# Patient Record
Sex: MALE | Race: WHITE | Employment: UNEMPLOYED | URBAN - METROPOLITAN AREA
[De-identification: names, ages, dates, MRNs, and addresses within clinical notes are randomized per-mention and may not be internally consistent; named-entity substitution may affect disease eponyms.]

---

## 2019-03-22 ENCOUNTER — APPOINTMENT (OUTPATIENT)
Dept: GENERAL RADIOLOGY | Age: 54
End: 2019-03-22
Attending: EMERGENCY MEDICINE
Payer: MEDICAID

## 2019-03-22 ENCOUNTER — HOSPITAL ENCOUNTER (EMERGENCY)
Age: 54
Discharge: HOME | End: 2019-03-22
Attending: EMERGENCY MEDICINE
Payer: MEDICAID

## 2019-03-22 VITALS
DIASTOLIC BLOOD PRESSURE: 88 MMHG | TEMPERATURE: 97.5 F | WEIGHT: 248 LBS | SYSTOLIC BLOOD PRESSURE: 133 MMHG | HEART RATE: 86 BPM | RESPIRATION RATE: 17 BRPM | HEIGHT: 72 IN | BODY MASS INDEX: 33.59 KG/M2 | OXYGEN SATURATION: 96 %

## 2019-03-22 DIAGNOSIS — S30.1XXA CONTUSION OF ABDOMINAL WALL, INITIAL ENCOUNTER: Primary | ICD-10-CM

## 2019-03-22 LAB
ALBUMIN SERPL-MCNC: 3.9 G/DL (ref 3.4–5)
ALBUMIN/GLOB SERPL: 1.1 {RATIO} (ref 0.8–1.7)
ALP SERPL-CCNC: 75 U/L (ref 45–117)
ALT SERPL-CCNC: 46 U/L (ref 16–61)
ANION GAP SERPL CALC-SCNC: 7 MMOL/L (ref 3–18)
APPEARANCE UR: CLEAR
AST SERPL-CCNC: 22 U/L (ref 15–37)
ATRIAL RATE: 77 BPM
BASOPHILS # BLD: 0 K/UL (ref 0–0.1)
BASOPHILS NFR BLD: 0 % (ref 0–2)
BILIRUB SERPL-MCNC: 0.4 MG/DL (ref 0.2–1)
BILIRUB UR QL: NEGATIVE
BUN SERPL-MCNC: 17 MG/DL (ref 7–18)
BUN/CREAT SERPL: 15 (ref 12–20)
CALCIUM SERPL-MCNC: 8.5 MG/DL (ref 8.5–10.1)
CALCULATED P AXIS, ECG09: 38 DEGREES
CALCULATED R AXIS, ECG10: -30 DEGREES
CALCULATED T AXIS, ECG11: 22 DEGREES
CHLORIDE SERPL-SCNC: 104 MMOL/L (ref 100–108)
CO2 SERPL-SCNC: 26 MMOL/L (ref 21–32)
COLOR UR: YELLOW
CREAT SERPL-MCNC: 1.14 MG/DL (ref 0.6–1.3)
DIAGNOSIS, 93000: NORMAL
DIFFERENTIAL METHOD BLD: NORMAL
EOSINOPHIL # BLD: 0.2 K/UL (ref 0–0.4)
EOSINOPHIL NFR BLD: 2 % (ref 0–5)
ERYTHROCYTE [DISTWIDTH] IN BLOOD BY AUTOMATED COUNT: 12.3 % (ref 11.6–14.5)
GLOBULIN SER CALC-MCNC: 3.6 G/DL (ref 2–4)
GLUCOSE SERPL-MCNC: 317 MG/DL (ref 74–99)
GLUCOSE UR STRIP.AUTO-MCNC: >1000 MG/DL
HCT VFR BLD AUTO: 42.4 % (ref 36–48)
HGB BLD-MCNC: 14.9 G/DL (ref 13–16)
HGB UR QL STRIP: NEGATIVE
KETONES UR QL STRIP.AUTO: NEGATIVE MG/DL
LEUKOCYTE ESTERASE UR QL STRIP.AUTO: NEGATIVE
LIPASE SERPL-CCNC: 282 U/L (ref 73–393)
LYMPHOCYTES # BLD: 3.5 K/UL (ref 0.9–3.6)
LYMPHOCYTES NFR BLD: 35 % (ref 21–52)
MCH RBC QN AUTO: 31.2 PG (ref 24–34)
MCHC RBC AUTO-ENTMCNC: 35.1 G/DL (ref 31–37)
MCV RBC AUTO: 88.7 FL (ref 74–97)
MONOCYTES # BLD: 0.6 K/UL (ref 0.05–1.2)
MONOCYTES NFR BLD: 6 % (ref 3–10)
NEUTS SEG # BLD: 5.5 K/UL (ref 1.8–8)
NEUTS SEG NFR BLD: 57 % (ref 40–73)
NITRITE UR QL STRIP.AUTO: NEGATIVE
P-R INTERVAL, ECG05: 186 MS
PH UR STRIP: 5 [PH] (ref 5–8)
PLATELET # BLD AUTO: 148 K/UL (ref 135–420)
PMV BLD AUTO: 10.6 FL (ref 9.2–11.8)
POTASSIUM SERPL-SCNC: 4 MMOL/L (ref 3.5–5.5)
PROT SERPL-MCNC: 7.5 G/DL (ref 6.4–8.2)
PROT UR STRIP-MCNC: NEGATIVE MG/DL
Q-T INTERVAL, ECG07: 366 MS
QRS DURATION, ECG06: 110 MS
QTC CALCULATION (BEZET), ECG08: 414 MS
RBC # BLD AUTO: 4.78 M/UL (ref 4.7–5.5)
SODIUM SERPL-SCNC: 137 MMOL/L (ref 136–145)
SP GR UR REFRACTOMETRY: >1.03 (ref 1–1.03)
UROBILINOGEN UR QL STRIP.AUTO: 0.2 EU/DL (ref 0.2–1)
VENTRICULAR RATE, ECG03: 77 BPM
WBC # BLD AUTO: 9.8 K/UL (ref 4.6–13.2)

## 2019-03-22 PROCEDURE — 71046 X-RAY EXAM CHEST 2 VIEWS: CPT

## 2019-03-22 PROCEDURE — 96374 THER/PROPH/DIAG INJ IV PUSH: CPT

## 2019-03-22 PROCEDURE — 74011000250 HC RX REV CODE- 250: Performed by: EMERGENCY MEDICINE

## 2019-03-22 PROCEDURE — 83690 ASSAY OF LIPASE: CPT

## 2019-03-22 PROCEDURE — 80053 COMPREHEN METABOLIC PANEL: CPT

## 2019-03-22 PROCEDURE — 93005 ELECTROCARDIOGRAM TRACING: CPT

## 2019-03-22 PROCEDURE — 99284 EMERGENCY DEPT VISIT MOD MDM: CPT

## 2019-03-22 PROCEDURE — 74011250636 HC RX REV CODE- 250/636: Performed by: EMERGENCY MEDICINE

## 2019-03-22 PROCEDURE — 85025 COMPLETE CBC W/AUTO DIFF WBC: CPT

## 2019-03-22 PROCEDURE — 81003 URINALYSIS AUTO W/O SCOPE: CPT

## 2019-03-22 RX ORDER — LIDOCAINE 50 MG/G
PATCH TOPICAL
Qty: 5 EACH | Refills: 0 | Status: SHIPPED | OUTPATIENT
Start: 2019-03-22

## 2019-03-22 RX ORDER — LIDOCAINE 4 G/100G
1 PATCH TOPICAL EVERY 24 HOURS
Status: DISCONTINUED | OUTPATIENT
Start: 2019-03-22 | End: 2019-03-22 | Stop reason: HOSPADM

## 2019-03-22 RX ORDER — KETOROLAC TROMETHAMINE 30 MG/ML
30 INJECTION, SOLUTION INTRAMUSCULAR; INTRAVENOUS
Status: COMPLETED | OUTPATIENT
Start: 2019-03-22 | End: 2019-03-22

## 2019-03-22 RX ADMIN — KETOROLAC TROMETHAMINE 30 MG: 30 INJECTION, SOLUTION INTRAMUSCULAR; INTRAVENOUS at 15:14

## 2019-03-22 RX ADMIN — SODIUM CHLORIDE 500 ML: 900 INJECTION, SOLUTION INTRAVENOUS at 15:14

## 2019-03-22 NOTE — ED TRIAGE NOTES
3 days of upper right abdominal pain under rib cage. Diarrhea. Denies any injury. C/o heartburn. 7 days ago stopped smoking

## 2019-03-22 NOTE — ED NOTES
I have reviewed discharge instruction and prescriptions with patient. Patient verbalized understanding and has no further questions at this time. Education taught and patient verbalized understanding of education. Teach back method used. Left ac IV removed, catheter tip intact on removal.  Armband removed and shredded per patients request.   
Patients pain 3/10. Belongings given to patient. Patient discharged with self to home.

## 2019-03-22 NOTE — ED PROVIDER NOTES
EMERGENCY DEPARTMENT HISTORY AND PHYSICAL EXAM 
 
2:36 PM 
 
 
Date: 3/22/2019 Patient Name: Driss Narayan History of Presenting Illness Chief Complaint Patient presents with  Abdominal Pain  Rib Pain History Provided By: Patient Additional History (Context): Driss Narayan is a 52 y.o. male who presents with right upper quadrant pain times 1 day worse with movement worse with taking a deep breath denies nausea vomiting denies any increased discomfort with eating does have increased discomfort with movement denies cough fevers chills or shortness of breath. Denies any trauma PCP: None Chief Complaint: Right upper quadrant pain Current Facility-Administered Medications Medication Dose Route Frequency Provider Last Rate Last Dose  lidocaine 4 % patch 1 Patch  1 Patch TransDERmal Q24H Sandhya Pardo MD      
 
Current Outpatient Medications Medication Sig Dispense Refill  lidocaine (LIDODERM) 5 % Apply patch to the affected area for 12 hours a day and remove for 12 hours a day. 5 Each 0 Past History Past Medical History: 
History reviewed. No pertinent past medical history. Past Surgical History: 
History reviewed. No pertinent surgical history. Family History: 
History reviewed. No pertinent family history. Social History: 
Social History Tobacco Use  Smoking status: Former Smoker  Smokeless tobacco: Never Used Substance Use Topics  Alcohol use: Not on file  Drug use: Not on file Allergies: 
No Known Allergies Review of Systems Review of Systems Constitutional: Negative for chills, diaphoresis, fatigue and fever. Respiratory: Negative for cough, chest tightness and shortness of breath. Cardiovascular: Negative for chest pain. Gastrointestinal: Negative for abdominal pain, nausea and vomiting. Genitourinary: Negative for dysuria and flank pain. Musculoskeletal: Negative for back pain. Skin: Negative for rash. Neurological: Negative for dizziness, syncope, weakness and headaches. Hematological: Negative. All other systems reviewed and are negative. Physical Exam  
 
Visit Vitals /88 (BP 1 Location: Right arm, BP Patient Position: At rest) Pulse 86 Temp 97.5 °F (36.4 °C) Resp 17 Ht 6' (1.829 m) Wt 112.5 kg (248 lb) SpO2 96% BMI 33.63 kg/m² Physical Exam  
Constitutional: He is oriented to person, place, and time. He appears well-developed and well-nourished. No distress. HENT:  
Head: Normocephalic and atraumatic. Mouth/Throat: Oropharynx is clear and moist.  
Eyes: Pupils are equal, round, and reactive to light. Conjunctivae and EOM are normal. No scleral icterus. Neck: Normal range of motion. Neck supple. Cardiovascular: Normal rate, regular rhythm and normal heart sounds. No murmur heard. Pulmonary/Chest: Effort normal and breath sounds normal. No respiratory distress. Abdominal: Soft. Bowel sounds are normal. He exhibits no distension. There is no tenderness. Mild right upper quadrant tender tenderness negative Fitzgerald sign Musculoskeletal: He exhibits no edema. Lymphadenopathy:  
  He has no cervical adenopathy. Neurological: He is alert and oriented to person, place, and time. Coordination normal.  
Skin: Skin is warm and dry. No rash noted. Psychiatric: He has a normal mood and affect. His behavior is normal.  
Nursing note and vitals reviewed. Diagnostic Study Results Labs - Recent Results (from the past 12 hour(s)) EKG, 12 LEAD, INITIAL Collection Time: 03/22/19  1:23 PM  
Result Value Ref Range Ventricular Rate 77 BPM  
 Atrial Rate 77 BPM  
 P-R Interval 186 ms QRS Duration 110 ms  
 Q-T Interval 366 ms  
 QTC Calculation (Bezet) 414 ms Calculated P Axis 38 degrees Calculated R Axis -30 degrees Calculated T Axis 22 degrees Diagnosis Normal sinus rhythm Left axis deviation Abnormal ECG No previous ECGs available Confirmed by Latasha Augustin ([id number]) on 3/22/2019 3:14:00 PM 
  
CBC WITH AUTOMATED DIFF Collection Time: 03/22/19  2:00 PM  
Result Value Ref Range WBC 9.8 4.6 - 13.2 K/uL  
 RBC 4.78 4.70 - 5.50 M/uL  
 HGB 14.9 13.0 - 16.0 g/dL HCT 42.4 36.0 - 48.0 % MCV 88.7 74.0 - 97.0 FL  
 MCH 31.2 24.0 - 34.0 PG  
 MCHC 35.1 31.0 - 37.0 g/dL  
 RDW 12.3 11.6 - 14.5 % PLATELET 802 458 - 060 K/uL MPV 10.6 9.2 - 11.8 FL  
 NEUTROPHILS 57 40 - 73 % LYMPHOCYTES 35 21 - 52 % MONOCYTES 6 3 - 10 % EOSINOPHILS 2 0 - 5 % BASOPHILS 0 0 - 2 %  
 ABS. NEUTROPHILS 5.5 1.8 - 8.0 K/UL  
 ABS. LYMPHOCYTES 3.5 0.9 - 3.6 K/UL  
 ABS. MONOCYTES 0.6 0.05 - 1.2 K/UL  
 ABS. EOSINOPHILS 0.2 0.0 - 0.4 K/UL  
 ABS. BASOPHILS 0.0 0.0 - 0.1 K/UL  
 DF AUTOMATED METABOLIC PANEL, COMPREHENSIVE Collection Time: 03/22/19  2:00 PM  
Result Value Ref Range Sodium 137 136 - 145 mmol/L Potassium 4.0 3.5 - 5.5 mmol/L Chloride 104 100 - 108 mmol/L  
 CO2 26 21 - 32 mmol/L Anion gap 7 3.0 - 18 mmol/L Glucose 317 (H) 74 - 99 mg/dL BUN 17 7.0 - 18 MG/DL Creatinine 1.14 0.6 - 1.3 MG/DL  
 BUN/Creatinine ratio 15 12 - 20 GFR est AA >60 >60 ml/min/1.73m2 GFR est non-AA >60 >60 ml/min/1.73m2 Calcium 8.5 8.5 - 10.1 MG/DL Bilirubin, total 0.4 0.2 - 1.0 MG/DL  
 ALT (SGPT) 46 16 - 61 U/L  
 AST (SGOT) 22 15 - 37 U/L Alk. phosphatase 75 45 - 117 U/L Protein, total 7.5 6.4 - 8.2 g/dL Albumin 3.9 3.4 - 5.0 g/dL Globulin 3.6 2.0 - 4.0 g/dL A-G Ratio 1.1 0.8 - 1.7 LIPASE Collection Time: 03/22/19  2:00 PM  
Result Value Ref Range Lipase 282 73 - 393 U/L Radiologic Studies -  
XR CHEST PA LAT Final Result IMPRESSION:  
  
No acute pulmonary process identified. Medical Decision Making I am the first provider for this patient.  
 
I reviewed the vital signs, available nursing notes, past medical history, past surgical history, family history and social history. Vital Signs-Reviewed the patient's vital signs. EKG: 
 
Records Reviewed: Nursing Notes and Old Medical Records (Time of Review: 2:36 PM) 
 
ED Course: Progress Notes, Reevaluation, and Consults: 
 
 
Provider Notes (Medical Decision Making): MDM Number of Diagnoses or Management Options Contusion of abdominal wall, initial encounter:  
Diagnosis management comments: R UQ pain worse with movement no n/v/d no radiations mild tenderness to R UQ no owen's sign 3:53 PM 
On reevaluation labs are negative white count and LFTs are normal after Toradol patient is sitting up does not appear to be in distress on reevaluation of his abdomen there is a specific area of tenderness to the abdominal wall at the attachment of the ribs in the anterior axillary line there is no evidence of a hematoma there is no evidence of infection and there was no tenderness surrounding the remainder of the abdomen lidocaine patch for pain Diagnosis Clinical Impression: 1. Contusion of abdominal wall, initial encounter Disposition: home Follow-up Information Follow up With Specialties Details Why Contact formerly [facility] EMERGENCY DEPT Emergency Medicine  As needed, If symptoms worsen [address] [facility] 
[phone] Your Doctor for recheck this week Nancy  Schedule an appointment as soon as possible for a visit for ED follow up appointment  [address] [place] [address]) [phone] Patient's Medications Start Taking LIDOCAINE (LIDODERM) 5 %    Apply patch to the affected area for 12 hours a day and remove for 12 hours a day. Continue Taking No medications on file These Medications have changed No medications on file Stop Taking No medications on file  
 
_______________________________

## 2019-11-03 ENCOUNTER — HOSPITAL ENCOUNTER (EMERGENCY)
Age: 54
Discharge: HOME | End: 2019-11-04
Attending: EMERGENCY MEDICINE | Admitting: EMERGENCY MEDICINE
Payer: MEDICAID

## 2019-11-03 ENCOUNTER — APPOINTMENT (OUTPATIENT)
Dept: CT IMAGING | Age: 54
End: 2019-11-03
Attending: EMERGENCY MEDICINE
Payer: MEDICAID

## 2019-11-03 ENCOUNTER — APPOINTMENT (OUTPATIENT)
Dept: MRI IMAGING | Age: 54
End: 2019-11-03
Attending: EMERGENCY MEDICINE
Payer: MEDICAID

## 2019-11-03 DIAGNOSIS — I26.99 OTHER ACUTE PULMONARY EMBOLISM WITHOUT ACUTE COR PULMONALE (HCC): ICD-10-CM

## 2019-11-03 DIAGNOSIS — R10.13 ABDOMINAL PAIN, ACUTE, EPIGASTRIC: ICD-10-CM

## 2019-11-03 DIAGNOSIS — K86.89 PANCREATIC MASS: Primary | ICD-10-CM

## 2019-11-03 LAB
ALBUMIN SERPL-MCNC: 3.9 G/DL (ref 3.4–5)
ALBUMIN/GLOB SERPL: 1 {RATIO} (ref 0.8–1.7)
ALP SERPL-CCNC: 120 U/L (ref 45–117)
ALT SERPL-CCNC: 21 U/L (ref 16–61)
ANION GAP SERPL CALC-SCNC: 5 MMOL/L (ref 3–18)
AST SERPL-CCNC: 18 U/L (ref 10–38)
BASOPHILS # BLD: 0 K/UL (ref 0–0.1)
BASOPHILS NFR BLD: 0 % (ref 0–2)
BILIRUB SERPL-MCNC: 0.7 MG/DL (ref 0.2–1)
BUN SERPL-MCNC: 14 MG/DL (ref 7–18)
BUN/CREAT SERPL: 16 (ref 12–20)
CALCIUM SERPL-MCNC: 9 MG/DL (ref 8.5–10.1)
CHLORIDE SERPL-SCNC: 102 MMOL/L (ref 100–111)
CK MB CFR SERPL CALC: 1.5 % (ref 0–4)
CK MB SERPL-MCNC: 1.4 NG/ML (ref 5–25)
CK SERPL-CCNC: 94 U/L (ref 39–308)
CO2 SERPL-SCNC: 29 MMOL/L (ref 21–32)
CREAT SERPL-MCNC: 0.86 MG/DL (ref 0.6–1.3)
DIFFERENTIAL METHOD BLD: ABNORMAL
EOSINOPHIL # BLD: 0.2 K/UL (ref 0–0.4)
EOSINOPHIL NFR BLD: 2 % (ref 0–5)
ERYTHROCYTE [DISTWIDTH] IN BLOOD BY AUTOMATED COUNT: 13 % (ref 11.6–14.5)
GLOBULIN SER CALC-MCNC: 4 G/DL (ref 2–4)
GLUCOSE SERPL-MCNC: 199 MG/DL (ref 74–99)
HCT VFR BLD AUTO: 39.2 % (ref 36–48)
HGB BLD-MCNC: 13.3 G/DL (ref 13–16)
LIPASE SERPL-CCNC: 152 U/L (ref 73–393)
LYMPHOCYTES # BLD: 2.8 K/UL (ref 0.9–3.6)
LYMPHOCYTES NFR BLD: 29 % (ref 21–52)
MCH RBC QN AUTO: 29.9 PG (ref 24–34)
MCHC RBC AUTO-ENTMCNC: 33.9 G/DL (ref 31–37)
MCV RBC AUTO: 88.1 FL (ref 74–97)
MONOCYTES # BLD: 0.8 K/UL (ref 0.05–1.2)
MONOCYTES NFR BLD: 8 % (ref 3–10)
NEUTS SEG # BLD: 5.8 K/UL (ref 1.8–8)
NEUTS SEG NFR BLD: 61 % (ref 40–73)
PLATELET # BLD AUTO: 111 K/UL (ref 135–420)
PMV BLD AUTO: 10.3 FL (ref 9.2–11.8)
POTASSIUM SERPL-SCNC: 3.8 MMOL/L (ref 3.5–5.5)
PROT SERPL-MCNC: 7.9 G/DL (ref 6.4–8.2)
RBC # BLD AUTO: 4.45 M/UL (ref 4.7–5.5)
SODIUM SERPL-SCNC: 136 MMOL/L (ref 136–145)
TROPONIN I SERPL-MCNC: 0.11 NG/ML (ref 0–0.04)
TROPONIN I SERPL-MCNC: 0.11 NG/ML (ref 0–0.04)
WBC # BLD AUTO: 9.5 K/UL (ref 4.6–13.2)

## 2019-11-03 PROCEDURE — 82550 ASSAY OF CK (CPK): CPT

## 2019-11-03 PROCEDURE — 96374 THER/PROPH/DIAG INJ IV PUSH: CPT

## 2019-11-03 PROCEDURE — 83690 ASSAY OF LIPASE: CPT

## 2019-11-03 PROCEDURE — 77030021566 MRI ABD W MRCP W WO CONT

## 2019-11-03 PROCEDURE — 93005 ELECTROCARDIOGRAM TRACING: CPT

## 2019-11-03 PROCEDURE — 74011000250 HC RX REV CODE- 250: Performed by: EMERGENCY MEDICINE

## 2019-11-03 PROCEDURE — 74011636320 HC RX REV CODE- 636/320: Performed by: EMERGENCY MEDICINE

## 2019-11-03 PROCEDURE — 80053 COMPREHEN METABOLIC PANEL: CPT

## 2019-11-03 PROCEDURE — 85025 COMPLETE CBC W/AUTO DIFF WBC: CPT

## 2019-11-03 PROCEDURE — 74011250637 HC RX REV CODE- 250/637: Performed by: EMERGENCY MEDICINE

## 2019-11-03 PROCEDURE — A9575 INJ GADOTERATE MEGLUMI 0.1ML: HCPCS | Performed by: EMERGENCY MEDICINE

## 2019-11-03 PROCEDURE — 96375 TX/PRO/DX INJ NEW DRUG ADDON: CPT

## 2019-11-03 PROCEDURE — 99284 EMERGENCY DEPT VISIT MOD MDM: CPT

## 2019-11-03 PROCEDURE — 74177 CT ABD & PELVIS W/CONTRAST: CPT

## 2019-11-03 PROCEDURE — 74011250636 HC RX REV CODE- 250/636: Performed by: EMERGENCY MEDICINE

## 2019-11-03 RX ORDER — PHENOL/SODIUM PHENOLATE
20 AEROSOL, SPRAY (ML) MUCOUS MEMBRANE DAILY
Qty: 30 TAB | Refills: 3 | Status: SHIPPED | OUTPATIENT
Start: 2019-11-03 | End: 2019-11-12

## 2019-11-03 RX ORDER — HYDROCODONE BITARTRATE AND ACETAMINOPHEN 5; 325 MG/1; MG/1
1 TABLET ORAL
Qty: 20 TAB | Refills: 0 | Status: SHIPPED | OUTPATIENT
Start: 2019-11-03 | End: 2019-11-10

## 2019-11-03 RX ORDER — MORPHINE SULFATE 2 MG/ML
2 INJECTION, SOLUTION INTRAMUSCULAR; INTRAVENOUS
Status: COMPLETED | OUTPATIENT
Start: 2019-11-03 | End: 2019-11-03

## 2019-11-03 RX ORDER — ONDANSETRON 4 MG/1
4 TABLET, ORALLY DISINTEGRATING ORAL
Qty: 20 TAB | Refills: 0 | Status: SHIPPED | OUTPATIENT
Start: 2019-11-03 | End: 2020-02-12

## 2019-11-03 RX ORDER — FAMOTIDINE 10 MG/ML
20 INJECTION INTRAVENOUS
Status: COMPLETED | OUTPATIENT
Start: 2019-11-03 | End: 2019-11-03

## 2019-11-03 RX ORDER — ONDANSETRON 2 MG/ML
4 INJECTION INTRAMUSCULAR; INTRAVENOUS
Status: COMPLETED | OUTPATIENT
Start: 2019-11-03 | End: 2019-11-03

## 2019-11-03 RX ORDER — GADOTERATE MEGLUMINE 376.9 MG/ML
20 INJECTION INTRAVENOUS
Status: COMPLETED | OUTPATIENT
Start: 2019-11-03 | End: 2019-11-03

## 2019-11-03 RX ORDER — NAPROXEN 500 MG/1
500 TABLET ORAL 2 TIMES DAILY WITH MEALS
Qty: 20 TAB | Refills: 0 | Status: SHIPPED | OUTPATIENT
Start: 2019-11-03 | End: 2019-11-15

## 2019-11-03 RX ORDER — HYDROCODONE BITARTRATE AND ACETAMINOPHEN 7.5; 325 MG/1; MG/1
1 TABLET ORAL
Status: COMPLETED | OUTPATIENT
Start: 2019-11-03 | End: 2019-11-03

## 2019-11-03 RX ADMIN — MORPHINE SULFATE 2 MG: 2 INJECTION, SOLUTION INTRAMUSCULAR; INTRAVENOUS at 18:44

## 2019-11-03 RX ADMIN — GADOTERATE MEGLUMINE 20 ML: 376.9 INJECTION INTRAVENOUS at 22:46

## 2019-11-03 RX ADMIN — LIDOCAINE HYDROCHLORIDE 40 ML: 20 SOLUTION ORAL; TOPICAL at 18:42

## 2019-11-03 RX ADMIN — FAMOTIDINE 20 MG: 10 INJECTION, SOLUTION INTRAVENOUS at 18:39

## 2019-11-03 RX ADMIN — HYDROCODONE BITARTRATE AND ACETAMINOPHEN 1 TABLET: 7.5; 325 TABLET ORAL at 23:48

## 2019-11-03 RX ADMIN — IOPAMIDOL 100 ML: 612 INJECTION, SOLUTION INTRAVENOUS at 19:19

## 2019-11-03 RX ADMIN — ONDANSETRON 4 MG: 2 INJECTION INTRAMUSCULAR; INTRAVENOUS at 18:39

## 2019-11-03 RX ADMIN — SODIUM CHLORIDE 1000 ML: 900 INJECTION, SOLUTION INTRAVENOUS at 18:38

## 2019-11-03 NOTE — ED PROVIDER NOTES
Sulma Valera is a 62 y.o. Male with history of insulin diabetes with complaints of upper mid sharp abdominal pain has been constant for the last 5 days. Pain is worse after eating within 5 minutes. Patient tried taking laxatives without sniffing relief but did have diarrhea after taking those. He denies any blood in stool or melena. He has had no fever. He is never had pain like this before in the past.  He is never had any significant abdominal issues to biliary, peptic ulcer disease, pancreatic. He denies any alcohol use. The history is provided by the patient and medical records. History reviewed. No pertinent past medical history. History reviewed. No pertinent surgical history. History reviewed. No pertinent family history.     Social History     Socioeconomic History    Marital status:      Spouse name: Not on file    Number of children: Not on file    Years of education: Not on file    Highest education level: Not on file   Occupational History    Not on file   Social Needs    Financial resource strain: Not on file    Food insecurity:     Worry: Not on file     Inability: Not on file    Transportation needs:     Medical: Not on file     Non-medical: Not on file   Tobacco Use    Smoking status: Former Smoker    Smokeless tobacco: Never Used   Substance and Sexual Activity    Alcohol use: Not on file    Drug use: Not on file    Sexual activity: Not on file   Lifestyle    Physical activity:     Days per week: Not on file     Minutes per session: Not on file    Stress: Not on file   Relationships    Social connections:     Talks on phone: Not on file     Gets together: Not on file     Attends [employer] service: Not on file     Active member of club or organization: Not on file     Attends meetings of clubs or organizations: Not on file     Relationship status: Not on file    Intimate partner violence:     Fear of current or ex partner: Not on file     Emotionally abused: Not on file     Physically abused: Not on file     Forced sexual activity: Not on file   Other Topics Concern    Not on file   Social History Narrative    Not on file         ALLERGIES: Patient has no known allergies. Review of Systems   Constitutional: Negative for fever. HENT: Negative for sore throat and trouble swallowing. Eyes: Negative for visual disturbance. Respiratory: Negative for shortness of breath. Cardiovascular: Negative for chest pain. Gastrointestinal: Positive for abdominal pain. Negative for blood in stool and constipation. Genitourinary: Negative for difficulty urinating, flank pain and hematuria. Musculoskeletal: Negative for gait problem. Skin: Negative for rash. Allergic/Immunologic: Negative for immunocompromised state. Neurological: Negative for syncope. Psychiatric/Behavioral: Positive for sleep disturbance. Vitals:    11/03/19 1806 11/03/19 1810 11/03/19 1820 11/03/19 1830   BP:  122/85 115/80 123/70   Pulse: 81 81 76 78   Resp: 15 13 11 15   Temp:       SpO2: 99% 99% 98% 98%            Physical Exam   Constitutional: He is oriented to person, place, and time. He appears well-developed and well-nourished. Non-toxic appearance. He does not appear ill. No distress. HENT:   Head: Normocephalic and atraumatic. Right Ear: External ear normal.   Left Ear: External ear normal.   Nose: Nose normal.   Mouth/Throat: Oropharynx is clear and moist. No oropharyngeal exudate. Eyes: Conjunctivae are normal.   Neck: Normal range of motion. Cardiovascular: Normal rate, regular rhythm, normal heart sounds and intact distal pulses. Pulmonary/Chest: Effort normal and breath sounds normal. No respiratory distress. Abdominal: Soft. Normal appearance. He exhibits no pulsatile midline mass. There is no hepatosplenomegaly. There is tenderness in the epigastric area.  There is no rigidity, no rebound, no guarding, no CVA tenderness, no tenderness at McBurney's point and negative Fitzgerald's sign. Musculoskeletal: Normal range of motion. He exhibits no edema. Neurological: He is alert and oriented to person, place, and time. Skin: Skin is warm and dry. Capillary refill takes less than 2 seconds. He is not diaphoretic. Psychiatric: His behavior is normal.   Nursing note and vitals reviewed.        MDM       Procedures  Vitals:  Patient Vitals for the past 12 hrs:   Temp Pulse Resp BP SpO2   11/03/19 1830  78 15 123/70 98 %   11/03/19 1820  76 11 115/80 98 %   11/03/19 1810  81 13 122/85 99 %   11/03/19 1806  81 15  99 %   11/03/19 1805  81 15 127/87    11/03/19 1745 98.6 °F (37 °C) 75 18 (!) 138/98 100 %         Medications ordered:   Medications   sodium chloride 0.9 % bolus infusion 1,000 mL (1,000 mL IntraVENous New Bag 11/3/19 1838)   mylanta/viscous lidocaine (GI COCKTAIL) (40 mL Oral Given 11/3/19 1842)   famotidine (PF) (PEPCID) injection 20 mg (20 mg IntraVENous Given 11/3/19 1839)   morphine injection 2 mg (2 mg IntraVENous Given 11/3/19 1844)   ondansetron (ZOFRAN) injection 4 mg (4 mg IntraVENous Given 11/3/19 1839)   iopamidol (ISOVUE 300) 61 % contrast injection 100 mL (100 mL IntraVENous Given 11/3/19 1919)         Lab findings:  Recent Results (from the past 12 hour(s))   EKG, 12 LEAD, INITIAL    Collection Time: 11/03/19  6:08 PM   Result Value Ref Range    Ventricular Rate 78 BPM    Atrial Rate 78 BPM    P-R Interval 188 ms    QRS Duration 100 ms    Q-T Interval 372 ms    QTC Calculation (Bezet) 424 ms    Calculated P Axis 33 degrees    Calculated R Axis -27 degrees    Calculated T Axis 19 degrees    Diagnosis       Normal sinus rhythm  Normal ECG  When compared with ECG of 22-MAR-2019 13:23,  No significant change was found     CBC WITH AUTOMATED DIFF    Collection Time: 11/03/19  6:23 PM   Result Value Ref Range    WBC 9.5 4.6 - 13.2 K/uL    RBC 4.45 (L) 4.70 - 5.50 M/uL    HGB 13.3 13.0 - 16.0 g/dL    HCT 39.2 36.0 - 48.0 %    MCV 88.1 74.0 - 97.0 FL    MCH 29.9 24.0 - 34.0 PG    MCHC 33.9 31.0 - 37.0 g/dL    RDW 13.0 11.6 - 14.5 %    PLATELET 528 (L) 289 - 420 K/uL    MPV 10.3 9.2 - 11.8 FL    NEUTROPHILS 61 40 - 73 %    LYMPHOCYTES 29 21 - 52 %    MONOCYTES 8 3 - 10 %    EOSINOPHILS 2 0 - 5 %    BASOPHILS 0 0 - 2 %    ABS. NEUTROPHILS 5.8 1.8 - 8.0 K/UL    ABS. LYMPHOCYTES 2.8 0.9 - 3.6 K/UL    ABS. MONOCYTES 0.8 0.05 - 1.2 K/UL    ABS. EOSINOPHILS 0.2 0.0 - 0.4 K/UL    ABS. BASOPHILS 0.0 0.0 - 0.1 K/UL    DF AUTOMATED     METABOLIC PANEL, COMPREHENSIVE    Collection Time: 11/03/19  6:23 PM   Result Value Ref Range    Sodium 136 136 - 145 mmol/L    Potassium 3.8 3.5 - 5.5 mmol/L    Chloride 102 100 - 111 mmol/L    CO2 29 21 - 32 mmol/L    Anion gap 5 3.0 - 18 mmol/L    Glucose 199 (H) 74 - 99 mg/dL    BUN 14 7.0 - 18 MG/DL    Creatinine 0.86 0.6 - 1.3 MG/DL    BUN/Creatinine ratio 16 12 - 20      GFR est AA >60 >60 ml/min/1.73m2    GFR est non-AA >60 >60 ml/min/1.73m2    Calcium 9.0 8.5 - 10.1 MG/DL    Bilirubin, total 0.7 0.2 - 1.0 MG/DL    ALT (SGPT) 21 16 - 61 U/L    AST (SGOT) 18 10 - 38 U/L    Alk.  phosphatase 120 (H) 45 - 117 U/L    Protein, total 7.9 6.4 - 8.2 g/dL    Albumin 3.9 3.4 - 5.0 g/dL    Globulin 4.0 2.0 - 4.0 g/dL    A-G Ratio 1.0 0.8 - 1.7     LIPASE    Collection Time: 11/03/19  6:23 PM   Result Value Ref Range    Lipase 152 73 - 393 U/L   CARDIAC PANEL,(CK, CKMB & TROPONIN)    Collection Time: 11/03/19  6:23 PM   Result Value Ref Range    CK 94 39 - 308 U/L    CK - MB 1.4 <3.6 ng/ml    CK-MB Index 1.5 0.0 - 4.0 %    Troponin-I, QT 0.11 (H) 0.0 - 0.045 NG/ML       EKG interpretation by ED Physician:  Normal sinus rhythm with no acute ST or T wave changes  Normal EKG  Rate 78    No significant change from previous EKG    X-Ray, CT or other radiology findings or impressions:  CT ABD PELV W CONT    (Results Pending)   MRI ABD W MRCP W WO CONT    (Results Pending)   CT abdomen pelvis: Hypoenhancing mass in the pancreatic body measuring 4.7 x 2.8 cm which encases and narrows the splenic artery with surrounding local alex-pancreatic fat infiltration. No other vascular involvement. This is highly concerning for primary pancreatic malignancy. Multiple hepatic hypodensities of varying sizes concerning for metastatic disease. Elongated hypodensity in the posterior spleen measuring up to 3.3 cm possibly focus of metastatic disease or infarct. Enlarged portacaval node measuring up to 1.5 cm concerning for metastatic disease    MRI abdomen: Large peripherally enhancing hyperintense mass in the body and tail the pancreas measuring up to 5 cm with upstream atrophy and pancreatic duct dilatation concerning for neoplasm. Numerous peripheral enhancing hyperintense masses seen throughout the liver concerning for metastatic disease. No significant biliary dilatation or obstruction. Tiny cyst within both kidneys. Spinal megaly with a small nonenhancing lesion demonstrates increased heterogeneity within the spleen    Progress notes, Consult notes or additional Procedure notes:   Discussed with patient, grandmother and sister regarding the results and this is highly likely pancreatic cancer. Will get MRI tonight to further evaluate for staging. Discussed with patient need for close follow-up with oncology this week. Do not feel patient requires admission at this time    I have discussed with patient and/or family/sig other the results, interpretation of any imaging if performed, suspected diagnosis and treatment plan to include instructions regarding the diagnoses listed to which understanding was expressed with all questions answered    Reevaluation of patient:   stable    Disposition:  Diagnosis:   1. Pancreatic mass    2.  Abdominal pain, acute, epigastric        Disposition: home      Follow-up Information    None           Patient's Medications   Start Taking    No medications on file   Continue Taking    LIDOCAINE (LIDODERM) 5 % Apply patch to the affected area for 12 hours a day and remove for 12 hours a day. These Medications have changed    No medications on file   Stop Taking    No medications on file       3:11 AM\  , Radiologist noted there is an over read patient is small right lower subsegmental PE.     Attempted to call patient but no answer and no answer when called emergency contact

## 2019-11-03 NOTE — ED TRIAGE NOTES
Severe upper abdominal pain started yesterday. Took 4 \"poop\" pills   Had some diarrhea but no relief from pain.

## 2019-11-04 VITALS
OXYGEN SATURATION: 95 % | SYSTOLIC BLOOD PRESSURE: 113 MMHG | DIASTOLIC BLOOD PRESSURE: 76 MMHG | TEMPERATURE: 98.2 F | RESPIRATION RATE: 15 BRPM | HEART RATE: 70 BPM

## 2019-11-04 LAB
ATRIAL RATE: 78 BPM
CALCULATED P AXIS, ECG09: 33 DEGREES
CALCULATED R AXIS, ECG10: -27 DEGREES
CALCULATED T AXIS, ECG11: 19 DEGREES
DIAGNOSIS, 93000: NORMAL
P-R INTERVAL, ECG05: 188 MS
Q-T INTERVAL, ECG07: 372 MS
QRS DURATION, ECG06: 100 MS
QTC CALCULATION (BEZET), ECG08: 424 MS
VENTRICULAR RATE, ECG03: 78 BPM

## 2019-11-04 NOTE — DISCHARGE INSTRUCTIONS

## 2019-11-04 NOTE — ED NOTES
Left message with pt on voicemail regarding PE and need for xarelto.    rx left for pt at registration  Kirk Sweeney MD

## 2019-11-04 NOTE — ED NOTES
12:25 AM  11/04/19     Discharge instructions given to patient (name) with verbalization of understanding. Patient accompanied by family. Patient discharged with the following prescriptions several. Patient discharged to home (destination).       Denton Weiss RN

## 2019-11-08 PROBLEM — C78.7 LIVER METASTASIS (HCC): Status: ACTIVE | Noted: 2019-11-08

## 2019-11-08 PROBLEM — K86.89 PANCREATIC MASS: Status: ACTIVE | Noted: 2019-11-08

## 2019-11-08 PROBLEM — R10.9 ABDOMINAL PAIN: Status: ACTIVE | Noted: 2019-11-08

## 2019-11-08 RX ORDER — INSULIN GLARGINE 100 [IU]/ML
INJECTION, SOLUTION SUBCUTANEOUS
Refills: 6 | COMMUNITY
Start: 2019-10-21 | End: 2019-11-27

## 2019-11-08 RX ORDER — BLOOD-GLUCOSE METER
EACH MISCELLANEOUS
Refills: 0 | COMMUNITY
Start: 2019-11-04

## 2019-11-08 RX ORDER — NAPROXEN 500 MG/1
TABLET ORAL
Refills: 0 | COMMUNITY
Start: 2019-11-04 | End: 2019-11-12

## 2019-11-08 RX ORDER — ONDANSETRON 4 MG/1
TABLET, ORALLY DISINTEGRATING ORAL
Refills: 0 | COMMUNITY
Start: 2019-11-04 | End: 2019-11-12

## 2019-11-08 RX ORDER — HYDROCODONE BITARTRATE AND ACETAMINOPHEN 5; 325 MG/1; MG/1
TABLET ORAL
Refills: 0 | COMMUNITY
Start: 2019-11-04 | End: 2019-11-19 | Stop reason: ALTCHOICE

## 2019-11-08 RX ORDER — RIVAROXABAN 15 MG-20MG
KIT ORAL DAILY
COMMUNITY
Start: 2019-11-05 | End: 2019-11-22

## 2019-11-08 RX ORDER — PEN NEEDLE, DIABETIC 29 G X1/2"
NEEDLE, DISPOSABLE MISCELLANEOUS
Refills: 3 | COMMUNITY
Start: 2019-10-21 | End: 2019-11-12

## 2019-11-08 RX ORDER — OMEPRAZOLE 20 MG/1
CAPSULE, DELAYED RELEASE ORAL
Refills: 3 | COMMUNITY
Start: 2019-11-04

## 2019-11-08 RX ORDER — ENOXAPARIN SODIUM 100 MG/ML
INJECTION SUBCUTANEOUS
COMMUNITY
Start: 2019-11-06 | End: 2019-11-22

## 2019-11-08 RX ORDER — METFORMIN HYDROCHLORIDE 500 MG/1
TABLET, EXTENDED RELEASE ORAL
Refills: 0 | COMMUNITY
Start: 2019-10-08 | End: 2019-11-22

## 2019-11-08 RX ORDER — GLIPIZIDE 10 MG/1
TABLET ORAL
Refills: 1 | COMMUNITY
Start: 2019-10-11 | End: 2019-11-22

## 2019-11-08 RX ORDER — QUETIAPINE FUMARATE 50 MG/1
50 TABLET, FILM COATED ORAL
COMMUNITY
Start: 2019-08-03

## 2019-11-09 ENCOUNTER — HOSPITAL ENCOUNTER (EMERGENCY)
Age: 54
Discharge: HOME | End: 2019-11-09
Attending: EMERGENCY MEDICINE
Payer: MEDICAID

## 2019-11-09 ENCOUNTER — APPOINTMENT (OUTPATIENT)
Dept: GENERAL RADIOLOGY | Age: 54
End: 2019-11-09
Attending: PHYSICIAN ASSISTANT
Payer: MEDICAID

## 2019-11-09 VITALS
TEMPERATURE: 98.2 F | WEIGHT: 205 LBS | HEART RATE: 98 BPM | DIASTOLIC BLOOD PRESSURE: 77 MMHG | SYSTOLIC BLOOD PRESSURE: 108 MMHG | OXYGEN SATURATION: 97 % | RESPIRATION RATE: 16 BRPM | BODY MASS INDEX: 27.77 KG/M2 | HEIGHT: 72 IN

## 2019-11-09 DIAGNOSIS — M25.00 HEMARTHROSIS: Primary | ICD-10-CM

## 2019-11-09 LAB
ALBUMIN SERPL-MCNC: 4 G/DL (ref 3.4–5)
ALBUMIN/GLOB SERPL: 1.1 {RATIO} (ref 0.8–1.7)
ALP SERPL-CCNC: 155 U/L (ref 45–117)
ALT SERPL-CCNC: 23 U/L (ref 16–61)
ANION GAP SERPL CALC-SCNC: 6 MMOL/L (ref 3–18)
APPEARANCE FLD: ABNORMAL
APTT PPP: 31.1 SEC (ref 23–36.4)
AST SERPL-CCNC: 16 U/L (ref 10–38)
BASOPHILS # BLD: 0 K/UL (ref 0–0.1)
BASOPHILS NFR BLD: 0 % (ref 0–2)
BILIRUB SERPL-MCNC: 0.4 MG/DL (ref 0.2–1)
BUN SERPL-MCNC: 22 MG/DL (ref 7–18)
BUN/CREAT SERPL: 19 (ref 12–20)
CALCIUM SERPL-MCNC: 9.2 MG/DL (ref 8.5–10.1)
CHLORIDE SERPL-SCNC: 103 MMOL/L (ref 100–111)
CO2 SERPL-SCNC: 27 MMOL/L (ref 21–32)
COLOR FLD: ABNORMAL
CREAT SERPL-MCNC: 1.16 MG/DL (ref 0.6–1.3)
DIFFERENTIAL METHOD BLD: ABNORMAL
EOSINOPHIL # BLD: 0.2 K/UL (ref 0–0.4)
EOSINOPHIL NFR BLD: 2 % (ref 0–5)
EOSINOPHIL NFR FLD MANUAL: 0 %
ERYTHROCYTE [DISTWIDTH] IN BLOOD BY AUTOMATED COUNT: 13.2 % (ref 11.6–14.5)
GLOBULIN SER CALC-MCNC: 3.7 G/DL (ref 2–4)
GLUCOSE SERPL-MCNC: 412 MG/DL (ref 74–99)
HCT VFR BLD AUTO: 39 % (ref 36–48)
HGB BLD-MCNC: 13 G/DL (ref 13–16)
INR PPP: 1.5 (ref 0.8–1.2)
LYMPHOCYTES # BLD: 2.5 K/UL (ref 0.9–3.6)
LYMPHOCYTES NFR BLD: 26 % (ref 21–52)
LYMPHOCYTES NFR FLD: 6 %
MCH RBC QN AUTO: 29.8 PG (ref 24–34)
MCHC RBC AUTO-ENTMCNC: 33.3 G/DL (ref 31–37)
MCV RBC AUTO: 89.4 FL (ref 74–97)
MONOCYTES # BLD: 0.7 K/UL (ref 0.05–1.2)
MONOCYTES NFR BLD: 7 % (ref 3–10)
MONOCYTES NFR FLD: 0 %
NEUTROPHILS NFR FLD: 76 %
NEUTS BAND # FLD: 18 %
NEUTS SEG # BLD: 6.2 K/UL (ref 1.8–8)
NEUTS SEG NFR BLD: 65 % (ref 40–73)
NUC CELL # FLD: 5111 /CU MM
PLATELET # BLD AUTO: 127 K/UL (ref 135–420)
PMV BLD AUTO: 10.8 FL (ref 9.2–11.8)
POTASSIUM SERPL-SCNC: 4.3 MMOL/L (ref 3.5–5.5)
PROT SERPL-MCNC: 7.7 G/DL (ref 6.4–8.2)
PROTHROMBIN TIME: 18.2 SEC (ref 11.5–15.2)
RBC # BLD AUTO: 4.36 M/UL (ref 4.7–5.5)
RBC # FLD: ABNORMAL /CU MM
SODIUM SERPL-SCNC: 136 MMOL/L (ref 136–145)
SPECIMEN SOURCE FLD: ABNORMAL
WBC # BLD AUTO: 9.5 K/UL (ref 4.6–13.2)

## 2019-11-09 PROCEDURE — 87070 CULTURE OTHR SPECIMN AEROBIC: CPT

## 2019-11-09 PROCEDURE — 80053 COMPREHEN METABOLIC PANEL: CPT

## 2019-11-09 PROCEDURE — 85025 COMPLETE CBC W/AUTO DIFF WBC: CPT

## 2019-11-09 PROCEDURE — 73564 X-RAY EXAM KNEE 4 OR MORE: CPT

## 2019-11-09 PROCEDURE — 89051 BODY FLUID CELL COUNT: CPT

## 2019-11-09 PROCEDURE — 85730 THROMBOPLASTIN TIME PARTIAL: CPT

## 2019-11-09 PROCEDURE — 74011000250 HC RX REV CODE- 250: Performed by: PHYSICIAN ASSISTANT

## 2019-11-09 PROCEDURE — 74011250637 HC RX REV CODE- 250/637: Performed by: PHYSICIAN ASSISTANT

## 2019-11-09 PROCEDURE — 99284 EMERGENCY DEPT VISIT MOD MDM: CPT

## 2019-11-09 PROCEDURE — 75810000054 HC ARTHOCENTISIS JOINT

## 2019-11-09 PROCEDURE — 85610 PROTHROMBIN TIME: CPT

## 2019-11-09 RX ORDER — LIDOCAINE HYDROCHLORIDE AND EPINEPHRINE 10; 10 MG/ML; UG/ML
10 INJECTION, SOLUTION INFILTRATION; PERINEURAL ONCE
Status: COMPLETED | OUTPATIENT
Start: 2019-11-09 | End: 2019-11-09

## 2019-11-09 RX ORDER — OXYCODONE AND ACETAMINOPHEN 5; 325 MG/1; MG/1
1 TABLET ORAL
Status: COMPLETED | OUTPATIENT
Start: 2019-11-09 | End: 2019-11-09

## 2019-11-09 RX ADMIN — OXYCODONE HYDROCHLORIDE AND ACETAMINOPHEN 1 TABLET: 5; 325 TABLET ORAL at 18:42

## 2019-11-09 RX ADMIN — LIDOCAINE HYDROCHLORIDE,EPINEPHRINE BITARTRATE 100 MG: 10; .01 INJECTION, SOLUTION INFILTRATION; PERINEURAL at 17:01

## 2019-11-09 NOTE — ED NOTES
Pt complains of waking up with left knee severely swollen and painful. Left knee is swollen and tender upon touching. Denies nay fall or trauma.

## 2019-11-09 NOTE — ED PROVIDER NOTES
[facility] EMERGENCY DEPT      4:55 PM    Date: 11/9/2019  Patient Name: Zahraa Hidalgo    History of Presenting Illness     Chief Complaint   Patient presents with    Knee Pain       62 y.o. male with noted past medical history with likely Pancreatic Cancer with mets to the liver and PE on Xarelto presents to the ED complaining of left knee pain since 30 minutes prior to arrival.  Patient states he took a quick nap, and when he woke up 30 minutes ago his knee was painful and swollen. He states that he had similar symptoms to his right knee a while back. He did not have any injury. Has not taken anything for his pain. Denies any numbness, weakness, redness, warmth, wounds, other symptoms at this time. Patient denies any other associated signs or symptoms. Patient denies any other complaints. Nursing notes regarding the HPI and triage nursing notes were reviewed. Prior medical records were reviewed.      Current Outpatient Medications   Medication Sig Dispense Refill    TRUE METRIX GLUCOSE METER misc USE AS DIRECTED  0    enoxaparin (LOVENOX) 80 mg/0.8 mL injection       glipiZIDE (GLUCOTROL) 10 mg tablet TAKE 1 TABLET BY MOUTH ONCE DAILY 30 MINUTES BEFORE BREAKFAST  1    HYDROcodone-acetaminophen (NORCO) 5-325 mg per tablet TAKE 1 TABLET BY MOUTH EVERY 8 HOURS AS NEEDED FOR PAIN FOR UP TO 7 DAYS (MAX DAILY AMOUNT 3 TABS)  0    LANTUS U-100 INSULIN 100 unit/mL injection INJECT 20 UNITS SUBCUTANEOUSLY ONCE DAILY AT BEDTIME  6    metFORMIN ER (GLUCOPHAGE XR) 500 mg tablet TAKE 1 TABLET BY MOUTH ONCE DAILY WITH EVENING MEAL FOR 30 DAYS  0    naproxen (NAPROSYN) 500 mg tablet TAKE 1 TABLET BY MOUTH TWICE DAILY WITH MEALS  0    omeprazole (PRILOSEC) 20 mg capsule TAKE 1 CAPSULE BY MOUTH ONCE DAILY  3    ondansetron (ZOFRAN ODT) 4 mg disintegrating tablet DISSOLVE 1 TABLET IN MOUTH EVERY 8 HOURS AS NEEDED FOR NAUSEA  0    QUEtiapine (SEROQUEL) 50 mg tablet       XARELTO 15 mg (42)- 20 mg (9) DsPk       BD INSULIN SYRINGE ULTRA-FINE 0.3 mL 31 gauge x 5/16\" syrg USE SYRINGES TO INJECT INSULIN DAILY AS DIRECTED FOR 30 DAYS  3    rivaroxaban (XARELTO STARTER THANIA) 15 mg (42)- 20 mg (9) DsPk Take one 15 mg tablet twice a day with food for the first 21 days. Then, take one 20 mg tablet once a day with food for 9 days. 1 Dose Pack 0    rivaroxaban (XARELTO) 20 mg tab tablet Take 1 Tab by mouth daily (with breakfast). 30 Tab 3    HYDROcodone-acetaminophen (NORCO) 5-325 mg per tablet Take 1 Tab by mouth every eight (8) hours as needed for Pain for up to 7 days. Max Daily Amount: 3 Tabs. 20 Tab 0    ondansetron (ZOFRAN ODT) 4 mg disintegrating tablet Take 1 Tab by mouth every eight (8) hours as needed for Nausea. 20 Tab 0    Omeprazole delayed release (PRILOSEC D/R) 20 mg tablet Take 1 Tab by mouth daily. 30 Tab 3    naproxen (NAPROSYN) 500 mg tablet Take 1 Tab by mouth two (2) times daily (with meals). 20 Tab 0    lidocaine (LIDODERM) 5 % Apply patch to the affected area for 12 hours a day and remove for 12 hours a day. 5 Each 0       Past History     Past Medical History:  Past Medical History:   Diagnosis Date    Cancer ([facility] [address]     pancreatic    Diabetes ([facility] [address]        Past Surgical History:  History reviewed. No pertinent surgical history. Family History:  History reviewed. No pertinent family history. Social History:  Social History     Tobacco Use    Smoking status: Former Smoker    Smokeless tobacco: Never Used   Substance Use Topics    Alcohol use: Not Currently    Drug use: Not Currently     Types: Heroin, Cocaine, Marijuana       Allergies:  No Known Allergies    Patient's primary care provider (as noted in EPIC):  None    Review of Systems   Constitutional:  Denies malaise, fever, chills. Extremity/MS:  + left knee pain/swelling. Neuro:  Denies neurologic symptoms/deficits/paresthesias. Skin: Denies injury, rash, itching or skin changes. All other systems negative as reviewed. Visit Vitals  /77   Pulse 98   Temp 98.2 °F (36.8 °C)   Resp 16   Ht 6' (1.829 m)   Wt 93 kg (205 lb)   SpO2 97%   BMI 27.80 kg/m²       PHYSICAL EXAM:    CONSTITUTIONAL:  Alert, in no apparent distress;  well developed;  well nourished. HEAD:  Normocephalic, atraumatic. EYES:  EOMI. Non-icteric sclera. Normal conjunctiva. ENTM: Mouth: mucous membranes moist.  NECK:  Supple  RESPIRATORY:  Chest clear, equal breath sounds, good air movement. Without wheezes, rhonchi or rales. CARDIOVASCULAR:  Regular rate and rhythm. No murmurs, rubs, or gallops. UPPER EXT:  Normal inspection. LOWER EXT: Significant edema and tenderness palpation, obvious effusion present without erythema, warmth or ecchymosis; neurovascularly intact distally with 5 out of 5 strength, 2+ pedal pulses. NEURO:  Moves all four extremities, and grossly normal motor exam.  SKIN:  No rashes;  Normal for age. PSYCH:  Alert and normal affect. DIFFERENTIAL DIAGNOSES/ MEDICAL DECISION MAKING:  Infection/ septic joint, hemarthrosis, gout, pseudogout, inflammatory effusion. Procedure Note:  Knee Arthrocentesis    Location:  Left knee  Point of needle insertion:  Lateral aspect of knee. Under sterile technique, an 18 gauge needle was introduced into the joint space and approximately 30 cc of grossly bloody synovial fluid was aspirated. The patient tolerated the procedure well. The fluid was sent off for cell count and cultures.      Recent Results (from the past 12 hour(s))   CBC WITH AUTOMATED DIFF    Collection Time: 11/09/19  4:56 PM   Result Value Ref Range    WBC 9.5 4.6 - 13.2 K/uL    RBC 4.36 (L) 4.70 - 5.50 M/uL    HGB 13.0 13.0 - 16.0 g/dL    HCT 39.0 36.0 - 48.0 %    MCV 89.4 74.0 - 97.0 FL    MCH 29.8 24.0 - 34.0 PG    MCHC 33.3 31.0 - 37.0 g/dL    RDW 13.2 11.6 - 14.5 %    PLATELET 379 (L) 842 - 420 K/uL    MPV 10.8 9.2 - 11.8 FL    NEUTROPHILS 65 40 - 73 %    LYMPHOCYTES 26 21 - 52 %    MONOCYTES 7 3 - 10 % EOSINOPHILS 2 0 - 5 %    BASOPHILS 0 0 - 2 %    ABS. NEUTROPHILS 6.2 1.8 - 8.0 K/UL    ABS. LYMPHOCYTES 2.5 0.9 - 3.6 K/UL    ABS. MONOCYTES 0.7 0.05 - 1.2 K/UL    ABS. EOSINOPHILS 0.2 0.0 - 0.4 K/UL    ABS. BASOPHILS 0.0 0.0 - 0.1 K/UL    DF AUTOMATED     PROTHROMBIN TIME + INR    Collection Time: 11/09/19  4:56 PM   Result Value Ref Range    Prothrombin time 18.2 (H) 11.5 - 15.2 sec    INR 1.5 (H) 0.8 - 1.2     PTT    Collection Time: 11/09/19  4:56 PM   Result Value Ref Range    aPTT 31.1 23.0 - 03.5 SEC   METABOLIC PANEL, COMPREHENSIVE    Collection Time: 11/09/19  4:56 PM   Result Value Ref Range    Sodium 136 136 - 145 mmol/L    Potassium 4.3 3.5 - 5.5 mmol/L    Chloride 103 100 - 111 mmol/L    CO2 27 21 - 32 mmol/L    Anion gap 6 3.0 - 18 mmol/L    Glucose 412 (HH) 74 - 99 mg/dL    BUN 22 (H) 7.0 - 18 MG/DL    Creatinine 1.16 0.6 - 1.3 MG/DL    BUN/Creatinine ratio 19 12 - 20      GFR est AA >60 >60 ml/min/1.73m2    GFR est non-AA >60 >60 ml/min/1.73m2    Calcium 9.2 8.5 - 10.1 MG/DL    Bilirubin, total 0.4 0.2 - 1.0 MG/DL    ALT (SGPT) 23 16 - 61 U/L    AST (SGOT) 16 10 - 38 U/L    Alk. phosphatase 155 (H) 45 - 117 U/L    Protein, total 7.7 6.4 - 8.2 g/dL    Albumin 4.0 3.4 - 5.0 g/dL    Globulin 3.7 2.0 - 4.0 g/dL    A-G Ratio 1.1 0.8 - 1.7     CELL COUNT AND DIFF, BODY FLUID    Collection Time: 11/09/19  5:30 PM   Result Value Ref Range    BODY FLUID TYPE KNEE FLUID      FLUID COLOR BLOODY      FLUID APPEARANCE BLOODY      FLUID RBC CT. 6,000,000 (A) NRRE /cu mm    FLUID NUCLEATED CELLS 5,111 (A) NRRE /cu mm    FLD NEUTROPHILS 76 %    FLD BANDS 18 %    FLD LYMPHS 6 %    FLD MONOCYTES 0 %    FLD EOSINS 0 %        IMPRESSION AND MEDICAL DECISION MAKING:  Based upon the patients presentation with noted HPI and PE, along with the work up done in the emergency department, I believe that the patient is having hemarthrosis. Patient is currently on Xarelto for a PE.   He was advised to hold on his Xarelto tomorrow as he has already taken it today. He may resume on Monday. He was given strict instructions to follow-up with his regular doctor. He will return immediately for any worsening including numbness or weakness of his lower leg, significant/worsening swelling/bruising, or other concerning symptoms. Diagnosis:   1.  Hemarthrosis      Disposition: Discharge    Follow-up Information     Follow up With Specialties Details Why [id number] [facility]  In 3 days  [address]    [address] [facility]  In 2 days  [address] [facility][note 3] [address]  [address] ([facility]) [facility] EMERGENCY DEPT Emergency Medicine  If symptoms worsen [address]  [phone]          Discharge Medication List as of 11/9/2019  8:13 PM      CONTINUE these medications which have NOT CHANGED    Details   TRUE METRIX GLUCOSE METER misc USE AS DIRECTED, Historical Med, R-0, CLAUDINE      enoxaparin (LOVENOX) 80 mg/0.8 mL injection Historical Med      glipiZIDE (GLUCOTROL) 10 mg tablet TAKE 1 TABLET BY MOUTH ONCE DAILY 30 MINUTES BEFORE BREAKFAST, Historical Med, R-1      !! HYDROcodone-acetaminophen (NORCO) 5-325 mg per tablet TAKE 1 TABLET BY MOUTH EVERY 8 HOURS AS NEEDED FOR PAIN FOR UP TO 7 DAYS (MAX DAILY AMOUNT 3 TABS), Historical Med, R-0      LANTUS U-100 INSULIN 100 unit/mL injection INJECT 20 UNITS SUBCUTANEOUSLY ONCE DAILY AT BEDTIME, Historical Med, R-6, CLAUDINE      metFORMIN ER (GLUCOPHAGE XR) 500 mg tablet TAKE 1 TABLET BY MOUTH ONCE DAILY WITH EVENING MEAL FOR 30 DAYS, Historical Med, R-0      !! naproxen (NAPROSYN) 500 mg tablet TAKE 1 TABLET BY MOUTH TWICE DAILY WITH MEALS, Historical Med, R-0      omeprazole (PRILOSEC) 20 mg capsule TAKE 1 CAPSULE BY MOUTH ONCE DAILY, Historical Med, R-3      !! ondansetron (ZOFRAN ODT) 4 mg disintegrating tablet DISSOLVE 1 TABLET IN MOUTH EVERY 8 HOURS AS NEEDED FOR NAUSEA, Historical Med, R-0      QUEtiapine (SEROQUEL) 50 mg tablet Historical Med      !! XARELTO 15 mg (42)- 20 mg (9) DsPk Historical Med, CLAUDINE      BD INSULIN SYRINGE ULTRA-FINE 0.3 mL 31 gauge x 5/16\" syrg USE SYRINGES TO INJECT INSULIN DAILY AS DIRECTED FOR 30 DAYS, Historical Med, R-3, CLAUDINE      !! rivaroxaban (XARELTO STARTER THANIA) 15 mg (42)- 20 mg (9) DsPk Take one 15 mg tablet twice a day with food for the first 21 days. Then, take one 20 mg tablet once a day with food for 9 days. , Print, Disp-1 Dose Pack, R-0      rivaroxaban (XARELTO) 20 mg tab tablet Take 1 Tab by mouth daily (with breakfast). , PrintStart after finishing starter packDisp-30 Tab, R-3      !! HYDROcodone-acetaminophen (NORCO) 5-325 mg per tablet Take 1 Tab by mouth every eight (8) hours as needed for Pain for up to 7 days. Max Daily Amount: 3 Tabs., Print, Disp-20 Tab, R-0      !! ondansetron (ZOFRAN ODT) 4 mg disintegrating tablet Take 1 Tab by mouth every eight (8) hours as needed for Nausea. , Print, Disp-20 Tab, R-0      Omeprazole delayed release (PRILOSEC D/R) 20 mg tablet Take 1 Tab by mouth daily. , Print, Disp-30 Tab, R-3      !! naproxen (NAPROSYN) 500 mg tablet Take 1 Tab by mouth two (2) times daily (with meals). , Print, Disp-20 Tab, R-0      lidocaine (LIDODERM) 5 % Apply patch to the affected area for 12 hours a day and remove for 12 hours a day., Print, Disp-5 Each, R-0       !! - Potential duplicate medications found. Please discuss with provider.         COLLEEN Brown

## 2019-11-09 NOTE — ED NOTES
Ac Gutiérrez is a 62 y.o. male with a pertinent history of recent dx of likely pancreatic cancer with metastases, new RLL pulmonary emboli, and DM who presents to the emergency department for evaluation of left knee swelling and pain. Pt is currently being treated with xarelto. He denies SOB, but admits to intermittent CP. No known cardiac history. I performed a brief evaluation, including pertinent history and physical, of the patient here in triage and I have determined that patient will need further treatment and evaluation from the main side ER physician. I have placed initial orders to help in expediting patients care.     November 09, 2019 at 4:21 PM - Aliya Fisher PA-C        Visit Vitals  /81 (BP 1 Location: Right arm, BP Patient Position: Sitting)   Pulse 98   Temp 98.2 °F (36.8 °C)   Resp 20   Ht 6' (1.829 m)   Wt 93 kg (205 lb)   SpO2 100%   BMI 27.80 kg/m²

## 2019-11-09 NOTE — ED TRIAGE NOTES
Pt arrives from home with c/o left knee swelling started today, denies any trauma or injury to knee. Pt with recent dx of Pancreatic CA and Pulmonary Embolism and is on anticoagulant. AAO x4, pain 10/10. Took dose of Xarelto today and to start on Lovenox tomorrow in prep for upcoming procedure.

## 2019-11-12 ENCOUNTER — ANESTHESIA EVENT (OUTPATIENT)
Dept: ENDOSCOPY | Age: 54
End: 2019-11-12
Payer: MEDICAID

## 2019-11-12 NOTE — PERIOP NOTES
PAT - SURGICAL PRE-ADMISSION INSTRUCTIONS    NAME:  Tiny Palmer                                                          TODAY'S DATE:  11/12/2019    SURGERY DATE:  11/13/2019                                  SURGERY ARRIVAL TIME:   1130    1. Do NOT eat or drink anything, including candy or gum, after MIDNIGHT on 11/12/2019 , unless you have specific instructions from your Surgeon or Anesthesia Provider to do so. 2. No smoking on the day of surgery. 3. No alcohol 24 hours prior to the day of surgery. 4. No recreational drugs for one week prior to the day of surgery. 5. Leave all valuables, including money/purse, at home. 6. Remove all jewelry, nail polish, makeup (including mascara); no lotions, powders, deodorant, or perfume/cologne/after shave. 7. Glasses/Contact lenses and Dentures may be worn to the hospital.  They will be removed prior to surgery. 8. Call your doctor if symptoms of a cold or illness develop within 24 ours prior to surgery. 9. AN ADULT MUST DRIVE YOU HOME AFTER OUTPATIENT SURGERY. 10. If you are having an OUTPATIENT procedure, please make arrangements for a responsible adult to be with you for 24 hours after your surgery. 11. If you are admitted to the hospital, you will be assigned to a bed after surgery is complete. Normally a family member will not be able to see you until you are in your assigned bed. 15. Family is encouraged to accompany you to the hospital.  We ask visitors in the treatment area to be limited to ONE person at a time to ensure patient privacy. EXCEPTIONS WILL BE MADE AS NEEDED. 15. Children under 12 are discouraged from entering the treatment area and need to be supervised by an adult when in the waiting room. Special Instructions:    HOLD oral diabetic medication on the MORNING OF surgery. , HOLD metformin/glucophage dose starting the EVENING BEFORE the day of surgery. , Follow physician instructions about insulin.   If NO instructions were given, take your usual dose of BASAL insulin the night BEFORE surgery. These surgical instructions were reviewed with Claudia Mustafa during the PAT phone call    Directions: On the morning of surgery, please go to the [facility]. Enter the building from the [facility] entrance, 1st floor (next to the Emergency Room entrance). Take the elevator to the 2nd floor. Sign in at the Registration Desk.     If you have any questions and/or concerns, please do not hesitate to call:  (Prior to the day of surgery)  [facility] unit:  [phone]  (Day of surgery)  [facility] unit:  [phone]

## 2019-11-13 ENCOUNTER — ANESTHESIA (OUTPATIENT)
Dept: ENDOSCOPY | Age: 54
End: 2019-11-13
Payer: MEDICAID

## 2019-11-13 ENCOUNTER — HOSPITAL ENCOUNTER (OUTPATIENT)
Age: 54
Setting detail: OUTPATIENT SURGERY
Discharge: HOME | End: 2019-11-13
Attending: INTERNAL MEDICINE | Admitting: INTERNAL MEDICINE
Payer: MEDICAID

## 2019-11-13 VITALS
DIASTOLIC BLOOD PRESSURE: 64 MMHG | WEIGHT: 209 LBS | OXYGEN SATURATION: 96 % | HEART RATE: 73 BPM | SYSTOLIC BLOOD PRESSURE: 99 MMHG | RESPIRATION RATE: 18 BRPM | BODY MASS INDEX: 28.35 KG/M2 | TEMPERATURE: 97.7 F

## 2019-11-13 LAB
AMPHET UR QL SCN: NEGATIVE
BARBITURATES UR QL SCN: NEGATIVE
BENZODIAZ UR QL: NEGATIVE
CANNABINOIDS UR QL SCN: NEGATIVE
COCAINE UR QL SCN: NEGATIVE
GLUCOSE BLD STRIP.AUTO-MCNC: 189 MG/DL (ref 70–110)
GLUCOSE BLD STRIP.AUTO-MCNC: 247 MG/DL (ref 70–110)
HDSCOM,HDSCOM: ABNORMAL
METHADONE UR QL: NEGATIVE
OPIATES UR QL: POSITIVE
PCP UR QL: NEGATIVE

## 2019-11-13 PROCEDURE — 76060000033 HC ANESTHESIA 1 TO 1.5 HR: Performed by: INTERNAL MEDICINE

## 2019-11-13 PROCEDURE — 88173 CYTOPATH EVAL FNA REPORT: CPT

## 2019-11-13 PROCEDURE — 82962 GLUCOSE BLOOD TEST: CPT

## 2019-11-13 PROCEDURE — 77030022853 HC NDL ASPIR ULTRSND BSC -C: Performed by: INTERNAL MEDICINE

## 2019-11-13 PROCEDURE — 80307 DRUG TEST PRSMV CHEM ANLYZR: CPT

## 2019-11-13 PROCEDURE — 74011636637 HC RX REV CODE- 636/637: Performed by: ANESTHESIOLOGY

## 2019-11-13 PROCEDURE — 74011250636 HC RX REV CODE- 250/636: Performed by: NURSE ANESTHETIST, CERTIFIED REGISTERED

## 2019-11-13 PROCEDURE — 76040000008: Performed by: INTERNAL MEDICINE

## 2019-11-13 PROCEDURE — 88305 TISSUE EXAM BY PATHOLOGIST: CPT

## 2019-11-13 PROCEDURE — 74011636637 HC RX REV CODE- 636/637: Performed by: NURSE ANESTHETIST, CERTIFIED REGISTERED

## 2019-11-13 PROCEDURE — 77030019957 HC CUF BLN GASTSCP OCOA -B: Performed by: INTERNAL MEDICINE

## 2019-11-13 RX ORDER — INSULIN LISPRO 100 [IU]/ML
3 INJECTION, SOLUTION INTRAVENOUS; SUBCUTANEOUS
Status: COMPLETED | OUTPATIENT
Start: 2019-11-13 | End: 2019-11-13

## 2019-11-13 RX ORDER — DEXTROSE MONOHYDRATE 100 MG/ML
125-250 INJECTION, SOLUTION INTRAVENOUS AS NEEDED
Status: DISCONTINUED | OUTPATIENT
Start: 2019-11-13 | End: 2019-11-13 | Stop reason: HOSPADM

## 2019-11-13 RX ORDER — SODIUM CHLORIDE 0.9 % (FLUSH) 0.9 %
5-40 SYRINGE (ML) INJECTION EVERY 8 HOURS
Status: DISCONTINUED | OUTPATIENT
Start: 2019-11-13 | End: 2019-11-13 | Stop reason: HOSPADM

## 2019-11-13 RX ORDER — INSULIN LISPRO 100 [IU]/ML
INJECTION, SOLUTION INTRAVENOUS; SUBCUTANEOUS ONCE
Status: COMPLETED | OUTPATIENT
Start: 2019-11-13 | End: 2019-11-13

## 2019-11-13 RX ORDER — SODIUM CHLORIDE 0.9 % (FLUSH) 0.9 %
5-40 SYRINGE (ML) INJECTION AS NEEDED
Status: DISCONTINUED | OUTPATIENT
Start: 2019-11-13 | End: 2019-11-13 | Stop reason: HOSPADM

## 2019-11-13 RX ORDER — SODIUM CHLORIDE, SODIUM LACTATE, POTASSIUM CHLORIDE, CALCIUM CHLORIDE 600; 310; 30; 20 MG/100ML; MG/100ML; MG/100ML; MG/100ML
50 INJECTION, SOLUTION INTRAVENOUS CONTINUOUS
Status: DISCONTINUED | OUTPATIENT
Start: 2019-11-13 | End: 2019-11-13 | Stop reason: HOSPADM

## 2019-11-13 RX ORDER — FAMOTIDINE 20 MG/1
20 TABLET, FILM COATED ORAL ONCE
Status: DISCONTINUED | OUTPATIENT
Start: 2019-11-13 | End: 2019-11-13 | Stop reason: HOSPADM

## 2019-11-13 RX ORDER — DEXTROMETHORPHAN/PSEUDOEPHED 2.5-7.5/.8
1.2 DROPS ORAL
Status: DISCONTINUED | OUTPATIENT
Start: 2019-11-13 | End: 2019-11-13 | Stop reason: HOSPADM

## 2019-11-13 RX ORDER — MAGNESIUM SULFATE 100 %
4 CRYSTALS MISCELLANEOUS AS NEEDED
Status: DISCONTINUED | OUTPATIENT
Start: 2019-11-13 | End: 2019-11-13 | Stop reason: HOSPADM

## 2019-11-13 RX ORDER — PROPOFOL 10 MG/ML
INJECTION, EMULSION INTRAVENOUS AS NEEDED
Status: DISCONTINUED | OUTPATIENT
Start: 2019-11-13 | End: 2019-11-13 | Stop reason: HOSPADM

## 2019-11-13 RX ADMIN — PROPOFOL 20 MG: 10 INJECTION, EMULSION INTRAVENOUS at 13:41

## 2019-11-13 RX ADMIN — PROPOFOL 20 MG: 10 INJECTION, EMULSION INTRAVENOUS at 13:37

## 2019-11-13 RX ADMIN — PROPOFOL 20 MG: 10 INJECTION, EMULSION INTRAVENOUS at 13:16

## 2019-11-13 RX ADMIN — PROPOFOL 20 MG: 10 INJECTION, EMULSION INTRAVENOUS at 13:29

## 2019-11-13 RX ADMIN — PROPOFOL 20 MG: 10 INJECTION, EMULSION INTRAVENOUS at 13:35

## 2019-11-13 RX ADMIN — PROPOFOL 20 MG: 10 INJECTION, EMULSION INTRAVENOUS at 13:26

## 2019-11-13 RX ADMIN — PROPOFOL 20 MG: 10 INJECTION, EMULSION INTRAVENOUS at 13:14

## 2019-11-13 RX ADMIN — PROPOFOL 20 MG: 10 INJECTION, EMULSION INTRAVENOUS at 13:45

## 2019-11-13 RX ADMIN — PROPOFOL 20 MG: 10 INJECTION, EMULSION INTRAVENOUS at 13:52

## 2019-11-13 RX ADMIN — INSULIN LISPRO 6 UNITS: 100 INJECTION, SOLUTION INTRAVENOUS; SUBCUTANEOUS at 12:07

## 2019-11-13 RX ADMIN — PROPOFOL 20 MG: 10 INJECTION, EMULSION INTRAVENOUS at 13:12

## 2019-11-13 RX ADMIN — PROPOFOL 20 MG: 10 INJECTION, EMULSION INTRAVENOUS at 13:39

## 2019-11-13 RX ADMIN — PROPOFOL 20 MG: 10 INJECTION, EMULSION INTRAVENOUS at 13:43

## 2019-11-13 RX ADMIN — PROPOFOL 20 MG: 10 INJECTION, EMULSION INTRAVENOUS at 13:18

## 2019-11-13 RX ADMIN — INSULIN LISPRO 3 UNITS: 100 INJECTION, SOLUTION INTRAVENOUS; SUBCUTANEOUS at 14:26

## 2019-11-13 RX ADMIN — SODIUM CHLORIDE, SODIUM LACTATE, POTASSIUM CHLORIDE, AND CALCIUM CHLORIDE 50 ML/HR: 600; 310; 30; 20 INJECTION, SOLUTION INTRAVENOUS at 12:03

## 2019-11-13 RX ADMIN — PROPOFOL 20 MG: 10 INJECTION, EMULSION INTRAVENOUS at 13:10

## 2019-11-13 RX ADMIN — PROPOFOL 120 MG: 10 INJECTION, EMULSION INTRAVENOUS at 13:08

## 2019-11-13 RX ADMIN — PROPOFOL 20 MG: 10 INJECTION, EMULSION INTRAVENOUS at 13:21

## 2019-11-13 RX ADMIN — PROPOFOL 20 MG: 10 INJECTION, EMULSION INTRAVENOUS at 13:49

## 2019-11-13 RX ADMIN — PROPOFOL 20 MG: 10 INJECTION, EMULSION INTRAVENOUS at 13:33

## 2019-11-13 RX ADMIN — PROPOFOL 20 MG: 10 INJECTION, EMULSION INTRAVENOUS at 13:31

## 2019-11-13 RX ADMIN — PROPOFOL 20 MG: 10 INJECTION, EMULSION INTRAVENOUS at 13:24

## 2019-11-13 NOTE — PERIOP NOTES
Pre-Op Summary    Pt arrived via car with family/friend and is oriented to time, place, person and situation. Patient with steady gait with none assistive devices. Visit Vitals  /75   Pulse 68   Temp 97.7 °F (36.5 °C)   Resp 16   Wt 94.8 kg (209 lb)   SpO2 95%   BMI 28.35 kg/m²       Peripheral IV located on Right forearm. Patients belongings are located with grandmother. Patient's point of contact is Lazarus Lemon and their contact number is: [phone]([facility]). They will be in the waiting room. They are able to receive medication information. They will be their ride home.

## 2019-11-13 NOTE — PROCEDURES
Endoscopic Ultrasound Procedure Note    Patient: Panda Vaughan MRN: [id number]  SSN: [id number]    YOB: 1965  Age: 62 y.o. Sex: male      Date/Time:  11/13/2019 2:01 PM    Endoscopic Ultrasound  Procedure Note    Procedure: Endoscopic ultrasound with Fine Needle Aspiration    IMPRESSION:   1. Large 4cm pancreas body mass. Appears to be invading the splenic vein. No invasion into other organs or other vasculature. FNA performed. 2. 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 Unable to FNA lymphnodes due to equipment malfunction. 3. Otherwise normal EUS. RECOMMENDATIONS:  1. Resume regular diet. 2. Will contact with FNA results in 1 week. 3. Follow up with Dr. Taina Baer and with Oncology. Indication: Pancreas mass on CT scan  :  Sharad Couch MD  Referring Provider:   None  History: The history and physical exam were reviewed and updated. Endoscope: Olympus UE-160 Radial Echoendoscope , GF- Linear Echoendoscope  Extent of Exam: third portion of the duodenum  ASA: ASA 3 - Patient with moderate systemic disease with functional limitations  Assistants:  Endoscopy Technician-1: Phyllis Balderrama  Endoscopy RN-1: Mariajose Rollins RN  Anethesia/Sedation:  MAC anesthesia    Description of the procedure: The procedure was discussed with the patient including risks, benefits, alternatives including risks of iv sedation, bleeding, perforation and aspiration. A safety timeout was performed. The patient was placed in the left lateral decubitus position. A bite block was placed. The patient was given incremental doses of intravenous sedation until moderate sedation was achieved. The patients vital signs were monitored at all times including heart rate/rhythm, blood pressure and oxygen saturation. The endoscope was then passed under direct visualization to the third portion of the duodenum.   The pancreas head and surrounding areas were imaged with ultrasound. The endoscope was then withdrawn into the stomach. The pancreas body and tail and surrounding areas were again visualized with ultrasound. The endoscope was withdrawn into the esophagus. The mediastinum and esophagus were imaged with ultrasound. The endoscope was then withdrawn. The Linear array echoendoscope was then passed under direct visualization. The area of concern was again imaged with ultrasound. The area was evaluated with color flow doppler with no flow noted in the lesion or path of the needle. FNA was then performed with suction. Cytology tech was in the room for procedure. No immediate complications noted. A total of 5 passes were made with a 22g Expect needle. The endoscope was then removed and patient transferred to recovery in stable condition. Findings:    Esophagus: The esophageal mucosa was normal with no ulceration, mass or stricture. There was no evidence of Dee's esophagus or reflux esophagitis. Stomach: The gastric mucosa was normal with no ulceration, mass, stricture. Duodenum: The duodenum mucosa was normal with no ulceration, mass, stricture and no evidence of villous atrophy. EUS: There was a large 4cm mass in the pancreas body. The mass was ovoid in shape with ill-defined border. The mass had mixed echogenic features including cystic appearing areas. The mass appeared to be invading splenic vein. The mass did not appear to invade other local organs. FNA performed with 22g expect needle with 5 passes made. The remainder of the pancreas appeared normal.  The pancreas head was normal with no mass or cyst.  Normal pancreatic duct with taper to the ampulla. The common bile duct was normal with no dilation, stone or stricture. Normal taper to the ampulla. There were two round lymphnodes adjacent to the pancreas head measuring approximately 1.5cm. There was a larger lymphnode measuring 2cm adjancent to the duodenum.   There was a 1.2cm lymphnode in the portahepatis. FNA attempted but visualization was not able to be performed with linear scope due to equipment malfunction. The SMA, SMV and portal vein appeared uninvolved on limited views. No masses noted on limited views of the liver. No celiac lymphadenopathy noted. Specimens:   ID Type Source Tests Collected by Time Destination   1 : FNA pancreatic mass x8 slides with 1 bottle cytolyt Other Pancreas  Donald Pfeiffer MD 11/13/2019 1344 Cytology               Complications:   None; patient tolerated the procedure well. EBL:Minimal    Discharge disposition:  Home in the company of  when sedation criteria are met.      Herman Nixon MD  November 13, 2019  2:01 PM

## 2019-11-13 NOTE — DISCHARGE INSTRUCTIONS
Patient Discharge Instructions    Kiesha Bae / [id number] : 1965    Admitted 2019 Discharged: 2019         Procedure Impression:  1. Large 4cm pancreas body mass. Appears to be invading the splenic vein. No invasion into other organs or other vasculature. FNA performed. 2. 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    3. Otherwise normal EUS. Recommendation:  1. Resume regular diet. 2. Will contact with biopsy results in 1 week  3. Follow up with Dr. Cally Bloom and with Oncology. 4. Resume lovenox tonight and follow lovenox window plan per Dr. Cally Bloom. Recommended Diet: Regular Diet    Recommended Activity:    1. Do not drink alcohol, drive or operate machinery for 12 hours   2. Call if any fever, abdominal pain or bleeding noted. Signed By: Alicia Thorne MD     2019         DISCHARGE SUMMARY from Nurse    PATIENT INSTRUCTIONS:    After general anesthesia or intravenous sedation, for 24 hours or while taking prescription Narcotics:  · Limit your activities  · Do not drive and operate hazardous machinery  · Do not make important personal or business decisions  · Do  not drink alcoholic beverages  · If you have not urinated within 8 hours after discharge, please contact your surgeon on call. Report the following to your surgeon:  · Excessive pain, swelling, redness or odor of or around the surgical area  · Temperature over 100.5  · Nausea and vomiting lasting longer than 4 hours or if unable to take medications  · Any signs of decreased circulation or nerve impairment to extremity: change in color, persistent  numbness, tingling, coldness or increase pain  · Any questions    What to do at Home:  These are general instructions for a healthy lifestyle:    No smoking/ No tobacco products/ Avoid exposure to second hand smoke  Surgeon General's Warning:  Quitting smoking now greatly reduces serious risk to your health.     Obesity, smoking, and sedentary lifestyle greatly increases your risk for illness    A healthy diet, regular physical exercise & weight monitoring are important for maintaining a healthy lifestyle    You may be retaining fluid if you have a history of heart failure or if you experience any of the following symptoms:  Weight gain of 3 pounds or more overnight or 5 pounds in a week, increased swelling in our hands or feet or shortness of breath while lying flat in bed. Please call your doctor as soon as you notice any of these symptoms; do not wait until your next office visit. The discharge information has been reviewed with the patient. The patient verbalized understanding. Discharge medications reviewed with the patient and appropriate educational materials and side effects teaching were provided. ___________________________________________________________________________________________________________________________________  Patient armband removed and given to patient to take home.   Patient was informed of the privacy risks if armband lost or stolen

## 2019-11-13 NOTE — ANESTHESIA POSTPROCEDURE EVALUATION
Procedure(s):  ENDOSCOPIC ULTRASOUND with  FNA.     MAC    Anesthesia Post Evaluation        Patient location during evaluation: PACU  Patient participation: complete - patient participated  Level of consciousness: awake and alert  Pain management: satisfactory to patient  Airway patency: patent  Anesthetic complications: no  Cardiovascular status: stable  Respiratory status: room air  Hydration status: stable  Post anesthesia nausea and vomiting:  none      Vitals Value Taken Time   BP 99/64 11/13/2019  2:09 PM   Temp     Pulse 73 11/13/2019  2:05 PM   Resp 18 11/13/2019  2:05 PM   SpO2 96 % 11/13/2019  2:05 PM

## 2019-11-13 NOTE — H&P
Date of Surgery Update:  Fracisco Galdamez was seen and examined. History and physical has been reviewed. The patient has been examined.  There have been no significant clinical changes since the completion of the originally dated History and Physical.    Signed By: Cory Mitchell MD     November 13, 2019 12:54 PM

## 2019-11-14 LAB
BACTERIA SPEC CULT: NORMAL
GRAM STN SPEC: NORMAL
GRAM STN SPEC: NORMAL
SERVICE CMNT-IMP: NORMAL

## 2019-11-15 ENCOUNTER — APPOINTMENT (OUTPATIENT)
Dept: GENERAL RADIOLOGY | Age: 54
End: 2019-11-15
Attending: PHYSICIAN ASSISTANT
Payer: MEDICAID

## 2019-11-15 ENCOUNTER — APPOINTMENT (OUTPATIENT)
Dept: VASCULAR SURGERY | Age: 54
End: 2019-11-15
Attending: PHYSICIAN ASSISTANT
Payer: MEDICAID

## 2019-11-15 ENCOUNTER — HOSPITAL ENCOUNTER (EMERGENCY)
Age: 54
Discharge: HOME | End: 2019-11-15
Attending: EMERGENCY MEDICINE
Payer: MEDICAID

## 2019-11-15 VITALS
RESPIRATION RATE: 18 BRPM | OXYGEN SATURATION: 100 % | SYSTOLIC BLOOD PRESSURE: 114 MMHG | HEART RATE: 71 BPM | DIASTOLIC BLOOD PRESSURE: 85 MMHG | TEMPERATURE: 98 F

## 2019-11-15 DIAGNOSIS — I82.4Y2 ACUTE DEEP VEIN THROMBOSIS (DVT) OF PROXIMAL VEIN OF LEFT LOWER EXTREMITY (HCC): Primary | ICD-10-CM

## 2019-11-15 DIAGNOSIS — M25.462 KNEE EFFUSION, LEFT: ICD-10-CM

## 2019-11-15 LAB
ALBUMIN SERPL-MCNC: 3.8 G/DL (ref 3.4–5)
ALBUMIN/GLOB SERPL: 1.1 {RATIO} (ref 0.8–1.7)
ALP SERPL-CCNC: 113 U/L (ref 45–117)
ALT SERPL-CCNC: 28 U/L (ref 16–61)
ANION GAP SERPL CALC-SCNC: 4 MMOL/L (ref 3–18)
AST SERPL-CCNC: 23 U/L (ref 10–38)
BASOPHILS # BLD: 0 K/UL (ref 0–0.1)
BASOPHILS NFR BLD: 0 % (ref 0–2)
BILIRUB SERPL-MCNC: 0.5 MG/DL (ref 0.2–1)
BUN SERPL-MCNC: 18 MG/DL (ref 7–18)
BUN/CREAT SERPL: 20 (ref 12–20)
CALCIUM SERPL-MCNC: 8.9 MG/DL (ref 8.5–10.1)
CHLORIDE SERPL-SCNC: 103 MMOL/L (ref 100–111)
CO2 SERPL-SCNC: 29 MMOL/L (ref 21–32)
CREAT SERPL-MCNC: 0.88 MG/DL (ref 0.6–1.3)
DIFFERENTIAL METHOD BLD: ABNORMAL
EOSINOPHIL # BLD: 0.2 K/UL (ref 0–0.4)
EOSINOPHIL NFR BLD: 2 % (ref 0–5)
ERYTHROCYTE [DISTWIDTH] IN BLOOD BY AUTOMATED COUNT: 13.1 % (ref 11.6–14.5)
GLOBULIN SER CALC-MCNC: 3.6 G/DL (ref 2–4)
GLUCOSE SERPL-MCNC: 266 MG/DL (ref 74–99)
HCT VFR BLD AUTO: 35.7 % (ref 36–48)
HGB BLD-MCNC: 11.8 G/DL (ref 13–16)
INR PPP: 1 (ref 0.8–1.2)
LYMPHOCYTES # BLD: 2.3 K/UL (ref 0.9–3.6)
LYMPHOCYTES NFR BLD: 30 % (ref 21–52)
MCH RBC QN AUTO: 29.1 PG (ref 24–34)
MCHC RBC AUTO-ENTMCNC: 33.1 G/DL (ref 31–37)
MCV RBC AUTO: 87.9 FL (ref 74–97)
MONOCYTES # BLD: 0.6 K/UL (ref 0.05–1.2)
MONOCYTES NFR BLD: 8 % (ref 3–10)
NEUTS SEG # BLD: 4.7 K/UL (ref 1.8–8)
NEUTS SEG NFR BLD: 60 % (ref 40–73)
PLATELET # BLD AUTO: 121 K/UL (ref 135–420)
PMV BLD AUTO: 10 FL (ref 9.2–11.8)
POTASSIUM SERPL-SCNC: 4 MMOL/L (ref 3.5–5.5)
PROT SERPL-MCNC: 7.4 G/DL (ref 6.4–8.2)
PROTHROMBIN TIME: 13.1 SEC (ref 11.5–15.2)
RBC # BLD AUTO: 4.06 M/UL (ref 4.7–5.5)
SODIUM SERPL-SCNC: 136 MMOL/L (ref 136–145)
WBC # BLD AUTO: 7.8 K/UL (ref 4.6–13.2)

## 2019-11-15 PROCEDURE — 80053 COMPREHEN METABOLIC PANEL: CPT

## 2019-11-15 PROCEDURE — 74011250637 HC RX REV CODE- 250/637: Performed by: PHYSICIAN ASSISTANT

## 2019-11-15 PROCEDURE — 85025 COMPLETE CBC W/AUTO DIFF WBC: CPT

## 2019-11-15 PROCEDURE — 99283 EMERGENCY DEPT VISIT LOW MDM: CPT

## 2019-11-15 PROCEDURE — 73564 X-RAY EXAM KNEE 4 OR MORE: CPT

## 2019-11-15 PROCEDURE — 85610 PROTHROMBIN TIME: CPT

## 2019-11-15 PROCEDURE — 93971 EXTREMITY STUDY: CPT

## 2019-11-15 RX ORDER — HYDROCODONE BITARTRATE AND ACETAMINOPHEN 5; 325 MG/1; MG/1
1 TABLET ORAL
Qty: 15 TAB | Refills: 0 | Status: SHIPPED | OUTPATIENT
Start: 2019-11-15 | End: 2019-11-19 | Stop reason: ALTCHOICE

## 2019-11-15 RX ORDER — OXYCODONE AND ACETAMINOPHEN 5; 325 MG/1; MG/1
1 TABLET ORAL
Status: COMPLETED | OUTPATIENT
Start: 2019-11-15 | End: 2019-11-15

## 2019-11-15 RX ADMIN — OXYCODONE HYDROCHLORIDE AND ACETAMINOPHEN 1 TABLET: 5; 325 TABLET ORAL at 14:54

## 2019-11-15 NOTE — ED TRIAGE NOTES
Left lower leg swelling  Noticed it this morning. States last time this happened they drained blood from his knee.

## 2019-11-15 NOTE — ED PROVIDER NOTES
EMERGENCY DEPARTMENT HISTORY AND PHYSICAL EXAM    1:37 PM      Date: 11/15/2019  Patient Name: Grupo Monreal    History of Presenting Illness     Chief Complaint   Patient presents with    Leg Pain       History Provided By: Patient    Chief Complaint: left calf swelling  Duration:  Days  Timing:  Acute  Location:   Quality: Aching  Severity: 5 out of 10  Modifying Factors: none  Associated Symptoms: denies any other associated signs or symptoms      Additional History (Context):Bairon Ochoa is a 62 y.o. male with a pertinent history of recent dx of pancreatic cancer with metastatic disease, PE currently on anticoagulation with Lovenox, IDDM, and GERD who presents to the emergency department for evaluation of left calf swelling with pain x 2 days. Pt reports he is compliant with prescribed Lovenox. He is also taking Naprosyn for pain. He denies CP, SOB, or hemoptysis. States he feels fine otherwise. Pt did have endoscopic US with FNA performed 2 days ago by Dr. Jessica Yarbrough. Pt reports he had a knee aspiration on his last visit to the ED. No recent fevers, chills, n/v/d. No other concerns at this time. PCP:  Janel, MD Jewel      Current Outpatient Medications   Medication Sig Dispense Refill    enoxaparin sodium (LOVENOX SC) by SubCUTAneous route.  HYDROcodone-acetaminophen (NORCO) 5-325 mg per tablet Take 1 Tab by mouth every eight (8) hours as needed for Pain for up to 5 days. Max Daily Amount: 3 Tabs.  15 Tab 0    TRUE METRIX GLUCOSE METER misc USE AS DIRECTED  0    enoxaparin (LOVENOX) 80 mg/0.8 mL injection       glipiZIDE (GLUCOTROL) 10 mg tablet TAKE 1 TABLET BY MOUTH ONCE DAILY 30 MINUTES BEFORE BREAKFAST  1    HYDROcodone-acetaminophen (NORCO) 5-325 mg per tablet TAKE 1 TABLET BY MOUTH EVERY 8 HOURS AS NEEDED FOR PAIN FOR UP TO 7 DAYS (MAX DAILY AMOUNT 3 TABS)  0    LANTUS U-100 INSULIN 100 unit/mL injection INJECT 20 UNITS SUBCUTANEOUSLY ONCE DAILY AT BEDTIME  6    metFORMIN ER (GLUCOPHAGE XR) 500 mg tablet TAKE 1 TABLET BY MOUTH ONCE DAILY WITH EVENING MEAL FOR 30 DAYS  0    omeprazole (PRILOSEC) 20 mg capsule TAKE 1 CAPSULE BY MOUTH ONCE DAILY  3    QUEtiapine (SEROQUEL) 50 mg tablet       XARELTO 15 mg (42)- 20 mg (9) DsPk       rivaroxaban (XARELTO STARTER THANIA) 15 mg (42)- 20 mg (9) DsPk Take one 15 mg tablet twice a day with food for the first 21 days. Then, take one 20 mg tablet once a day with food for 9 days. 1 Dose Pack 0    ondansetron (ZOFRAN ODT) 4 mg disintegrating tablet Take 1 Tab by mouth every eight (8) hours as needed for Nausea. 20 Tab 0    lidocaine (LIDODERM) 5 % Apply patch to the affected area for 12 hours a day and remove for 12 hours a day. 5 Each 0       Past History     Past Medical History:  Past Medical History:   Diagnosis Date    Cancer ([facility]     pancreatic    Diabetes ([facility]     GERD (gastroesophageal reflux disease)     Thromboembolus (H[facility])     Blood clot in his lungs       Past Surgical History:  Past Surgical History:   Procedure Laterality Date    HX TONSILLECTOMY         Family History:  History reviewed. No pertinent family history. Social History:  Social History     Tobacco Use    Smoking status: Former Smoker     Last attempt to quit: 2019     Years since quittin.5    Smokeless tobacco: Never Used   Substance Use Topics    Alcohol use: Not Currently    Drug use: Not Currently     Types: Heroin, Cocaine, Marijuana     Comment:  1year ago       Allergies:  No Known Allergies      Review of Systems       Review of Systems   Constitutional: Negative for chills and fever. HENT: Negative for congestion, rhinorrhea and sore throat. Respiratory: Negative for cough and shortness of breath. Cardiovascular: Positive for leg swelling. Negative for chest pain. Gastrointestinal: Negative for abdominal pain, blood in stool, constipation, diarrhea, nausea and vomiting.    Genitourinary: Negative for dysuria, frequency and hematuria. Musculoskeletal: Negative for back pain and myalgias. Skin: Negative for rash and wound. Neurological: Negative for dizziness and headaches. All other systems reviewed and are negative. Physical Exam     Visit Vitals  /85   Pulse 71   Temp 98 °F (36.7 °C)   Resp 18   SpO2 100%       Physical Exam   Constitutional: He is oriented to person, place, and time. He appears well-developed and well-nourished. No distress. HENT:   Head: Normocephalic and atraumatic. Eyes: Conjunctivae are normal.   Neck: Normal range of motion. Neck supple. Cardiovascular: Normal rate, regular rhythm and normal heart sounds. Pulmonary/Chest: Effort normal and breath sounds normal. No respiratory distress. He exhibits no tenderness. Musculoskeletal: He exhibits edema and tenderness. He exhibits no deformity. Edema and TTP left knee   Neurological: He is alert and oriented to person, place, and time. Skin: Skin is warm and dry. He is not diaphoretic. Psychiatric: He has a normal mood and affect. Nursing note and vitals reviewed. Diagnostic Study Results     Labs -  Recent Results (from the past 12 hour(s))   CBC WITH AUTOMATED DIFF    Collection Time: 11/15/19  1:55 PM   Result Value Ref Range    WBC 7.8 4.6 - 13.2 K/uL    RBC 4.06 (L) 4.70 - 5.50 M/uL    HGB 11.8 (L) 13.0 - 16.0 g/dL    HCT 35.7 (L) 36.0 - 48.0 %    MCV 87.9 74.0 - 97.0 FL    MCH 29.1 24.0 - 34.0 PG    MCHC 33.1 31.0 - 37.0 g/dL    RDW 13.1 11.6 - 14.5 %    PLATELET 489 (L) 776 - 420 K/uL    MPV 10.0 9.2 - 11.8 FL    NEUTROPHILS 60 40 - 73 %    LYMPHOCYTES 30 21 - 52 %    MONOCYTES 8 3 - 10 %    EOSINOPHILS 2 0 - 5 %    BASOPHILS 0 0 - 2 %    ABS. NEUTROPHILS 4.7 1.8 - 8.0 K/UL    ABS. LYMPHOCYTES 2.3 0.9 - 3.6 K/UL    ABS. MONOCYTES 0.6 0.05 - 1.2 K/UL    ABS. EOSINOPHILS 0.2 0.0 - 0.4 K/UL    ABS.  BASOPHILS 0.0 0.0 - 0.1 K/UL    DF AUTOMATED     METABOLIC PANEL, COMPREHENSIVE    Collection Time: 11/15/19  1:55 PM Result Value Ref Range    Sodium 136 136 - 145 mmol/L    Potassium 4.0 3.5 - 5.5 mmol/L    Chloride 103 100 - 111 mmol/L    CO2 29 21 - 32 mmol/L    Anion gap 4 3.0 - 18 mmol/L    Glucose 266 (H) 74 - 99 mg/dL    BUN 18 7.0 - 18 MG/DL    Creatinine 0.88 0.6 - 1.3 MG/DL    BUN/Creatinine ratio 20 12 - 20      GFR est AA >60 >60 ml/min/1.73m2    GFR est non-AA >60 >60 ml/min/1.73m2    Calcium 8.9 8.5 - 10.1 MG/DL    Bilirubin, total 0.5 0.2 - 1.0 MG/DL    ALT (SGPT) 28 16 - 61 U/L    AST (SGOT) 23 10 - 38 U/L    Alk. phosphatase 113 45 - 117 U/L    Protein, total 7.4 6.4 - 8.2 g/dL    Albumin 3.8 3.4 - 5.0 g/dL    Globulin 3.6 2.0 - 4.0 g/dL    A-G Ratio 1.1 0.8 - 1.7     PROTHROMBIN TIME + INR    Collection Time: 11/15/19  1:55 PM   Result Value Ref Range    Prothrombin time 13.1 11.5 - 15.2 sec    INR 1.0 0.8 - 1.2         Radiologic Studies -   Xr Knee Lt Min 4 V    Result Date: 11/15/2019  EXAM: LEFT KNEE RADIOGRAPHS CLINICAL INDICATION/HISTORY: Left knee pain, swelling -Additional: None COMPARISON: Radiographs dated 11/9/2019 TECHNIQUE: 4 views of the left knee _______________ FINDINGS: BONES: Osseous alignment is as expected on the provided projections. Tricompartmental left knee joint osteoarthritis is present, March severity across the patellofemoral compartment. SOFT TISSUES: There is a small knee joint effusion with small ossified intra-articular bodies present within the suprapatellar recess. Mild prepatellar soft tissue swelling. _______________     IMPRESSION: 1. Tricompartmental left knee joint osteoarthritis, advanced severity across the patellofemoral compartment. 2. Joint effusion with ossified intra-articular bodies and mild prepatellar soft tissue swelling, as previously. Medical Decision Making   I am the first provider for this patient. I reviewed the vital signs, available nursing notes, past medical history, past surgical history, family history and social history.     Vital Signs-Reviewed the patient's vital signs. Pulse Oximetry Analysis -  100% on room air (Interpretation)    Records Reviewed: Nursing Notes and Old Medical Records (Time of Review: 1:37 PM)    ED Course: Progress Notes, Reevaluation, and Consults:    Provider Notes (Medical Decision Making):   differential diagnosis:  DVT, cellulitis, hemarthrosis, pathologic fracture    Plan: Patient presents in no significant distress with normal vitals. Exam concerning for DVT. Mild thrombocytopenia with normal INR. Otherwise unremarkable/stable blood work. Patient found to be positive for DVT, acute, nonocclusive in the posterior tibials, peroneal, popliteal, and distal femoral.  Patient is already taking Lovenox but is compliant with this. X-ray shows effusion and osteoarthrosis/degenerative changes to the left knee. No indication for emergent aspiration. Patient advised to discontinue use of Naprosyn as this may be worsening his bleeding. He is advised to follow-up with vascular and his oncologist.  No indication for further work-up at this time. At this time, patient is stable and appropriate for discharge home. Patient demonstrates understanding of current diagnoses and is in agreement with the treatment plan. They are advised that while the likelihood of serious underlying condition is low at this point given the evaluation performed today, we cannot fully rule it out. They are advised to immediately return with any new symptoms or worsening of current condition. All questions have been answered. Patient is given educational material regarding their diagnoses, including danger symptoms and when to return to the ED. Diagnosis     Clinical Impression:   1. Acute deep vein thrombosis (DVT) of proximal vein of left lower extremity ([address]    2.  Knee effusion, left        Disposition: DC Home    Follow-up Information     Follow up With Specialties Details Why Contact Info    Sharda Liriano MD Vascular Surgery Call For follow-up [address] [facility] [address] [place] [address] [address] EMERGENCY DEPT Emergency Medicine Go to As needed, If symptoms worsen [address] [facility].  [phone]           Patient's Medications   Start Taking    HYDROCODONE-ACETAMINOPHEN (NORCO) 5-325 MG PER TABLET    Take 1 Tab by mouth every eight (8) hours as needed for Pain for up to 5 days. Max Daily Amount: 3 Tabs. Continue Taking    ENOXAPARIN (LOVENOX) 80 MG/0.8 ML INJECTION        ENOXAPARIN SODIUM (LOVENOX SC)    by SubCUTAneous route. GLIPIZIDE (GLUCOTROL) 10 MG TABLET    TAKE 1 TABLET BY MOUTH ONCE DAILY 30 MINUTES BEFORE BREAKFAST    HYDROCODONE-ACETAMINOPHEN (NORCO) 5-325 MG PER TABLET    TAKE 1 TABLET BY MOUTH EVERY 8 HOURS AS NEEDED FOR PAIN FOR UP TO 7 DAYS (MAX DAILY AMOUNT 3 TABS)    LANTUS U-100 INSULIN 100 UNIT/ML INJECTION    INJECT 20 UNITS SUBCUTANEOUSLY ONCE DAILY AT BEDTIME    LIDOCAINE (LIDODERM) 5 %    Apply patch to the affected area for 12 hours a day and remove for 12 hours a day. METFORMIN ER (GLUCOPHAGE XR) 500 MG TABLET    TAKE 1 TABLET BY MOUTH ONCE DAILY WITH EVENING MEAL FOR 30 DAYS    OMEPRAZOLE (PRILOSEC) 20 MG CAPSULE    TAKE 1 CAPSULE BY MOUTH ONCE DAILY    ONDANSETRON (ZOFRAN ODT) 4 MG DISINTEGRATING TABLET    Take 1 Tab by mouth every eight (8) hours as needed for Nausea. QUETIAPINE (SEROQUEL) 50 MG TABLET        RIVAROXABAN (XARELTO STARTER THANIA) 15 MG (42)- 20 MG (9) DSPK    Take one 15 mg tablet twice a day with food for the first 21 days. Then, take one 20 mg tablet once a day with food for 9 days. TRUE METRIX GLUCOSE METER MISC    USE AS DIRECTED    XARELTO 15 MG (42)- 20 MG (9) DSPK       These Medications have changed    No medications on file   Stop Taking    NAPROXEN (NAPROSYN) 500 MG TABLET    Take 1 Tab by mouth two (2) times daily (with meals).      _______________________________    This note was dictated utilizing voice recognition software which may lead to typographical errors. I apologize in advance if the situation occurs. If questions arise please do not hesitate to contact me or call our department.   Tierney Roper PA-C

## 2019-11-19 ENCOUNTER — TELEPHONE (OUTPATIENT)
Dept: ONCOLOGY | Age: 54
End: 2019-11-19

## 2019-11-19 ENCOUNTER — HOSPITAL ENCOUNTER (OUTPATIENT)
Dept: INFUSION THERAPY | Age: 54
Discharge: HOME | End: 2019-11-19
Payer: MEDICAID

## 2019-11-19 ENCOUNTER — OFFICE VISIT (OUTPATIENT)
Dept: ONCOLOGY | Age: 54
End: 2019-11-19

## 2019-11-19 VITALS
OXYGEN SATURATION: 96 % | HEART RATE: 79 BPM | SYSTOLIC BLOOD PRESSURE: 120 MMHG | TEMPERATURE: 97.7 F | DIASTOLIC BLOOD PRESSURE: 76 MMHG

## 2019-11-19 VITALS
WEIGHT: 215.4 LBS | TEMPERATURE: 97.7 F | HEART RATE: 79 BPM | SYSTOLIC BLOOD PRESSURE: 120 MMHG | DIASTOLIC BLOOD PRESSURE: 76 MMHG | OXYGEN SATURATION: 96 % | RESPIRATION RATE: 18 BRPM | BODY MASS INDEX: 29.21 KG/M2

## 2019-11-19 DIAGNOSIS — D69.6 THROMBOCYTOPENIA (HCC): ICD-10-CM

## 2019-11-19 DIAGNOSIS — C78.7 LIVER METASTASES (HCC): ICD-10-CM

## 2019-11-19 DIAGNOSIS — C25.9 PANCREATIC ADENOCARCINOMA (HCC): Primary | ICD-10-CM

## 2019-11-19 DIAGNOSIS — C25.9 PANCREATIC ADENOCARCINOMA (HCC): ICD-10-CM

## 2019-11-19 DIAGNOSIS — R10.13 EPIGASTRIC PAIN: ICD-10-CM

## 2019-11-19 DIAGNOSIS — I26.99 PULMONARY EMBOLISM WITHOUT ACUTE COR PULMONALE, UNSPECIFIED CHRONICITY, UNSPECIFIED PULMONARY EMBOLISM TYPE (HCC): ICD-10-CM

## 2019-11-19 LAB
ALBUMIN SERPL-MCNC: 3.9 G/DL (ref 3.4–5)
ALBUMIN/GLOB SERPL: 0.9 {RATIO} (ref 0.8–1.7)
ALP SERPL-CCNC: 99 U/L (ref 45–117)
ALT SERPL-CCNC: 31 U/L (ref 16–61)
ANION GAP SERPL CALC-SCNC: 4 MMOL/L (ref 3–18)
AST SERPL-CCNC: 18 U/L (ref 10–38)
BASO+EOS+MONOS # BLD AUTO: 0.8 K/UL (ref 0–2.3)
BASO+EOS+MONOS NFR BLD AUTO: 8 % (ref 0.1–17)
BILIRUB SERPL-MCNC: 0.5 MG/DL (ref 0.2–1)
BUN SERPL-MCNC: 14 MG/DL (ref 7–18)
BUN/CREAT SERPL: 15 (ref 12–20)
CALCIUM SERPL-MCNC: 9.5 MG/DL (ref 8.5–10.1)
CHLORIDE SERPL-SCNC: 101 MMOL/L (ref 100–111)
CO2 SERPL-SCNC: 29 MMOL/L (ref 21–32)
CREAT SERPL-MCNC: 0.92 MG/DL (ref 0.6–1.3)
DIFFERENTIAL METHOD BLD: NORMAL
ERYTHROCYTE [DISTWIDTH] IN BLOOD BY AUTOMATED COUNT: 13.2 % (ref 11.5–14.5)
GLOBULIN SER CALC-MCNC: 4.3 G/DL (ref 2–4)
GLUCOSE SERPL-MCNC: 219 MG/DL (ref 74–99)
HCT VFR BLD AUTO: 38 % (ref 36–48)
HGB BLD-MCNC: 12.6 G/DL (ref 12–16)
LYMPHOCYTES # BLD: 2.6 K/UL (ref 1.1–5.9)
LYMPHOCYTES NFR BLD: 25 % (ref 14–44)
MCH RBC QN AUTO: 29.4 PG (ref 25–35)
MCHC RBC AUTO-ENTMCNC: 33.2 G/DL (ref 31–37)
MCV RBC AUTO: 88.6 FL (ref 78–102)
NEUTS SEG # BLD: 6.7 K/UL (ref 1.8–9.5)
NEUTS SEG NFR BLD: 67 % (ref 40–70)
PLATELET # BLD AUTO: 154 K/UL (ref 140–440)
POTASSIUM SERPL-SCNC: 3.9 MMOL/L (ref 3.5–5.5)
PROT SERPL-MCNC: 8.2 G/DL (ref 6.4–8.2)
RBC # BLD AUTO: 4.29 M/UL (ref 4.1–5.1)
SODIUM SERPL-SCNC: 134 MMOL/L (ref 136–145)
WBC # BLD AUTO: 10.1 K/UL (ref 4.5–13)

## 2019-11-19 PROCEDURE — 80053 COMPREHEN METABOLIC PANEL: CPT

## 2019-11-19 PROCEDURE — 81350 UGT1A1 GENE COMMON VARIANTS: CPT

## 2019-11-19 PROCEDURE — 85025 COMPLETE CBC W/AUTO DIFF WBC: CPT

## 2019-11-19 PROCEDURE — 86301 IMMUNOASSAY TUMOR CA 19-9: CPT

## 2019-11-19 PROCEDURE — 36415 COLL VENOUS BLD VENIPUNCTURE: CPT

## 2019-11-19 RX ORDER — NALOXONE HYDROCHLORIDE 4 MG/.1ML
SPRAY NASAL
Qty: 1 EACH | Refills: 1 | Status: SHIPPED | OUTPATIENT
Start: 2019-11-19

## 2019-11-19 RX ORDER — OXYCODONE HYDROCHLORIDE 15 MG/1
15 TABLET, FILM COATED, EXTENDED RELEASE ORAL EVERY 12 HOURS
Qty: 60 TAB | Refills: 0 | Status: SHIPPED | OUTPATIENT
Start: 2019-11-19 | End: 2019-12-10 | Stop reason: DRUGHIGH

## 2019-11-19 RX ORDER — LIDOCAINE AND PRILOCAINE 25; 25 MG/G; MG/G
CREAM TOPICAL AS NEEDED
Qty: 30 G | Refills: 0 | Status: SHIPPED | OUTPATIENT
Start: 2019-11-19 | End: 2020-05-25

## 2019-11-19 RX ORDER — LOPERAMIDE HCL 2 MG
2 TABLET ORAL
Qty: 40 TAB | Refills: 1 | Status: SHIPPED | OUTPATIENT
Start: 2019-11-19 | End: 2019-11-29

## 2019-11-19 RX ORDER — DEXAMETHASONE 4 MG/1
TABLET ORAL
Qty: 30 TAB | Refills: 0 | Status: SHIPPED | OUTPATIENT
Start: 2019-11-19

## 2019-11-19 RX ORDER — HYDROCODONE BITARTRATE AND ACETAMINOPHEN 7.5; 325 MG/1; MG/1
1 TABLET ORAL
Qty: 90 TAB | Refills: 0 | Status: SHIPPED | OUTPATIENT
Start: 2019-11-19 | End: 2019-11-27

## 2019-11-19 RX ORDER — ONDANSETRON HYDROCHLORIDE 8 MG/1
8 TABLET, FILM COATED ORAL
Qty: 90 TAB | Refills: 0 | Status: SHIPPED | OUTPATIENT
Start: 2019-11-19 | End: 2019-11-27

## 2019-11-19 NOTE — TELEPHONE ENCOUNTER
Pharmacy is requesting Prior Auth for the following prescriptions:         oxyCODONE ER (OXYCONTIN) 15 mg ER tablet 15 mg, EVERY 12 HOURS 0 ordered        Summary: Take 1 Tab by mouth every twelve (12) hours for 30 days. Max Daily Amount: 30 mg., Print, Disp-60 Tab, R-0   Dose, Route, Frequency: 15 mg, Oral, EVERY 12 HOURS  Start: 11/19/2019  End: 12/19/2019  Ord/Sold: 11/19/2019 (O)  Report  Adh:   Taking:   Long-term:   Med Dose History  Change       Patient Sig: Take 1 Tab by mouth every twelve (12) hours for 30 days. Max Daily Amount: 30 mg. Ordered on: 11/19/2019       Authorized by: Lane Nielsen       Dispense: 60 Tab        HYDROcodone-acetaminophen ([address]) 7.5-325 mg per tablet 1 Tab, EVERY 6 HOURS AS NEEDED 0 ordered         Summary: Take 1 Tab by mouth every six (6) hours as needed for Pain for up to 30 days. Max Daily Amount: 4 Tabs., Normal, Disp-90 Tab, R-0   Dose, Route, Frequency: 1 Tab, Oral, EVERY 6 HOURS AS NEEDED  Start: 11/19/2019  End: 12/19/2019  Ord/Sold: 11/19/2019 (O)  Report  Adh:   Taking:   Long-term:   [facility] Dose [facility]  Change       Patient Sig: Take 1 Tab by mouth every six (6) hours as needed for Pain for up to 30 days. Max Daily Amount: 4 Tabs.        Ordered on: 11/19/2019       Authorized by: Magda Brown: 90 Tab

## 2019-11-19 NOTE — PROGRESS NOTES
[facility]  [address] [facility], [address]  [place], ose [facility]  Office Phone: [phone]  Fax: [phone]      Reason for visit: Pancreatic adenocarcinoma    HPI:   Lillian Milligan is a 62 y.o.  male with past medical history including pulmonary embolus, on Lovenox, type 2 diabetes, GERD, who I was asked to see in consultation at the request of Dr. Yaima Cummings for evaluation for newly diagnosed pancreatic adenocarcinoma. Patient had fine-needle aspiration of pancreatic mass on 11/13/2019 and pathology showed pancreatic adenocarcinoma. Mr. Marie Cervantes presented to ER on 11/03/2019 with complaint of severe mid-abdominal pain for 5 days. CT abdomen and pelvis showed hypoenhancing mass in the pancreatic body measuring 4.7 x 2.8 cm which encases and narrows the splenic artery with surrounding local peripancreatic fat infiltration. There were also multiple hepatic hypodensities of varying size concerning for metastatic disease. There was also elongated hypodensity in the posterior spleen measuring up to 3.3 cm possibly focus of metastatic disease. 1.5 cm portacaval lymph node was also concerning for metastatic disease. Abdominal MRI confirmed above findings see below for more details and oncology history. Patient presented again in the ER on 11/15/2019 with concern of lower extremity pain and swelling and was found to have left acute, nonocclusive DVT in the posterior tibial, peroneal, popliteal and distal femoral veins. Labs in the ER on 11/15/2019 showed sodium 136, potassium 4.0, chloride 103, CO2 29, BUN 18, creatinine 0.88. Albumin 3.8. Normal AST and ALT. normal total bilirubin. WBC 7.8, H&H 11.8/35.7, MCV 87.9, platelet 931. Patient was seen and examined today. He is awake alert oriented x3. On review of system he reports epigastric abdominal pain, 67/10, left lower extremity swelling and pain as well as fatigue.   Has lost 15-20 Lbs in last 3 months. Denies any fevers, chills, or nausea and vomiting. Denies any melena or bright red blood per rectum. All also points of review of system have been reviewed and were negative. ECOG performance status 1. Independent with ADLs and IADLs. DX: Pancreatic adenocarcinoma    STAGE: Stage IV    Treatment intent: Palliative    ONCOLOGY HISTORY:   11/03/2019: Paige Hemming to ER due to 5 days complaints of epigastric pain. CT abdomen and pelvis showed:  1. 5 cm mass within the pancreatic body highly suggestive of primary malignancy. Superimposed acute pancreatitis cannot entirely be excluded. 2. Multiple liver lesions highly suggestive of metastatic disease 3. Metastatic retroperitoneal and mesenteric lymphadenopathy. 4. Indeterminate hypodensity within the spleen. Diagnostic consideration include splenic infarct or metastatic lesion 5. Right lower lobe pulmonary emboli.      *MRI of the abdomen showed  1. Stable since same day CT abdomen pelvis. Elliptical 4 cm hypoenhancing mass,  body of pancreas, with upstream glandular atrophy and pancreatic ductal  dilatation, most likely adenocarcinoma. Associated local/regional likely metastatic lymphadenopathy including upper retroperitoneum, lesser omentum, portacaval/periportal space. Associated encasement/narrowing of the adjacent  splenic vein. 2. Mild peripancreatic edema and soft tissue reticulation; may reflect secondary low-grade or chronic pancreatitis or adjacent peripancreatic tumor infiltration.   3. Numerous hypoenhancing and target-like small nodules and masses throughout  the liver, typical of metastatic disease. 4. Mild pericholecystic fluid, nonspecific, but may represent low-grade cholecystitis. No evident cholecystolithiasis/choledocholithiasis or biliary ductal dilatation. 5. Mild splenomegaly with focal small splenic infarction, age indeterminate  perhaps recent. Small left lower pole, nonacute renal infarction.  6. Other minor and/or ancillary findings including lumbar disc herniations    11/15/2019: Duplex left  lower extremity showed Acute nonocclusive thrombus in the distal femoral vein, popliteal, peroneal and both posterior tibial veins. The thrombus in the distal femoral vein appears to be floating tail-like thrombus. 2019: First medical oncology visit with me         Past Medical History:   Diagnosis Date    Cancer ([facility] [address]     pancreatic    Diabetes ([facility] [address]     GERD (gastroesophageal reflux disease)     Thromboembolus (HCC)     Blood clot in his lungs     Past Surgical History:   Procedure Laterality Date    HX TONSILLECTOMY       Social History     Socioeconomic History    Marital status:      Spouse name: Not on file    Number of children: Not on file    Years of education: Not on file    Highest education level: Not on file   Tobacco Use    Smoking status: Former Smoker     Last attempt to quit: 2019     Years since quittin.5    Smokeless tobacco: Never Used   Substance and Sexual Activity    Alcohol use: Not Currently    Drug use: Not Currently     Types: Heroin, Cocaine, Marijuana     Comment:  1year ago     No family history on file. Current Outpatient Medications   Medication Sig Dispense Refill    enoxaparin sodium (LOVENOX SC) by SubCUTAneous route.  HYDROcodone-acetaminophen (NORCO) 5-325 mg per tablet Take 1 Tab by mouth every eight (8) hours as needed for Pain for up to 5 days. Max Daily Amount: 3 Tabs.  15 Tab 0    TRUE METRIX GLUCOSE METER misc USE AS DIRECTED  0    enoxaparin (LOVENOX) 80 mg/0.8 mL injection       glipiZIDE (GLUCOTROL) 10 mg tablet TAKE 1 TABLET BY MOUTH ONCE DAILY 30 MINUTES BEFORE BREAKFAST  1    HYDROcodone-acetaminophen (NORCO) 5-325 mg per tablet TAKE 1 TABLET BY MOUTH EVERY 8 HOURS AS NEEDED FOR PAIN FOR UP TO 7 DAYS (MAX DAILY AMOUNT 3 TABS)  0    LANTUS U-100 INSULIN 100 unit/mL injection INJECT 20 UNITS SUBCUTANEOUSLY ONCE DAILY AT BEDTIME  6    metFORMIN ER (GLUCOPHAGE XR) 500 mg tablet TAKE 1 TABLET BY MOUTH ONCE DAILY WITH EVENING MEAL FOR 30 DAYS  0    omeprazole (PRILOSEC) 20 mg capsule TAKE 1 CAPSULE BY MOUTH ONCE DAILY  3    QUEtiapine (SEROQUEL) 50 mg tablet       XARELTO 15 mg (42)- 20 mg (9) DsPk       rivaroxaban (XARELTO STARTER THANIA) 15 mg (42)- 20 mg (9) DsPk Take one 15 mg tablet twice a day with food for the first 21 days. Then, take one 20 mg tablet once a day with food for 9 days. 1 Dose Pack 0    ondansetron (ZOFRAN ODT) 4 mg disintegrating tablet Take 1 Tab by mouth every eight (8) hours as needed for Nausea. 20 Tab 0    lidocaine (LIDODERM) 5 % Apply patch to the affected area for 12 hours a day and remove for 12 hours a day. 5 Each 0       No Known Allergies    Review of Systems  As per HPI      Objective:  Physical Exam:  Visit Vitals  /76   Pulse 79   Temp 97.7 °F (36.5 °C) (Oral)   Resp 18   Wt 97.7 kg (215 lb 6.4 oz)   SpO2 96%   BMI 29.21 kg/m²         General:  Alert, cooperative, no distress, appears stated age. Head:  Normocephalic, without obvious abnormality, atraumatic. Eyes:  Conjunctivae/corneas clear. PERRL, EOMs intact. Throat: Lips, mucosa, and tongue normal.    Neck: Supple, symmetrical, trachea midline, no adenopathy, thyroid: no enlargement/tenderness/nodules   Back:   Symmetric, no curvature. ROM normal. No CVA tenderness. Lungs:   Clear to auscultation bilaterally. Chest wall:  No tenderness or deformity. Heart:  Regular rate and rhythm, S1, S2 normal, no murmur, click, rub or gallop. Abdomen:   Soft, non-tender. Bowel sounds normal. No masses,  No organomegaly. Extremities: Extremities normal, atraumatic, no cyanosis or edema. Skin: Skin color, texture, turgor normal. No rashes or lesions. Lymph nodes: Cervical, supraclavicular, and axillary nodes normal.   Neurologic: CNII-XII intact.        Diagnostic Imaging     Results for orders placed during the hospital encounter of 11/03/19   CT ABD PELV W CONT    Narrative EXAM: CT of the abdomen and pelvis    INDICATION: Upper abdominal pain radiating to back    COMPARISON: None. TECHNIQUE: Axial CT imaging of the abdomen and pelvis was performed with  intravenous contrast. Multiplanar reformats were generated. One or more dose  reduction techniques were used on this CT: automated exposure control,  adjustment of the mAs and/or kVp according to patient size, and iterative  reconstruction techniques. The specific techniques used on this CT exam have  been documented in the patient's electronic medical record. Digital Imaging and Communications in Medicine (DICOM) format image data are  available to nonaffiliated external healthcare facilities or entities on a  secure, media free, reciprocally searchable basis with patient authorization for  at least a 12 month period after this study. _______________    FINDINGS:    LOWER CHEST: There are filling defects within the segmental and subsegmental  branches of the right lower lobe pulmonary artery. Heart size is the upper limit  of normal. No pericardial effusion. A small hiatal hernia is present. LIVER, BILIARY: Liver contains numerous hypoattenuating masses of varying sizes  spanning the right and left hepatic lobes. No biliary dilation. Mild gallbladder  wall thickening is present. PANCREAS: 4.7 x 2.8 cm hypoattenuating heterogeneous infiltrative mass within  the pancreatic body and tail. There is peripancreatic inflammatory stranding. SPLEEN: Focal linear hypodensity is seen within the posterior splenic body. ADRENALS: Normal.    KIDNEYS: Normal.    LYMPH NODES: Enlarged mesenteric and retroperitoneal lymph nodes. For example,  periaortic node measures 1.9 x 1.5 cm on series 3 image 82. Enlarged nodes are  also seen within the marquis hepatis and epigastric region. GASTROINTESTINAL TRACT: No bowel dilation or wall thickening. Normal appendix. PELVIC ORGANS: Prostate hypertrophy.     VASCULATURE: Unremarkable. BONES: Mild thoracolumbar dextroscoliosis and multilevel spondylosis. No acute  or aggressive osseous abnormalities identified. OTHER: None.    _______________      Impression IMPRESSION:    1. 5 cm mass within the pancreatic body highly suggestive of primary malignancy. Superimposed acute pancreatitis cannot entirely be excluded. 2. Multiple liver lesions highly suggestive of metastatic disease    3. Metastatic retroperitoneal and mesenteric lymphadenopathy    4. Indeterminate hypodensity within the spleen. Diagnostic considerations  include splenic infarct or metastatic lesion    5. Right lower lobe pulmonary emboli. Critical result was discussed with ED  attending at 3:05 AM on November 4, 2019. This result was acknowledged and  understood. Preliminary interpretation provided by on-call radiology resident. Lab Results  Lab Results   Component Value Date/Time    WBC 7.8 11/15/2019 01:55 PM    HGB 11.8 (L) 11/15/2019 01:55 PM    HCT 35.7 (L) 11/15/2019 01:55 PM    PLATELET 729 (L) 66/04/9888 01:55 PM    MCV 87.9 11/15/2019 01:55 PM       Lab Results   Component Value Date/Time    Sodium 136 11/15/2019 01:55 PM    Potassium 4.0 11/15/2019 01:55 PM    Chloride 103 11/15/2019 01:55 PM    CO2 29 11/15/2019 01:55 PM    Anion gap 4 11/15/2019 01:55 PM    Glucose 266 (H) 11/15/2019 01:55 PM    BUN 18 11/15/2019 01:55 PM    Creatinine 0.88 11/15/2019 01:55 PM    BUN/Creatinine ratio 20 11/15/2019 01:55 PM    GFR est AA >60 11/15/2019 01:55 PM    GFR est non-AA >60 11/15/2019 01:55 PM    Calcium 8.9 11/15/2019 01:55 PM    AST (SGOT) 23 11/15/2019 01:55 PM    Alk. phosphatase 113 11/15/2019 01:55 PM    Protein, total 7.4 11/15/2019 01:55 PM    Albumin 3.8 11/15/2019 01:55 PM    Globulin 3.6 11/15/2019 01:55 PM    A-G Ratio 1.1 11/15/2019 01:55 PM    ALT (SGPT) 28 11/15/2019 01:55 PM       Assessment/Plan:  62 y.o. male  1.  Pancreatic adenocarcinoma ([facility] [address]  I had a long discussion with Mr. Amarilis regarding his newly diagnosed metastatic pancreatic cancer. I told him that in the first-line setting, palliative chemotherapy have been shown to improve 1 year survival by 73% compared to supportive care alone. I told him that since he has a good performance status, that my plan would be to treat him with palliative FOLFIRINOX  as follow:    Day 1: Oxaliplatin 85mg/m2 IV + irinotecan 180mg/m2 IV + leucovorin 400mg/m2 IV, followed by a 5-FU bolus of 400mg/m2 and a 46-hour continuous 5-FU infusion of 2,400mg/m2. Repeat cycle every 2 weeks until disease progression. The benefits, risk, and potential adverse effects of this combination chemotherapy including but not limited to cytopenias, neutropenic fever, possible death, nausea vomiting, fatigue, peripheral neuropathy, alopecia, diarrhea, kidney failure etc. were discussed with patient. All of his questions were answered. He opted to continue with treatment plan. *UG T1 A1 testing  *CBC with differential, CMP, and CA-19-9 today  *PET/CT tomorrow hopefully  *Brain MRI was ordered and pending  *Port-A-Cath to be placed  *Signed chemotherapy consent  *Ordered Glucerna. Start treatment as soon as possible    2. Liver metastases ([address]  These are from pancreatic cancer. We will follow-up with imaging after few cycles of treatment. 3. Epigastric pain  Continue Norco 5 mg orally every 4-6 hours as needed pain. Add long-acting with OxyContin 15 mg p.o. twice daily  Signed pain contract    4. Pulmonary embolism without acute cor pulmonale, unspecified chronicity, unspecified pulmonary embolism type (HCC)  Continue Xarelto    5. Thrombocytopenia ([address]  This is likely secondary to the mild splenomegaly seen on imaging. Continue follow-up. 6. Insulin dependent diabetes:Likely from the pancreatic cancer. PCP to stop Metformin since patient is likely not making insulin anymore.  Needs tight glucose control with insulin since he will be getting steroids for pre-chemo meds.       Return in 2 weeks

## 2019-11-19 NOTE — PROGRESS NOTES
Rg Mckenzie is a 62 y.o. male presenting today for a new patient appointment. Patient is ambulatory with no assistive devices and reports pain of 10/10 in abdomen. Chief Complaint   Patient presents with    Pancreatic Cancer       Visit Vitals  /76   Pulse 79   Temp 97.7 °F (36.5 °C) (Oral)   Resp 18   Wt 215 lb 6.4 oz (97.7 kg)   SpO2 96%   BMI 29.21 kg/m²       Current Outpatient Medications   Medication Sig    oxyCODONE ER (OXYCONTIN) 15 mg ER tablet Take 1 Tab by mouth every twelve (12) hours for 30 days. Max Daily Amount: 30 mg.    naloxone (NARCAN) 4 mg/actuation nasal spray Use 1 spray intranasally, then discard. Repeat with new spray every 2 min as needed for opioid overdose symptoms, alternating nostrils.  HYDROcodone-acetaminophen (NORCO) 7.5-325 mg per tablet Take 1 Tab by mouth every six (6) hours as needed for Pain for up to 30 days. Max Daily Amount: 4 Tabs.  dexAMETHasone (DECADRON) 4 mg tablet Take 4mg by mouth twice a day the day before chemotherapy and the day after chemotherapy    lidocaine-prilocaine (EMLA) topical cream Apply  to affected area as needed for Pain (Apply to skin 30-60 minutes before mediport access).  loperamide (IMMODIUM) 2 mg tablet Take 1 Tab by mouth four (4) times daily as needed for Diarrhea for up to 10 days. Indications: Diarrhea caused by Chemotherapy    ondansetron hcl (ZOFRAN) 8 mg tablet Take 1 Tab by mouth every eight (8) hours as needed for Nausea.  nut.tx.gluc.intol,lac-free,soy (GLUCERNA ADVANCE) liqd Take 237 mL by mouth three (3) times daily.     TRUE METRIX GLUCOSE METER misc USE AS DIRECTED    glipiZIDE (GLUCOTROL) 10 mg tablet TAKE 1 TABLET BY MOUTH ONCE DAILY 30 MINUTES BEFORE BREAKFAST    LANTUS U-100 INSULIN 100 unit/mL injection INJECT 20 UNITS SUBCUTANEOUSLY ONCE DAILY AT BEDTIME    metFORMIN ER (GLUCOPHAGE XR) 500 mg tablet TAKE 1 TABLET BY MOUTH ONCE DAILY WITH EVENING MEAL FOR 30 DAYS    omeprazole (PRILOSEC) 20 mg capsule TAKE 1 CAPSULE BY MOUTH ONCE DAILY    XARELTO 15 mg (42)- 20 mg (9) DsPk     rivaroxaban (XARELTO STARTER THANIA) 15 mg (42)- 20 mg (9) DsPk Take one 15 mg tablet twice a day with food for the first 21 days. Then, take one 20 mg tablet once a day with food for 9 days.  ondansetron (ZOFRAN ODT) 4 mg disintegrating tablet Take 1 Tab by mouth every eight (8) hours as needed for Nausea.  enoxaparin sodium (LOVENOX SC) by SubCUTAneous route.  enoxaparin (LOVENOX) 80 mg/0.8 mL injection     QUEtiapine (SEROQUEL) 50 mg tablet     lidocaine (LIDODERM) 5 % Apply patch to the affected area for 12 hours a day and remove for 12 hours a day. No current facility-administered medications for this visit. Medications no longer taking/discontinued: Lovenox, Seroquel    Fall Risk Assessment, last 12 mths 11/19/2019   Able to walk? Yes   Fall in past 12 months? No       3 most recent PHQ Screens 11/19/2019   Little interest or pleasure in doing things Not at all   Feeling down, depressed, irritable, or hopeless Not at all   Total Score PHQ 2 0       Abuse Screening Questionnaire 11/19/2019   Do you ever feel afraid of your partner? N   Are you in a relationship with someone who physically or mentally threatens you? N   Is it safe for you to go home?  Y       Learning Assessment 11/19/2019   PRIMARY LEARNER Patient   PRIMARY LANGUAGE ENGLISH   LEARNER PREFERENCE PRIMARY LISTENING   ANSWERED BY patient   RELATIONSHIP SELF

## 2019-11-19 NOTE — LETTER
11/19/19 Patient: Lillian Milligan YOB: 1965 Date of Visit: 11/19/2019 Jewel Islas MD 
Patient Can Only Remember The Practice Name And Not The Physician 
VIA Ricci Gill NP 
[address] [place] [address] [phone] VIA Facsimile: [phone] Dear MD Ricci Squires NP, Thank you for referring Mr. Lillian Milligan to [facility] for evaluation. My notes for this consultation are attached. If you have questions, please do not hesitate to call me. I look forward to following your patient along with you.  
 
 
Sincerely, 
 
Eloisa Lucero MD

## 2019-11-19 NOTE — Clinical Note
Newly diagnosed stage IV pancreatic ca to be treated next week with folfirinox.  Port to be placed hopefully this week

## 2019-11-19 NOTE — PROGRESS NOTES
[facility] Progress Note    Date: 2019    Name: Vanita Rodney    MRN: [id number]         : 1965    Peripheral Lab Draw      Mr. Ivan Malik to [facility], ambulatory at [id number] [phone] accompanied by family. Pt was assessed and education was provided. Mr. Barby Tavarez vitals were reviewed and patient was observed for 5 minutes prior to treatment. Visit Vitals  /76   Pulse 79   Temp 97.7 °F (36.5 °C) (Oral)   SpO2 96%     Recent Results (from the past 12 hour(s))   CBC WITH 3 PART DIFF    Collection Time: 19  2:24 PM   Result Value Ref Range    WBC 10.1 4.5 - 13.0 K/uL    RBC 4.29 4. 10 - 5.10 M/uL    HGB 12.6 12.0 - 16.0 g/dL    HCT 38.0 36 - 48 %    MCV 88.6 78 - 102 FL    MCH 29.4 25.0 - 35.0 PG    MCHC 33.2 31 - 37 g/dL    RDW 13.2 11.5 - 14.5 %    PLATELET 942 300 - 237 K/uL    NEUTROPHILS 67 40 - 70 %    MIXED CELLS 8 0.1 - 17 %    LYMPHOCYTES 25 14 - 44 %    ABS. NEUTROPHILS 6.7 1.8 - 9.5 K/UL    ABS. MIXED CELLS 0.8 0.0 - 2.3 K/uL    ABS. LYMPHOCYTES 2.6 1.1 - 5.9 K/UL    DF AUTOMATED         Blood obtained peripherally from left arm x 1 attempt with butterfly needle and sent to lab for Cbc w/diff, Cmp, Ca 19-9 and [id number] Irinotecan toxicity  per written orders. No bleeding or hematoma noted at site. Gauze and coban applied. Mr. Ivan Malik tolerated the phlebotomy, and had no complaints. Patient armband removed and shredded. Mr. Ivan Malik was discharged from [place] [address] in stable condition at 1425.      Kat Brown Phlebotomist PCT  2019  2:31 PM

## 2019-11-20 ENCOUNTER — TELEPHONE (OUTPATIENT)
Dept: ONCOLOGY | Age: 54
End: 2019-11-20

## 2019-11-20 ENCOUNTER — OFFICE VISIT (OUTPATIENT)
Dept: SURGERY | Age: 54
End: 2019-11-20

## 2019-11-20 VITALS
DIASTOLIC BLOOD PRESSURE: 71 MMHG | OXYGEN SATURATION: 96 % | HEART RATE: 81 BPM | SYSTOLIC BLOOD PRESSURE: 104 MMHG | BODY MASS INDEX: 28.79 KG/M2 | TEMPERATURE: 97.3 F | WEIGHT: 212.6 LBS | HEIGHT: 72 IN

## 2019-11-20 DIAGNOSIS — C25.9 PANCREATIC ADENOCARCINOMA (HCC): Primary | ICD-10-CM

## 2019-11-20 LAB — CANCER AG19-9 SERPL-ACNC: 112 U/ML (ref 0–35)

## 2019-11-20 NOTE — TELEPHONE ENCOUNTER
[employer], called to inform that a peer to peer is required for the patient.   Please contact MILAGROS @[phone]

## 2019-11-20 NOTE — PROGRESS NOTES
Consult    Patient: Tiny Palmer MRN: [id number]  SSN: [id number]    YOB: 1965  Age: 62 y.o. Sex: male      Subjective:      Tiny Palmer is a 62 y.o. white male who presents in referral from Dr. Francisco Teixeira in regard to facilitation of venous access for planned chemotherapy of a recently diagnosed pancreatic adenocarcinoma with hepatic metastases. The patient has a significant history of a DVT/PE for which she is on Xarelto. Past Medical History:   Diagnosis Date    Cancer ([facility] [address]     pancreatic    Diabetes ([facility] [address]     GERD (gastroesophageal reflux disease)     Thromboembolus (HCC)     Blood clot in his lungs     Past Surgical History:   Procedure Laterality Date    HX TONSILLECTOMY        Family History   Problem Relation Age of Onset    Diabetes Mother     Hypertension Mother     Heart Disease Father     Diabetes Brother     Cancer Maternal Grandmother     Cancer Maternal Grandfather      Social History     Tobacco Use    Smoking status: Former Smoker     Packs/day: 0.50     Last attempt to quit: 2018     Years since quittin.0    Smokeless tobacco: Never Used   Substance Use Topics    Alcohol use: Not Currently      Current Outpatient Medications   Medication Sig Dispense Refill    oxyCODONE ER (OXYCONTIN) 15 mg ER tablet Take 1 Tab by mouth every twelve (12) hours for 30 days. Max Daily Amount: 30 mg. 60 Tab 0    naloxone (NARCAN) 4 mg/actuation nasal spray Use 1 spray intranasally, then discard. Repeat with new spray every 2 min as needed for opioid overdose symptoms, alternating nostrils. 1 Each 1    HYDROcodone-acetaminophen (NORCO) 7.5-325 mg per tablet Take 1 Tab by mouth every six (6) hours as needed for Pain for up to 30 days. Max Daily Amount: 4 Tabs.  90 Tab 0    dexAMETHasone (DECADRON) 4 mg tablet Take 4mg by mouth twice a day the day before chemotherapy and the day after chemotherapy 30 Tab 0    lidocaine-prilocaine (EMLA) topical cream Apply  to affected area as needed for Pain (Apply to skin 30-60 minutes before mediport access). 30 g 0    loperamide (IMMODIUM) 2 mg tablet Take 1 Tab by mouth four (4) times daily as needed for Diarrhea for up to 10 days. Indications: Diarrhea caused by Chemotherapy 40 Tab 1    ondansetron hcl (ZOFRAN) 8 mg tablet Take 1 Tab by mouth every eight (8) hours as needed for Nausea. 90 Tab 0    nut.tx.gluc.intol,lac-free,soy (GLUCERNA ADVANCE) liqd Take 237 mL by mouth three (3) times daily. 90 Bottle 5    enoxaparin sodium (LOVENOX SC) by SubCUTAneous route.  TRUE METRIX GLUCOSE METER misc USE AS DIRECTED  0    enoxaparin (LOVENOX) 80 mg/0.8 mL injection       glipiZIDE (GLUCOTROL) 10 mg tablet TAKE 1 TABLET BY MOUTH ONCE DAILY 30 MINUTES BEFORE BREAKFAST  1    LANTUS U-100 INSULIN 100 unit/mL injection INJECT 20 UNITS SUBCUTANEOUSLY ONCE DAILY AT BEDTIME  6    metFORMIN ER (GLUCOPHAGE XR) 500 mg tablet TAKE 1 TABLET BY MOUTH ONCE DAILY WITH EVENING MEAL FOR 30 DAYS  0    omeprazole (PRILOSEC) 20 mg capsule TAKE 1 CAPSULE BY MOUTH ONCE DAILY  3    QUEtiapine (SEROQUEL) 50 mg tablet       XARELTO 15 mg (42)- 20 mg (9) DsPk       rivaroxaban (XARELTO STARTER THANIA) 15 mg (42)- 20 mg (9) DsPk Take one 15 mg tablet twice a day with food for the first 21 days. Then, take one 20 mg tablet once a day with food for 9 days. 1 Dose Pack 0    ondansetron (ZOFRAN ODT) 4 mg disintegrating tablet Take 1 Tab by mouth every eight (8) hours as needed for Nausea. 20 Tab 0    lidocaine (LIDODERM) 5 % Apply patch to the affected area for 12 hours a day and remove for 12 hours a day. 5 Each 0        No Known Allergies    Review of Systems:      General: Denies fevers, chills, night sweats, fatigue, weight loss, or weight gain.     HEENT: Denies changes in auditory or visual acuity, recurrent pharyngitis, epistaxis, chronic rhinorrhea    Respiratory: Denies increasing shortness of breath, productive cough, hemoptysis    Cardiac: Denies known history of cardiac disease, heart murmur, palpitations    GI: Denies dysphagia, recurrent emesis, hematemesis, changes in bowel habits, hematochezia, melena    : Denies hematuria frequency urgency dysuria    Musculoskeletal: Denies fractures, dislocations    Neurologic: Denies history of CVA, paralysis paresthesias, recurrent cephalgia, seizures    Endocrine: Denies polyuria, polydipsia, polyphagia    Lymph/heme: Denies a history of malignancy, anemia    Integumentary: Negative for dermatitis           Objective:     Vitals:    11/20/19 1040   BP: 104/71   Pulse: 81   Temp: 97.3 °F (36.3 °C)   TempSrc: Oral   SpO2: 96%   Weight: 96.4 kg (212 lb 9.6 oz)   Height: 6' (1.829 m)        Physical Exam:    General: Well developed, well nourished,  in no acute distress, nontoxic in appearance. Head: Normocephalic, atraumatic  Neck: Supple, no masses, no adenopathy or carotid bruits, trachea midline  Resp: Clear to auscultation bilaterally, no wheezing, rhonchi, or rales, excursions normal and symmetrical.  Cardio: Regular rate and rhythm, no murmurs, clicks, gallops, or rubs. Abdomen: Soft, nontender, nondistended, normoactive bowel sounds, no hernias. l. Psych: Alert and oriented to person, place, and time. Assessment:     Pancreatic adenocarcinoma    Plan:     Discussed the options for venous access to facilitate his chemotherapy. The patient after discussion is elected to pursue right subclavian vein PowerPort placement in this regard.   I have asked patient to discontinue his Xarelto 3 days prior to procedure we will pursue scheduling at a date convenient for the patient as an outpatient at [facility] utilizing MAC/local anesthesia  Signed By: Heath Dumont DO     November 20, 2019

## 2019-11-20 NOTE — PATIENT INSTRUCTIONS
.. PATIENT PRE AND POST OPERATIVE INSTRUCTIONS [address] [place] [address] [address] [place], [place] [facility] 
[phone] Before Surgery Instructions:  
1) You must have someone available to drive you to and from your procedure and stay with you for the first 24 hours. 2) It is very important that you have nothing to eat or drink after midnight the night before your surgery. This includes chewing gum or sucking on hard candy. Take only heart, blood pressure and cholesterol medications the morning of surgery with only a sip of water. 3) Please stop taking Plavix 10 days prior to your surgery. Stop taking Coumadin 5 days prior to your surgery. Stop taking all Aspirin or Aspirin containing products 7 days prior to your surgery. Stop taking Advil, Motrin, Aleve, and etc. 3 days prior to your surgery. 4) If you take any diabetic medications please consult with your primary care physician on how to take them on the day of your surgery 5) Please stop all Herbal products 2 weeks prior to your surgery. 6) Please arrive at the hospital 2 hours prior to your surgery, unless you have been otherwise instructed. Any required labs/urine drug screen/x-rays will be performed on day of surgery. 7) If you are of child bearing age you will have pregnancy test done the morning of your surgery as soon as you arrive. 8) Patients having an operation on their colon will be given a separate instruction sheet on their Bowel Prep. 9) For any pre-operative work up check in at the main entrance to [address] [place] [address], and then go to Patient Registration. These studies are done on a walk in basis they are open from 7:00am to 5:00pm Monday through Friday. 10) Please wash your surgical site the morning of your surgery with antibacterial (i.e. Dial) soap and water.  
11) You will be contacted by a hospital preadmission nurse approximately one week prior to scheduled surgery to discuss additional instructions and to review your medications. 12) You may be contacted to change your surgery time. At times this is necessary due to equipment, staffing needs or in the event of a cancellation. Surgery Date and Time: November 25th 2019, at 7:30am 
 
Please check in at [facility], enter through the Emergency Room entrance and go up to the second floor. Please check in by 5:30am the day of your surgery. After Surgery Instructions: You will need to be seen in the office for a follow-up visit 7-14 days after your surgery. Please call after you have had the procedure to make this appointment. Unless otherwise instructed, you may remove your outer bandage and shower 48 hours after your surgery. If you develop a fever greater than 101, have any significant drainage, bleeding, swelling and/or pus of the wound. Please call our office immediately. You may contact [facility] with any questions at [phone], [phone]

## 2019-11-20 NOTE — TELEPHONE ENCOUNTER
Patients sister called to advised patients medication amount was to high for him to pay, she was advised that a prior authorization was sent to his insurance co., to approve the medication

## 2019-11-20 NOTE — TELEPHONE ENCOUNTER
Prior Authorization completed and faxed to [facility] - pending insurance decision.

## 2019-11-20 NOTE — H&P (VIEW-ONLY)
Consult Patient: Kasey Foreman MRN: [id number]  SSN: [id number] YOB: 1965  Age: 62 y.o. Sex: male Subjective:  
  
Kasey Foreman is a 62 y.o. white male who presents in referral from Dr. Aida Reyna in regard to facilitation of venous access for planned chemotherapy of a recently diagnosed pancreatic adenocarcinoma with hepatic metastases. The patient has a significant history of a DVT/PE for which she is on Xarelto. Past Medical History:  
Diagnosis Date  Cancer [facility])   
 pancreatic  Diabetes ([facility] [address]  GERD (gastroesophageal reflux disease)  Thromboembolus ([facility] [address] Blood clot in his lungs Past Surgical History:  
Procedure Laterality Date  HX TONSILLECTOMY Family History Problem Relation Age of Onset  Diabetes Mother  Hypertension Mother  Heart Disease Father  Diabetes Brother  Cancer Maternal Grandmother  Cancer Maternal Grandfather Social History Tobacco Use  Smoking status: Former Smoker Packs/day: 0.50 Last attempt to quit: 2018 Years since quittin.0  Smokeless tobacco: Never Used Substance Use Topics  Alcohol use: Not Currently Current Outpatient Medications Medication Sig Dispense Refill  oxyCODONE ER (OXYCONTIN) 15 mg ER tablet Take 1 Tab by mouth every twelve (12) hours for 30 days. Max Daily Amount: 30 mg. 60 Tab 0  
 naloxone (NARCAN) 4 mg/actuation nasal spray Use 1 spray intranasally, then discard. Repeat with new spray every 2 min as needed for opioid overdose symptoms, alternating nostrils. 1 Each 1  
 HYDROcodone-acetaminophen (NORCO) 7.5-325 mg per tablet Take 1 Tab by mouth every six (6) hours as needed for Pain for up to 30 days. Max Daily Amount: 4 Tabs.  90 Tab 0  
 dexAMETHasone (DECADRON) 4 mg tablet Take 4mg by mouth twice a day the day before chemotherapy and the day after chemotherapy 30 Tab 0  
  lidocaine-prilocaine (EMLA) topical cream Apply  to affected area as needed for Pain (Apply to skin 30-60 minutes before mediport access). 30 g 0  
 loperamide (IMMODIUM) 2 mg tablet Take 1 Tab by mouth four (4) times daily as needed for Diarrhea for up to 10 days. Indications: Diarrhea caused by Chemotherapy 40 Tab 1  
 ondansetron hcl (ZOFRAN) 8 mg tablet Take 1 Tab by mouth every eight (8) hours as needed for Nausea. 90 Tab 0  
 nut.tx.gluc.intol,lac-free,soy (GLUCERNA ADVANCE) liqd Take 237 mL by mouth three (3) times daily. 90 Bottle 5  
 enoxaparin sodium (LOVENOX SC) by SubCUTAneous route.  TRUE METRIX GLUCOSE METER misc USE AS DIRECTED  0  
 enoxaparin (LOVENOX) 80 mg/0.8 mL injection  glipiZIDE (GLUCOTROL) 10 mg tablet TAKE 1 TABLET BY MOUTH ONCE DAILY 30 MINUTES BEFORE BREAKFAST  1  
 LANTUS U-100 INSULIN 100 unit/mL injection INJECT 20 UNITS SUBCUTANEOUSLY ONCE DAILY AT BEDTIME  6  
 metFORMIN ER (GLUCOPHAGE XR) 500 mg tablet TAKE 1 TABLET BY MOUTH ONCE DAILY WITH EVENING MEAL FOR 30 DAYS  0  
 omeprazole (PRILOSEC) 20 mg capsule TAKE 1 CAPSULE BY MOUTH ONCE DAILY  3  
 QUEtiapine (SEROQUEL) 50 mg tablet  XARELTO 15 mg (42)- 20 mg (9) DsPk  rivaroxaban (XARELTO STARTER THANIA) 15 mg (42)- 20 mg (9) DsPk Take one 15 mg tablet twice a day with food for the first 21 days. Then, take one 20 mg tablet once a day with food for 9 days. 1 Dose Pack 0  
 ondansetron (ZOFRAN ODT) 4 mg disintegrating tablet Take 1 Tab by mouth every eight (8) hours as needed for Nausea. 20 Tab 0  
 lidocaine (LIDODERM) 5 % Apply patch to the affected area for 12 hours a day and remove for 12 hours a day. 5 Each 0 No Known Allergies Review of Systems: 
 
 
General: Denies fevers, chills, night sweats, fatigue, weight loss, or weight gain. HEENT: Denies changes in auditory or visual acuity, recurrent pharyngitis, epistaxis, chronic rhinorrhea Respiratory: Denies increasing shortness of breath, productive cough, hemoptysis Cardiac: Denies known history of cardiac disease, heart murmur, palpitations GI: Denies dysphagia, recurrent emesis, hematemesis, changes in bowel habits, hematochezia, melena : Denies hematuria frequency urgency dysuria Musculoskeletal: Denies fractures, dislocations Neurologic: Denies history of CVA, paralysis paresthesias, recurrent cephalgia, seizures Endocrine: Denies polyuria, polydipsia, polyphagia Lymph/heme: Denies a history of malignancy, anemia Integumentary: Negative for dermatitis Objective:  
 
Vitals:  
 11/20/19 1040 BP: 104/71 Pulse: 81 Temp: 97.3 °F (36.3 °C) TempSrc: Oral  
SpO2: 96% Weight: 96.4 kg (212 lb 9.6 oz) Height: 6' (1.829 m) Physical Exam: 
 
General: Well developed, well nourished,  in no acute distress, nontoxic in appearance. Head: Normocephalic, atraumatic Neck: Supple, no masses, no adenopathy or carotid bruits, trachea midline Resp: Clear to auscultation bilaterally, no wheezing, rhonchi, or rales, excursions normal and symmetrical. 
Cardio: Regular rate and rhythm, no murmurs, clicks, gallops, or rubs. Abdomen: Soft, nontender, nondistended, normoactive bowel sounds, no hernias. l. Psych: Alert and oriented to person, place, and time. Assessment:  
 
Pancreatic adenocarcinoma Plan:  
 
Discussed the options for venous access to facilitate his chemotherapy. The patient after discussion is elected to pursue right subclavian vein PowerPort placement in this regard. I have asked patient to discontinue his Xarelto 3 days prior to procedure we will pursue scheduling at a date convenient for the patient as an outpatient at [facility] utilizing MAC/local anesthesia Signed By: Ailyn Ibarra DO November 20, 2019

## 2019-11-21 ENCOUNTER — DOCUMENTATION ONLY (OUTPATIENT)
Dept: ONCOLOGY | Age: 54
End: 2019-11-21

## 2019-11-21 ENCOUNTER — TELEPHONE (OUTPATIENT)
Dept: ONCOLOGY | Age: 54
End: 2019-11-21

## 2019-11-21 NOTE — TELEPHONE ENCOUNTER
Allen Barney of [employer] advised patient needs a Peer to Peer for Pet scan schedule on 11/22/19

## 2019-11-21 NOTE — PROGRESS NOTES
Peer to peer completed. PET/CT scheduled for 11/22/2019 was approved. Authorization number [id number]. Auth date from 11/20/2019 - 01/18/2020.         AGUSTINA Michelle   11/21/2019

## 2019-11-22 ENCOUNTER — ANESTHESIA EVENT (OUTPATIENT)
Dept: SURGERY | Age: 54
End: 2019-11-22
Payer: MEDICAID

## 2019-11-22 DIAGNOSIS — C78.7 LIVER METASTASIS (HCC): ICD-10-CM

## 2019-11-22 DIAGNOSIS — C25.9 PANCREATIC ADENOCARCINOMA (HCC): Primary | ICD-10-CM

## 2019-11-22 NOTE — TELEPHONE ENCOUNTER
Spoke with Geeta Breaux, she states they are \"missing some information on the prior authorizations\" but does inform me that they received the short and long acting opioid PA forms via fax. Completed remaining questions, Hydrocodone rx approved (auth# [id number] valid from 11/20/2019-05/20/2020) and Oxycontin rx approved (auth# [id number] valid from 11/20/2019-05/20/2020).

## 2019-11-25 ENCOUNTER — APPOINTMENT (OUTPATIENT)
Dept: GENERAL RADIOLOGY | Age: 54
End: 2019-11-25
Attending: SURGERY
Payer: MEDICAID

## 2019-11-25 ENCOUNTER — HOSPITAL ENCOUNTER (OUTPATIENT)
Age: 54
Setting detail: OBSERVATION
Discharge: HOME | End: 2019-11-27
Attending: EMERGENCY MEDICINE | Admitting: INTERNAL MEDICINE
Payer: MEDICAID

## 2019-11-25 ENCOUNTER — ANESTHESIA (OUTPATIENT)
Dept: SURGERY | Age: 54
End: 2019-11-25
Payer: MEDICAID

## 2019-11-25 ENCOUNTER — HOSPITAL ENCOUNTER (OUTPATIENT)
Age: 54
Discharge: HOME | End: 2019-11-25
Attending: SURGERY | Admitting: SURGERY
Payer: MEDICAID

## 2019-11-25 VITALS
WEIGHT: 211 LBS | DIASTOLIC BLOOD PRESSURE: 64 MMHG | OXYGEN SATURATION: 95 % | RESPIRATION RATE: 16 BRPM | TEMPERATURE: 97.9 F | HEIGHT: 72 IN | HEART RATE: 60 BPM | SYSTOLIC BLOOD PRESSURE: 199 MMHG | BODY MASS INDEX: 28.58 KG/M2

## 2019-11-25 DIAGNOSIS — R11.2 CINV (CHEMOTHERAPY-INDUCED NAUSEA AND VOMITING): ICD-10-CM

## 2019-11-25 DIAGNOSIS — C25.9 PANCREATIC ADENOCARCINOMA (HCC): Primary | ICD-10-CM

## 2019-11-25 DIAGNOSIS — C78.7 LIVER METASTASES (HCC): ICD-10-CM

## 2019-11-25 DIAGNOSIS — C25.9 MALIGNANT NEOPLASM OF PANCREAS, UNSPECIFIED LOCATION OF MALIGNANCY (HCC): Primary | ICD-10-CM

## 2019-11-25 DIAGNOSIS — T45.1X5A CINV (CHEMOTHERAPY-INDUCED NAUSEA AND VOMITING): ICD-10-CM

## 2019-11-25 PROBLEM — E11.65 HYPERGLYCEMIA DUE TO TYPE 2 DIABETES MELLITUS (HCC): Status: ACTIVE | Noted: 2019-11-25

## 2019-11-25 PROBLEM — B19.20 HEPATITIS C: Status: ACTIVE | Noted: 2019-11-25

## 2019-11-25 PROBLEM — E11.9 DIABETES MELLITUS, NEW ONSET (HCC): Status: ACTIVE | Noted: 2019-11-25

## 2019-11-25 PROBLEM — D64.9 ANEMIA: Status: ACTIVE | Noted: 2019-11-25

## 2019-11-25 LAB
ALBUMIN SERPL-MCNC: 3.9 G/DL (ref 3.4–5)
ALBUMIN/GLOB SERPL: 1 {RATIO} (ref 0.8–1.7)
ALP SERPL-CCNC: 114 U/L (ref 45–117)
ALT SERPL-CCNC: 27 U/L (ref 16–61)
ANION GAP SERPL CALC-SCNC: 5 MMOL/L (ref 3–18)
AST SERPL-CCNC: 25 U/L (ref 10–38)
BASOPHILS # BLD: 0 K/UL (ref 0–0.1)
BASOPHILS NFR BLD: 0 % (ref 0–2)
BILIRUB SERPL-MCNC: 0.4 MG/DL (ref 0.2–1)
BUN SERPL-MCNC: 22 MG/DL (ref 7–18)
BUN/CREAT SERPL: 20 (ref 12–20)
CALCIUM SERPL-MCNC: 9 MG/DL (ref 8.5–10.1)
CHLORIDE SERPL-SCNC: 105 MMOL/L (ref 100–111)
CO2 SERPL-SCNC: 25 MMOL/L (ref 21–32)
CREAT SERPL-MCNC: 1.09 MG/DL (ref 0.6–1.3)
DIFFERENTIAL METHOD BLD: ABNORMAL
EOSINOPHIL # BLD: 0.2 K/UL (ref 0–0.4)
EOSINOPHIL NFR BLD: 2 % (ref 0–5)
ERYTHROCYTE [DISTWIDTH] IN BLOOD BY AUTOMATED COUNT: 13.1 % (ref 11.6–14.5)
EST. AVERAGE GLUCOSE BLD GHB EST-MCNC: 258 MG/DL
GLOBULIN SER CALC-MCNC: 3.9 G/DL (ref 2–4)
GLUCOSE BLD STRIP.AUTO-MCNC: 125 MG/DL (ref 70–110)
GLUCOSE BLD STRIP.AUTO-MCNC: 130 MG/DL (ref 70–110)
GLUCOSE BLD STRIP.AUTO-MCNC: 172 MG/DL (ref 70–110)
GLUCOSE BLD STRIP.AUTO-MCNC: 242 MG/DL (ref 70–110)
GLUCOSE SERPL-MCNC: 217 MG/DL (ref 74–99)
HBA1C MFR BLD: 10.6 % (ref 4.2–5.6)
HBA1C MFR BLD: 10.6 % (ref 4.2–5.6)
HCT VFR BLD AUTO: 38.6 % (ref 36–48)
HGB BLD-MCNC: 12.7 G/DL (ref 13–16)
LIPASE SERPL-CCNC: 49 U/L (ref 73–393)
LYMPHOCYTES # BLD: 2.5 K/UL (ref 0.9–3.6)
LYMPHOCYTES NFR BLD: 25 % (ref 21–52)
Lab: NORMAL
Lab: NORMAL
MCH RBC QN AUTO: 29.1 PG (ref 24–34)
MCHC RBC AUTO-ENTMCNC: 32.9 G/DL (ref 31–37)
MCV RBC AUTO: 88.5 FL (ref 74–97)
MONOCYTES # BLD: 0.7 K/UL (ref 0.05–1.2)
MONOCYTES NFR BLD: 7 % (ref 3–10)
NEUTS SEG # BLD: 6.3 K/UL (ref 1.8–8)
NEUTS SEG NFR BLD: 66 % (ref 40–73)
PLATELET # BLD AUTO: 143 K/UL (ref 135–420)
PMV BLD AUTO: 10.8 FL (ref 9.2–11.8)
POTASSIUM SERPL-SCNC: 3.9 MMOL/L (ref 3.5–5.5)
PROT SERPL-MCNC: 7.8 G/DL (ref 6.4–8.2)
RBC # BLD AUTO: 4.36 M/UL (ref 4.7–5.5)
SODIUM SERPL-SCNC: 135 MMOL/L (ref 136–145)
UGT1A1 GENE MUT ANL BLD/T: NORMAL
WBC # BLD AUTO: 9.7 K/UL (ref 4.6–13.2)

## 2019-11-25 PROCEDURE — 83036 HEMOGLOBIN GLYCOSYLATED A1C: CPT

## 2019-11-25 PROCEDURE — 82962 GLUCOSE BLOOD TEST: CPT

## 2019-11-25 PROCEDURE — 74011250636 HC RX REV CODE- 250/636: Performed by: SURGERY

## 2019-11-25 PROCEDURE — 74011250636 HC RX REV CODE- 250/636: Performed by: NURSE ANESTHETIST, CERTIFIED REGISTERED

## 2019-11-25 PROCEDURE — 85025 COMPLETE CBC W/AUTO DIFF WBC: CPT

## 2019-11-25 PROCEDURE — 80053 COMPREHEN METABOLIC PANEL: CPT

## 2019-11-25 PROCEDURE — C1788 PORT, INDWELLING, IMP: HCPCS | Performed by: SURGERY

## 2019-11-25 PROCEDURE — 77001 FLUOROGUIDE FOR VEIN DEVICE: CPT

## 2019-11-25 PROCEDURE — 74011250637 HC RX REV CODE- 250/637: Performed by: INTERNAL MEDICINE

## 2019-11-25 PROCEDURE — 76010000138 HC OR TIME 0.5 TO 1 HR: Performed by: SURGERY

## 2019-11-25 PROCEDURE — 74011250636 HC RX REV CODE- 250/636: Performed by: INTERNAL MEDICINE

## 2019-11-25 PROCEDURE — 77030002986 HC SUT PROL J&J -A: Performed by: SURGERY

## 2019-11-25 PROCEDURE — 74011636637 HC RX REV CODE- 636/637: Performed by: INTERNAL MEDICINE

## 2019-11-25 PROCEDURE — 77030031139 HC SUT VCRL2 J&J -A: Performed by: SURGERY

## 2019-11-25 PROCEDURE — 74011000250 HC RX REV CODE- 250: Performed by: SURGERY

## 2019-11-25 PROCEDURE — 83690 ASSAY OF LIPASE: CPT

## 2019-11-25 PROCEDURE — 74011250637 HC RX REV CODE- 250/637: Performed by: NURSE ANESTHETIST, CERTIFIED REGISTERED

## 2019-11-25 PROCEDURE — 76060000032 HC ANESTHESIA 0.5 TO 1 HR: Performed by: SURGERY

## 2019-11-25 PROCEDURE — 77030020782 HC GWN BAIR PAWS FLX 3M -B: Performed by: SURGERY

## 2019-11-25 PROCEDURE — 99218 HC RM OBSERVATION: CPT

## 2019-11-25 PROCEDURE — 77030011265 HC ELECTRD BLD HEX COVD -A: Performed by: SURGERY

## 2019-11-25 PROCEDURE — 74011000250 HC RX REV CODE- 250: Performed by: NURSE ANESTHETIST, CERTIFIED REGISTERED

## 2019-11-25 PROCEDURE — 71045 X-RAY EXAM CHEST 1 VIEW: CPT

## 2019-11-25 PROCEDURE — 74011000250 HC RX REV CODE- 250: Performed by: INTERNAL MEDICINE

## 2019-11-25 PROCEDURE — 99284 EMERGENCY DEPT VISIT MOD MDM: CPT

## 2019-11-25 PROCEDURE — 77030002933 HC SUT MCRYL J&J -A: Performed by: SURGERY

## 2019-11-25 PROCEDURE — 76210000021 HC REC RM PH II 0.5 TO 1 HR: Performed by: SURGERY

## 2019-11-25 PROCEDURE — 74011250637 HC RX REV CODE- 250/637: Performed by: EMERGENCY MEDICINE

## 2019-11-25 DEVICE — PORT INFUS 8FR PLAS ATTCH OPN END POLYUR CATH SIL FILL SUT: Type: IMPLANTABLE DEVICE | Site: SUBCLAVIAN | Status: FUNCTIONAL

## 2019-11-25 RX ORDER — INSULIN LISPRO 100 [IU]/ML
INJECTION, SOLUTION INTRAVENOUS; SUBCUTANEOUS
Status: DISCONTINUED | OUTPATIENT
Start: 2019-11-25 | End: 2019-11-26

## 2019-11-25 RX ORDER — DEXTROSE MONOHYDRATE 50 MG/ML
25 INJECTION, SOLUTION INTRAVENOUS CONTINUOUS
Status: CANCELLED
Start: 2019-12-16

## 2019-11-25 RX ORDER — ACETAMINOPHEN 325 MG/1
650 TABLET ORAL AS NEEDED
Status: CANCELLED
Start: 2019-12-16

## 2019-11-25 RX ORDER — ONDANSETRON 2 MG/ML
8 INJECTION INTRAMUSCULAR; INTRAVENOUS AS NEEDED
Status: CANCELLED | OUTPATIENT
Start: 2019-12-16

## 2019-11-25 RX ORDER — SODIUM CHLORIDE 0.9 % (FLUSH) 0.9 %
5-40 SYRINGE (ML) INJECTION EVERY 8 HOURS
Status: DISCONTINUED | OUTPATIENT
Start: 2019-11-25 | End: 2019-11-25 | Stop reason: HOSPADM

## 2019-11-25 RX ORDER — HEPARIN SODIUM 1000 [USP'U]/ML
INJECTION, SOLUTION INTRAVENOUS; SUBCUTANEOUS AS NEEDED
Status: DISCONTINUED | OUTPATIENT
Start: 2019-11-25 | End: 2019-11-25 | Stop reason: HOSPADM

## 2019-11-25 RX ORDER — OLANZAPINE 5 MG/1
TABLET, ORALLY DISINTEGRATING ORAL
Qty: 30 TAB | Refills: 1 | Status: SHIPPED | OUTPATIENT
Start: 2019-11-25 | End: 2020-01-22

## 2019-11-25 RX ORDER — LIDOCAINE 4 G/100G
1 PATCH TOPICAL EVERY 24 HOURS
Status: DISCONTINUED | OUTPATIENT
Start: 2019-11-25 | End: 2019-11-27 | Stop reason: HOSPADM

## 2019-11-25 RX ORDER — OXYCODONE HYDROCHLORIDE 5 MG/1
10 TABLET ORAL ONCE
Status: COMPLETED | OUTPATIENT
Start: 2019-11-25 | End: 2019-11-25

## 2019-11-25 RX ORDER — HEPARIN 100 UNIT/ML
300-500 SYRINGE INTRAVENOUS AS NEEDED
Status: CANCELLED
Start: 2019-12-18

## 2019-11-25 RX ORDER — DEXAMETHASONE 4 MG/1
4 TABLET ORAL EVERY 12 HOURS
Status: DISCONTINUED | OUTPATIENT
Start: 2019-11-25 | End: 2019-11-26

## 2019-11-25 RX ORDER — INSULIN LISPRO 100 [IU]/ML
INJECTION, SOLUTION INTRAVENOUS; SUBCUTANEOUS ONCE
Status: DISCONTINUED | OUTPATIENT
Start: 2019-11-25 | End: 2019-11-25 | Stop reason: HOSPADM

## 2019-11-25 RX ORDER — QUETIAPINE FUMARATE 25 MG/1
50 TABLET, FILM COATED ORAL
Status: DISCONTINUED | OUTPATIENT
Start: 2019-11-25 | End: 2019-11-27 | Stop reason: HOSPADM

## 2019-11-25 RX ORDER — MAGNESIUM SULFATE 100 %
4 CRYSTALS MISCELLANEOUS AS NEEDED
Status: DISCONTINUED | OUTPATIENT
Start: 2019-11-25 | End: 2019-11-27 | Stop reason: HOSPADM

## 2019-11-25 RX ORDER — FAMOTIDINE 20 MG/1
20 TABLET, FILM COATED ORAL ONCE
Status: COMPLETED | OUTPATIENT
Start: 2019-11-25 | End: 2019-11-25

## 2019-11-25 RX ORDER — DIPHENHYDRAMINE HYDROCHLORIDE 50 MG/ML
50 INJECTION, SOLUTION INTRAMUSCULAR; INTRAVENOUS AS NEEDED
Status: CANCELLED
Start: 2019-12-16

## 2019-11-25 RX ORDER — LIDOCAINE AND PRILOCAINE 25; 25 MG/G; MG/G
CREAM TOPICAL AS NEEDED
Status: DISCONTINUED | OUTPATIENT
Start: 2019-11-25 | End: 2019-11-27 | Stop reason: HOSPADM

## 2019-11-25 RX ORDER — NALOXONE HYDROCHLORIDE 0.4 MG/ML
0.4 INJECTION, SOLUTION INTRAMUSCULAR; INTRAVENOUS; SUBCUTANEOUS AS NEEDED
Status: DISCONTINUED | OUTPATIENT
Start: 2019-11-25 | End: 2019-11-27 | Stop reason: HOSPADM

## 2019-11-25 RX ORDER — ONDANSETRON 4 MG/1
8 TABLET, ORALLY DISINTEGRATING ORAL
Status: DISCONTINUED | OUTPATIENT
Start: 2019-11-25 | End: 2019-11-27 | Stop reason: HOSPADM

## 2019-11-25 RX ORDER — PALONOSETRON 0.05 MG/ML
0.25 INJECTION, SOLUTION INTRAVENOUS ONCE
Status: CANCELLED | OUTPATIENT
Start: 2019-12-16

## 2019-11-25 RX ORDER — MIDAZOLAM HYDROCHLORIDE 1 MG/ML
INJECTION, SOLUTION INTRAMUSCULAR; INTRAVENOUS AS NEEDED
Status: DISCONTINUED | OUTPATIENT
Start: 2019-11-25 | End: 2019-11-25 | Stop reason: HOSPADM

## 2019-11-25 RX ORDER — SODIUM CHLORIDE, SODIUM LACTATE, POTASSIUM CHLORIDE, CALCIUM CHLORIDE 600; 310; 30; 20 MG/100ML; MG/100ML; MG/100ML; MG/100ML
25 INJECTION, SOLUTION INTRAVENOUS CONTINUOUS
Status: DISCONTINUED | OUTPATIENT
Start: 2019-11-25 | End: 2019-11-25 | Stop reason: HOSPADM

## 2019-11-25 RX ORDER — INSULIN GLARGINE 100 [IU]/ML
20 INJECTION, SOLUTION SUBCUTANEOUS
Status: DISCONTINUED | OUTPATIENT
Start: 2019-11-25 | End: 2019-11-26

## 2019-11-25 RX ORDER — LIDOCAINE HYDROCHLORIDE 20 MG/ML
INJECTION, SOLUTION EPIDURAL; INFILTRATION; INTRACAUDAL; PERINEURAL AS NEEDED
Status: DISCONTINUED | OUTPATIENT
Start: 2019-11-25 | End: 2019-11-25 | Stop reason: HOSPADM

## 2019-11-25 RX ORDER — ATROPINE SULFATE 0.4 MG/ML
0.4 INJECTION, SOLUTION ENDOTRACHEAL; INTRAMEDULLARY; INTRAMUSCULAR; INTRAVENOUS; SUBCUTANEOUS
Status: CANCELLED | OUTPATIENT
Start: 2019-12-16

## 2019-11-25 RX ORDER — PROPOFOL 10 MG/ML
INJECTION, EMULSION INTRAVENOUS AS NEEDED
Status: DISCONTINUED | OUTPATIENT
Start: 2019-11-25 | End: 2019-11-25 | Stop reason: HOSPADM

## 2019-11-25 RX ORDER — ATROPINE SULFATE 0.4 MG/ML
0.4 INJECTION, SOLUTION ENDOTRACHEAL; INTRAMEDULLARY; INTRAMUSCULAR; INTRAVENOUS; SUBCUTANEOUS ONCE
Status: CANCELLED | OUTPATIENT
Start: 2019-12-16

## 2019-11-25 RX ORDER — PANTOPRAZOLE SODIUM 40 MG/1
40 TABLET, DELAYED RELEASE ORAL
Status: DISCONTINUED | OUTPATIENT
Start: 2019-11-26 | End: 2019-11-27 | Stop reason: HOSPADM

## 2019-11-25 RX ORDER — FENTANYL CITRATE 50 UG/ML
25 INJECTION, SOLUTION INTRAMUSCULAR; INTRAVENOUS AS NEEDED
Status: DISCONTINUED | OUTPATIENT
Start: 2019-11-25 | End: 2019-11-25 | Stop reason: HOSPADM

## 2019-11-25 RX ORDER — ALBUTEROL SULFATE 0.83 MG/ML
2.5 SOLUTION RESPIRATORY (INHALATION) AS NEEDED
Status: CANCELLED
Start: 2019-12-16

## 2019-11-25 RX ORDER — ONDANSETRON 4 MG/1
4 TABLET, ORALLY DISINTEGRATING ORAL
Status: DISCONTINUED | OUTPATIENT
Start: 2019-11-25 | End: 2019-11-25 | Stop reason: SDUPTHER

## 2019-11-25 RX ORDER — HEPARIN 100 UNIT/ML
300-500 SYRINGE INTRAVENOUS AS NEEDED
Status: CANCELLED
Start: 2019-12-16

## 2019-11-25 RX ORDER — SODIUM CHLORIDE 0.9 % (FLUSH) 0.9 %
10 SYRINGE (ML) INJECTION AS NEEDED
Status: CANCELLED
Start: 2019-12-18

## 2019-11-25 RX ORDER — HYDROCODONE BITARTRATE AND ACETAMINOPHEN 7.5; 325 MG/1; MG/1
1 TABLET ORAL
Status: DISCONTINUED | OUTPATIENT
Start: 2019-11-25 | End: 2019-11-26

## 2019-11-25 RX ORDER — MAGNESIUM SULFATE 100 %
4 CRYSTALS MISCELLANEOUS AS NEEDED
Status: DISCONTINUED | OUTPATIENT
Start: 2019-11-25 | End: 2019-11-25 | Stop reason: HOSPADM

## 2019-11-25 RX ORDER — SODIUM CHLORIDE 9 MG/ML
10 INJECTION INTRAMUSCULAR; INTRAVENOUS; SUBCUTANEOUS AS NEEDED
Status: CANCELLED | OUTPATIENT
Start: 2019-12-18

## 2019-11-25 RX ORDER — ONDANSETRON 2 MG/ML
4 INJECTION INTRAMUSCULAR; INTRAVENOUS ONCE
Status: DISCONTINUED | OUTPATIENT
Start: 2019-11-25 | End: 2019-11-25 | Stop reason: HOSPADM

## 2019-11-25 RX ORDER — SODIUM CHLORIDE 9 MG/ML
10 INJECTION INTRAMUSCULAR; INTRAVENOUS; SUBCUTANEOUS AS NEEDED
Status: CANCELLED | OUTPATIENT
Start: 2019-12-16

## 2019-11-25 RX ORDER — PROPOFOL 10 MG/ML
INJECTION, EMULSION INTRAVENOUS
Status: DISCONTINUED | OUTPATIENT
Start: 2019-11-25 | End: 2019-11-25 | Stop reason: HOSPADM

## 2019-11-25 RX ORDER — SODIUM CHLORIDE 0.9 % (FLUSH) 0.9 %
10 SYRINGE (ML) INJECTION AS NEEDED
Status: CANCELLED
Start: 2019-12-16

## 2019-11-25 RX ORDER — FLUOROURACIL 50 MG/ML
900 INJECTION, SOLUTION INTRAVENOUS ONCE
Status: CANCELLED
Start: 2019-12-16 | End: 2019-12-16

## 2019-11-25 RX ORDER — HYDROCORTISONE SODIUM SUCCINATE 100 MG/2ML
100 INJECTION, POWDER, FOR SOLUTION INTRAMUSCULAR; INTRAVENOUS AS NEEDED
Status: CANCELLED | OUTPATIENT
Start: 2019-12-16

## 2019-11-25 RX ORDER — OXYCODONE HYDROCHLORIDE 5 MG/1
5 TABLET ORAL
Status: DISCONTINUED | OUTPATIENT
Start: 2019-11-25 | End: 2019-11-25 | Stop reason: HOSPADM

## 2019-11-25 RX ORDER — EPINEPHRINE 1 MG/ML
0.3 INJECTION, SOLUTION, CONCENTRATE INTRAVENOUS AS NEEDED
Status: CANCELLED | OUTPATIENT
Start: 2019-12-16

## 2019-11-25 RX ORDER — CEFAZOLIN SODIUM 2 G/50ML
2 SOLUTION INTRAVENOUS ONCE
Status: COMPLETED | OUTPATIENT
Start: 2019-11-25 | End: 2019-11-25

## 2019-11-25 RX ORDER — LIDOCAINE HYDROCHLORIDE 10 MG/ML
0.1 INJECTION, SOLUTION EPIDURAL; INFILTRATION; INTRACAUDAL; PERINEURAL AS NEEDED
Status: DISCONTINUED | OUTPATIENT
Start: 2019-11-25 | End: 2019-11-25 | Stop reason: HOSPADM

## 2019-11-25 RX ORDER — FENTANYL CITRATE 50 UG/ML
INJECTION, SOLUTION INTRAMUSCULAR; INTRAVENOUS AS NEEDED
Status: DISCONTINUED | OUTPATIENT
Start: 2019-11-25 | End: 2019-11-25 | Stop reason: HOSPADM

## 2019-11-25 RX ORDER — SODIUM CHLORIDE 0.9 % (FLUSH) 0.9 %
5-40 SYRINGE (ML) INJECTION AS NEEDED
Status: DISCONTINUED | OUTPATIENT
Start: 2019-11-25 | End: 2019-11-25 | Stop reason: HOSPADM

## 2019-11-25 RX ADMIN — MIDAZOLAM 1 MG: 1 INJECTION INTRAMUSCULAR; INTRAVENOUS at 07:50

## 2019-11-25 RX ADMIN — INSULIN LISPRO 2 UNITS: 100 INJECTION, SOLUTION INTRAVENOUS; SUBCUTANEOUS at 23:20

## 2019-11-25 RX ADMIN — FENTANYL CITRATE 25 MCG: 50 INJECTION, SOLUTION INTRAMUSCULAR; INTRAVENOUS at 07:54

## 2019-11-25 RX ADMIN — PROPOFOL 20 MG: 10 INJECTION, EMULSION INTRAVENOUS at 08:19

## 2019-11-25 RX ADMIN — SODIUM CHLORIDE, SODIUM LACTATE, POTASSIUM CHLORIDE, AND CALCIUM CHLORIDE 25 ML/HR: 600; 310; 30; 20 INJECTION, SOLUTION INTRAVENOUS at 06:44

## 2019-11-25 RX ADMIN — PROPOFOL 140 MCG/KG/MIN: 10 INJECTION, EMULSION INTRAVENOUS at 07:54

## 2019-11-25 RX ADMIN — INSULIN GLARGINE 20 UNITS: 100 INJECTION, SOLUTION SUBCUTANEOUS at 23:21

## 2019-11-25 RX ADMIN — CEFAZOLIN SODIUM 2 G: 2 SOLUTION INTRAVENOUS at 07:54

## 2019-11-25 RX ADMIN — PROPOFOL 50 MG: 10 INJECTION, EMULSION INTRAVENOUS at 07:55

## 2019-11-25 RX ADMIN — LIDOCAINE HYDROCHLORIDE 100 MG: 20 INJECTION, SOLUTION INTRAVENOUS at 07:54

## 2019-11-25 RX ADMIN — OXYCODONE HYDROCHLORIDE 10 MG: 5 TABLET ORAL at 19:57

## 2019-11-25 RX ADMIN — FAMOTIDINE 20 MG: 20 TABLET ORAL at 06:43

## 2019-11-25 RX ADMIN — QUETIAPINE FUMARATE 50 MG: 25 TABLET ORAL at 23:20

## 2019-11-25 RX ADMIN — RIVAROXABAN 15 MG: 15 TABLET, FILM COATED ORAL at 23:20

## 2019-11-25 RX ADMIN — MIDAZOLAM 1 MG: 1 INJECTION INTRAMUSCULAR; INTRAVENOUS at 07:47

## 2019-11-25 RX ADMIN — OXYCODONE 13.5 MG: 13.5 CAPSULE, EXTENDED RELEASE ORAL at 23:20

## 2019-11-25 RX ADMIN — DEXAMETHASONE 4 MG: 4 TABLET ORAL at 23:21

## 2019-11-25 RX ADMIN — PROPOFOL 50 MG: 10 INJECTION, EMULSION INTRAVENOUS at 07:54

## 2019-11-25 NOTE — DISCHARGE INSTRUCTIONS
Patient Education        Implanted Port: What to Expect at [address] [place] [address] have had a procedure to implant a port. A port is a device placed, in most cases, under the skin of your chest below your collarbone. It is made of plastic, stainless steel, or titanium. It's about the size of a quarter, but thicker. It looks like a small bump under your skin. A thin, flexible tube called a catheter runs under the skin from the port into a large vein. With the port, you will be able to get medicines (such as chemotherapy) with more comfort. You also can get blood, nutrients, or other fluids. Blood can be taken through the port for tests. You will probably have some discomfort and bruising at the port site. This will go away in a few days. The port can be used right away. You may have the port for weeks, months, or longer. Your port will need to be flushed out regularly to keep it open. A nurse or other health professional will do this for you. This care sheet gives you a general idea about how long it will take for you to recover. But each person recovers at a different pace. Follow the steps below to feel better as quickly as possible. How can you care for yourself at home? Activity    · Avoid arm and upper body movements that may pull on the catheter for the first few days. These movements include heavy weight lifting and vigorous use of your arms.     · You will probably need to take 1 day off from work and will be able to return to normal activities shortly after. This depends on the type of work you do, why you have the catheter, and how you feel.     · You probably will be able to take baths and swim. But you may need to avoid some activities. Talk to your doctor about any limits on your activity.     · Ask your doctor when you can drive again. Be careful when you pull your seat belt across your chest so it doesn't pull out the catheter. It's okay if the seat belt lays over the catheter.    Medicines    · Your doctor will tell you if and when you can restart your medicines. He or she will also give you instructions about taking any new medicines.     · If you take blood thinners, such as warfarin (Coumadin), clopidogrel (Plavix), or aspirin, be sure to talk to your doctor. He or she will tell you if and when to start taking those medicines again. Make sure that you understand exactly what your doctor wants you to do.     · Be safe with medicines. Read and follow all instructions on the label. ? If the doctor gave you a prescription medicine for pain, take it as prescribed. ? If you are not taking a prescription pain medicine, ask your doctor if you can take an over-the-counter medicine. Incision care    · If you have a bandage, your doctor will tell you when you can remove it. After you remove the bandage, you may shower. Wash the area with soap and water and pat it dry. Don't use hydrogen peroxide or alcohol, which can slow healing. You may cover the area with a gauze bandage if it weeps or rubs against clothing. Change the bandage every day.     · If you have strips of tape on the cut (incision) the doctor made, leave the tape on for a week or until it falls off. Other instructions    · Always carry the medical alert card that your doctor gives you. It contains information about your port. It will tell health care workers that you have a port in case you need emergency care.     · When you get dressed, be careful not to rub the port. Do not wear a bra or suspenders that irritate your skin near the port. Follow-up care is a key part of your treatment and safety. Be sure to make and go to all appointments, and call your doctor if you are having problems. It's also a good idea to know your test results and keep a list of the medicines you take. When should you call for help?   Call your doctor now or seek immediate medical care if:    · You have signs of infection, such as:  ? Increased pain, swelling, warmth, or redness. ? Red streaks leading from the area. ? Pus draining from the area. ? A fever.     · You have pain or swelling in your neck or arm.     · You have trouble breathing.    Watch closely for changes in your health, and be sure to contact your doctor if:    · You have any problems with your line or port. Where can you learn more? Go to http://kelley-carissa.info/. Enter M256 in the search box to learn more about \"Implanted Port: What to Expect at Home. \"  Current as of: June 26, 2019  Content Version: 12.2  © 1542-3014 [employer]. Care instructions adapted under license by [employer] (which disclaims liability or warranty for this information). If you have questions about a medical condition or this instruction, always ask your healthcare professional. [address] any warranty or liability for your use of this information. DISCHARGE SUMMARY from Nurse    PATIENT INSTRUCTIONS:    After general anesthesia or intravenous sedation, for 24 hours or while taking prescription Narcotics:  · Limit your activities  · Do not drive and operate hazardous machinery  · Do not make important personal or business decisions  · Do  not drink alcoholic beverages  · If you have not urinated within 8 hours after discharge, please contact your surgeon on call.     Report the following to your surgeon:  · Excessive pain, swelling, redness or odor of or around the surgical area  · Temperature over 100.5  · Nausea and vomiting lasting longer than 4 hours or if unable to take medications  · Any signs of decreased circulation or nerve impairment to extremity: change in color, persistent  numbness, tingling, coldness or increase pain  · Any questions    These are general instructions for a healthy lifestyle:    No smoking/ No tobacco products/ Avoid exposure to second hand smoke  Surgeon General's Warning:  Quitting smoking now greatly reduces serious risk to your health. Obesity, smoking, and sedentary lifestyle greatly increases your risk for illness    A healthy diet, regular physical exercise & weight monitoring are important for maintaining a healthy lifestyle    You may be retaining fluid if you have a history of heart failure or if you experience any of the following symptoms:  Weight gain of 3 pounds or more overnight or 5 pounds in a week, increased swelling in our hands or feet or shortness of breath while lying flat in bed. Please call your doctor as soon as you notice any of these symptoms; do not wait until your next office visit. The discharge information has been reviewed with the patient. The patient verbalized understanding. Discharge medications reviewed with the patient and appropriate educational materials and side effects teaching were provided. ______________Patient armband removed and given to patient to take home.   Patient was informed of the privacy risks if armband lost or stolen  _____________________________________________________________________________________________________________________

## 2019-11-25 NOTE — ANESTHESIA PREPROCEDURE EVALUATION
Relevant Problems   No relevant active problems       Anesthetic History   No history of anesthetic complications            Review of Systems / Medical History  Patient summary reviewed and pertinent labs reviewed    Pulmonary  Within defined limits                 Neuro/Psych   Within defined limits           Cardiovascular                  Exercise tolerance: >4 METS     GI/Hepatic/Renal     GERD: well controlled  Hepatitis: type C    Liver disease     Endo/Other    Diabetes: type 2    Cancer (Pancreatic cancer)     Other Findings              Physical Exam    Airway  Mallampati: II  TM Distance: 4 - 6 cm  Neck ROM: normal range of motion   Mouth opening: Normal     Cardiovascular  Regular rate and rhythm,  S1 and S2 normal,  no murmur, click, rub, or gallop             Dental    Dentition: Edentulous     Pulmonary  Breath sounds clear to auscultation               Abdominal  GI exam deferred       Other Findings            Anesthetic Plan    ASA: 3  Anesthesia type: MAC          Induction: Intravenous  Anesthetic plan and risks discussed with: Patient

## 2019-11-25 NOTE — BRIEF OP NOTE
BRIEF OPERATIVE NOTE    Date of Procedure: 11/25/2019   Preoperative Diagnosis: pancreatic adenocarcinoma hcc c25.9  Postoperative Diagnosis: pancreatic adenocarcinoma hcc c25.9    Procedure(s):  right subclavian vein powerport placement  Surgeon(s) and Role:     * Artis Raza, DO - Primary         Surgical Assistant: None    Surgical Staff:  Circ-1: Simon Giron RN  Radiology Technician: JON Winn, RT, NM, ARRT  Scrub Tech-1: Mu Soto  Surg Asst-1:  Yanira Neville  Event Time In Time Out   Incision Start 0803    Incision Close 0834      Anesthesia: General   Estimated Blood Loss: 5cc  Specimens: * No specimens in log *   Findings: Normal anatomy     Complications: None  Implants: * No implants in log *

## 2019-11-25 NOTE — PROGRESS NOTES
conducted a pre-surgery visit with Loni Copeland, who is a 62 y.o.,male. The  provided the following Interventions:  Initiated a relationship of care and support. Offered prayer and assurance of continued prayers on patient's behalf. Plan:  Chaplains will continue to follow and will provide pastoral care on an as needed/requested basis.  recommends bedside caregivers page  on duty if patient shows signs of acute spiritual or emotional distress.     Edilberto Flores   Spiritual Care   [phone]

## 2019-11-25 NOTE — ED TRIAGE NOTES
Patient has pancreatic CA. Had port put in today. Endocrinologist wants insulin increased to decrease blood glucose and PCP desires admission to hospital for patient. Wants a PET scan for patient.

## 2019-11-25 NOTE — PERIOP NOTES
Pre-Op Summary    Pt arrived via car with family/friend and is oriented to time, place, person and situation. Patient with steady gait with none assistive devices. Visit Vitals  BP (!) 129/97 (BP 1 Location: Left arm, BP Patient Position: At rest)   Pulse 70   Temp 97.9 °F (36.6 °C)   Resp 18   Ht 6' (1.829 m)   Wt 95.7 kg (211 lb)   SpO2 97%   BMI 28.62 kg/m²       Peripheral IV located on Left antecubital .    Patients belongings are located with family. [facility] and [facility] contact number is: [phone]. They will be in the waiting room. They are able to receive medication information. They will be their ride home.

## 2019-11-25 NOTE — PROGRESS NOTES
Dr. Liya Feliciano discontinuing patient prescription for dexamethasone and replacing with Zydis (ordered verbal with read back).

## 2019-11-25 NOTE — PERIOP NOTES
Phase 2 Recovery Summary    Report received from OR RN. Fm at bedside. Waiting for x-ray. Confirmed with Dr. Leandra Su to have x-ray today prior to pt DC. Dc instructions provided. Vitals:    11/22/19 1702 11/25/19 0623 11/25/19 0842 11/25/19 0845   BP:  (!) 129/97 100/64 199/64   Pulse:  70 62 60   Resp:  18 20 16   Temp:  97.9 °F (36.6 °C)     SpO2:  97% 95% 95%   Weight: 96.2 kg (212 lb) 95.7 kg (211 lb)     Height: 6' (1.829 m) 6' (1.829 m)         oriented to time, place, person and situation          Lines and Drains  Peripheral Intravenous Line:   Peripheral IV 11/25/19 Left Antecubital (Active)   Site Assessment Clean, dry, & intact 11/25/2019  6:44 AM   Phlebitis Assessment 0 11/25/2019  6:44 AM   Infiltration Assessment 0 11/25/2019  6:44 AM   Dressing Status Clean, dry, & intact 11/25/2019  6:44 AM   Dressing Type Tape;Transparent 11/25/2019  6:44 AM   Hub Color/Line Status Pink; Infusing 11/25/2019  6:44 AM   Alcohol Cap Used Yes 11/25/2019  6:44 AM       Wound  Wound Chest Right RIGHT SUBCLAVIAN (Active)   Dressing Status Clean, dry, and intact 11/25/2019  9:00 AM   Dressing Type 4 x 4;Adhesive wound closure strips (Steri-Strips);Gauze 11/25/2019  9:00 AM   Incision Site Well Approximated Yes 11/25/2019  8:45 AM   Number of days: 0        Patient assisted to chair with minimal assist. Pateint tolerating liquids well. Patient's family at bedside. Discharge discussed with patient and family. No questions or concerns at this time. Patient had time to ask any questions. Patient discharged to home, with aunt.       Erica Rosa RN

## 2019-11-25 NOTE — INTERVAL H&P NOTE
H&P Update:  Rg Mckenzie was seen and examined. History and physical has been reviewed. The patient has been examined.  There have been no significant clinical changes since the completion of the originally dated History and Physical.

## 2019-11-26 ENCOUNTER — TELEPHONE (OUTPATIENT)
Dept: ONCOLOGY | Age: 54
End: 2019-11-26

## 2019-11-26 LAB
ANION GAP SERPL CALC-SCNC: 5 MMOL/L (ref 3–18)
BUN SERPL-MCNC: 20 MG/DL (ref 7–18)
BUN/CREAT SERPL: 11 (ref 12–20)
CALCIUM SERPL-MCNC: 9 MG/DL (ref 8.5–10.1)
CHLORIDE SERPL-SCNC: 105 MMOL/L (ref 100–111)
CO2 SERPL-SCNC: 25 MMOL/L (ref 21–32)
CREAT SERPL-MCNC: 1.84 MG/DL (ref 0.6–1.3)
GLUCOSE BLD STRIP.AUTO-MCNC: 199 MG/DL (ref 70–110)
GLUCOSE BLD STRIP.AUTO-MCNC: 219 MG/DL (ref 70–110)
GLUCOSE BLD STRIP.AUTO-MCNC: 283 MG/DL (ref 70–110)
GLUCOSE BLD STRIP.AUTO-MCNC: 315 MG/DL (ref 70–110)
GLUCOSE SERPL-MCNC: 206 MG/DL (ref 74–99)
POTASSIUM SERPL-SCNC: 4.5 MMOL/L (ref 3.5–5.5)
SODIUM SERPL-SCNC: 135 MMOL/L (ref 136–145)

## 2019-11-26 PROCEDURE — 36415 COLL VENOUS BLD VENIPUNCTURE: CPT

## 2019-11-26 PROCEDURE — 99218 HC RM OBSERVATION: CPT

## 2019-11-26 PROCEDURE — 74011250637 HC RX REV CODE- 250/637: Performed by: HOSPITALIST

## 2019-11-26 PROCEDURE — 82962 GLUCOSE BLOOD TEST: CPT

## 2019-11-26 PROCEDURE — 74011636637 HC RX REV CODE- 636/637: Performed by: INTERNAL MEDICINE

## 2019-11-26 PROCEDURE — 80048 BASIC METABOLIC PNL TOTAL CA: CPT

## 2019-11-26 PROCEDURE — 74011636637 HC RX REV CODE- 636/637: Performed by: HOSPITALIST

## 2019-11-26 PROCEDURE — 74011000250 HC RX REV CODE- 250: Performed by: INTERNAL MEDICINE

## 2019-11-26 PROCEDURE — 74011250637 HC RX REV CODE- 250/637: Performed by: INTERNAL MEDICINE

## 2019-11-26 PROCEDURE — 74011250636 HC RX REV CODE- 250/636: Performed by: INTERNAL MEDICINE

## 2019-11-26 RX ORDER — HYDROCODONE BITARTRATE AND ACETAMINOPHEN 7.5; 325 MG/1; MG/1
1-2 TABLET ORAL
Status: DISCONTINUED | OUTPATIENT
Start: 2019-11-26 | End: 2019-11-27 | Stop reason: HOSPADM

## 2019-11-26 RX ORDER — INSULIN GLARGINE 100 [IU]/ML
24 INJECTION, SOLUTION SUBCUTANEOUS
Status: DISCONTINUED | OUTPATIENT
Start: 2019-11-26 | End: 2019-11-27

## 2019-11-26 RX ORDER — HYDROCODONE BITARTRATE AND ACETAMINOPHEN 7.5; 325 MG/1; MG/1
1-2 TABLET ORAL
Status: DISCONTINUED | OUTPATIENT
Start: 2019-11-26 | End: 2019-11-26

## 2019-11-26 RX ORDER — INSULIN LISPRO 100 [IU]/ML
INJECTION, SOLUTION INTRAVENOUS; SUBCUTANEOUS 4 TIMES DAILY
Status: DISCONTINUED | OUTPATIENT
Start: 2019-11-26 | End: 2019-11-27 | Stop reason: HOSPADM

## 2019-11-26 RX ADMIN — HYDROCODONE BITARTRATE AND ACETAMINOPHEN 1 TABLET: 7.5; 325 TABLET ORAL at 06:42

## 2019-11-26 RX ADMIN — INSULIN LISPRO 6 UNITS: 100 INJECTION, SOLUTION INTRAVENOUS; SUBCUTANEOUS at 12:02

## 2019-11-26 RX ADMIN — PANTOPRAZOLE SODIUM 40 MG: 40 TABLET, DELAYED RELEASE ORAL at 08:35

## 2019-11-26 RX ADMIN — RIVAROXABAN 15 MG: 15 TABLET, FILM COATED ORAL at 16:03

## 2019-11-26 RX ADMIN — HYDROCODONE BITARTRATE AND ACETAMINOPHEN 1 TABLET: 7.5; 325 TABLET ORAL at 11:10

## 2019-11-26 RX ADMIN — QUETIAPINE FUMARATE 50 MG: 25 TABLET ORAL at 21:04

## 2019-11-26 RX ADMIN — OXYCODONE 13.5 MG: 13.5 CAPSULE, EXTENDED RELEASE ORAL at 21:04

## 2019-11-26 RX ADMIN — INSULIN LISPRO 6 UNITS: 100 INJECTION, SOLUTION INTRAVENOUS; SUBCUTANEOUS at 21:44

## 2019-11-26 RX ADMIN — RIVAROXABAN 15 MG: 15 TABLET, FILM COATED ORAL at 08:35

## 2019-11-26 RX ADMIN — HYDROCODONE BITARTRATE AND ACETAMINOPHEN 2 TABLET: 7.5; 325 TABLET ORAL at 16:03

## 2019-11-26 RX ADMIN — INSULIN LISPRO 12 UNITS: 100 INJECTION, SOLUTION INTRAVENOUS; SUBCUTANEOUS at 17:09

## 2019-11-26 RX ADMIN — DEXAMETHASONE 4 MG: 4 TABLET ORAL at 08:35

## 2019-11-26 RX ADMIN — OXYCODONE 13.5 MG: 13.5 CAPSULE, EXTENDED RELEASE ORAL at 08:35

## 2019-11-26 RX ADMIN — INSULIN GLARGINE 24 UNITS: 100 INJECTION, SOLUTION SUBCUTANEOUS at 21:45

## 2019-11-26 RX ADMIN — INSULIN LISPRO 2 UNITS: 100 INJECTION, SOLUTION INTRAVENOUS; SUBCUTANEOUS at 08:35

## 2019-11-26 NOTE — PROGRESS NOTES
2150: pt arrived via wheelchair accompanied by ED nurse    2206: pt alert and oriented X4, denies pain, hooked on tele monitor box # 32 on SR, rhythm verified by monitor tech, admission and required doc completed, Dual skin assessment done with Verner Caprio RN, dressing R subclavian area clean, dry and intact, bed locked and low position, call bell within reach    2320: medication given, tolerated well    0205: sleeping, no sign of distress, no changed from previous assessment, continue to monitor    0300: assisted pt to bathroom and back to bed comfortably    0600: pt sleeping, reassessment done, no changes, call bell within reach    0642: pt c/o pain, scale 7/10, Norco 7.5 mg PO given for pain    Bedside and Verbal shift change report given to [address] [place] [address] (oncoming nurse) by Segun Wall RN (offgoing nurse). Report included the following information SBAR, Kardex, ED Summary, Intake/Output, MAR, Recent Results and Cardiac Rhythm SR on tele # 32.

## 2019-11-26 NOTE — PROGRESS NOTES
0730 - Bedside shift change report given to Mouna (oncoming nurse) by Nydia Santacruz (offgoing nurse). Report included the following information SBAR, Kardex, Procedure Summary, Intake/Output, MAR and Recent Results. 1603 - Norco administered for pain. 1633 - Pt pain reassessed; pt's pain level decreased. 1709 - Insulin Lispro administered. 1935 - Bedside shift change report given to Erwin Wyman (oncoming nurse) by Sonja Diane (offgoing nurse). Report included the following information SBAR, Kardex, Procedure Summary, Intake/Output, MAR and Recent Results.

## 2019-11-26 NOTE — DIABETES MGMT
NUTRITIONAL ASSESSMENT GLYCEMIC CONTROL/ PLAN OF CARE     Jordan Suarez           62 y.o.           11/25/2019              Patient Active Problem List   Diagnosis Code    Liver metastasis ([facility] [address] C78.7    Abdominal pain R10.9    Pancreatic adenocarcinoma ([facility] [address] C25.9    Epigastric pain R10.13    Pulmonary embolism without acute cor pulmonale (HCC) I26.99    Thrombocytopenia ([address] D69.6    Diabetes mellitus, new onset ([facility] [address] E11.9    Hepatitis C B19.20    Anemia D64.9      INTERVENTIONS/PLAN:   Encouraged increased intake at meals to meet calorie and protein requirements. Recommend increasing Lantus to 24 units q 24 hrs. ASSESSMENT:   Nutritional Status:  Pt is overweight related to excess caloric intake as evidenced by 119% ideal weight and BMI=28.6kg/m2. Altered nutrition-related lab values  RT DX. Diabetes Mellitus  as evidenced by A1c of 10.6 % equivalent  to ave Blood Glucose of 248 mg/dl for 2-3 months prior to admission  Diabetes Management:   Recent blood glucose:       Lab Results   Component Value Date/Time     (H) 11/26/2019 05:40 AM     (H) 11/25/2019 08:08 PM    GLUCPOC 199 (H) 11/26/2019 08:18 AM    GLUCPOC 172 (H) 11/25/2019 10:28 PM    GLUCPOC 242 (H) 11/25/2019 08:01 PM     Within target range (non-ICU: <140; ICU<180): [] Yes   [x]  No  Current Insulin regimen: 20 units Lantus plus corrective lispro  Home medication/insulin regimen: 20 units Lantus per pt  HbA1c:10.6 %  Adequate glycemic control PTA:  [] Yes  [x] No     SUBJECTIVE/OBJECTIVE:   Information obtained from: Pt reports he has been eating ~50% of meals and that his appetite is reduced. Diet: Diabetic diet  No data found.   Medications: [x]                Reviewed     Most Recent POC Glucose:   Recent Labs     11/26/19  0540 11/25/19 2008   * 217*         Labs:   Lab Results   Component Value Date/Time    Hemoglobin A1c 10.6 (H) 11/25/2019 08:08 PM     Lab Results   Component Value Date/Time    Sodium 135 (L) 11/26/2019 05:40 AM    Potassium 4.5 11/26/2019 05:40 AM    Chloride 105 11/26/2019 05:40 AM    CO2 25 11/26/2019 05:40 AM    Anion gap 5 11/26/2019 05:40 AM    Glucose 206 (H) 11/26/2019 05:40 AM    BUN 20 (H) 11/26/2019 05:40 AM    Creatinine 1.84 (H) 11/26/2019 05:40 AM    Calcium 9.0 11/26/2019 05:40 AM    Albumin 3.9 11/25/2019 08:08 PM       Anthropometrics: IBW : 80.7 kg (178 lb), % IBW (Calculated): 118.54 %, BMI (calculated): 28.6  Wt Readings from Last 1 Encounters:   11/25/19 95.7 kg (211 lb)      Ht Readings from Last 1 Encounters:   11/25/19 6' (1.829 m)     Estimated Nutrition Needs:  7507 Kcals/day, Protein (g): (95) Fluid (ml): 2400 ml  Based on:   [x]          Actual BW    []          ABW   []            Adjusted BW    Nutrition Diagnoses: As stated above. Nutrition Interventions: insulin increase and implement food preferences  Goal:   Blood glucose will be within target range of  mg/dL by 11/29. Intake will meet >75% of energy and protein requirements by 12/2.      Nutrition Monitoring and Evaluation      []     Monitor po intake on meal rounds  [x]     Continue inpatient monitoring and intervention  []     Other:      Nutrition Risk:  []   High     []  Moderate    [x]  Minimal/Uncompromised    Talha Baker, RD  pgr [phone]

## 2019-11-26 NOTE — PROGRESS NOTES
Problem: Diabetes Self-Management  Goal: *Disease process and treatment process  Description  Define diabetes and identify own type of diabetes; list 3 options for treating diabetes. Outcome: Progressing Towards Goal  Goal: *Incorporating nutritional management into lifestyle  Description  Describe effect of type, amount and timing of food on blood glucose; list 3 methods for planning meals. Outcome: Progressing Towards Goal  Goal: *Incorporating physical activity into lifestyle  Description  State effect of exercise on blood glucose levels. Outcome: Progressing Towards Goal  Goal: *Developing strategies to promote health/change behavior  Description  Define the ABC's of diabetes; identify appropriate screenings, schedule and personal plan for screenings. Outcome: Progressing Towards Goal  Goal: *Using medications safely  Description  State effect of diabetes medications on diabetes; name diabetes medication taking, action and side effects. Outcome: Progressing Towards Goal  Goal: *Monitoring blood glucose, interpreting and using results  Description  Identify recommended blood glucose targets  and personal targets. Outcome: Progressing Towards Goal  Goal: *Prevention, detection, treatment of acute complications  Description  List symptoms of hyper- and hypoglycemia; describe how to treat low blood sugar and actions for lowering  high blood glucose level. Outcome: Progressing Towards Goal  Goal: *Prevention, detection and treatment of chronic complications  Description  Define the natural course of diabetes and describe the relationship of blood glucose levels to long term complications of diabetes.   Outcome: Progressing Towards Goal  Goal: *Developing strategies to address psychosocial issues  Description  Describe feelings about living with diabetes; identify support needed and support network  Outcome: Progressing Towards Goal  Goal: *Insulin pump training  Outcome: Progressing Towards Goal  Goal: *Sick day guidelines  Outcome: Progressing Towards Goal  Goal: *Patient Specific Goal (EDIT GOAL, INSERT TEXT)  Outcome: Progressing Towards Goal     Problem: Falls - Risk of  Goal: *Absence of Falls  Description  Document Delfin Escobar Fall Risk and appropriate interventions in the flowsheet.   Outcome: Progressing Towards Goal  Note: Fall Risk Interventions:  Mobility Interventions: Patient to call before getting OOB    Mentation Interventions: Door open when patient unattended    Medication Interventions: Patient to call before getting OOB    Elimination Interventions: Call light in reach    History of Falls Interventions: Door open when patient unattended         Problem: Nutrition Deficit  Goal: *Optimize nutritional status  Outcome: Progressing Towards Goal

## 2019-11-26 NOTE — TELEPHONE ENCOUNTER
Spoke with patient's sister, advised her that d/t issues with glycemic control, Dr. Augustine Wilkins is changing pre-chemotherapy outpatient medication from dexamethasone to olanzapine. Advised Ms. Carlitos Yap this prescription is ready for pickup at our office. She verbalized understanding and had no other questions/concerns.

## 2019-11-26 NOTE — OP NOTES
[facility]  OPERATIVE REPORT    Name:  Jada Magaña  MR#:   [id number]  :  1965  ACCOUNT #:  [de-identified]  DATE OF SERVICE:  2019    PREOPERATIVE DIAGNOSIS:  Metastatic pancreatic adenocarcinoma. POSTOPERATIVE DIAGNOSIS:  Metastatic pancreatic adenocarcinoma. PROCEDURE PERFORMED:  Right subclavian vein 8.0 Icelandic single lumen PowerPort placement. SURGEON:  Daisy Pulliam. Leandra Su DO    ASSISTANT: None    ANESTHESIA:  MAC supplemented with local infiltration at the operative site with equal mixture of 1% lidocaine plain mixed in equal portions of 0.5% Marcaine diluted with 1:200,000 epinephrine utilizing a total volume of 19 mL. COMPLICATIONS: None    SPECIMENS REMOVED:  None. IMPLANTS: 8.0 Icelandic single-lumen PowerPort catheter    ESTIMATED BLOOD LOSS:  Less than 5 mL. FINDINGS:  Normal anatomy. INDICATIONS FOR SURGICAL PROCEDURE:  This is a 49-year-old white male with metastatic pancreatic adenocarcinoma who needs venous access to facilitate his upcoming chemotherapy. The risks, benefits of operating versus nonoperative potential alternatives and potential complications to include, but not limited to undesired cosmetic result; wound hematoma, seroma, or infection; catheter or reservoir sepsis; hemo-, pneumo-or chylothorax; catheter malposition or malfunction; injury to any cervical or axillary structure, DVT, PE, pneumonia, myocardial infarction, stroke, potentially death were all discussed in detail with the patient prior to surgical procedure who voiced understanding and wished to proceed. PROCEDURE:  The patient, after signing his operative permit in the preoperative staging area and voiding and receiving Ancef for antibiotic prophylaxis as well as Lovenox for DVT prophylaxis, was then transported to the operating room where he was placed in the supine position on the operating room table, and SCDs were placed and inflated prior to institution of MAC anesthesia. The patient was then prepped in the usual fashion with his arms tuck and a roll between the shoulder blades and bilateral infraclavicular and cervical regions were prepped and draped in the usual sterile fashion. A time-out procedure was then performed according to protocol and agreed upon by all personnel in the operating suite. The operative site was then anesthetized with an equal mixture of 1% lidocaine plain mixed in equal portions of 0.5% Marcaine diluted with 1:200,000 epinephrine, utilizing a total volume of 19 mL. A seeker needle was then introduced in the junction of the middle and distal clavicular thirds 1.5 cm below the clavicle oriented towards the suprasternal notch entering the right subclavian vein on the first pass. A guidewire was then placed and confirmed to be in the superior vena cava utilizing fluoroscopy. A 2-mm transverse cutaneous incision was made at the exit site of the wire and a transverse 3 cm incision was made in the middle aspect of the left chest.  This was carried down to the subcutaneous tissue and inferior flap was developed utilizing electrocautery, large enough to accommodate the PowerPort reservoir. The PowerPort reservoir catheter was then flushed with heparinized saline 3 units/mL of fluid and the catheter was then tunneled from the reservoir pocket out through the cutaneous exit site of the wire. The reservoir was then sutured in position utilizing simple interrupted 2-0 Prolene sutures. A dilator and a Peel-Away sheath were introduced under fluoroscopic directions and the catheter was tailored to the appropriate length. The guidewire and dilator were removed. The catheter was introduced into Peel-Away sheath, which was subsequently removed and its position was confirmed with the tip at the junction of the superior vena cava right atrial junction. There was a smooth contour of the catheter back into the reservoir without kinking. No evidence of pneumothorax. The reservoir was then accessed with a Russ needle and aspirated of blood and then flushed with 5 mL of a 300/mL heparinized saline solution and the reservoir was then flushed with 1 mL of 1000 units/mL of heparin. The wound was irrigated with normal saline solution. Deep subcutaneous closure was accomplished with a series of simple interrupted buried 3-0 Vicryl sutures. Deep dermal closure was accomplished with a series of simple interrupted buried deep dermal 3-0 Vicryl sutures and the skin was then closed with a running 4-0 Monocryl subcuticular stitch. Steri-Strips were applied as was the sterile dressing. The sponge and needle counts were correct both during and completion of the procedure. The patient tolerated the procedure without operative complications, subsequently was extubated and transported to the recovery room in awake, alert, and stable condition. The estimated blood loss was less than 5 mL. The patient received 250 mL of crystalloid during the procedure. The operative start time was 0803, completion time was 0834.       DO CONCHITA [email or web address]  D:  11/25/2019 8:55  T:  11/25/2019 19:00  JOB #:  [id number]

## 2019-11-26 NOTE — ASSESSMENT & PLAN NOTE
Patient has received curative therapy for this infection; however, initial treatment was not curative and Hepatitis C infection has reportedly reoccurred.

## 2019-11-26 NOTE — ASSESSMENT & PLAN NOTE
Patient on Rivaroxaban. However, Patient reports that he has only taken this medication for, perhaps, 5 days total since being prescribed it, as he has had to hold it for Pancreas Biopsy and MediPort Placement.

## 2019-11-26 NOTE — ED NOTES
Report given to [facility]    TRANSFER - ED to INPATIENT REPORT:    SBAR report made available to receiving floor on this patient being transferred to [address] [place] [address] ([facility])  for routine progression of care       Admitting diagnosis Diabetes mellitus, new onset ([facility] [address] [E11.9]  Pancreatic cancer ([facility] [address] [C25.9]    Information from the following report(s) SBAR, Kardex, ED Summary and MAR was made available to receiving floor. Lines:   Peripheral IV 11/25/19 Right Antecubital (Active)   Site Assessment Clean, dry, & intact 11/25/2019  8:10 PM   Phlebitis Assessment 0 11/25/2019  8:10 PM   Infiltration Assessment 0 11/25/2019  8:10 PM   Dressing Status Clean, dry, & intact 11/25/2019  8:10 PM   Dressing Type Transparent 11/25/2019  8:10 PM   Hub Color/Line Status Green 11/25/2019  8:10 PM   Action Taken Blood drawn 11/25/2019  8:10 PM        Medication list confirmed with patient    Opportunity for questions and clarification was provided. Patient is oriented to time, place, person and situation   Patient is  continent and ambulatory with assist     Valuables transported with patient     Patient transported with:   Registered Nurse      =Monitored (most recent)  Vitals w/ MEWS Score (last day)     Date/Time MEWS Score Pulse Resp Temp BP Level of Consciousness SpO2    11/25/19 21:14:23  2  65  18  97.6 °F (36.4 °C)  95/61  Alert  96 %    11/25/19 2011            Alert      11/25/19 1802  1  72  16  98.8 °F (37.1 °C)  107/70  Alert  96 %              Septic Patients:     Lactic Acid  No results found for: LACPOC (Most recent on top)  Repeat drawn: NO Time: na     ALL LACTIC ACIDS GREATER THAN 2 MUST BE REPEATED POC WITHIN 4 HOURS OR PRIOR TO ADMISSION               Total Fluid Bolus initiated and documented on MAR: NO    All ordered antibiotics initiated within first 3 hours of TIME ZERO?   NO

## 2019-11-26 NOTE — PROGRESS NOTES
Internal Medicine Progress Note    Patient's Name: Fracisco Galdamez  Admit Date: 11/25/2019  Length of Stay: 0      Assessment/Plan     Principal Problem:    Diabetes mellitus, new onset ([facility] (11/25/2019)    Active Problems:    Abdominal pain (11/8/2019)      Pancreatic adenocarcinoma ([facility] (11/19/2019)      Pulmonary embolism without acute cor pulmonale ([facility] (11/19/2019)      Hepatitis C (11/25/2019)      Anemia (11/25/2019)      DM  ACHS  D/c dexamethasone     Pancreatic adenocarcinoma  Severe abd pain, pain control and nausea control  PET scan ordered     PE  Continue xeralto, on RA, no resp distress    - Cont acceptable home medications for chronic conditions   - DVT protocol    I have personally reviewed all pertinent labs and films that have officially resulted over the last 24 hours. I have personally checked for all pending labs that are awaiting final results. Interval History   \"Bairon Montiel is a 62 y.o. male who was recently diagnosed with New Onset Diabetes, Pancreatic Cancer, and DVTs/PE who presents to [facility] ER after being referred by his Primary Endocrinologist for glycemic control while starting a new therapeutic agent (Dexamethasone) and obtaining a PET scan. Patient and Wife provide Subjective. Patient states that Diabetes was diagnosed in 10/2019 due to polyuria and serum glucose measurements obtained using Patient's Brother's Glucometer. Patient reports that Abdominal Pain led to finding a Pancreatic Mass approximately 2-3 weeks ago, which was confirmed via Pancreas Biopsy. Patient reports that approximately 2 weeks ago his Left Leg and Left Knee were swollen and painful, which eventually led to the diagnosis of a DVT and PE. Patient was started on Rivaroxaban at that time (approximately 11/09/2019). Patient reports that he has had to hold Rivaroxaban for Biopsies and Mediport placement.   Patient reports that his blood sugars have been quite volatile and that he was unable to have a PET Scan due to hyperglycemia.     Notably, Patient's Primary Endocrinologist sent a hand-written letter requesting that Patient be admitted for stricter Glycemic Control while starting Dexamethasone, which could not be accomplished Out-Patient. Additionally, Patient could have a PET scan performed while serum glucose was controlled at this time.     Patient is placed on Observation for management of hyperglycemia while initiating a new therapeutic agent (Dexamethasone) and for performance of PET Scan. \"    Glucose improving. Subjective     Pt s/e @ bedside. No major events overnight. Pt offers no complaints this AM.Denies CP or SOB. Denies abd pain    Objective     Visit Vitals  /73   Pulse 79   Temp 97.8 °F (36.6 °C)   Resp 20   Ht 6' (1.829 m)   Wt 95.7 kg (211 lb)   SpO2 94%   BMI 28.62 kg/m²       Physical Exam:  General Appearance: NAD, conversant  HENT: normocephalic/atraumatic, moist mucus membranes  Neck: No JVD, supple  Lungs: CTA with normal respiratory effort  CV: RRR, no m/r/g  Abdomen: soft, epigastric tenderness, guarding, normal bowel sounds  Extremities: no cyanosis, no peripheral edema  Neuro: No focal deficits, motor/sensory intact    Intake and Output:  Current Shift:  No intake/output data recorded. Last three shifts:  11/24 1901 - 11/26 0700  In: 240 [P.O.:240]  Out: -     Lab/Data Reviewed: All lab results for the last 24 hours reviewed. Imaging Reviewed:  No results found.     Medications Reviewed:  Current Facility-Administered Medications   Medication Dose Route Frequency    insulin glargine (LANTUS) injection 24 Units  24 Units SubCUTAneous QHS    HYDROcodone-acetaminophen (NORCO) 7.5-325 mg per tablet 1-2 Tab  1-2 Tab Oral Q4H PRN    dexAMETHasone (DECADRON) tablet 4 mg  4 mg Oral Q12H    lidocaine 4 % patch 1 Patch  1 Patch TransDERmal Q24H    lidocaine-prilocaine (EMLA) 2.5-2.5 % cream   Topical PRN    pantoprazole (PROTONIX) tablet 40 mg  40 mg Oral ACB    ondansetron (ZOFRAN ODT) tablet 8 mg  8 mg Oral Q8H PRN    oxyCODONE ER (XTAMPZA ER) capsule 13.5 mg  13.5 mg Oral Q12H    QUEtiapine (SEROquel) tablet 50 mg  50 mg Oral QHS    insulin lispro (HUMALOG) injection   SubCUTAneous AC&HS    glucose chewable tablet 16 g  4 Tab Oral PRN    glucagon (GLUCAGEN) injection 1 mg  1 mg IntraMUSCular PRN    dextrose 10 % infusion 125-250 mL  125-250 mL IntraVENous PRN    naloxone (NARCAN) injection 0.4 mg  0.4 mg IntraVENous PRN    rivaroxaban (XARELTO) tablet 15 mg  15 mg Oral BID WITH MEALS       Alexei Dorsey PA-C  [address] [facility]  Hospitalist Division  Pager: [phone]  Office: [phone]

## 2019-11-26 NOTE — PROGRESS NOTES
Problem: Diabetes Self-Management  Goal: *Disease process and treatment process  Description  Define diabetes and identify own type of diabetes; list 3 options for treating diabetes. Outcome: Progressing Towards Goal  Goal: *Incorporating nutritional management into lifestyle  Description  Describe effect of type, amount and timing of food on blood glucose; list 3 methods for planning meals. Outcome: Progressing Towards Goal  Goal: *Incorporating physical activity into lifestyle  Description  State effect of exercise on blood glucose levels. Outcome: Progressing Towards Goal  Goal: *Developing strategies to promote health/change behavior  Description  Define the ABC's of diabetes; identify appropriate screenings, schedule and personal plan for screenings. Outcome: Progressing Towards Goal  Goal: *Using medications safely  Description  State effect of diabetes medications on diabetes; name diabetes medication taking, action and side effects. Outcome: Progressing Towards Goal  Goal: *Monitoring blood glucose, interpreting and using results  Description  Identify recommended blood glucose targets  and personal targets. Outcome: Progressing Towards Goal  Goal: *Prevention, detection, treatment of acute complications  Description  List symptoms of hyper- and hypoglycemia; describe how to treat low blood sugar and actions for lowering  high blood glucose level. Outcome: Progressing Towards Goal  Goal: *Prevention, detection and treatment of chronic complications  Description  Define the natural course of diabetes and describe the relationship of blood glucose levels to long term complications of diabetes.   Outcome: Progressing Towards Goal  Goal: *Developing strategies to address psychosocial issues  Description  Describe feelings about living with diabetes; identify support needed and support network  Outcome: Progressing Towards Goal  Goal: *Insulin pump training  Outcome: Progressing Towards Goal  Goal: *Sick day guidelines  Outcome: Progressing Towards Goal  Goal: *Patient Specific Goal (EDIT GOAL, INSERT TEXT)  Outcome: Progressing Towards Goal     Problem: Patient Education: Go to Patient Education Activity  Goal: Patient/Family Education  Outcome: Progressing Towards Goal     Problem: Falls - Risk of  Goal: *Absence of Falls  Description  Document [facility] Fall Risk and appropriate interventions in the flowsheet.   Outcome: Progressing Towards Goal  Note: Fall Risk Interventions:  Mobility Interventions: Patient to call before getting OOB    Mentation Interventions: Door open when patient unattended    Medication Interventions: Patient to call before getting OOB, Teach patient to arise slowly    Elimination Interventions: Call light in reach, Urinal in reach, Patient to call for help with toileting needs              Problem: Patient Education: Go to Patient Education Activity  Goal: Patient/Family Education  Outcome: Progressing Towards Goal     Problem: Pain  Goal: *Control of Pain  Outcome: Progressing Towards Goal     Problem: Patient Education: Go to Patient Education Activity  Goal: Patient/Family Education  Outcome: Progressing Towards Goal

## 2019-11-26 NOTE — H&P
History and Physical    Patient: Kartik Khan MRN: [id number]  SSN: [id number]    YOB: 1965  Age: 62 y.o. Sex: male      Subjective:      Kartik Khan is a 62 y.o. male who was recently diagnosed with New Onset Diabetes, Pancreatic Cancer, and DVTs/PE who presents to [facility] ER after being referred by his Primary Endocrinologist for glycemic control while starting a new therapeutic agent (Dexamethasone) and obtaining a PET scan. Patient and Wife provide Subjective. Patient states that Diabetes was diagnosed in 10/2019 due to polyuria and serum glucose measurements obtained using Patient's Brother's Glucometer. Patient reports that Abdominal Pain led to finding a Pancreatic Mass approximately 2-3 weeks ago, which was confirmed via Pancreas Biopsy. Patient reports that approximately 2 weeks ago his Left Leg and Left Knee were swollen and painful, which eventually led to the diagnosis of a DVT and PE. Patient was started on Rivaroxaban at that time (approximately 11/09/2019). Patient reports that he has had to hold Rivaroxaban for Biopsies and Mediport placement. Patient reports that his blood sugars have been quite volatile and that he was unable to have a PET Scan due to hyperglycemia. Notably, Patient's Primary Endocrinologist sent a hand-written letter requesting that Patient be admitted for stricter Glycemic Control while starting Dexamethasone, which could not be accomplished Out-Patient. Additionally, Patient could have a PET scan performed while serum glucose was controlled at this time. Patient is placed on Observation for management of hyperglycemia while initiating a new therapeutic agent (Dexamethasone) and for performance of PET Scan. Past Medical History:   Diagnosis Date    Cancer [facility])     Pancreatic Adenocarcinoma. Diagnosed 11/2019.  Diabetes ([facility] [address] 10/2019    Diagnosed 10/2019.     Hepatitis C     Received Curative Treatment, but Hepatitis C was not cured and returned.  Left ulnar fracture     Thromboembolus ([address]     DVT of LLE 2019. Also, Pulmonary Embolism 2019. Treated with Rivaroxaban. Past Surgical History:   Procedure Laterality Date    HX TONSILLECTOMY      IR BX PANCREAS NEEDLE PERC  2019    Endoscopic Pancreatic Biopsy (?)    IR INSERT TUNL CVC W PORT OVER 5 YEARS  2019      Family History   Problem Relation Age of Onset    Diabetes Mother     Kidney Disease Mother     Diabetes Father     Diabetes Brother     Cancer Maternal Grandmother     Cancer Maternal Grandfather     Cancer Maternal Aunt     Cancer Maternal Uncle      Social History     Tobacco Use    Smoking status: Former Smoker     Packs/day: 0.50     Years: 40.00     Pack years: 20.00     Last attempt to quit: 2018     Years since quittin.0    Smokeless tobacco: Never Used    Tobacco comment: Quit 2018   Substance Use Topics    Alcohol use: Not Currently      Patient lives with Family and [address] [place] [address]. Patient is up ad leidy at home. Patient admits to Smoking 0.5 PPD x~40 years; Quit 2018. Patient reports rare ETOH Use. Patient denies Koleen Pock. Patient reports previous Heroine, Cocaine, and Marijuana use; Quit 2018. Prior to Admission medications    Medication Sig Start Date End Date Taking? Authorizing Provider   OLANZapine (ZYPREXA ZYDIS) 5 mg disintegrating tablet Take 5mg PO the night before chemotherapy, then 5mg PO the morning of chemotherapy, then 5mg PO three times a day as needed for 1 week following chemotherapy. Indications: prevent nausea and vomiting from cancer chemotherapy 19   Chiqui Campbell MD   rivaroxaban (XARELTO) 15 mg (42)- 20 mg (9) DsPk Take 20 mg by mouth daily. Take one 15 mg tablet twice a day with food for the first 21 days. Then, take one 20 mg tablet once a day with food for 9 days.     Provider, Historical   oxyCODONE ER (OXYCONTIN) 15 mg ER tablet Take 1 Tab by mouth every twelve (12) hours for 30 days. Max Daily Amount: 30 mg. 11/19/19 12/19/19  Ramón Barriga MD   naloxone [facility]) 4 mg/actuation nasal spray Use 1 spray intranasally, then discard. Repeat with new spray every 2 min as needed for opioid overdose symptoms, alternating nostrils. 11/19/19   Ramón Barriga MD   HYDROcodone-acetaminophen (NORCO) 7.5-325 mg per tablet Take 1 Tab by mouth every six (6) hours as needed for Pain for up to 30 days. Max Daily Amount: 4 Tabs. 11/19/19 12/19/19  Ramón Barriga MD   dexAMETHasone (DECADRON) 4 mg tablet Take 4mg by mouth twice a day the day before chemotherapy and the day after chemotherapy 11/19/19   Chiqui Campbell MD   lidocaine-prilocaine (EMLA) topical cream Apply  to affected area as needed for Pain (Apply to skin 30-60 minutes before mediport access). 11/19/19   Ramón Barriga MD   loperamide (IMMODIUM) 2 mg tablet Take 1 Tab by mouth four (4) times daily as needed for Diarrhea for up to 10 days. Indications: Diarrhea caused by Chemotherapy 11/19/19 11/29/19  Chiqui Campbell MD   ondansetron hcl (ZOFRAN) 8 mg tablet Take 1 Tab by mouth every eight (8) hours as needed for Nausea. 11/19/19   Chiqui Campbell MD   nut.tx.gluc.intol,lac-free,soy (GLUCERNA ADVANCE) liqd Take 237 mL by mouth three (3) times daily. 11/19/19   Ramón Barriga MD   TRUE METRIX GLUCOSE METER misc USE AS DIRECTED 11/4/19   Provider, Historical   LANTUS U-100 INSULIN 100 unit/mL injection INJECT 20 UNITS SUBCUTANEOUSLY ONCE DAILY AT BEDTIME 10/21/19   Provider, Historical   omeprazole (PRILOSEC) 20 mg capsule TAKE 1 CAPSULE BY MOUTH ONCE DAILY 11/4/19   Provider, Historical   QUEtiapine (SEROQUEL) 50 mg tablet Take 50 mg by mouth nightly. 8/3/19   Provider, Historical   ondansetron (ZOFRAN ODT) 4 mg disintegrating tablet Take 1 Tab by mouth every eight (8) hours as needed for Nausea.  11/3/19   Maddy Tyler MD   lidocaine (LIDODERM) 5 % Apply patch to the affected area for 12 hours a day and remove for 12 hours a day. 3/22/19   Natasha Bolanos MD        No Known Allergies    Review of Systems:  (-) Fevers  (-) Chills  (-) Fatigue  (-) Generalized Weakness  (-) Vertigo  (-) Cough  (-) Increased Sputum Production  (-) Pain with Inspiration  (-) Wheezing  (-) Shortness of Breath  (-) Dyspnea on Exertion  (-) Chest Pain  (-) Abdominal Pain  (-) Nausea  (-) Vomiting  (-) Diarrhea  (-) Constipation  (-) Polyuria  (-) Dysuria  (-) Faintness  All other systems have been reviewed and are negative      Objective:     Vitals:    11/25/19 1802 11/25/19 2114 11/25/19 2155   BP: 107/70 95/61 102/66   Pulse: 72 65 64   Resp: 16 18 18   Temp: 98.8 °F (37.1 °C) 97.6 °F (36.4 °C) 97.7 °F (36.5 °C)   SpO2: 96% 96% 95%   Weight: 95.7 kg (211 lb)     Height: 6' (1.829 m)          Physical Exam:  General:  Adult male lying in bed in no acute distress  HEENT:  Atraumatic, normocephalic; (+) Miotic Pupils equally round and reactive to light with accommodation; Extraocular muscles intact; (+) Somewhat dry Oropharynx without erythema, edema, or exudates; (+) Edentulous  Neck:  No Bruits; No Lymphadenopathy  Chest:  No pectus carinatum; No pectus excavatum  Cardiovascular:  Regular rate and rhythm without rubs, gallops, or murmurs  Respiratory:  (+) Slight Crackles of Bilateral lung bases, Right worse than Left; otherwise clear to Auscultation Bilaterally without wheezes or rhonchi; normal effort of breathing  Abdominal:  Soft, non-tense, (+) Mild-to-Moderate point-tenderness of LLQ over previous injection yaw; BS present without guarding, rebound, or masses  :  Deferred  Extremities:  Pulses 2+ x4 without edema, clubbing, or cyanosis  Musculoskeletal:  Strength 5/5 and symmetrical in BUE and BLE  Integument:  No rash on face, forearms, or legs  Neurological:  A&O x4/4; No gross deficits of Visual Acuity, Eye Movement, Jaw Opening, Facial Expression, Hearing, Phonation, or Head Movement;  No gross deficits of Tongue Movement or Slurring of Speech  Psychiatric:  Affect, Language, and Behavior are appropriate      Assessment:     Hospital Problems  Date Reviewed: 11/25/2019          Codes Class Noted POA    * (Principal) Diabetes mellitus, new onset ([facility] ICD-10-CM: E11.9  ICD-9-CM: 250.00  11/25/2019 Yes        Pancreatic adenocarcinoma ([facility] ICD-10-CM: C25.9  ICD-9-CM: 157.9  11/19/2019 Yes        Pulmonary embolism without acute cor pulmonale ([facility] ICD-10-CM: I26.99  ICD-9-CM: 415.19  11/19/2019 Yes        Abdominal pain ICD-10-CM: R10.9  ICD-9-CM: 789.00  11/8/2019 Yes        Hepatitis C ICD-10-CM: B19.20  ICD-9-CM: 070.70  11/25/2019 Yes        Anemia ICD-10-CM: D64.9  ICD-9-CM: 285.9  11/25/2019 Yes              Plan:     Start Dexamethasone 4 mg PO BID. Continue home Long-Acting Insulin 20 units qHS with Serum Glucose checking qACHS with Corrective Insulin. Additional adjustments may be determined after Corrective Insulin requirements are tabulated after 24 hours. Continue home medications for Malignant Pain and Depression, also prophylaxis for GERD and Nausea. MediPort was implanted today on 11/25/2019 and recommendations were for Rivaroxaban to be HELD for 3 days. DVT prophylaxis will be achieved with SCDs. Plan to resume Rivaroxaban on 11/28/2019.       Signed By: Adela Montelongo DO     November 25, 2019

## 2019-11-26 NOTE — DIABETES MGMT
Diabetes Patient/Family Education Record  Factors That  May Influence Patients Ability  to Learn or  Comply with Recommendations   []   Language barrier    []   Cultural needs   []   Motivation    []   Cognitive limitation    [x]   Physical- reported pain   []   Education    []   Physiological factors   []   Hearing/vision/speaking impairment   []   Quaker beliefs    []   Financial factors   []  Other:   []  No factors identified at this time.      Person Instructed:   [x]   Patient   []   Family   []  Other     Preference for Learning:   [x]   Verbal   []   Written   []  Demonstration     Level of Comprehension & Competence:    [x]  Good                                      [] Fair                                     []  Poor                             []  Needs Reinforcement   [x]  Teachback completed    Education Component:   [x]  Medication management,  and potential medication interactions    [x]  Nutritional management -obtain usual meal pattern   []  Exercise   []  Signs, symptoms, and treatment of hyperglycemia and hypoglycemia   [] Prevention, recognition and treatment of hyperglycemia and hypoglycemia   [x]  Importance of blood glucose monitoring and how to obtain a blood glucose meter - currently monitoring   []  Instruction on use of the blood glucose meter   [x]  Discuss the importance of HbA1C monitoring    []  Sick day guidelines   []  Proper use and disposal of lancets, needles, syringes or insulin pens (if appropriate)   []  Potential long-term complications (retinopathy, kidney disease, neuropathy, foot care)   [] Information about whom to contact in case of emergency or for more information    [x]  Goal:  Patient/family will demonstrate understanding of Diabetes Self Management Skills by: (date) _12/5______  Plan for post-discharge education or self-management support:    [] Outpatient class schedule provided            [] Patient Declined    [] Scheduled for outpatient classes (date) _______  Verify:  Does patient understand how diabetes medications work? __yes__________________________  Does patient know what their most recent A1c is? ___10.6________________________________  Does patient monitor glucose at home? _____yes______________________________________  Does patient have difficulty obtaining diabetes medications or testing supplies?  ___insurance covers per pt______________

## 2019-11-26 NOTE — PROGRESS NOTES
Problem: Discharge Planning  Goal: *Discharge to safe environment  Outcome: Resolved/Met    Home    Care Management Interventions  PCP Verified by CM: Yes  Palliative Care Criteria Met (RRAT>21 & CHF Dx)?: No  Transition of Care Consult (CM Consult): Discharge Planning  Current Support Network: (lives with family per patient)  Confirm Follow Up Transport: Family  Plan discussed with Pt/Family/Caregiver: Yes  Discharge Location  Discharge Placement: Home     Reason for Admission:   Diabetes mellitus, new onset ([facility] [address] (11/25/2019)                     RRAT Score:     18             Do you (patient/family) have any concerns for transition/discharge? Not at this time              Plan for utilizing home health:   Not home bound    Current Advanced Directive/Advance Care Plan:  Not at this time            Transition of Care Plan:           Spoke with patient in room, he is independent with his  ADL's And uses no DME. He verified his address, PCP ( [address]), insurance ( [employer] [place] )  and phone # as correct on the facesheet. Patient has designated __Sister Dearchidi Lara [phone]_____________________ to participate in his/her discharge plan and to receive any needed information. Plan is to return home per patient and has family to transport. Patient and/or next of kin has been given the Outpatient Observation Information and Notification letter and all questions answered.

## 2019-11-27 VITALS
SYSTOLIC BLOOD PRESSURE: 111 MMHG | DIASTOLIC BLOOD PRESSURE: 69 MMHG | HEIGHT: 72 IN | OXYGEN SATURATION: 97 % | WEIGHT: 211 LBS | RESPIRATION RATE: 18 BRPM | TEMPERATURE: 98.5 F | HEART RATE: 60 BPM | BODY MASS INDEX: 28.58 KG/M2

## 2019-11-27 LAB
ANION GAP SERPL CALC-SCNC: 5 MMOL/L (ref 3–18)
BUN SERPL-MCNC: 21 MG/DL (ref 7–18)
BUN/CREAT SERPL: 23 (ref 12–20)
CALCIUM SERPL-MCNC: 9.1 MG/DL (ref 8.5–10.1)
CHLORIDE SERPL-SCNC: 103 MMOL/L (ref 100–111)
CO2 SERPL-SCNC: 27 MMOL/L (ref 21–32)
CREAT SERPL-MCNC: 0.93 MG/DL (ref 0.6–1.3)
GLUCOSE BLD STRIP.AUTO-MCNC: 126 MG/DL (ref 70–110)
GLUCOSE BLD STRIP.AUTO-MCNC: 275 MG/DL (ref 70–110)
GLUCOSE SERPL-MCNC: 302 MG/DL (ref 74–99)
POTASSIUM SERPL-SCNC: 3.6 MMOL/L (ref 3.5–5.5)
SODIUM SERPL-SCNC: 135 MMOL/L (ref 136–145)

## 2019-11-27 PROCEDURE — 99218 HC RM OBSERVATION: CPT

## 2019-11-27 PROCEDURE — 74011250637 HC RX REV CODE- 250/637: Performed by: INTERNAL MEDICINE

## 2019-11-27 PROCEDURE — 80048 BASIC METABOLIC PNL TOTAL CA: CPT

## 2019-11-27 PROCEDURE — 36415 COLL VENOUS BLD VENIPUNCTURE: CPT

## 2019-11-27 PROCEDURE — 82962 GLUCOSE BLOOD TEST: CPT

## 2019-11-27 PROCEDURE — 74011636637 HC RX REV CODE- 636/637: Performed by: HOSPITALIST

## 2019-11-27 PROCEDURE — 74011250637 HC RX REV CODE- 250/637: Performed by: HOSPITALIST

## 2019-11-27 RX ORDER — INSULIN GLARGINE 100 [IU]/ML
4 INJECTION, SOLUTION SUBCUTANEOUS
Status: COMPLETED | OUTPATIENT
Start: 2019-11-27 | End: 2019-11-27

## 2019-11-27 RX ORDER — INSULIN LISPRO 100 [IU]/ML
3-15 INJECTION, SOLUTION INTRAVENOUS; SUBCUTANEOUS
Qty: 1 VIAL | Refills: 0 | Status: SHIPPED | OUTPATIENT
Start: 2019-11-27

## 2019-11-27 RX ORDER — INSULIN GLARGINE 100 [IU]/ML
28 INJECTION, SOLUTION SUBCUTANEOUS
Qty: 1 VIAL | Refills: 0 | Status: SHIPPED | OUTPATIENT
Start: 2019-11-27

## 2019-11-27 RX ORDER — INSULIN LISPRO 100 [IU]/ML
3 INJECTION, SOLUTION INTRAVENOUS; SUBCUTANEOUS 4 TIMES DAILY
Qty: 1 VIAL | Refills: 0 | Status: SHIPPED
Start: 2019-11-27 | End: 2019-11-27

## 2019-11-27 RX ORDER — ONDANSETRON 4 MG/1
4 TABLET, FILM COATED ORAL
Qty: 15 TAB | Refills: 0 | Status: SHIPPED | OUTPATIENT
Start: 2019-11-27 | End: 2020-01-15 | Stop reason: SDUPTHER

## 2019-11-27 RX ORDER — OXYCODONE HYDROCHLORIDE 10 MG/1
10 TABLET ORAL
Qty: 9 TAB | Refills: 0 | Status: SHIPPED | OUTPATIENT
Start: 2019-11-27 | End: 2019-11-30

## 2019-11-27 RX ORDER — INSULIN GLARGINE 100 [IU]/ML
28 INJECTION, SOLUTION SUBCUTANEOUS
Status: DISCONTINUED | OUTPATIENT
Start: 2019-11-27 | End: 2019-11-27 | Stop reason: HOSPADM

## 2019-11-27 RX ADMIN — INSULIN LISPRO 9 UNITS: 100 INJECTION, SOLUTION INTRAVENOUS; SUBCUTANEOUS at 09:44

## 2019-11-27 RX ADMIN — HYDROCODONE BITARTRATE AND ACETAMINOPHEN 1 TABLET: 7.5; 325 TABLET ORAL at 12:04

## 2019-11-27 RX ADMIN — OXYCODONE 13.5 MG: 13.5 CAPSULE, EXTENDED RELEASE ORAL at 09:44

## 2019-11-27 RX ADMIN — INSULIN GLARGINE 4 UNITS: 100 INJECTION, SOLUTION SUBCUTANEOUS at 12:03

## 2019-11-27 RX ADMIN — HYDROCODONE BITARTRATE AND ACETAMINOPHEN 2 TABLET: 7.5; 325 TABLET ORAL at 00:32

## 2019-11-27 RX ADMIN — PANTOPRAZOLE SODIUM 40 MG: 40 TABLET, DELAYED RELEASE ORAL at 09:44

## 2019-11-27 RX ADMIN — HYDROCODONE BITARTRATE AND ACETAMINOPHEN 2 TABLET: 7.5; 325 TABLET ORAL at 05:04

## 2019-11-27 NOTE — PROGRESS NOTES
Discharge instructions provided to pt and pt wife on behalf of primary nurse Priyanka Benz. Written prescriptions were given to the pt. Recommended follow up appointments reviewed. Pt escorted via wheelchair to car to be driven by wife. Pt left in stable condition.

## 2019-11-27 NOTE — DISCHARGE SUMMARY
2 [facility]  Hospitalist Division    Discharge Summary    Patient: Sulma Valera MRN: [id number]  CSN: [id number]    YOB: 1965  Age: 62 y.o. Sex: male    DOA: 11/25/2019 LOS:  LOS: 0 days   Discharge Date: 11/27/2019     Admission Diagnoses: Diabetes mellitus, new onset ([facility] [E11.9]  Pancreatic cancer ([facility] [C25.9]    Discharge Diagnoses:    Problem List as of 11/27/2019 Date Reviewed: 11/25/2019          Codes Class Noted - Resolved    Pancreatic cancer ([facility] ICD-10-CM: C25.9  ICD-9-CM: 157.9  11/25/2019 - Present        * (Principal) Diabetes mellitus, new onset ([facility] ICD-10-CM: E11.9  ICD-9-CM: 250.00  11/25/2019 - Present        Hepatitis C ICD-10-CM: B19.20  ICD-9-CM: 070.70  11/25/2019 - Present        Anemia ICD-10-CM: D64.9  ICD-9-CM: 285.9  11/25/2019 - Present        Pancreatic adenocarcinoma ([facility] ICD-10-CM: C25.9  ICD-9-CM: 157.9  11/19/2019 - Present        Liver metastases ([facility] ICD-10-CM: C78.7  ICD-9-CM: 197.7  11/19/2019 - Present        Epigastric pain ICD-10-CM: R10.13  ICD-9-CM: 789.06  11/19/2019 - Present        Pulmonary embolism without acute cor pulmonale (HCC) ICD-10-CM: I26.99  ICD-9-CM: 415.19  11/19/2019 - Present        Thrombocytopenia ([facility] ICD-10-CM: D69.6  ICD-9-CM: 287.5  11/19/2019 - Present        Pancreatic mass ICD-10-CM: K86.89  ICD-9-CM: 577.8  11/8/2019 - Present        Liver metastasis ([facility] ICD-10-CM: C78.7  ICD-9-CM: 197.7  11/8/2019 - Present        Abdominal pain ICD-10-CM: R10.9  ICD-9-CM: 789.00  11/8/2019 - Present              Discharge Condition: Stable    Discharge To: Home    Consults: None    Procedures: None    Hospital Course:     \"Bairon Olmos is a 62 y. o. male who was recently diagnosed with New Onset Diabetes, Pancreatic Cancer, and DVTs/PE who presents to [facility] ER after being referred by his Primary Endocrinologist for glycemic control while starting a new therapeutic agent (Dexamethasone) and obtaining a PET scan.  Patient and Wife provide Subjective.  Patient states that Diabetes was diagnosed in 10/2019 due to polyuria and serum glucose measurements obtained using Patient's Brother's Glucometer.  Patient reports that Abdominal Pain led to finding a Pancreatic Mass approximately 2-3 weeks ago, which was confirmed via Pancreas Biopsy.  Patient reports that approximately 2 weeks ago his Left Leg and Left Knee were swollen and painful, which eventually led to the diagnosis of a DVT and PE.  Patient was started on Rivaroxaban at that time (approximately 11/09/2019).  Patient reports that he has had to hold Rivaroxaban for Biopsies and Mediport placement.  Patient reports that his blood sugars have been quite volatile and that he was unable to have a PET Scan due to hyperglycemia.     Notably, Patient's Primary Endocrinologist sent a hand-written letter requesting that Patient be admitted for stricter Glycemic Control while starting Dexamethasone, which could not be accomplished Out-Patient.  Additionally, Patient could have a PET scan performed while serum glucose was controlled at this time.     Patient is placed on Observation for management of hyperglycemia while initiating a new therapeutic agent (Dexamethasone) and for performance of PET Scan. \"     Glucose improving. Pain controlled with oral medications. Diabetic education addressed. He will check home glucose daily and given note pad to document glucose. Diabetes management consulted. Patient has PET scan planned for this Friday. He is provided prescription for Lantus 28 units, and high resistant sliding scale Lispro on day of discharge. VSS. Ready for discharge.    Pain control     Physical Exam:  General appearance: alert, cooperative, no distress, appears stated age  Head: Normocephalic, without obvious abnormality, atraumatic  Lungs: clear to auscultation bilaterally  Heart: regular rate and rhythm, S1, S2 normal, no murmur, click, rub or gallop  Abdomen: soft, non-tender. Bowel sounds normal. No masses,  no organomegaly  Extremities: extremities normal, atraumatic, no cyanosis or edema  Skin: Skin color, texture, turgor normal. No rashes or lesions  Neurologic: Grossly normal  PSY: Mood and affect normal, appropriately behaved    Significant Diagnostic Studies: All lab results for the last 24 hours reviewed. No results found. Discharge Medications:   Cannot display discharge medications since this patient is not currently admitted.       Activity: Activity as tolerated    Diet: Diabetic Diet    Wound Care: None needed    Follow-up: PCP, Endocrinologist     Discharge time: >35 Minutes     [facility]  Pager: [phone]  Office: [phone]    11/27/2019, 2:40 PM

## 2019-11-27 NOTE — DISCHARGE INSTRUCTIONS
Your A1C  was   Lab Results   Component Value Date/Time    Hemoglobin A1c 10.6 (H) 11/25/2019 08:08 PM   .      This lab test reflects that your blood sugar was elevated over the past 3 months and your average blood sugar has been 258 mg/dl. Please follow up with your endocrinologist when you are discharged. Please monitor your blood sugar 3 times per day and record for your doctor. Please take your insulin as prescribed -  28 units lantus (glargine) at night and lispro (humalog) with meals utilizing the following scale; Less than 150 =   0 units  150 -199 =   3 units  200 -249 =   6 units  250 -299 =   9 units  300 -349 =   12 units  350 and above =   15 units        DISCHARGE SUMMARY from Nurse    PATIENT INSTRUCTIONS:    After general anesthesia or intravenous sedation, for 24 hours or while taking prescription Narcotics:  · Limit your activities  · Do not drive and operate hazardous machinery  · Do not make important personal or business decisions  · Do  not drink alcoholic beverages  · If you have not urinated within 8 hours after discharge, please contact your surgeon on call. Report the following to your surgeon:  · Excessive pain, swelling, redness or odor of or around the surgical area  · Temperature over 100.5  · Nausea and vomiting lasting longer than 4 hours or if unable to take medications  · Any signs of decreased circulation or nerve impairment to extremity: change in color, persistent  numbness, tingling, coldness or increase pain  · Any questions    What to do at Home:  Recommended activity: Activity as tolerated    If you experience any of the following symptoms of elevated blood glucose not responding to medication, please follow up with primary care physician/endocrinologist.    *  Please give a list of your current medications to your Primary Care Provider.     *  Please update this list whenever your medications are discontinued, doses are      changed, or new medications (including over-the-counter products) are added. *  Please carry medication information at all times in case of emergency situations. These are general instructions for a healthy lifestyle:    No smoking/ No tobacco products/ Avoid exposure to second hand smoke  Surgeon General's Warning:  Quitting smoking now greatly reduces serious risk to your health. Obesity, smoking, and sedentary lifestyle greatly increases your risk for illness    A healthy diet, regular physical exercise & weight monitoring are important for maintaining a healthy lifestyle    You may be retaining fluid if you have a history of heart failure or if you experience any of the following symptoms:  Weight gain of 3 pounds or more overnight or 5 pounds in a week, increased swelling in our hands or feet or shortness of breath while lying flat in bed. Please call your doctor as soon as you notice any of these symptoms; do not wait until your next office visit. The discharge information has been reviewed with the patient. The patient verbalized understanding. Discharge medications reviewed with the patient and appropriate educational materials and side effects teaching were provided.     Patient armband removed and shredded

## 2019-11-27 NOTE — DIABETES MGMT
Glycemic Control Plan of Care    Patient to discharge home today and follow up with endocrinology. Will discharge on corrective lispro and lantus per orders - 28 units lantus daily and sliding scale lispro with meals. Reviewed this with patient and printed his insulin orders in his discharge summary. He it to monitor BG 3 times daily before meals and record for his PCP. Provided him with chart to record his BG and insulin doses.   He has meter and supplies at home -         Hernesto Lawler MPH RN  Pager [phone]  Office [phone]

## 2019-11-27 NOTE — PROGRESS NOTES
Problem: Diabetes Self-Management  Goal: *Monitoring blood glucose, interpreting and using results  Description  Identify recommended blood glucose targets  and personal targets. Outcome: Progressing Towards Goal     Problem: Diabetes Self-Management  Goal: *Prevention, detection, treatment of acute complications  Description  List symptoms of hyper- and hypoglycemia; describe how to treat low blood sugar and actions for lowering  high blood glucose level. Outcome: Progressing Towards Goal     Problem: Diabetes Self-Management  Goal: *Using medications safely  Description  State effect of diabetes medications on diabetes; name diabetes medication taking, action and side effects.   Outcome: Progressing Towards Goal

## 2019-11-27 NOTE — PROGRESS NOTES
Problem: Discharge Planning  Goal: *Discharge to safe environment  Outcome: Resolved/Met     Home    Care Management Interventions  PCP Verified by CM: Yes  Palliative Care Criteria Met (RRAT>21 & CHF Dx)?: No  Transition of Care Consult (CM Consult):  Other(home)  Current Support Network: (lives with family per patient)  Confirm Follow Up Transport: Family  Plan discussed with Pt/Family/Caregiver: Yes  Discharge Location  Discharge Placement: Home

## 2019-11-27 NOTE — PROGRESS NOTES
Assume care of patient lying in bed watching TV. Alert and oriented X 4. Denies pain or discomfort at present. Bed locked in lowest position. Call light within reach and understand to use for assistance and needs. 11/27/2019    0030 Norco 2 tabs administered orally for complaint of abdominal pain. 0130  Resting quietly with eyes closed. 0507 Norco 2 tabs administered orally for complaint of abdominal pain with rocking motion in place. 0604 Resting quietly with eyes closed. No complaint voiced of pain or discomfort at present. 0745 Bedside and Verbal shift change report given to Nika Webb RN (oncoming nurse) by Andre Galdamez RN (offgoing nurse). Report given with SBAR, Kardex, Intake/Output, MAR and Recent Results.

## 2019-11-29 ENCOUNTER — HOSPITAL ENCOUNTER (OUTPATIENT)
Dept: PET IMAGING | Age: 54
Discharge: HOME | End: 2019-11-29
Attending: INTERNAL MEDICINE
Payer: MEDICAID

## 2019-11-29 ENCOUNTER — HOSPITAL ENCOUNTER (OUTPATIENT)
Dept: PET IMAGING | Age: 54
Discharge: HOME | End: 2019-11-22
Attending: INTERNAL MEDICINE

## 2019-11-29 ENCOUNTER — APPOINTMENT (OUTPATIENT)
Dept: INFUSION THERAPY | Age: 54
End: 2019-11-29

## 2019-11-29 DIAGNOSIS — C78.7 LIVER METASTASES (HCC): ICD-10-CM

## 2019-11-29 DIAGNOSIS — C25.9 PANCREATIC ADENOCARCINOMA (HCC): ICD-10-CM

## 2019-11-29 PROCEDURE — A9552 F18 FDG: HCPCS

## 2019-12-02 ENCOUNTER — TELEPHONE (OUTPATIENT)
Dept: ONCOLOGY | Age: 54
End: 2019-12-02

## 2019-12-02 ENCOUNTER — HOSPITAL ENCOUNTER (OUTPATIENT)
Dept: INFUSION THERAPY | Age: 54
Discharge: HOME | End: 2019-12-02
Payer: MEDICAID

## 2019-12-02 NOTE — TELEPHONE ENCOUNTER
Patient's caregiver would like to speak with nurse regarding dexAMETHasone (DECADRON) 4 mg tablet, has some questions and needs clarification.  Please f/u

## 2019-12-03 ENCOUNTER — OFFICE VISIT (OUTPATIENT)
Dept: CARDIOLOGY CLINIC | Age: 54
End: 2019-12-03

## 2019-12-03 ENCOUNTER — TELEPHONE (OUTPATIENT)
Dept: ONCOLOGY | Age: 54
End: 2019-12-03

## 2019-12-03 ENCOUNTER — DOCUMENTATION ONLY (OUTPATIENT)
Dept: ONCOLOGY | Age: 54
End: 2019-12-03

## 2019-12-03 VITALS
OXYGEN SATURATION: 98 % | WEIGHT: 213.2 LBS | DIASTOLIC BLOOD PRESSURE: 84 MMHG | HEART RATE: 79 BPM | HEIGHT: 72 IN | BODY MASS INDEX: 28.88 KG/M2 | RESPIRATION RATE: 22 BRPM | SYSTOLIC BLOOD PRESSURE: 122 MMHG

## 2019-12-03 DIAGNOSIS — I82.402 ACUTE THROMBOEMBOLISM OF DEEP VEINS OF LEFT LOWER EXTREMITY (HCC): Primary | ICD-10-CM

## 2019-12-03 NOTE — PROGRESS NOTES
Patient presents in office today as new patient.       3 most recent PHQ Screens 12/3/2019   Little interest or pleasure in doing things Not at all   Feeling down, depressed, irritable, or hopeless Not at all   Total Score PHQ 2 0

## 2019-12-03 NOTE — PROGRESS NOTES
Spoke with patient regarding pain medication. Patient reports pain 10/10 abdominal pain. He was advised to change frequency of hydrocodone 7.5 mg from every 6 hours to every 4 hours. And to continue taking oxycontin 15 mg q12h. Will further discuss during office visit on tomorrow.       Kem Carter NP-C  12/3/2019

## 2019-12-03 NOTE — TELEPHONE ENCOUNTER
Patient called back and advised he is taking his medication oxycontin 15 mg twice a day, and taking the Hydrocodone 7.5mg every 6 hrs a day       Patient advised both his medication is not working and he is in a lot of pain

## 2019-12-03 NOTE — PROGRESS NOTES
Rg Mckenzie    Chief Complaint   Patient presents with   Fayrene Breech New Patient       History and Physical    Mr. Joao Mai is a 52-year-old gentleman referred to our office from the emergency room for evaluation of a deep venous thrombosis. Currently he only complains of abdominal pain and no chest pain or shortness of breath. He denies any leg swelling. Apparently he had some sweats. Shortness of breath and abdominal pain for which she presents to the emergency room where identified he had a DVT PE as well as a pancreatic mass that was later diagnosed as adenocarcinoma. His oncologist is Dr. Humaira Peoples. Past Medical History:   Diagnosis Date    Cancer [facility])     Pancreatic Adenocarcinoma. Diagnosed 11/2019.  Diabetes ([address] 10/2019    Diagnosed 10/2019.  Hepatitis C     Received Curative Treatment, but Hepatitis C was not cured and returned.  Left ulnar fracture     Thromboembolus ([address]     DVT of LLE 11/2019. Also, Pulmonary Embolism 11/2019. Treated with Rivaroxaban. Patient Active Problem List   Diagnosis Code    Pancreatic mass K86.89    Liver metastasis (HCC) C78.7    Abdominal pain R10.9    Pancreatic adenocarcinoma ([address] C25.9    Liver metastases ([address] C78.7    Epigastric pain R10.13    Pulmonary embolism without acute cor pulmonale (HCC) I26.99    Thrombocytopenia ([address] D69.6    Pancreatic cancer ([address] C25.9    Diabetes mellitus, new onset ([address] E11.9    Hepatitis C B19.20    Anemia D64.9     Past Surgical History:   Procedure Laterality Date    HX TONSILLECTOMY      IR BX PANCREAS NEEDLE PERC  11/2019    Endoscopic Pancreatic Biopsy (?)    IR INSERT TUNL CVC W PORT OVER 5 YEARS  11/25/2019     Current Outpatient Medications   Medication Sig Dispense Refill    insulin glargine (LANTUS U-100 INSULIN) 100 unit/mL injection 28 Units by SubCUTAneous route nightly. 1 Vial 0    ondansetron hcl (ZOFRAN) 4 mg tablet Take 1 Tab by mouth every eight (8) hours as needed for Nausea.  15 Tab 0    insulin lispro (HUMALOG) 100 unit/mL injection 3-15 Units by SubCUTAneous route Before breakfast, lunch, and dinner. 1 Vial 0    OLANZapine (ZYPREXA ZYDIS) 5 mg disintegrating tablet Take 5mg PO the night before chemotherapy, then 5mg PO the morning of chemotherapy, then 5mg PO three times a day as needed for 1 week following chemotherapy. Indications: prevent nausea and vomiting from cancer chemotherapy 30 Tab 1    rivaroxaban (XARELTO) 15 mg (42)- 20 mg (9) DsPk Take 20 mg by mouth daily. Take one 15 mg tablet twice a day with food for the first 21 days. Then, take one 20 mg tablet once a day with food for 9 days.  oxyCODONE ER (OXYCONTIN) 15 mg ER tablet Take 1 Tab by mouth every twelve (12) hours for 30 days. Max Daily Amount: 30 mg. 60 Tab 0    naloxone (NARCAN) 4 mg/actuation nasal spray Use 1 spray intranasally, then discard. Repeat with new spray every 2 min as needed for opioid overdose symptoms, alternating nostrils. 1 Each 1    dexAMETHasone (DECADRON) 4 mg tablet Take 4mg by mouth twice a day the day before chemotherapy and the day after chemotherapy 30 Tab 0    lidocaine-prilocaine (EMLA) topical cream Apply  to affected area as needed for Pain (Apply to skin 30-60 minutes before mediport access). 30 g 0    nut.tx.gluc.intol,lac-free,soy (GLUCERNA ADVANCE) liqd Take 237 mL by mouth three (3) times daily. 90 Bottle 5    TRUE METRIX GLUCOSE METER misc USE AS DIRECTED  0    omeprazole (PRILOSEC) 20 mg capsule TAKE 1 CAPSULE BY MOUTH ONCE DAILY  3    ondansetron (ZOFRAN ODT) 4 mg disintegrating tablet Take 1 Tab by mouth every eight (8) hours as needed for Nausea. 20 Tab 0    QUEtiapine (SEROQUEL) 50 mg tablet Take 50 mg by mouth nightly.  lidocaine (LIDODERM) 5 % Apply patch to the affected area for 12 hours a day and remove for 12 hours a day.  5 Each 0     No Known Allergies  Social History     Socioeconomic History    Marital status:      Spouse name: Not on file  Number of children: Not on file    Years of education: Not on file    Highest education level: Not on file   Occupational History    Not on file   Social Needs    Financial resource strain: Not on file    Food insecurity:     Worry: Not on file     Inability: Not on file    Transportation needs:     Medical: Not on file     Non-medical: Not on file   Tobacco Use    Smoking status: Former Smoker     Packs/day: 0.50     Years: 40.00     Pack years: 20.00     Last attempt to quit: 2018     Years since quittin.0    Smokeless tobacco: Never Used    Tobacco comment: Quit 2018   Substance and Sexual Activity    Alcohol use: Not Currently    Drug use: Not Currently     Types: Marijuana, Cocaine, Heroin     Comment: Quit Cocaine, Heroin, and Marijuana since 2018    Sexual activity: Not Currently   Lifestyle    Physical activity:     Days per week: Not on file     Minutes per session: Not on file    Stress: Not on file   Relationships    Social connections:     Talks on phone: Not on file     Gets together: Not on file     Attends Scientologist service: Not on file     Active member of club or organization: Not on file     Attends meetings of clubs or organizations: Not on file     Relationship status: Not on file    Intimate partner violence:     Fear of current or ex partner: Not on file     Emotionally abused: Not on file     Physically abused: Not on file     Forced sexual activity: Not on file   Other Topics Concern     Service Not Asked    Blood Transfusions Not Asked    Caffeine Concern Not Asked    Occupational Exposure Not Asked   Faisal Kathleen Hazards Not Asked    Sleep Concern Not Asked    Stress Concern Not Asked    Weight Concern Not Asked    Special Diet Not Asked    Back Care Not Asked    Exercise Not Asked    Bike Helmet Not Asked    Seat Belt Not Asked    Self-Exams Not Asked   Social History Narrative    Not on file      Family History   Problem Relation Age of Onset    Diabetes Mother     Kidney Disease Mother     Diabetes Father     Diabetes Brother     Cancer Maternal Grandmother     Cancer Maternal Grandfather     Cancer Maternal Aunt     Cancer Maternal Uncle        Review of Systems    Review of Systems   Constitutional: Negative for chills, diaphoresis, fever, malaise/fatigue and weight loss. HENT: Negative for hearing loss and sore throat. Eyes: Negative for blurred vision, photophobia and redness. Respiratory: Negative for cough, hemoptysis, shortness of breath and wheezing. Cardiovascular: Negative for chest pain, palpitations and orthopnea. Gastrointestinal: Positive for abdominal pain. Negative for blood in stool, constipation, diarrhea, heartburn, nausea and vomiting. Genitourinary: Negative for dysuria, frequency, hematuria and urgency. Musculoskeletal: Negative for back pain and myalgias. Skin: Negative for itching and rash. Neurological: Negative for dizziness, speech change, focal weakness, weakness and headaches. Endo/Heme/Allergies: Does not bruise/bleed easily. Psychiatric/Behavioral: Negative for depression and suicidal ideas.             Physical Exam:    Visit Vitals  /84 (BP 1 Location: Left arm, BP Patient Position: Sitting)   Pulse 79   Resp 22   Ht 6' (1.829 m)   Wt 213 lb 3.2 oz (96.7 kg)   SpO2 98%   BMI 28.92 kg/m²      Physical Examination: General appearance - alert, well appearing, and in no distress  Mental status - alert, oriented to person, place, and time  Eyes - sclera anicteric, left eye normal, right eye normal  Ears - right ear normal, left ear normal  Nose - normal and patent, no erythema, discharge or polyps  Mouth - mucous membranes moist, pharynx normal without lesions  Neck - supple, no significant adenopathy  Lymphatics - no palpable lymphadenopathy  Chest - clear to auscultation, no wheezes, rales or rhonchi, symmetric air entry  Heart - normal rate and regular rhythm  Abdomen -soft, slight tenderness palpation alex-umbilical area no rebound or guarding. Extremities - peripheral pulses normal, no pedal edema, no clubbing or cyanosis      Impression and Plan:    ICD-10-CM ICD-9-CM    1. Acute thromboembolism of deep veins of left lower extremity (HCC) I82.402 453.40      No orders of the defined types were placed in this encounter. Follow-up and Dispositions    · Return if symptoms worsen or fail to improve. I told Mr. Donta Ochoa that I do not have anything to add to his treatment plan. He is current on anticoagulation and I would defer to the oncologist as to the best regimen for treating deep venous thrombosis and the patient with pancreatic cancer. I agree that there is no indication for an IVC filter as he is tolerating his anticoagulation. I recommend that he follow-up with his oncologist in regards to his pain medication as it has been prescribing him narcotics and to reach out to our office if he develops any new vascular issues. The treatment plan was reviewed with the patient in detail. The patient voiced understanding of this plan and all questions and concerns were addressed. The patient agrees with this plan. We discussed the signs and symptoms that would require earlier attention or intervention. The patient was given educational material related to his/her visit and the patient has voiced understanding of the material.     I appreciate the opportunity to participate in the care of your patient. I will be sure to keep you informed of any subsequent changes in the treatment plan. If you have any questions or concerns, please feel free to contact me. Hubert Rodriguez MD    PLEASE NOTE:  This document has been produced using voice recognition software. Unrecognized errors in transcription may be present.

## 2019-12-03 NOTE — TELEPHONE ENCOUNTER
Pt called stating he really needs some pain meds. States the pain is very unbearable. Also stated he may not be able to make it to the appt tomorrow without something to help with the pain.   Please advise

## 2019-12-04 ENCOUNTER — NURSE NAVIGATOR (OUTPATIENT)
Dept: OTHER | Age: 54
End: 2019-12-04

## 2019-12-04 ENCOUNTER — APPOINTMENT (OUTPATIENT)
Dept: INFUSION THERAPY | Age: 54
End: 2019-12-04
Payer: MEDICAID

## 2019-12-04 ENCOUNTER — OFFICE VISIT (OUTPATIENT)
Dept: ONCOLOGY | Age: 54
End: 2019-12-04

## 2019-12-04 VITALS
HEART RATE: 64 BPM | HEIGHT: 72 IN | TEMPERATURE: 97.7 F | WEIGHT: 217.8 LBS | BODY MASS INDEX: 29.5 KG/M2 | DIASTOLIC BLOOD PRESSURE: 69 MMHG | OXYGEN SATURATION: 96 % | SYSTOLIC BLOOD PRESSURE: 104 MMHG | RESPIRATION RATE: 18 BRPM

## 2019-12-04 DIAGNOSIS — C25.9 PANCREATIC ADENOCARCINOMA (HCC): ICD-10-CM

## 2019-12-04 DIAGNOSIS — C78.7 LIVER METASTASES (HCC): ICD-10-CM

## 2019-12-04 DIAGNOSIS — D69.6 THROMBOCYTOPENIA (HCC): ICD-10-CM

## 2019-12-04 DIAGNOSIS — I26.99 PULMONARY EMBOLISM WITHOUT ACUTE COR PULMONALE, UNSPECIFIED CHRONICITY, UNSPECIFIED PULMONARY EMBOLISM TYPE (HCC): ICD-10-CM

## 2019-12-04 DIAGNOSIS — C25.9 MALIGNANT NEOPLASM OF PANCREAS, UNSPECIFIED LOCATION OF MALIGNANCY (HCC): Primary | ICD-10-CM

## 2019-12-04 DIAGNOSIS — E11.9 DIABETES MELLITUS, NEW ONSET (HCC): ICD-10-CM

## 2019-12-04 NOTE — PROGRESS NOTES
Tamir Howell is a 62 y.o. male ambulatory accompanied by brother and sister in law. Sister in law states they have been in [place] and have returned to take care of Mr. Paige Easton. Mr. Paige Easton lives with them. Patient complains of pain with eating. States pain medication doesn't help with breakthrough pain, however denies any pain today. Patient states he has bowel movements q3 days and has to strain. 1965    Chief Complaint   Patient presents with    Pancreatic Cancer     follow up       Visit Vitals  /69 (BP 1 Location: Left arm, BP Patient Position: Sitting)   Pulse 64   Temp 97.7 °F (36.5 °C) (Oral)   Resp 18   Ht 6' (1.829 m)   Wt 217 lb 12.8 oz (98.8 kg)   SpO2 96%   BMI 29.54 kg/m²       Current Outpatient Medications on File Prior to Visit   Medication Sig Dispense Refill    insulin glargine (LANTUS U-100 INSULIN) 100 unit/mL injection 28 Units by SubCUTAneous route nightly. 1 Vial 0    ondansetron hcl (ZOFRAN) 4 mg tablet Take 1 Tab by mouth every eight (8) hours as needed for Nausea. 15 Tab 0    insulin lispro (HUMALOG) 100 unit/mL injection 3-15 Units by SubCUTAneous route Before breakfast, lunch, and dinner. 1 Vial 0    OLANZapine (ZYPREXA ZYDIS) 5 mg disintegrating tablet Take 5mg PO the night before chemotherapy, then 5mg PO the morning of chemotherapy, then 5mg PO three times a day as needed for 1 week following chemotherapy. Indications: prevent nausea and vomiting from cancer chemotherapy 30 Tab 1    rivaroxaban (XARELTO) 15 mg (42)- 20 mg (9) DsPk Take 20 mg by mouth daily. Take one 15 mg tablet twice a day with food for the first 21 days. Then, take one 20 mg tablet once a day with food for 9 days.  oxyCODONE ER (OXYCONTIN) 15 mg ER tablet Take 1 Tab by mouth every twelve (12) hours for 30 days. Max Daily Amount: 30 mg. 60 Tab 0    naloxone (NARCAN) 4 mg/actuation nasal spray Use 1 spray intranasally, then discard.  Repeat with new spray every 2 min as needed for opioid overdose symptoms, alternating nostrils. 1 Each 1    dexAMETHasone (DECADRON) 4 mg tablet Take 4mg by mouth twice a day the day before chemotherapy and the day after chemotherapy 30 Tab 0    lidocaine-prilocaine (EMLA) topical cream Apply  to affected area as needed for Pain (Apply to skin 30-60 minutes before mediport access). 30 g 0    nut.tx.gluc.intol,lac-free,soy (GLUCERNA ADVANCE) liqd Take 237 mL by mouth three (3) times daily. 90 Bottle 5    TRUE METRIX GLUCOSE METER misc USE AS DIRECTED  0    omeprazole (PRILOSEC) 20 mg capsule TAKE 1 CAPSULE BY MOUTH ONCE DAILY  3    QUEtiapine (SEROQUEL) 50 mg tablet Take 50 mg by mouth nightly.  ondansetron (ZOFRAN ODT) 4 mg disintegrating tablet Take 1 Tab by mouth every eight (8) hours as needed for Nausea. 20 Tab 0    lidocaine (LIDODERM) 5 % Apply patch to the affected area for 12 hours a day and remove for 12 hours a day. 5 Each 0     No current facility-administered medications on file prior to visit. Learning Assessment 12/3/2019   PRIMARY LEARNER Patient   PRIMARY LANGUAGE ENGLISH   LEARNER PREFERENCE PRIMARY VIDEOS   ANSWERED BY patient   RELATIONSHIP SELF        3 most recent PHQ Screens 12/3/2019   Little interest or pleasure in doing things Not at all   Feeling down, depressed, irritable, or hopeless Not at all   Total Score PHQ 2 0       Fall Risk Assessment, last 12 mths 11/19/2019   Able to walk? Yes   Fall in past 12 months? No       Abuse Screening Questionnaire 11/19/2019   Do you ever feel afraid of your partner? N   Are you in a relationship with someone who physically or mentally threatens you? N   Is it safe for you to go home? Y       1. Have you been to the ER, urgent care clinic since your last visit? Hospitalized since your last visit? Yes, [facility] ER, 11/25/19 hyperglycemia    2. Have you seen or consulted any other health care providers outside of the [address] [place] [address] since your last visit?   Include any pap smears or colon screening.  No

## 2019-12-04 NOTE — PROGRESS NOTES
Grupo Monreal is a 62 y.o. 1965 male with with past medical history including pulmonary embolus, on Lovenox, type 2 diabetes, GERD, who I was asked to see in consultation at the request of Dr. Eugenia Canales for evaluation for newly diagnosed pancreatic adenocarcinoma. Patient had fine-needle aspiration of pancreatic mass on 11/13/2019 and pathology showed pancreatic adenocarcinoma.     Mr. Donta Ochoa presented to ER on 11/03/2019 with complaint of severe mid-abdominal pain for 5 days. CT abdomen and pelvis showed hypoenhancing mass in the pancreatic body measuring 4.7 x 2.8 cm which encases and narrows the splenic artery with surrounding local peripancreatic fat infiltration. There were also multiple hepatic hypodensities of varying size concerning for metastatic disease. There was also elongated hypodensity in the posterior spleen measuring up to 3.3 cm possibly focus of metastatic disease. 1.5 cm portacaval lymph node was also concerning for metastatic disease. Abdominal MRI confirmed above findings see below for more details and oncology history. Patient presented again in the ER on 11/15/2019 with concern of lower extremity pain and swelling and was found to have left acute, nonocclusive DVT in the posterior tibial, peroneal, popliteal and distal femoral veins.     Labs in the ER on 11/15/2019 showed sodium 136, potassium 4.0, chloride 103, CO2 29, BUN 18, creatinine 0.88. Albumin 3.8. Normal AST and ALT. normal total bilirubin. WBC 7.8, H&H 11.8/35.7, MCV 87.9, platelet 431.     Patient was seen and examined today. He is awake alert oriented x3. On review of system he reports epigastric abdominal pain, 67/10, after he eats. Has lost 15-20 Lbs in last 3 months. Denies any fevers, chills, or nausea and vomiting. Denies any melena or bright red blood per rectum. All also points of review of system have been reviewed and were negative. ECOG performance status 1.   Independent with ADLs and IADLs.     DX: Pancreatic adenocarcinoma     STAGE: Stage IV     Treatment intent: Palliative     ONCOLOGY HISTORY:   11/03/2019: [place] to ER due to 5 days complaints of epigastric pain. CT abdomen and pelvis showed:  1. 5 cm mass within the pancreatic body highly suggestive of primary malignancy. Superimposed acute pancreatitis cannot entirely be excluded. 2. Multiple liver lesions highly suggestive of metastatic disease 3. Metastatic retroperitoneal and mesenteric lymphadenopathy. 4. Indeterminate hypodensity within the spleen. Diagnostic consideration include splenic infarct or metastatic lesion 5. Right lower lobe pulmonary emboli.       *MRI of the abdomen showed  1. Stable since same day CT abdomen pelvis. Elliptical 4 cm hypoenhancing mass,  body of pancreas, with upstream glandular atrophy and pancreatic ductal  dilatation, most likely adenocarcinoma. Associated local/regional likely metastatic lymphadenopathy including upper retroperitoneum, lesser omentum, portacaval/periportal space. Associated encasement/narrowing of the adjacent  splenic vein. 2. Mild peripancreatic edema and soft tissue reticulation; may reflect secondary low-grade or chronic pancreatitis or adjacent peripancreatic tumor infiltration.   3. Numerous hypoenhancing and target-like small nodules and masses throughout  the liver, typical of metastatic disease. 4. Mild pericholecystic fluid, nonspecific, but may represent low-grade cholecystitis. No evident cholecystolithiasis/choledocholithiasis or biliary ductal dilatation. 5. Mild splenomegaly with focal small splenic infarction, age indeterminate  perhaps recent. Small left lower pole, nonacute renal infarction. 6. Other minor and/or ancillary findings including lumbar disc herniations     11/15/2019: Duplex left  lower extremity showed Acute nonocclusive thrombus in the distal femoral vein, popliteal, peroneal and both posterior tibial veins.  The thrombus in the distal femoral vein appears to be floating tail-like thrombus.     2019: First medical oncology visit with me  2019: PET/CT showed  Malignant metabolic activity corresponding with the known malignancy in the pancreatic body. Innumerable hypermetabolic liver metastases. Metastatic portal caval and aortocaval lymph nodes. Enlargement and malignant metabolic activity in the anterior aspect of the left  psoas muscle suspicious for metastatic disease. Distant metastatic disease to include the left supraclavicular region, left  superior mediastinum and possibly the left inferior hilum.       Past Medical History:   Diagnosis Date    Cancer [facility])     Pancreatic Adenocarcinoma. Diagnosed 2019.  Diabetes ([facility] [address] 10/2019    Diagnosed 10/2019.  Hepatitis C     Received Curative Treatment, but Hepatitis C was not cured and returned.  Left ulnar fracture     Thromboembolus ([facility] [address]     DVT of LLE 2019. Also, Pulmonary Embolism 2019. Treated with Rivaroxaban.      Past Surgical History:   Procedure Laterality Date    HX TONSILLECTOMY      IR BX PANCREAS NEEDLE PERC  2019    Endoscopic Pancreatic Biopsy (?)    IR INSERT TUNL CVC W PORT OVER 5 YEARS  2019     Social History     Socioeconomic History    Marital status:      Spouse name: Not on file    Number of children: Not on file    Years of education: Not on file    Highest education level: Not on file   Tobacco Use    Smoking status: Former Smoker     Packs/day: 0.50     Years: 40.00     Pack years: 20.00     Last attempt to quit: 2018     Years since quittin.0    Smokeless tobacco: Never Used    Tobacco comment: Quit 2018   Substance and Sexual Activity    Alcohol use: Not Currently    Drug use: Not Currently     Types: Marijuana, Cocaine, Heroin     Comment: Quit Cocaine, Heroin, and Marijuana since 2018    Sexual activity: Not Currently   Other Topics Concern     Family History   Problem Relation Age of Onset    Diabetes Mother     Kidney Disease Mother     Diabetes Father     Diabetes Brother     Cancer Maternal Grandmother     Cancer Maternal Grandfather     Cancer Maternal Aunt     Cancer Maternal Uncle        Current Outpatient Medications   Medication Sig Dispense Refill    insulin glargine (LANTUS U-100 INSULIN) 100 unit/mL injection 28 Units by SubCUTAneous route nightly. 1 Vial 0    ondansetron hcl (ZOFRAN) 4 mg tablet Take 1 Tab by mouth every eight (8) hours as needed for Nausea. 15 Tab 0    insulin lispro (HUMALOG) 100 unit/mL injection 3-15 Units by SubCUTAneous route Before breakfast, lunch, and dinner. 1 Vial 0    OLANZapine (ZYPREXA ZYDIS) 5 mg disintegrating tablet Take 5mg PO the night before chemotherapy, then 5mg PO the morning of chemotherapy, then 5mg PO three times a day as needed for 1 week following chemotherapy. Indications: prevent nausea and vomiting from cancer chemotherapy 30 Tab 1    rivaroxaban (XARELTO) 15 mg (42)- 20 mg (9) DsPk Take 20 mg by mouth daily. Take one 15 mg tablet twice a day with food for the first 21 days. Then, take one 20 mg tablet once a day with food for 9 days.  oxyCODONE ER (OXYCONTIN) 15 mg ER tablet Take 1 Tab by mouth every twelve (12) hours for 30 days. Max Daily Amount: 30 mg. 60 Tab 0    naloxone (NARCAN) 4 mg/actuation nasal spray Use 1 spray intranasally, then discard. Repeat with new spray every 2 min as needed for opioid overdose symptoms, alternating nostrils. 1 Each 1    dexAMETHasone (DECADRON) 4 mg tablet Take 4mg by mouth twice a day the day before chemotherapy and the day after chemotherapy 30 Tab 0    lidocaine-prilocaine (EMLA) topical cream Apply  to affected area as needed for Pain (Apply to skin 30-60 minutes before mediport access). 30 g 0    nut.tx.gluc.intol,lac-free,soy (GLUCERNA ADVANCE) liqd Take 237 mL by mouth three (3) times daily.  90 Bottle 5    TRUE METRIX GLUCOSE METER misc USE AS DIRECTED  0    omeprazole (PRILOSEC) 20 mg capsule TAKE 1 CAPSULE BY MOUTH ONCE DAILY  3    QUEtiapine (SEROQUEL) 50 mg tablet Take 50 mg by mouth nightly.  ondansetron (ZOFRAN ODT) 4 mg disintegrating tablet Take 1 Tab by mouth every eight (8) hours as needed for Nausea. 20 Tab 0    lidocaine (LIDODERM) 5 % Apply patch to the affected area for 12 hours a day and remove for 12 hours a day. 5 Each 0       No Known Allergies    Review of Systems  As per hpi    Objective:  Visit Vitals  /69 (BP 1 Location: Left arm, BP Patient Position: Sitting)   Pulse 64   Temp 97.7 °F (36.5 °C) (Oral)   Resp 18   Ht 6' (1.829 m)   Wt 98.8 kg (217 lb 12.8 oz)   SpO2 96%   BMI 29.54 kg/m²         Physical Exam:   General appearance - alert, well appearing, and in no distress  Mental status - alert, oriented to person, place, and time  EYE-ONOFRE, EOMI  ENT-ENT exam normal, no neck nodes or sinus tenderness  Mouth - mucous membranes moist, pharynx normal without lesions  Neck - supple, no significant adenopathy   Chest - clear to auscultation, no wheezes, rales or rhonchi, symmetric air entry   Heart - normal rate and regular rhythm   Abdomen - soft, nontender, nondistended, no masses or organomegaly  Lymph- no adenopathy palpable  Ext-no pedal edema noted  Skin-Warm and dry. Neuro -alert, oriented, normal speech, no focal findings or movement disorder noted      Diagnostic Imaging     No results found for this or any previous visit. Results for orders placed during the hospital encounter of 11/25/19   XR CHEST PORT    Narrative EXAM: CHEST RADIOGRAPH, SINGLE VIEW    CLINICAL INDICATION/HISTORY: MediPort placement    COMPARISON: Two-view chest 3/22/2019    TECHNIQUE: Portable frontal view of the chest was obtained.     _______________    FINDINGS:    SUPPORT DEVICES: MediPort catheter is present via right subclavian vein. Tip is  in the superior vena cava. HEART AND MEDIASTINUM: Cardiomediastinal silhouette appears within normal  limits.   Normal caliber thoracic aorta. No central vascular congestion. LUNGS AND PLEURAL SPACES: Lungs are well aerated with no confluent airspace  opacity. No pleural effusion or pneumothorax. BONY THORAX AND SOFT TISSUES: No acute osseous abnormality. _______________      Impression IMPRESSION:      1. MediPort catheter good position. No pneumothorax. No acute cardiopulmonary  disease. Results for orders placed during the hospital encounter of 11/03/19   CT ABD PELV W CONT    Narrative EXAM: CT of the abdomen and pelvis    INDICATION: Upper abdominal pain radiating to back    COMPARISON: None. TECHNIQUE: Axial CT imaging of the abdomen and pelvis was performed with  intravenous contrast. Multiplanar reformats were generated. One or more dose  reduction techniques were used on this CT: automated exposure control,  adjustment of the mAs and/or kVp according to patient size, and iterative  reconstruction techniques. The specific techniques used on this CT exam have  been documented in the patient's electronic medical record. Digital Imaging and Communications in Medicine (DICOM) format image data are  available to nonaffiliated external healthcare facilities or entities on a  secure, media free, reciprocally searchable basis with patient authorization for  at least a 12 month period after this study. _______________    FINDINGS:    LOWER CHEST: There are filling defects within the segmental and subsegmental  branches of the right lower lobe pulmonary artery. Heart size is the upper limit  of normal. No pericardial effusion. A small hiatal hernia is present. LIVER, BILIARY: Liver contains numerous hypoattenuating masses of varying sizes  spanning the right and left hepatic lobes. No biliary dilation. Mild gallbladder  wall thickening is present. PANCREAS: 4.7 x 2.8 cm hypoattenuating heterogeneous infiltrative mass within  the pancreatic body and tail.  There is peripancreatic inflammatory stranding. SPLEEN: Focal linear hypodensity is seen within the posterior splenic body. ADRENALS: Normal.    KIDNEYS: Normal.    LYMPH NODES: Enlarged mesenteric and retroperitoneal lymph nodes. For example,  periaortic node measures 1.9 x 1.5 cm on series 3 image 82. Enlarged nodes are  also seen within the marquis hepatis and epigastric region. GASTROINTESTINAL TRACT: No bowel dilation or wall thickening. Normal appendix. PELVIC ORGANS: Prostate hypertrophy. VASCULATURE: Unremarkable. BONES: Mild thoracolumbar dextroscoliosis and multilevel spondylosis. No acute  or aggressive osseous abnormalities identified. OTHER: None.    _______________      Impression IMPRESSION:    1. 5 cm mass within the pancreatic body highly suggestive of primary malignancy. Superimposed acute pancreatitis cannot entirely be excluded. 2. Multiple liver lesions highly suggestive of metastatic disease    3. Metastatic retroperitoneal and mesenteric lymphadenopathy    4. Indeterminate hypodensity within the spleen. Diagnostic considerations  include splenic infarct or metastatic lesion    5. Right lower lobe pulmonary emboli. Critical result was discussed with ED  attending at 3:05 AM on November 4, 2019. This result was acknowledged and  understood. Preliminary interpretation provided by on-call radiology resident.         Lab Results  Lab Results   Component Value Date/Time    WBC 9.7 11/25/2019 08:08 PM    HGB 12.7 (L) 11/25/2019 08:08 PM    HCT 38.6 11/25/2019 08:08 PM    PLATELET 475 23/96/5212 08:08 PM    MCV 88.5 11/25/2019 08:08 PM       Lab Results   Component Value Date/Time    Sodium 135 (L) 11/27/2019 10:36 AM    Potassium 3.6 11/27/2019 10:36 AM    Chloride 103 11/27/2019 10:36 AM    CO2 27 11/27/2019 10:36 AM    Anion gap 5 11/27/2019 10:36 AM    Glucose 302 (H) 11/27/2019 10:36 AM    BUN 21 (H) 11/27/2019 10:36 AM    Creatinine 0.93 11/27/2019 10:36 AM    BUN/Creatinine ratio 23 (H) 11/27/2019 10:36 AM GFR est AA >60 11/27/2019 10:36 AM    GFR est non-AA >60 11/27/2019 10:36 AM    Calcium 9.1 11/27/2019 10:36 AM    AST (SGOT) 25 11/25/2019 08:08 PM    Alk. phosphatase 114 11/25/2019 08:08 PM    Protein, total 7.8 11/25/2019 08:08 PM    Albumin 3.9 11/25/2019 08:08 PM    Globulin 3.9 11/25/2019 08:08 PM    A-G Ratio 1.0 11/25/2019 08:08 PM    ALT (SGPT) 27 11/25/2019 08:08 PM       Assessment/Plan:  62 y.o. male  1. Pancreatic adenocarcinoma [facility])  I had a long discussion with Mr. Fransisca Oconnor regarding his newly diagnosed metastatic pancreatic cancer. I told him that in the first-line setting, palliative chemotherapy have been shown to improve 1 year survival by 73% compared to supportive care alone. I told him that since he has a good performance status, that my plan would be to treat him with palliative FOLFIRINOX  as follow:     Day 1: Oxaliplatin 85mg/m2 IV + irinotecan 180mg/m2 IV + leucovorin 400mg/m2 IV, followed by a 5-FU bolus of 400mg/m2 and a 46-hour continuous 5-FU infusion of 2,400mg/m2. Repeat cycle every 2 weeks until disease progression.     The benefits, risk, and potential adverse effects of this combination chemotherapy including but not limited to cytopenias, neutropenic fever, possible death, nausea vomiting, fatigue, peripheral neuropathy, alopecia, diarrhea, kidney failure etc. were discussed with patient. All of his questions were answered. He opted to continue with treatment plan. Due to high blood sugar I will not give him steroids. I will give Olanzapine instead for nausea. Reviewed PETCT which showed widespread mets. *UG T1 A1 pending  *CBC with differential, CMP reviewed  *Brain MRI was ordered and pending  *Port-A-Cath  placed  *Signed chemotherapy consent  *Ordered Glucerna. Awaiting insurance to approve chemo the hopefully start by 12/16/19     2. Liver metastases ([address]  These are from pancreatic cancer. We will follow-up with imaging after few cycles of treatment.     3. Epigastric pain  Increased oxycontin to 30 mg BID  Continue Hydrocodone/tylenol 7.5 mg Q 4 hours prn  Signed pain contract     4. Pulmonary embolism without acute cor pulmonale, unspecified chronicity, unspecified pulmonary embolism type (HCC)  Continue Xarelto     5. Thrombocytopenia ([address]  This is likely secondary to the mild splenomegaly seen on imaging. Continue follow-up.     6. Insulin dependent diabetes:Likely from the pancreatic cancer. PCP to stop Metformin since patient is likely not making insulin anymore. Needs tight glucose control with insulin.  No steroids for prechemo    RTC 2 weeks            Shaina Weller MD

## 2019-12-09 ENCOUNTER — TELEPHONE (OUTPATIENT)
Dept: ONCOLOGY | Age: 54
End: 2019-12-09

## 2019-12-09 NOTE — TELEPHONE ENCOUNTER
Rec'd call from patients caregiver, Jorge Vinson, on Permission to Release info. Stated pt has been in severe pain. Wants to know what they should do as far as pain medicine.

## 2019-12-10 ENCOUNTER — DOCUMENTATION ONLY (OUTPATIENT)
Dept: ONCOLOGY | Age: 54
End: 2019-12-10

## 2019-12-10 DIAGNOSIS — G89.3 CANCER-RELATED PAIN: Primary | ICD-10-CM

## 2019-12-10 DIAGNOSIS — R10.13 EPIGASTRIC PAIN: ICD-10-CM

## 2019-12-10 DIAGNOSIS — C25.9 PANCREATIC ADENOCARCINOMA (HCC): ICD-10-CM

## 2019-12-10 RX ORDER — NALOXONE HYDROCHLORIDE 4 MG/.1ML
SPRAY NASAL
Qty: 1 EACH | Refills: 1 | Status: SHIPPED | OUTPATIENT
Start: 2019-12-10

## 2019-12-10 RX ORDER — OXYCODONE HYDROCHLORIDE 15 MG/1
15 TABLET, FILM COATED, EXTENDED RELEASE ORAL EVERY 12 HOURS
Qty: 60 TAB | Refills: 0 | Status: CANCELLED | OUTPATIENT
Start: 2019-12-10 | End: 2020-01-09

## 2019-12-10 RX ORDER — OXYCODONE HYDROCHLORIDE 30 MG/1
30 TABLET, FILM COATED, EXTENDED RELEASE ORAL EVERY 12 HOURS
Qty: 60 TAB | Refills: 0 | Status: SHIPPED | OUTPATIENT
Start: 2019-12-10 | End: 2019-12-18 | Stop reason: ALTCHOICE

## 2019-12-10 NOTE — PROGRESS NOTES
Oxycodone ER 15 MG BID was increased during previous office visit on 12/04/02019 to 30 mg BID for cancer related pain. A new prescription was sent to pharmacy for 30 day supply #60 tabs. Both the  and chart were reviewed. Opioid agreement on file.      JESSICA Jauregui-BC  12/10/2019

## 2019-12-10 NOTE — TELEPHONE ENCOUNTER
Pt requesting refill. States they were instructed to increase dosage and he will run out before initial date. oxyCODONE ER (OXYCONTIN) 15 mg ER tablet [[id number]]     Order Details   Dose: 15 mg Route: Oral Frequency: EVERY 12 HOURS   Dispense Quantity: 60 Tab Refills: 0 Fills remaining: --           Sig: Take 1 Tab by mouth every twelve (12) hours for 30 days.  Max Daily Amount: 30 mg.          Written Date: 11/19/19 Expiration Date: --     Start Date: 11/19/19 End Date: 12/19/19 after 60 doses

## 2019-12-11 ENCOUNTER — TELEPHONE (OUTPATIENT)
Dept: ONCOLOGY | Age: 54
End: 2019-12-11

## 2019-12-11 ENCOUNTER — HOSPITAL ENCOUNTER (OUTPATIENT)
Dept: MRI IMAGING | Age: 54
Discharge: HOME | End: 2019-12-11
Attending: INTERNAL MEDICINE

## 2019-12-11 VITALS — BODY MASS INDEX: 29.29 KG/M2 | WEIGHT: 216 LBS

## 2019-12-11 DIAGNOSIS — C25.9 PANCREATIC ADENOCARCINOMA (HCC): ICD-10-CM

## 2019-12-11 DIAGNOSIS — C78.7 LIVER METASTASIS (HCC): ICD-10-CM

## 2019-12-11 NOTE — NURSE NAVIGATOR
Initial assessment for Jose Rodriguez, newly diagnosed with pancreatic cancer. Meeting with pt included pt and brother. Patient was assessed for the following barriers:    Communication:       Yes    No  -Ability to read/write,understand [place]       [x]      []    -Ability to talk with family/children,friends about diagnosis     [x]      []    -Other:  Comments/Referrals/Services provided: Patient was able to discuss diagnosis with family  Employment/Financial/Legal:     Yes    No  -Loss of employment/income         []      [x]    -Insurance coverage          [x]      []     -Needs help applying for Social Security/Disability/FMLA     [x]      []     -Help with Co-Pays for office visits         []      [x]    -Medication assistance/medical supplies or equipment     []      [x]    -Difficulty paying bills: utilities/housing       []      [x]    -Means to buy food                     [x]      []    -Legal issues           []      [x]    -Other:  Comments/Referrals/Services provided: Pt has coverage with Medicaid, no financial concerns today. Referred to [facility], referral faxed to [phone].   Psychosocial        Yes    No  Housing:  -Homeless           []      [x]    -Extended care needs: long term care/home care/Hospice     []      [x]    -Other:  Comments/Referrals/Services provided: pt lives with brother and sister-in-law who owns a shulter  Transportation:       Yes    No  -Lack of vehicle or public transportation options      []      [x]    -Funds need for public transportation: bus/taxi      []      [x]    -Other:  Comments/Referrals/Services provided: Brother brings pt to appointments  Support system:       Yes No  -Family members at home: spouse/significant other/children    [x]      []    -Has a support system         [x]      []    Other:  Comments/Referrals/Services provided: Lives with brother  Spirituality:        Yes No  -Spiritual issues          []      [x]    -Cultural concerns          []      [x] -Other:  -Comments/Referrals/Services provided: No concerns today  Sexuality:        Yes No  -Body image concerns                     []      [x]    -Relationships/significant other issues        []      [x]    -Other:  Comments/Referrals/Services provided: No concerns today   Coping:        Yes    No  -Able to manage emotions         [x]      []    -Feeling fearful or anxious         [x]      []    -Interest in attending support groups        []      [x]    -Cancer related pain/control         []      [x]    -Other:  Comments/Referrals/Services provided: Pt calm today, attends bible study which decreases anxiety. Tobacco dependency:      Yes No  -Currently smokes: cigarettes/cigars        [x]      []    -Uses smokeless tobacco         [x]      []    -Other:  Comments/Referrals/Services provided: Quite smoking 1 yr ago    Education/review of Disease process and Management   Yes No  -Explanation of navigator role/contact information      [x]      []    New Patient Guide for Pancreatic Cancer provided                      [x]     []         -Pathology/Staging          [x]      []    -Diagnostic tests          [x]      []    -Genetic testing needed         [x]      []    -Treatment options/plan: surgery/chemotherapy/radiation     [x]      []    -Mediport placement as needed                              [x]      []    -Tobacco cessation as needed        []      [x]    -Need for a second opinion         []      [x]    -Importance of bringing family/friends to medical appts     [x]      []   -Other:  Patient/family verbalized understanding of treatment plan: Yes. PET Scan, port and brain MRI ordered, labs scheduled 12/3/19. Placed on pain regiment, referred to palliative care. Hold steroids while on treatment, RTC in 2 weeks. All questions answered. NCCN Distress Tool    Avery Augustin was assessed for management of distress using the NCCN Distress Thermometer for Patients tool.       Mild to moderate distress  Scorin-4        Yes    No    -Practical/physical issues       []      [x]      -Provide community resources      [x]      []      -Provide list of support groups      [x]      []      -Provide list of national organizations and websites               [x]      []      -Provide ongoing supportive care by oncology medical team        [x]      []                  Comments/Referrals/Services provided:  Yes. Score 0, will reassess in the future. Moderate to severe distress  Scorin or greater                   Yes    No    -Navigator consulted with MD      []      []     -Navigator follow up         []      []     -Spiritual concerns        []      []     -Emotional/family concerns       []      []     -Refer to /mental health professional/PCP   []      []      Comments/Referral/Services provided:  Yes.

## 2019-12-11 NOTE — TELEPHONE ENCOUNTER
Rec'd call from [facility], stating that MRI was unable to be performed this morning due to patient having severe claustrophobia

## 2019-12-13 DIAGNOSIS — C25.9 MALIGNANT NEOPLASM OF PANCREAS, UNSPECIFIED LOCATION OF MALIGNANCY (HCC): ICD-10-CM

## 2019-12-13 NOTE — PROGRESS NOTES
Pharmacy Note - Treatment Plan has been transcribed by Pharmacy    Name: Tiffany Heredia  : 1965  Estimated body surface area is 2.23 meters squared as calculated from the following:    Height as of 19: 182.9 cm (72\"). Weight as of 19: 98 kg (216 lb). Diagnosis: Pancreatic Cancer   Treatment Plan: FOLFIRINOX  Cycle/Day: C1D1  Cycle Start Date: 2019    Lab Results   Component Value Date/Time    WBC 9.7 2019 08:08 PM    PLATELET 934  08:08 PM     Lab Results   Component Value Date/Time    ABS. NEUTROPHILS 6.3 2019 08:08 PM     Lab Results   Component Value Date/Time    Creatinine 0.93 2019 10:36 AM     Most Recent Creatinine Clearance:  CrCl: 107.6 mL/min (based on Adjusted Body Weight)  Creatinine: 0.93 mg/dL (2019)      Pharmacy Intervention:  A Treatment plan has been received and transcribed into [facility] by [address].    FOLFIRINOX every 14 days x 12 cycles  Add Emend to treatment regimen- contacted Dr. Maryanne Perez office   Pharmacy to continue to monitor    Thank you,  Carrol Acosta, PharmD, M.S.  Billie Guevara [address] [place] [address], [facility], and [facility]   [phone]     Pharmacist: TALI Avalos

## 2019-12-15 NOTE — ED PROVIDER NOTES
EMERGENCY DEPARTMENT HISTORY AND PHYSICAL EXAM      Date: 11/25/2019  Patient Name: Jose Rodriguez    History of Presenting Illness     Chief Complaint   Patient presents with    Other       History (Context): Jose Rodriguez is a 62 y.o. gentleman with pancreatic cancer who was sent here by his endocrinologist for admission due to being on steroids and having uncontrolled hyperglycemia. The patient is on an insulin regimen. His blood glucose has been very labile. On review of systems, the patient denies fever, chills, rashes, nausea vomiting, diarrhea    PCP: Jewel Islas MD    Current Outpatient Medications   Medication Sig Dispense Refill    insulin glargine (LANTUS U-100 INSULIN) 100 unit/mL injection 28 Units by SubCUTAneous route nightly. 1 Vial 0    ondansetron hcl (ZOFRAN) 4 mg tablet Take 1 Tab by mouth every eight (8) hours as needed for Nausea. 15 Tab 0    insulin lispro (HUMALOG) 100 unit/mL injection 3-15 Units by SubCUTAneous route Before breakfast, lunch, and dinner. 1 Vial 0    oxyCODONE ER 30 mg TR12 Take 30 mg by mouth every twelve (12) hours for 30 days. Max Daily Amount: 60 mg. 60 Tab 0    naloxone (NARCAN) 4 mg/actuation nasal spray Use 1 spray intranasally, then discard. Repeat with new spray every 2 min as needed for opioid overdose symptoms, alternating nostrils. Indications: opioid overdose 1 Each 1    OLANZapine (ZYPREXA ZYDIS) 5 mg disintegrating tablet Take 5mg PO the night before chemotherapy, then 5mg PO the morning of chemotherapy, then 5mg PO three times a day as needed for 1 week following chemotherapy. Indications: prevent nausea and vomiting from cancer chemotherapy 30 Tab 1    rivaroxaban (XARELTO) 15 mg (42)- 20 mg (9) DsPk Take 20 mg by mouth daily. Take one 15 mg tablet twice a day with food for the first 21 days. Then, take one 20 mg tablet once a day with food for 9 days.  naloxone (NARCAN) 4 mg/actuation nasal spray Use 1 spray intranasally, then discard. Repeat with new spray every 2 min as needed for opioid overdose symptoms, alternating nostrils. 1 Each 1    dexAMETHasone (DECADRON) 4 mg tablet Take 4mg by mouth twice a day the day before chemotherapy and the day after chemotherapy 30 Tab 0    lidocaine-prilocaine (EMLA) topical cream Apply  to affected area as needed for Pain (Apply to skin 30-60 minutes before mediport access). 30 g 0    nut.tx.gluc.intol,lac-free,soy (GLUCERNA ADVANCE) liqd Take 237 mL by mouth three (3) times daily. 90 Bottle 5    TRUE METRIX GLUCOSE METER misc USE AS DIRECTED  0    omeprazole (PRILOSEC) 20 mg capsule TAKE 1 CAPSULE BY MOUTH ONCE DAILY  3    QUEtiapine (SEROQUEL) 50 mg tablet Take 50 mg by mouth nightly.  ondansetron (ZOFRAN ODT) 4 mg disintegrating tablet Take 1 Tab by mouth every eight (8) hours as needed for Nausea. 20 Tab 0    lidocaine (LIDODERM) 5 % Apply patch to the affected area for 12 hours a day and remove for 12 hours a day. 5 Each 0       Past History     Past Medical History:  Past Medical History:   Diagnosis Date    Cancer [facility])     Pancreatic Adenocarcinoma. Diagnosed 11/2019.  Diabetes ([facility] [address] 10/2019    Diagnosed 10/2019.  Hepatitis C     Received Curative Treatment, but Hepatitis C was not cured and returned.  Left ulnar fracture     Thromboembolus ([address]     DVT of LLE 11/2019. Also, Pulmonary Embolism 11/2019. Treated with Rivaroxaban.        Past Surgical History:  Past Surgical History:   Procedure Laterality Date    HX TONSILLECTOMY      IR BX PANCREAS NEEDLE PERC  11/2019    Endoscopic Pancreatic Biopsy (?)    IR INSERT TUNL CVC W PORT OVER 5 YEARS  11/25/2019       Family History:  Family History   Problem Relation Age of Onset    Diabetes Mother     Kidney Disease Mother     Diabetes Father     Diabetes Brother     Cancer Maternal Grandmother     Cancer Maternal Grandfather     Cancer Maternal Aunt     Cancer Maternal Uncle        Social History:  Social History Tobacco Use    Smoking status: Former Smoker     Packs/day: 0.50     Years: 40.00     Pack years: 20.00     Last attempt to quit: 2018     Years since quittin.1    Smokeless tobacco: Never Used    Tobacco comment: Quit 2018   Substance Use Topics    Alcohol use: Not Currently    Drug use: Not Currently     Types: Marijuana, Cocaine, Heroin     Comment: Quit Cocaine, Heroin, and Marijuana since 2018       Allergies:  No Known Allergies    PMH, PSH, family history, social history, allergies reviewed with the patient with significant items noted above. Review of Systems   As per HPI, otherwise reviewed and negative. Physical Exam     Vitals:    19 2325 19 0500 19 0732 19 1200   BP: 120/82 107/70 113/69 111/69   Pulse: 66 66 (!) 54 60   Resp: 18 18 18 18   Temp: 97.8 °F (36.6 °C) 97.5 °F (36.4 °C) 97.4 °F (36.3 °C) 98.5 °F (36.9 °C)   SpO2: 96% 99% 94% 97%   Weight:       Height:           Gen: Well-appearing, in no acute distress   HEENT: Normocephalic, sclera anicteric  Cardiovascular: Normal rate, regular rhythm, no murmurs, rubs, gallops. Pulses intact and equal distally. Pulmonary: No respiratory distress. No stridor. Clear lungs. ABD: Soft, nontender, nondistended. Neuro: Alert. Normal speech. Normal mentation. Psych: Normal thought content and thought processes. : No CVA tenderness  EXT: Moves all extremities well. No cyanosis or clubbing. Skin: Warm and well-perfused. Other        Diagnostic Study Results     Labs -   No results found for this or any previous visit (from the past 12 hour(s)). Radiologic Studies -   PET BONE SCAN NAFL F18 [facility] BDY (INI)    (Results Pending)     CT Results  (Last 48 hours)    None        CXR Results  (Last 48 hours)    None            Medical Decision Making   I am the first provider for this patient.     I reviewed the vital signs, available nursing notes, past medical history, past surgical history, family history and social history. Vital Signs-Reviewed the patient's vital signs. Records Reviewed: Personally, on initial evaluation    MDM:   Patient presents with labile blood glucose in the setting of insulin and steroids in a diabetic patient with pancreatic cancer. Exam significant for baseline stable exam.  The patient's endocrinologist recommends he be admitted which is reasonable. Patient condition on initial evaluation: Stable    Plan:   Baseline labs and admission    Orders as below:  Orders Placed This Encounter    NO VTE PROPHYLAXIS NEEDED    PET BONE SCAN NAFL F18 WH BDY (INI)    CBC WITH AUTOMATED DIFF    COMPREHENSIVE METABOLIC PANEL    LIPASE    METABOLIC PANEL, BASIC    HEMOGLOBIN A1C    GLUCOSE, POC    GLUCOSE, POC    GLUCOSE, POC    GLUCOSE, POC    GLUCOSE, POC    GLUCOSE, POC    GLUCOSE, POC    GLUCOSE, POC    oxyCODONE IR (ROXICODONE) tablet 10 mg    DISCONTD: dexAMETHasone (DECADRON) tablet 4 mg    DISCONTD: HYDROcodone-acetaminophen (NORCO) 7.5-325 mg per tablet 1 Tab    DISCONTD: insulin glargine (LANTUS) injection 20 Units    DISCONTD: lidocaine 4 % patch 1 Patch    DISCONTD: lidocaine-prilocaine (EMLA) 2.5-2.5 % cream    DISCONTD: . PHARMACY TO SUBSTITUTE PER PROTOCOL (Reordered from: naloxone (NARCAN) 4 mg/actuation nasal spray)    DISCONTD: pantoprazole (PROTONIX) tablet 40 mg    DISCONTD: ondansetron (ZOFRAN ODT) tablet 4 mg    DISCONTD: ondansetron (ZOFRAN ODT) tablet 8 mg    DISCONTD: oxyCODONE ER (XTAMPZA ER) capsule 13.5 mg    DISCONTD: QUEtiapine (SEROquel) tablet 50 mg    DISCONTD: insulin lispro (HUMALOG) injection    DISCONTD: glucose chewable tablet 16 g    DISCONTD: glucagon (GLUCAGEN) injection 1 mg    DISCONTD: dextrose 10 % infusion 125-250 mL    DISCONTD: naloxone (NARCAN) injection 0.4 mg    DISCONTD: rivaroxaban (XARELTO) tablet 15 mg    DISCONTD: insulin glargine (LANTUS) injection 24 Units    DISCONTD: HYDROcodone-acetaminophen (NORCO) 7.5-325 mg per tablet 1-2 Tab    DISCONTD: insulin lispro (HUMALOG) injection    DISCONTD: HYDROcodone-acetaminophen (NORCO) 7.5-325 mg per tablet 1-2 Tab    DISCONTD: insulin glargine (LANTUS) injection 28 Units    insulin glargine (LANTUS) injection 4 Units    insulin glargine (LANTUS U-100 INSULIN) 100 unit/mL injection    ondansetron hcl (ZOFRAN) 4 mg tablet    DISCONTD: insulin lispro (HUMALOG) 100 unit/mL injection    oxyCODONE IR (ROXICODONE) 10 mg tab immediate release tablet    insulin lispro (HUMALOG) 100 unit/mL injection    INITIAL PHYSICIAN ORDER: OBSERVATION/OUTPATIENT IN A BED OBSERVATION; Telemetry; Management of New Therapeutic Agent (Dexamethasone) in setting of Pancreatic Cancer and New-Onset Diabetes        ED Course:   ED Course as of Dec 15 1207   Mon Nov 25, 2019   2008 Spoke to the hospitalist who will admit the patient. Patient admitted in stable condition. [DT]      ED Course User Index  [DT] Wade Curry MD               Disposition: Admit    Diagnosis     Clinical Impression:   1. Malignant neoplasm of pancreas, unspecified location of malignancy ([facility] [address]        Signed,  Marilee Stephenson MD  Emergency Physician  [facility]    As a voice dictation software was utilized to dictate this note, minor word transpositions can occur. I apologize for confusing wording and typographic errors. Please feel free to contact me for clarification.

## 2019-12-16 ENCOUNTER — HOSPITAL ENCOUNTER (OUTPATIENT)
Dept: INFUSION THERAPY | Age: 54
Discharge: HOME | End: 2019-12-16
Payer: MEDICAID

## 2019-12-16 VITALS
DIASTOLIC BLOOD PRESSURE: 72 MMHG | HEART RATE: 76 BPM | WEIGHT: 210.8 LBS | HEIGHT: 72 IN | RESPIRATION RATE: 18 BRPM | TEMPERATURE: 98 F | BODY MASS INDEX: 28.55 KG/M2 | SYSTOLIC BLOOD PRESSURE: 111 MMHG | OXYGEN SATURATION: 94 %

## 2019-12-16 DIAGNOSIS — C78.7 LIVER METASTASIS (HCC): ICD-10-CM

## 2019-12-16 DIAGNOSIS — K86.89 PANCREATIC MASS: Primary | ICD-10-CM

## 2019-12-16 LAB
ALBUMIN SERPL-MCNC: 3.4 G/DL (ref 3.4–5)
ALBUMIN/GLOB SERPL: 0.9 {RATIO} (ref 0.8–1.7)
ALP SERPL-CCNC: 161 U/L (ref 45–117)
ALT SERPL-CCNC: 36 U/L (ref 16–61)
ANION GAP SERPL CALC-SCNC: 4 MMOL/L (ref 3–18)
AST SERPL-CCNC: 30 U/L (ref 10–38)
BASO+EOS+MONOS # BLD AUTO: 0.8 K/UL (ref 0–2.3)
BASO+EOS+MONOS NFR BLD AUTO: 8 % (ref 0.1–17)
BILIRUB SERPL-MCNC: 0.4 MG/DL (ref 0.2–1)
BUN SERPL-MCNC: 20 MG/DL (ref 7–18)
BUN/CREAT SERPL: 23 (ref 12–20)
CALCIUM SERPL-MCNC: 8.7 MG/DL (ref 8.5–10.1)
CHLORIDE SERPL-SCNC: 105 MMOL/L (ref 100–111)
CO2 SERPL-SCNC: 28 MMOL/L (ref 21–32)
CREAT SERPL-MCNC: 0.88 MG/DL (ref 0.6–1.3)
DIFFERENTIAL METHOD BLD: ABNORMAL
ERYTHROCYTE [DISTWIDTH] IN BLOOD BY AUTOMATED COUNT: 13.1 % (ref 11.5–14.5)
GLOBULIN SER CALC-MCNC: 3.8 G/DL (ref 2–4)
GLUCOSE SERPL-MCNC: 127 MG/DL (ref 74–99)
HCT VFR BLD AUTO: 37.4 % (ref 36–48)
HGB BLD-MCNC: 12.2 G/DL (ref 12–16)
LYMPHOCYTES # BLD: 2.3 K/UL (ref 1.1–5.9)
LYMPHOCYTES NFR BLD: 23 % (ref 14–44)
MCH RBC QN AUTO: 28.8 PG (ref 25–35)
MCHC RBC AUTO-ENTMCNC: 32.6 G/DL (ref 31–37)
MCV RBC AUTO: 88.2 FL (ref 78–102)
NEUTS SEG # BLD: 6.7 K/UL (ref 1.8–9.5)
NEUTS SEG NFR BLD: 69 % (ref 40–70)
PLATELET # BLD AUTO: 111 K/UL (ref 140–440)
POTASSIUM SERPL-SCNC: 3.7 MMOL/L (ref 3.5–5.5)
PROT SERPL-MCNC: 7.2 G/DL (ref 6.4–8.2)
RBC # BLD AUTO: 4.24 M/UL (ref 4.1–5.1)
SODIUM SERPL-SCNC: 137 MMOL/L (ref 136–145)
WBC # BLD AUTO: 9.8 K/UL (ref 4.5–13)

## 2019-12-16 PROCEDURE — 96416 CHEMO PROLONG INFUSE W/PUMP: CPT

## 2019-12-16 PROCEDURE — 96375 TX/PRO/DX INJ NEW DRUG ADDON: CPT

## 2019-12-16 PROCEDURE — 74011250636 HC RX REV CODE- 250/636: Performed by: INTERNAL MEDICINE

## 2019-12-16 PROCEDURE — 96367 TX/PROPH/DG ADDL SEQ IV INF: CPT

## 2019-12-16 PROCEDURE — 96411 CHEMO IV PUSH ADDL DRUG: CPT

## 2019-12-16 PROCEDURE — 74011000258 HC RX REV CODE- 258: Performed by: INTERNAL MEDICINE

## 2019-12-16 PROCEDURE — 36591 DRAW BLOOD OFF VENOUS DEVICE: CPT

## 2019-12-16 PROCEDURE — 80053 COMPREHEN METABOLIC PANEL: CPT

## 2019-12-16 PROCEDURE — 77030012965 HC NDL HUBR BBMI -A

## 2019-12-16 PROCEDURE — 96417 CHEMO IV INFUS EACH ADDL SEQ: CPT

## 2019-12-16 PROCEDURE — 96413 CHEMO IV INFUSION 1 HR: CPT

## 2019-12-16 PROCEDURE — 96415 CHEMO IV INFUSION ADDL HR: CPT

## 2019-12-16 PROCEDURE — 85025 COMPLETE CBC W/AUTO DIFF WBC: CPT

## 2019-12-16 PROCEDURE — 36415 COLL VENOUS BLD VENIPUNCTURE: CPT

## 2019-12-16 RX ORDER — PALONOSETRON 0.05 MG/ML
0.25 INJECTION, SOLUTION INTRAVENOUS ONCE
Status: COMPLETED | OUTPATIENT
Start: 2019-12-16 | End: 2019-12-16

## 2019-12-16 RX ORDER — FLUOROURACIL 50 MG/ML
900 INJECTION, SOLUTION INTRAVENOUS ONCE
Status: COMPLETED | OUTPATIENT
Start: 2019-12-16 | End: 2019-12-16

## 2019-12-16 RX ORDER — SODIUM CHLORIDE 9 MG/ML
10 INJECTION INTRAMUSCULAR; INTRAVENOUS; SUBCUTANEOUS AS NEEDED
Status: ACTIVE | OUTPATIENT
Start: 2019-12-16 | End: 2019-12-16

## 2019-12-16 RX ORDER — DEXTROSE MONOHYDRATE 50 MG/ML
25 INJECTION, SOLUTION INTRAVENOUS CONTINUOUS
Status: DISPENSED | OUTPATIENT
Start: 2019-12-16 | End: 2019-12-16

## 2019-12-16 RX ORDER — ATROPINE SULFATE 1 MG/ML
0.4 INJECTION, SOLUTION INTRAVENOUS ONCE
Status: COMPLETED | OUTPATIENT
Start: 2019-12-16 | End: 2019-12-16

## 2019-12-16 RX ADMIN — DEXTROSE 25 ML/HR: 5 SOLUTION INTRAVENOUS at 11:27

## 2019-12-16 RX ADMIN — IRINOTECAN HYDROCHLORIDE 400 MG: 20 INJECTION, SOLUTION INTRAVENOUS at 14:33

## 2019-12-16 RX ADMIN — ATROPINE SULFATE 0.4 MG: 1 INJECTION, SOLUTION INTRAMUSCULAR; INTRAVENOUS; SUBCUTANEOUS at 11:25

## 2019-12-16 RX ADMIN — FLUOROURACIL 5300 MG: 50 INJECTION, SOLUTION INTRAVENOUS at 16:13

## 2019-12-16 RX ADMIN — PALONOSETRON 0.25 MG: 0.05 INJECTION, SOLUTION INTRAVENOUS at 11:27

## 2019-12-16 RX ADMIN — FLUOROURACIL 900 MG: 50 INJECTION, SOLUTION INTRAVENOUS at 16:19

## 2019-12-16 RX ADMIN — FOSAPREPITANT 150 MG: 150 INJECTION, POWDER, LYOPHILIZED, FOR SOLUTION INTRAVENOUS at 11:27

## 2019-12-16 RX ADMIN — LEUCOVORIN CALCIUM 900 MG: 100 INJECTION, POWDER, LYOPHILIZED, FOR SOLUTION INTRAMUSCULAR; INTRAVENOUS at 14:34

## 2019-12-16 RX ADMIN — OXALIPLATIN 190 MG: 5 INJECTION, SOLUTION, CONCENTRATE INTRAVENOUS at 12:29

## 2019-12-16 NOTE — PROGRESS NOTES
[facility] Progress Note    Date: 2019    Name: Cassi Moralez    MRN: [id number]         : 1965     Chemotherapy cycle : C1D1 Folfirinox      Mr. Olmos arrived to [place] at 0930. Mr. Daniela Gardner was assessed and education was provided. Mr. Jam Reinoso vitals were reviewed. Visit Vitals  /72 (BP 1 Location: Right arm, BP Patient Position: Sitting)   Pulse 76   Temp 98 °F (36.7 °C)   Resp 18   Ht 6' (1.829 m)   Wt 95.6 kg (210 lb 12.8 oz)   SpO2 94%   BMI 28.59 kg/m²           Recent Results (from the past 12 hour(s))   CBC WITH 3 PART DIFF    Collection Time: 19 10:05 AM   Result Value Ref Range    WBC 9.8 4.5 - 13.0 K/uL    RBC 4.24 4.10 - 5.10 M/uL    HGB 12.2 12.0 - 16.0 g/dL    HCT 37.4 36 - 48 %    MCV 88.2 78 - 102 FL    MCH 28.8 25.0 - 35.0 PG    MCHC 32.6 31 - 37 g/dL    RDW 13.1 11.5 - 14.5 %    PLATELET 081 (L) 569 - 440 K/uL    NEUTROPHILS 69 40 - 70 %    MIXED CELLS 8 0.1 - 17 %    LYMPHOCYTES 23 14 - 44 %    ABS. NEUTROPHILS 6.7 1.8 - 9.5 K/UL    ABS. MIXED CELLS 0.8 0.0 - 2.3 K/uL    ABS. LYMPHOCYTES 2.3 1.1 - 5.9 K/UL    DF AUTOMATED     METABOLIC PANEL, COMPREHENSIVE    Collection Time: 19 10:05 AM   Result Value Ref Range    Sodium 137 136 - 145 mmol/L    Potassium 3.7 3.5 - 5.5 mmol/L    Chloride 105 100 - 111 mmol/L    CO2 28 21 - 32 mmol/L    Anion gap 4 3.0 - 18 mmol/L    Glucose 127 (H) 74 - 99 mg/dL    BUN 20 (H) 7.0 - 18 MG/DL    Creatinine 0.88 0.6 - 1.3 MG/DL    BUN/Creatinine ratio 23 (H) 12 - 20      GFR est AA >60 >60 ml/min/1.73m2    GFR est non-AA >60 >60 ml/min/1.73m2    Calcium 8.7 8.5 - 10.1 MG/DL    Bilirubin, total 0.4 0.2 - 1.0 MG/DL    ALT (SGPT) 36 16 - 61 U/L    AST (SGOT) 30 10 - 38 U/L    Alk. phosphatase 161 (H) 45 - 117 U/L    Protein, total 7.2 6.4 - 8.2 g/dL    Albumin 3.4 3.4 - 5.0 g/dL    Globulin 3.8 2.0 - 4.0 g/dL    A-G Ratio 0.9 0.8 - 1.7         Right chest mediport accessed with 20 g 3/4 inch emmanuel needle.  Port flushed easily and had brisk blood return. Blood drawn off and sent for CBC & CMP per written orders after 10 ml waste. D5W initiated @ [facility]. Lab results within ordered parameters to give chemo today. ANC = 6.7, PLT = 111. Chemo dosages verified with today's BSA and found to be within 10% of ordered dosages. Pre-medications (Aloxi 0.25mg and Atropine 0.4mg IVP, Emend 150 mg IVPB) were administered as ordered and chemotherapy was initiated after blood return from port re-verified. Reviewed expected side effects of premeds with patient. Oxaliplatin 190 mg was infused at  157 ml/hr over 2 hours per order. VS stable at end of infusion and pt denied complaints. Line flushed with D5W and blood return from port re-verified. Irinotecan 400 mg was infused at  368 ml/hr over 90 minutes per order. VS stable at end of infusion and pt denied complaints. Line flushed with D5W and blood return from port re-verified. Leucovorin 900 mg was infused at  213 ml/hr over 90 minutes per order. VS stable at end of infusion and pt denied complaints. Line flushed with D5W and blood return from port re-verified    5FU syringe 900 mg was given IVP over 5 minutes per order. VS stable at end of infusion and pt denied complaints. Line flushed with D5W and blood return from port re-verified    5FU 5300 mg infused via CADD pump and infusing without difficulty over 46 hours. Tubing connections reinforced with tape. Mr. Usha Figueroa tolerated infusion without complaints. Mr. Usha Figueroa was discharged from [place] [address] in stable condition at 1640. He is to return on December 17, 2019 at 1500 for his next appointment.     Belinda Vazquez  December 16, 2019

## 2019-12-18 ENCOUNTER — OFFICE VISIT (OUTPATIENT)
Dept: ONCOLOGY | Age: 54
End: 2019-12-18

## 2019-12-18 ENCOUNTER — TELEPHONE (OUTPATIENT)
Dept: ONCOLOGY | Age: 54
End: 2019-12-18

## 2019-12-18 ENCOUNTER — HOSPITAL ENCOUNTER (OUTPATIENT)
Dept: INFUSION THERAPY | Age: 54
Discharge: HOME | End: 2019-12-18
Payer: MEDICAID

## 2019-12-18 ENCOUNTER — NURSE NAVIGATOR (OUTPATIENT)
Dept: OTHER | Age: 54
End: 2019-12-18

## 2019-12-18 ENCOUNTER — HOSPITAL ENCOUNTER (OUTPATIENT)
Dept: GENERAL RADIOLOGY | Age: 54
Discharge: HOME | End: 2019-12-18
Payer: MEDICAID

## 2019-12-18 ENCOUNTER — HOSPITAL ENCOUNTER (OUTPATIENT)
Dept: LAB | Age: 54
Discharge: HOME | End: 2019-12-18

## 2019-12-18 ENCOUNTER — APPOINTMENT (OUTPATIENT)
Dept: INFUSION THERAPY | Age: 54
End: 2019-12-18
Payer: MEDICAID

## 2019-12-18 VITALS
OXYGEN SATURATION: 96 % | HEART RATE: 83 BPM | TEMPERATURE: 99.9 F | RESPIRATION RATE: 18 BRPM | SYSTOLIC BLOOD PRESSURE: 118 MMHG | DIASTOLIC BLOOD PRESSURE: 71 MMHG

## 2019-12-18 VITALS
SYSTOLIC BLOOD PRESSURE: 105 MMHG | WEIGHT: 214.2 LBS | HEART RATE: 92 BPM | DIASTOLIC BLOOD PRESSURE: 69 MMHG | BODY MASS INDEX: 29.05 KG/M2 | OXYGEN SATURATION: 93 % | RESPIRATION RATE: 18 BRPM | TEMPERATURE: 99 F

## 2019-12-18 DIAGNOSIS — I26.99 PULMONARY EMBOLISM WITHOUT ACUTE COR PULMONALE, UNSPECIFIED CHRONICITY, UNSPECIFIED PULMONARY EMBOLISM TYPE (HCC): ICD-10-CM

## 2019-12-18 DIAGNOSIS — R10.13 EPIGASTRIC PAIN: ICD-10-CM

## 2019-12-18 DIAGNOSIS — C25.9 PANCREATIC ADENOCARCINOMA (HCC): Primary | ICD-10-CM

## 2019-12-18 DIAGNOSIS — K86.89 PANCREATIC MASS: ICD-10-CM

## 2019-12-18 DIAGNOSIS — D69.6 THROMBOCYTOPENIA (HCC): ICD-10-CM

## 2019-12-18 DIAGNOSIS — K59.00 CONSTIPATION, UNSPECIFIED CONSTIPATION TYPE: ICD-10-CM

## 2019-12-18 DIAGNOSIS — M25.562 ACUTE PAIN OF LEFT KNEE: ICD-10-CM

## 2019-12-18 DIAGNOSIS — C25.9 PANCREATIC ADENOCARCINOMA (HCC): ICD-10-CM

## 2019-12-18 DIAGNOSIS — C78.7 LIVER METASTASES (HCC): ICD-10-CM

## 2019-12-18 DIAGNOSIS — C78.7 LIVER METASTASIS (HCC): ICD-10-CM

## 2019-12-18 LAB — XX-LABCORP SPECIMEN COL,LCBCF: NORMAL

## 2019-12-18 PROCEDURE — 96523 IRRIG DRUG DELIVERY DEVICE: CPT

## 2019-12-18 PROCEDURE — 73564 X-RAY EXAM KNEE 4 OR MORE: CPT

## 2019-12-18 PROCEDURE — 99001 SPECIMEN HANDLING PT-LAB: CPT

## 2019-12-18 RX ORDER — OXYCODONE HYDROCHLORIDE 10 MG/1
10 TABLET ORAL
Qty: 120 TAB | Refills: 0 | Status: SHIPPED | OUTPATIENT
Start: 2019-12-18 | End: 2019-12-21

## 2019-12-18 RX ORDER — HEPARIN 100 UNIT/ML
SYRINGE INTRAVENOUS
Status: DISCONTINUED
Start: 2019-12-18 | End: 2019-12-19 | Stop reason: HOSPADM

## 2019-12-18 RX ORDER — FENTANYL 25 UG/1
1 PATCH TRANSDERMAL
Qty: 10 PATCH | Refills: 0 | Status: SHIPPED | OUTPATIENT
Start: 2019-12-18 | End: 2020-01-15 | Stop reason: DRUGHIGH

## 2019-12-18 RX ORDER — LACTULOSE 10 G/15ML
10 SOLUTION ORAL; RECTAL 3 TIMES DAILY
Qty: 480 ML | Refills: 3 | Status: SHIPPED | OUTPATIENT
Start: 2019-12-18

## 2019-12-18 NOTE — TELEPHONE ENCOUNTER
Per patient's brother, pharmacy stated a 3rd party approval was needed for fentaNYL (DURAGESIC) 25 mcg/hr [facility]

## 2019-12-18 NOTE — PROGRESS NOTES
[facility] Progress Note    Date: 2019    Name: Martha Mcclure    MRN: [id number]         : 1965    D/C CADD PUMP    Mr. Sulema Epperson arrived to [facility] at 80. Mr. Sulema Epperson was assessed and education was provided. Mr. Judith Grace vitals were reviewed. Visit Vitals  /71 (BP 1 Location: Left arm, BP Patient Position: Sitting)   Pulse 83   Temp 99.9 °F (37.7 °C)   Resp 18   SpO2 96%       Patient's right upper chest port already accessed from yesterday's visit. CADD pump infusion complete. Disconnected from patient's port. Blood return verified. Normal saline initiated at [facility] to flush line. Mr. Sulema Epperson tolerated infusion without complaints. Mr. Sulema Epperson was discharged from [place] [address] in stable condition at [phone]. He is to return on  at 0800 for his next appointment.     Lima Ocasio RN  2019

## 2019-12-18 NOTE — PROGRESS NOTES
Hudson Lau is a 62 y.o. 1965 male with past medical history including pulmonary embolus, on Lovenox, type 2 diabetes, GERD, who I was asked to see in consultation at the request of Tess Berg for evaluation for newly diagnosed pancreatic adenocarcinoma.  Patient had fine-needle aspiration of pancreatic mass on 11/13/2019 and pathology showed pancreatic adenocarcinoma. I saw him in consultation for the first time on 11/19/2019) was to treat him with palliative FOLFIRINOX. He received C1D1 FOLFIRINOX on 12/16/2019. In the interim he went to the ER on 11/27/2019 due to hypoglycemia which was worse urgent from steroid intake. Patient was sent by endocrinologist to be admitted for blood sugar control before staging PET/CT. Blood sugars were finally controlled and PET/CT was done on 11/29/2019. Patient was seen and examined today. [facility] is awake alert oriented x3.  On review of system he reports epigastric abdominal pain, 6-7/10, after he eats. Also reports right knee pain and swelling which started this AM.  Denies any fevers, chills, or nausea and vomiting.  Denies any melena or bright red blood per rectum.  All also points of review of system have been reviewed and were negative.  ECOG performance status 1.  Independent with ADLs and IADLs.    DX: Pancreatic adenocarcinoma     STAGE: Stage IV     Treatment intent: Palliative     ONCOLOGY HISTORY:   11/03/2019: Went to ER due to 5 days complaints of epigastric pain.  CT abdomen and pelvis showed:  1. 5 cm mass within the pancreatic body highly suggestive of primary malignancy. Superimposed acute pancreatitis cannot entirely be excluded. 2. Multiple liver lesions highly suggestive of metastatic disease 3. Metastatic retroperitoneal and mesenteric lymphadenopathy. 4. Indeterminate hypodensity within the spleen.  Diagnostic consideration include splenic infarct or metastatic lesion 5. Right lower lobe pulmonary emboli.       *MRI of the abdomen showed  1. Stable since same day CT abdomen pelvis. Elliptical 4 cm hypoenhancing mass,  body of pancreas, with upstream glandular atrophy and pancreatic ductal  dilatation, most likely adenocarcinoma. Associated local/regional likely metastatic lymphadenopathy including upper retroperitoneum, lesser omentum, portacaval/periportal space. Associated encasement/narrowing of the adjacent  splenic vein. 2. Mild peripancreatic edema and soft tissue reticulation; may reflect secondary low-grade or chronic pancreatitis or adjacent peripancreatic tumor infiltration.   3. Numerous hypoenhancing and target-like small nodules and masses throughout  the liver, typical of metastatic disease. 4. Mild pericholecystic fluid, nonspecific, but may represent low-grade cholecystitis.   No evident cholecystolithiasis/choledocholithiasis or biliary ductal dilatation. 5. Mild splenomegaly with focal small splenic infarction, age indeterminate  perhaps recent. Small left lower pole, nonacute renal infarction. 6. Other minor and/or ancillary findings including lumbar disc herniations     11/15/2019: Duplex left  lower extremity showed Acute nonocclusive thrombus in the distal femoral vein, popliteal, peroneal and both posterior tibial veins. The thrombus in the distal femoral vein appears to be floating tail-like thrombus.     11/19/2019: First medical oncology visit with me    11/29/2019: PET/CT showed  Malignant metabolic activity corresponding with the known malignancy in the pancreatic body. Innumerable hypermetabolic liver metastases. Metastatic portal caval and aortocaval lymph nodes. Enlargement and malignant metabolic activity in the anterior aspect of the left  psoas muscle suspicious for metastatic disease. Distant metastatic disease to include the left supraclavicular region, left  superior mediastinum and possibly the left inferior hilum.     Past Medical History:   Diagnosis Date    Cancer [facility])     Pancreatic Adenocarcinoma. Diagnosed 2019.  Diabetes ([facility] [address] 10/2019    Diagnosed 10/2019.  Hepatitis C     Received Curative Treatment, but Hepatitis C was not cured and returned.  Left ulnar fracture     Thromboembolus ([facility] [address]     DVT of LLE 2019. Also, Pulmonary Embolism 2019. Treated with Rivaroxaban. Past Surgical History:   Procedure Laterality Date    HX TONSILLECTOMY      IR BX PANCREAS NEEDLE PERC  2019    Endoscopic Pancreatic Biopsy (?)    IR INSERT TUNL CVC W PORT OVER 5 YEARS  2019     Social History     Socioeconomic History    Marital status:      Spouse name: Not on file    Number of children: Not on file    Years of education: Not on file    Highest education level: Not on file   Tobacco Use    Smoking status: Former Smoker     Packs/day: 0.50     Years: 40.00     Pack years: 20.00     Last attempt to quit: 2018     Years since quittin.1    Smokeless tobacco: Never Used    Tobacco comment: Quit 2018   Substance and Sexual Activity    Alcohol use: Not Currently    Drug use: Not Currently     Types: Marijuana, Cocaine, Heroin     Comment: Quit Cocaine, Heroin, and Marijuana since 2018    Sexual activity: Not Currently   Other Topics Concern     Family History   Problem Relation Age of Onset    Diabetes Mother     Kidney Disease Mother     Diabetes Father     Diabetes Brother     Cancer Maternal Grandmother     Cancer Maternal Grandfather     Cancer Maternal Aunt     Cancer Maternal Uncle        Current Outpatient Medications   Medication Sig Dispense Refill    oxyCODONE ER 30 mg TR12 Take 30 mg by mouth every twelve (12) hours for 30 days. Max Daily Amount: 60 mg. 60 Tab 0    naloxone (NARCAN) 4 mg/actuation nasal spray Use 1 spray intranasally, then discard. Repeat with new spray every 2 min as needed for opioid overdose symptoms, alternating nostrils.   Indications: opioid overdose 1 Each 1    insulin glargine (LANTUS U-100 INSULIN) 100 unit/mL injection 28 Units by SubCUTAneous route nightly. 1 Vial 0    ondansetron hcl (ZOFRAN) 4 mg tablet Take 1 Tab by mouth every eight (8) hours as needed for Nausea. 15 Tab 0    insulin lispro (HUMALOG) 100 unit/mL injection 3-15 Units by SubCUTAneous route Before breakfast, lunch, and dinner. 1 Vial 0    OLANZapine (ZYPREXA ZYDIS) 5 mg disintegrating tablet Take 5mg PO the night before chemotherapy, then 5mg PO the morning of chemotherapy, then 5mg PO three times a day as needed for 1 week following chemotherapy. Indications: prevent nausea and vomiting from cancer chemotherapy 30 Tab 1    rivaroxaban (XARELTO) 15 mg (42)- 20 mg (9) DsPk Take 20 mg by mouth daily. Take one 15 mg tablet twice a day with food for the first 21 days. Then, take one 20 mg tablet once a day with food for 9 days.  naloxone (NARCAN) 4 mg/actuation nasal spray Use 1 spray intranasally, then discard. Repeat with new spray every 2 min as needed for opioid overdose symptoms, alternating nostrils. 1 Each 1    dexAMETHasone (DECADRON) 4 mg tablet Take 4mg by mouth twice a day the day before chemotherapy and the day after chemotherapy 30 Tab 0    lidocaine-prilocaine (EMLA) topical cream Apply  to affected area as needed for Pain (Apply to skin 30-60 minutes before mediport access). 30 g 0    nut.tx.gluc.intol,lac-free,soy (GLUCERNA ADVANCE) liqd Take 237 mL by mouth three (3) times daily. 90 Bottle 5    TRUE METRIX GLUCOSE METER misc USE AS DIRECTED  0    omeprazole (PRILOSEC) 20 mg capsule TAKE 1 CAPSULE BY MOUTH ONCE DAILY  3    ondansetron (ZOFRAN ODT) 4 mg disintegrating tablet Take 1 Tab by mouth every eight (8) hours as needed for Nausea. 20 Tab 0    QUEtiapine (SEROQUEL) 50 mg tablet Take 50 mg by mouth nightly.  lidocaine (LIDODERM) 5 % Apply patch to the affected area for 12 hours a day and remove for 12 hours a day.  5 Each 0     Facility-Administered Medications Ordered in Other Visits   Medication Dose Route Frequency Provider Last Rate Last Dose    PHARMACY INFORMATION NOTE  1 Each Other Rx Dosing/Monitoring Hilaria Barriga MD        fluorouracil (ADRUCIL) 5,300 mg in 0.9% sodium chloride 250 mL CADD Cassette  5,300 mg IntraVENous ONCE Hilaria Barriga MD 5.4 mL/hr at 12/16/19 1613 5,300 mg at 12/16/19 1613       No Known Allergies    Review of Systems  As per HPI    Objective:  Visit Vitals  /69   Pulse 92   Temp 99 °F (37.2 °C) (Oral)   Resp 18   Wt 97.2 kg (214 lb 3.2 oz)   SpO2 93%   BMI 29.05 kg/m²       Physical Exam:   General appearance - alert, well appearing, and in no distress  Mental status - alert, oriented to person, place, and time  EYE-ONOFRE, EOMI  ENT-ENT exam normal, no neck nodes or sinus tenderness  Mouth - mucous membranes moist, pharynx normal without lesions  Neck - supple, no significant adenopathy   Chest - clear to auscultation, no wheezes, rales or rhonchi, symmetric air entry   Heart - normal rate and regular rhythm   Abdomen - soft, nontender, nondistended, no masses or organomegaly  Lymph- no adenopathy palpable  Ext-no pedal edema noted  Skin-Warm and dry. Neuro -alert, oriented, normal speech, no focal findings or movement disorder noted      Diagnostic Imaging     No results found for this or any previous visit. Results for orders placed during the hospital encounter of 11/25/19   XR CHEST PORT    Narrative EXAM: CHEST RADIOGRAPH, SINGLE VIEW    CLINICAL INDICATION/HISTORY: MediPort placement    COMPARISON: Two-view chest 3/22/2019    TECHNIQUE: Portable frontal view of the chest was obtained.     _______________    FINDINGS:    SUPPORT DEVICES: MediPort catheter is present via right subclavian vein. Tip is  in the superior vena cava. HEART AND MEDIASTINUM: Cardiomediastinal silhouette appears within normal  limits. Normal caliber thoracic aorta. No central vascular congestion. LUNGS AND PLEURAL SPACES: Lungs are well aerated with no confluent airspace  opacity.   No pleural effusion or pneumothorax. BONY THORAX AND SOFT TISSUES: No acute osseous abnormality. _______________      Impression IMPRESSION:      1. MediPort catheter good position. No pneumothorax. No acute cardiopulmonary  disease. Results for orders placed during the hospital encounter of 11/03/19   CT ABD PELV W CONT    Narrative EXAM: CT of the abdomen and pelvis    INDICATION: Upper abdominal pain radiating to back    COMPARISON: None. TECHNIQUE: Axial CT imaging of the abdomen and pelvis was performed with  intravenous contrast. Multiplanar reformats were generated. One or more dose  reduction techniques were used on this CT: automated exposure control,  adjustment of the mAs and/or kVp according to patient size, and iterative  reconstruction techniques. The specific techniques used on this CT exam have  been documented in the patient's electronic medical record. Digital Imaging and Communications in Medicine (DICOM) format image data are  available to nonaffiliated external healthcare facilities or entities on a  secure, media free, reciprocally searchable basis with patient authorization for  at least a 12 month period after this study. _______________    FINDINGS:    LOWER CHEST: There are filling defects within the segmental and subsegmental  branches of the right lower lobe pulmonary artery. Heart size is the upper limit  of normal. No pericardial effusion. A small hiatal hernia is present. LIVER, BILIARY: Liver contains numerous hypoattenuating masses of varying sizes  spanning the right and left hepatic lobes. No biliary dilation. Mild gallbladder  wall thickening is present. PANCREAS: 4.7 x 2.8 cm hypoattenuating heterogeneous infiltrative mass within  the pancreatic body and tail. There is peripancreatic inflammatory stranding. SPLEEN: Focal linear hypodensity is seen within the posterior splenic body.     ADRENALS: Normal.    KIDNEYS: Normal.    LYMPH NODES: Enlarged mesenteric and retroperitoneal lymph nodes. For example,  periaortic node measures 1.9 x 1.5 cm on series 3 image 82. Enlarged nodes are  also seen within the marquis hepatis and epigastric region. GASTROINTESTINAL TRACT: No bowel dilation or wall thickening. Normal appendix. PELVIC ORGANS: Prostate hypertrophy. VASCULATURE: Unremarkable. BONES: Mild thoracolumbar dextroscoliosis and multilevel spondylosis. No acute  or aggressive osseous abnormalities identified. OTHER: None.    _______________      Impression IMPRESSION:    1. 5 cm mass within the pancreatic body highly suggestive of primary malignancy. Superimposed acute pancreatitis cannot entirely be excluded. 2. Multiple liver lesions highly suggestive of metastatic disease    3. Metastatic retroperitoneal and mesenteric lymphadenopathy    4. Indeterminate hypodensity within the spleen. Diagnostic considerations  include splenic infarct or metastatic lesion    5. Right lower lobe pulmonary emboli. Critical result was discussed with ED  attending at 3:05 AM on November 4, 2019. This result was acknowledged and  understood. Preliminary interpretation provided by on-call radiology resident. Lab Results  Lab Results   Component Value Date/Time    WBC 9.8 12/16/2019 10:05 AM    HGB 12.2 12/16/2019 10:05 AM    HCT 37.4 12/16/2019 10:05 AM    PLATELET 096 (L) 05/84/9478 10:05 AM    MCV 88.2 12/16/2019 10:05 AM       Lab Results   Component Value Date/Time    Sodium 137 12/16/2019 10:05 AM    Potassium 3.7 12/16/2019 10:05 AM    Chloride 105 12/16/2019 10:05 AM    CO2 28 12/16/2019 10:05 AM    Anion gap 4 12/16/2019 10:05 AM    Glucose 127 (H) 12/16/2019 10:05 AM    BUN 20 (H) 12/16/2019 10:05 AM    Creatinine 0.88 12/16/2019 10:05 AM    BUN/Creatinine ratio 23 (H) 12/16/2019 10:05 AM    GFR est AA >60 12/16/2019 10:05 AM    GFR est non-AA >60 12/16/2019 10:05 AM    Calcium 8.7 12/16/2019 10:05 AM    AST (SGOT) 30 12/16/2019 10:05 AM    Alk. phosphatase 161 (H) 12/16/2019 10:05 AM    Protein, total 7.2 12/16/2019 10:05 AM    Albumin 3.4 12/16/2019 10:05 AM    Globulin 3.8 12/16/2019 10:05 AM    A-G Ratio 0.9 12/16/2019 10:05 AM    ALT (SGPT) 36 12/16/2019 10:05 AM       Assessment/Plan:  62 y. o. male  1. Pancreatic adenocarcinoma ([facility] [address]  I had a long discussion with . Matheus Hernadez regarding his newly diagnosed metastatic pancreatic cancer.  I told him that in the first-line setting, palliative chemotherapy have been shown to improve 1 year survival by 73% compared to supportive care alone. Labs on 12/16/2019 showed WBC 9.8, H&H 12.2/37.4, MCV 88.2, platelets 196. BUN 20 and creatinine 0.88. He is status post C1 D1 FOLFIRINOX on 12/16/2019 as follow:    Day 1: Oxaliplatin 85mg/m2 IV + irinotecan 180mg/m2 IV + leucovorin 400mg/m2 IV, followed by a 5-FU bolus of 400mg/m2 and a 46-hour continuous 5-FU infusion of 2,400mg/m2. Repeat cycle every 2 weeks until disease progression.     Patient tolerated well cycle 1 FOLFIRINOX so far with minimal side effects. Recheck labs before next cycle. *UG T1 A1  heterozygous  *Brain MRI patient could not do MRI due to claustrophobia. *Signed chemotherapy consent  *Continue  Glucerna. *Check monthly CA-19-9     2. Liver metastases ([facility] [address]  I will follow-up with imaging after few cycles of treatment.     3. Epigastric pain  Still having severe epigastric especially after he eats. Stop Oxycontin-Give Fentanyl patch 25 mcg Q 72 hours dosing-Give Oxycodone 10 mg PO Q 4hours prn. Stop Norco-Due to tylenol        4. Pulmonary embolism without acute cor pulmonale, unspecified chronicity, unspecified pulmonary embolism type (HCC)  Continue Xarelto     5. Thrombocytopenia ([address]  This is likely secondary to the mild splenomegaly seen on imaging.  Continue follow-up.     6. Insulin dependent diabetes:Likely from the pancreatic cancer. PCP to stop Metformin since patient is likely not making insulin anymore.  Needs tight glucose control with insulin. No steroids for pre-chemotherapy. Give Zyprexa for nausea. 7. Constipation: Likely from opiates and zofran.  Give lactulose     RTC 4 weeks            Elisha Gupta MD

## 2019-12-18 NOTE — NURSE NAVIGATOR
Saw pt in clinic with Dr. Uzma Gaston c/o cancer related pain, constipation and edema in right knee. Pain meds adjusted, X-Ray right knee and labs ordered. RTC in 4 weeks, all questions answered.

## 2019-12-18 NOTE — PROGRESS NOTES
Sulma Valera is a 62 y.o. male presenting today for a follow-up appointment. Patient is ambulatory with no assistive devices and reports 2/10 pain in right knee and abdominal pain this morning \"it hurts when I eat, I had to take an extra pain pill\". Also reports difficulty with memory, \"I forgot how to tie my shoes yesterday\". Chief Complaint   Patient presents with    Pancreatic Cancer       Visit Vitals  /69   Pulse 92   Temp 99 °F (37.2 °C) (Oral)   Resp 18   Wt 214 lb 3.2 oz (97.2 kg)   SpO2 93%   BMI 29.05 kg/m²       Current Outpatient Medications   Medication Sig    oxyCODONE ER 30 mg TR12 Take 30 mg by mouth every twelve (12) hours for 30 days. Max Daily Amount: 60 mg.    naloxone (NARCAN) 4 mg/actuation nasal spray Use 1 spray intranasally, then discard. Repeat with new spray every 2 min as needed for opioid overdose symptoms, alternating nostrils. Indications: opioid overdose    insulin glargine (LANTUS U-100 INSULIN) 100 unit/mL injection 28 Units by SubCUTAneous route nightly.  ondansetron hcl (ZOFRAN) 4 mg tablet Take 1 Tab by mouth every eight (8) hours as needed for Nausea.  insulin lispro (HUMALOG) 100 unit/mL injection 3-15 Units by SubCUTAneous route Before breakfast, lunch, and dinner.  OLANZapine (ZYPREXA ZYDIS) 5 mg disintegrating tablet Take 5mg PO the night before chemotherapy, then 5mg PO the morning of chemotherapy, then 5mg PO three times a day as needed for 1 week following chemotherapy. Indications: prevent nausea and vomiting from cancer chemotherapy    rivaroxaban (XARELTO) 15 mg (42)- 20 mg (9) DsPk Take 20 mg by mouth daily. Take one 15 mg tablet twice a day with food for the first 21 days. Then, take one 20 mg tablet once a day with food for 9 days.  naloxone (NARCAN) 4 mg/actuation nasal spray Use 1 spray intranasally, then discard. Repeat with new spray every 2 min as needed for opioid overdose symptoms, alternating nostrils.     dexAMETHasone (DECADRON) 4 mg tablet Take 4mg by mouth twice a day the day before chemotherapy and the day after chemotherapy    lidocaine-prilocaine (EMLA) topical cream Apply  to affected area as needed for Pain (Apply to skin 30-60 minutes before mediport access).  nut.tx.gluc.intol,lac-free,soy (GLUCERNA ADVANCE) liqd Take 237 mL by mouth three (3) times daily.  TRUE METRIX GLUCOSE METER misc USE AS DIRECTED    omeprazole (PRILOSEC) 20 mg capsule TAKE 1 CAPSULE BY MOUTH ONCE DAILY    ondansetron (ZOFRAN ODT) 4 mg disintegrating tablet Take 1 Tab by mouth every eight (8) hours as needed for Nausea.  QUEtiapine (SEROQUEL) 50 mg tablet Take 50 mg by mouth nightly.  lidocaine (LIDODERM) 5 % Apply patch to the affected area for 12 hours a day and remove for 12 hours a day. No current facility-administered medications for this visit. Facility-Administered Medications Ordered in Other Visits   Medication Dose Route Frequency    PHARMACY INFORMATION NOTE  1 Each Other Rx Dosing/Monitoring    fluorouracil (ADRUCIL) 5,300 mg in 0.9% sodium chloride 250 mL CADD Cassette  5,300 mg IntraVENous ONCE       Medications no longer taking/discontinued: lidocaine, seroquel    Fall Risk Assessment, last 12 mths 11/19/2019   Able to walk? Yes   Fall in past 12 months? No       3 most recent PHQ Screens 12/3/2019   Little interest or pleasure in doing things Not at all   Feeling down, depressed, irritable, or hopeless Not at all   Total Score PHQ 2 0       Abuse Screening Questionnaire 11/19/2019   Do you ever feel afraid of your partner? N   Are you in a relationship with someone who physically or mentally threatens you? N   Is it safe for you to go home? Y       1. Have you been to the ER, urgent care clinic since your last visit? Hospitalized since your last visit? No    2. Have you seen or consulted any other health care providers outside of the [address] [place] [address] since your last visit?   Include any pap smears or colon screening.  No

## 2019-12-19 LAB
CANCER AG19-9 SERPL-ACNC: 85 U/ML (ref 0–35)
SPECIMEN STATUS REPORT, ROLRST: NORMAL
URATE SERPL-MCNC: 6.8 MG/DL (ref 3.7–8.6)

## 2019-12-20 ENCOUNTER — TELEPHONE (OUTPATIENT)
Dept: ONCOLOGY | Age: 54
End: 2019-12-20

## 2019-12-20 ENCOUNTER — HOSPITAL ENCOUNTER (EMERGENCY)
Age: 54
Discharge: HOME | End: 2019-12-20
Attending: EMERGENCY MEDICINE
Payer: MEDICAID

## 2019-12-20 VITALS
HEART RATE: 70 BPM | WEIGHT: 170 LBS | BODY MASS INDEX: 23.03 KG/M2 | HEIGHT: 72 IN | OXYGEN SATURATION: 82 % | SYSTOLIC BLOOD PRESSURE: 115 MMHG | TEMPERATURE: 99.4 F | RESPIRATION RATE: 17 BRPM | DIASTOLIC BLOOD PRESSURE: 74 MMHG

## 2019-12-20 DIAGNOSIS — R10.13 ABDOMINAL PAIN, EPIGASTRIC: ICD-10-CM

## 2019-12-20 DIAGNOSIS — C25.9 MALIGNANT NEOPLASM OF PANCREAS, UNSPECIFIED LOCATION OF MALIGNANCY (HCC): Primary | ICD-10-CM

## 2019-12-20 LAB
ALBUMIN SERPL-MCNC: 3.6 G/DL (ref 3.4–5)
ALBUMIN/GLOB SERPL: 1.1 {RATIO} (ref 0.8–1.7)
ALP SERPL-CCNC: 151 U/L (ref 45–117)
ALT SERPL-CCNC: 31 U/L (ref 16–61)
ANION GAP SERPL CALC-SCNC: 7 MMOL/L (ref 3–18)
AST SERPL-CCNC: 39 U/L (ref 10–38)
BASOPHILS # BLD: 0 K/UL (ref 0–0.1)
BASOPHILS NFR BLD: 0 % (ref 0–2)
BILIRUB SERPL-MCNC: 0.5 MG/DL (ref 0.2–1)
BUN SERPL-MCNC: 19 MG/DL (ref 7–18)
BUN/CREAT SERPL: 22 (ref 12–20)
CALCIUM SERPL-MCNC: 8.6 MG/DL (ref 8.5–10.1)
CHLORIDE SERPL-SCNC: 103 MMOL/L (ref 100–111)
CO2 SERPL-SCNC: 25 MMOL/L (ref 21–32)
CREAT SERPL-MCNC: 0.88 MG/DL (ref 0.6–1.3)
DIFFERENTIAL METHOD BLD: ABNORMAL
EOSINOPHIL # BLD: 0.1 K/UL (ref 0–0.4)
EOSINOPHIL NFR BLD: 2 % (ref 0–5)
ERYTHROCYTE [DISTWIDTH] IN BLOOD BY AUTOMATED COUNT: 12.9 % (ref 11.6–14.5)
GLOBULIN SER CALC-MCNC: 3.4 G/DL (ref 2–4)
GLUCOSE SERPL-MCNC: 112 MG/DL (ref 74–99)
HCT VFR BLD AUTO: 35.6 % (ref 36–48)
HGB BLD-MCNC: 11.5 G/DL (ref 13–16)
LIPASE SERPL-CCNC: 36 U/L (ref 73–393)
LYMPHOCYTES # BLD: 1.4 K/UL (ref 0.9–3.6)
LYMPHOCYTES NFR BLD: 22 % (ref 21–52)
MCH RBC QN AUTO: 28.9 PG (ref 24–34)
MCHC RBC AUTO-ENTMCNC: 32.3 G/DL (ref 31–37)
MCV RBC AUTO: 89.4 FL (ref 74–97)
MONOCYTES # BLD: 0.1 K/UL (ref 0.05–1.2)
MONOCYTES NFR BLD: 1 % (ref 3–10)
NEUTS SEG # BLD: 4.8 K/UL (ref 1.8–8)
NEUTS SEG NFR BLD: 75 % (ref 40–73)
PLATELET # BLD AUTO: 105 K/UL (ref 135–420)
PMV BLD AUTO: 10.7 FL (ref 9.2–11.8)
POTASSIUM SERPL-SCNC: 3.9 MMOL/L (ref 3.5–5.5)
PROT SERPL-MCNC: 7 G/DL (ref 6.4–8.2)
RBC # BLD AUTO: 3.98 M/UL (ref 4.7–5.5)
SODIUM SERPL-SCNC: 135 MMOL/L (ref 136–145)
WBC # BLD AUTO: 6.4 K/UL (ref 4.6–13.2)

## 2019-12-20 PROCEDURE — 83690 ASSAY OF LIPASE: CPT

## 2019-12-20 PROCEDURE — 96374 THER/PROPH/DIAG INJ IV PUSH: CPT

## 2019-12-20 PROCEDURE — 80053 COMPREHEN METABOLIC PANEL: CPT

## 2019-12-20 PROCEDURE — 99283 EMERGENCY DEPT VISIT LOW MDM: CPT

## 2019-12-20 PROCEDURE — 96375 TX/PRO/DX INJ NEW DRUG ADDON: CPT

## 2019-12-20 PROCEDURE — 74011250636 HC RX REV CODE- 250/636: Performed by: EMERGENCY MEDICINE

## 2019-12-20 PROCEDURE — 87081 CULTURE SCREEN ONLY: CPT

## 2019-12-20 PROCEDURE — 85025 COMPLETE CBC W/AUTO DIFF WBC: CPT

## 2019-12-20 RX ORDER — FAMOTIDINE 10 MG/ML
20 INJECTION INTRAVENOUS
Status: COMPLETED | OUTPATIENT
Start: 2019-12-20 | End: 2019-12-20

## 2019-12-20 RX ORDER — FAMOTIDINE 10 MG/ML
INJECTION INTRAVENOUS
Status: DISCONTINUED
Start: 2019-12-20 | End: 2019-12-20 | Stop reason: HOSPADM

## 2019-12-20 RX ORDER — HYDROMORPHONE HYDROCHLORIDE 1 MG/ML
1 INJECTION, SOLUTION INTRAMUSCULAR; INTRAVENOUS; SUBCUTANEOUS ONCE
Status: COMPLETED | OUTPATIENT
Start: 2019-12-20 | End: 2019-12-20

## 2019-12-20 RX ORDER — ONDANSETRON 2 MG/ML
4 INJECTION INTRAMUSCULAR; INTRAVENOUS
Status: COMPLETED | OUTPATIENT
Start: 2019-12-20 | End: 2019-12-20

## 2019-12-20 RX ADMIN — ONDANSETRON 4 MG: 2 INJECTION INTRAMUSCULAR; INTRAVENOUS at 17:05

## 2019-12-20 RX ADMIN — FAMOTIDINE 20 MG: 10 INJECTION, SOLUTION INTRAVENOUS at 17:44

## 2019-12-20 RX ADMIN — SODIUM CHLORIDE 1000 ML: 900 INJECTION, SOLUTION INTRAVENOUS at 17:05

## 2019-12-20 RX ADMIN — HYDROMORPHONE HYDROCHLORIDE 1 MG: 1 INJECTION, SOLUTION INTRAMUSCULAR; INTRAVENOUS; SUBCUTANEOUS at 17:05

## 2019-12-20 NOTE — DISCHARGE INSTRUCTIONS
Thank you so much for visiting us today. Please make sure to call your doctor today to be rechecked in 1-3 days. Bring your results from here with you when you do. Return immediately if any fevers, headaches, chest pain, difficulty breathing, abdominal pain, worsening or changing symptoms, or any other concerns. Patient Education        Abdominal Pain: Care Instructions  Your Care Instructions    Abdominal pain has many possible causes. Some aren't serious and get better on their own in a few days. Others need more testing and treatment. If your pain continues or gets worse, you need to be rechecked and may need more tests to find out what is wrong. You may need surgery to correct the problem. Don't ignore new symptoms, such as fever, nausea and vomiting, urination problems, pain that gets worse, and dizziness. These may be signs of a more serious problem. Your doctor may have recommended a follow-up visit in the next 8 to 12 hours. If you are not getting better, you may need more tests or treatment. The doctor has checked you carefully, but problems can develop later. If you notice any problems or new symptoms, get medical treatment right away. Follow-up care is a key part of your treatment and safety. Be sure to make and go to all appointments, and call your doctor if you are having problems. It's also a good idea to know your test results and keep a list of the medicines you take. How can you care for yourself at home? · Rest until you feel better. · To prevent dehydration, drink plenty of fluids, enough so that your urine is light yellow or clear like water. Choose water and other caffeine-free clear liquids until you feel better. If you have kidney, heart, or liver disease and have to limit fluids, talk with your doctor before you increase the amount of fluids you drink. · If your stomach is upset, eat mild foods, such as rice, dry toast or crackers, bananas, and applesauce.  Try eating several small meals instead of two or three large ones. · Wait until 48 hours after all symptoms have gone away before you have spicy foods, alcohol, and drinks that contain caffeine. · Do not eat foods that are high in fat. · Avoid anti-inflammatory medicines such as aspirin, ibuprofen (Advil, Motrin), and naproxen (Aleve). These can cause stomach upset. Talk to your doctor if you take daily aspirin for another health problem. When should you call for help? Call 911 anytime you think you may need emergency care. For example, call if:    · You passed out (lost consciousness).     · You pass maroon or very bloody stools.     · You vomit blood or what looks like coffee grounds.     · You have new, severe belly pain.    Call your doctor now or seek immediate medical care if:    · Your pain gets worse, especially if it becomes focused in one area of your belly.     · You have a new or higher fever.     · Your stools are black and look like tar, or they have streaks of blood.     · You have unexpected vaginal bleeding.     · You have symptoms of a urinary tract infection. These may include:  ? Pain when you urinate. ? Urinating more often than usual.  ? Blood in your urine.     · You are dizzy or lightheaded, or you feel like you may faint.    Watch closely for changes in your health, and be sure to contact your doctor if:    · You are not getting better after 1 day (24 hours). Where can you learn more? Go to http://kelley-carissa.info/. Enter N463 in the search box to learn more about \"Abdominal Pain: Care Instructions. \"  Current as of: June 26, 2019  Content Version: 12.2  © 3192-2340 [employer] Care instructions adapted under license by [employer] (which disclaims liability or warranty for this information).  If you have questions about a medical condition or this instruction, always ask your healthcare professional. Fabian Mo disclaims any warranty or liability for your use of this information. Patient Education        Pancreatic Cancer: Care Instructions  Your Care Instructions    Pancreatic cancer occurs when abnormal cells grow out of control in the pancreas. Your pancreas is in your belly, behind your stomach. It makes juices that help your body digest food. It also makes insulin, which helps control your blood sugar level. You may have a combination of treatments, depending on how advanced your cancer is. You may have surgery to take out part or all of your pancreas. Also, you may need radiation treatments or may take medicines that destroy cancer cells (chemotherapy). You may need to take medicines to help you digest food and control your blood sugar. If the cancer causes pain, your doctor will give you medicine or other treatment to help you be more comfortable. When you find out that you have cancer, you may feel many emotions and may need some help coping. Seek out family, friends, and counselors for support. You also can do things at home to make yourself feel better while you go through treatment. Call the [employer] (8-709.607.5856) or visit its website at [employer] for more information. Follow-up care is a key part of your treatment and safety. Be sure to make and go to all appointments, and call your doctor if you are having problems. It's also a good idea to know your test results and keep a list of the medicines you take. How can you care for yourself at home? · Take your medicines exactly as prescribed. Call your doctor if you think you are having a problem with your medicine. You may get medicine for nausea and vomiting if you have these side effects. · Eat healthy food. If you do not feel like eating, try to eat food that has protein and extra calories to keep up your strength and prevent weight loss. Drink liquid meal replacements for extra calories and protein. Try to eat your main meal early.   · Get some physical activity every day, but do not get too tired. Keep doing the hobbies you enjoy as your energy allows. · Take steps to control your stress and workload. Learn relaxation techniques. ? Share your feelings. Stress and tension affect our emotions. By expressing your feelings to others, you may be able to understand and cope with them. ? Consider joining a support group. Talking about a problem with your spouse, a good friend, or other people with similar problems is a good way to reduce tension and stress. ? Express yourself through art. Try writing, crafts, dance, or art to relieve stress. Some dance, writing, or art groups may be available just for people who have cancer. ? Be kind to your body and mind. Getting enough sleep, eating a healthy diet, and taking time to do things you enjoy can contribute to an overall feeling of balance in your life and help reduce stress. ? Get help if you need it. Discuss your concerns with your doctor or counselor. · If you are vomiting or have diarrhea:  ? Drink plenty of fluids (enough so that your urine is light yellow or clear like water) to prevent dehydration. Choose water and other caffeine-free clear liquids. If you have kidney, heart, or liver disease and have to limit fluids, talk with your doctor before you increase the amount of fluids you drink. ? When you are able to eat, try clear soups, mild foods, and liquids until all symptoms are gone for 12 to 48 hours. Other good choices include dry toast, crackers, cooked cereal, and gelatin dessert, such as Jell-O.  · If you have not already done so, prepare a list of advance directives. Advance directives are instructions to your doctor and family members about what kind of care you want if you become unable to speak or express yourself. When should you call for help? Call 911 anytime you think you may need emergency care.  For example, call if:    · You passed out (lost consciousness).    Call your doctor now or seek immediate medical care if:    · You have a fever.     · You have abnormal bleeding.     · You have new or worse pain.     · You think you have an infection.     · You have new symptoms, such as a cough, belly pain, vomiting, diarrhea, or a rash.    Watch closely for changes in your health, and be sure to contact your doctor if:    · You are much more tired than usual.     · You have swollen glands in your armpits, groin, or neck.     · You do not get better as expected. Where can you learn more? Go to http://kelley-carissa.info/. Enter A692 in the search box to learn more about \"Pancreatic Cancer: Care Instructions. \"  Current as of: December 19, 2018  Content Version: 12.2  © 4729-6679 [employer]. Care instructions adapted under license by [employer] (which disclaims liability or warranty for this information). If you have questions about a medical condition or this instruction, always ask your healthcare professional. [address] any warranty or liability for your use of this information.

## 2019-12-20 NOTE — TELEPHONE ENCOUNTER
Caller is advising Mr Simi Patrick is in pain (its btwn 8-10) and he is unable to sleep since having his chemo bag removed

## 2019-12-20 NOTE — ED NOTES
Primary nurse Michelle RN states she is having trouble opening door of pyxis to obtain Pepcid. Attempted to retreive Pepcid from pyxis, pyxis door will not open. Notified Deandra Jin in pharmacy of door not opening and to credit patient for attempts of obtaining Pepcid.

## 2019-12-20 NOTE — ED TRIAGE NOTES
Pt with hx of Pancreatic CA stage IV arrives with c/o severe abdominal pain starting early in the am with headache and sore throat. Pt denies N/V and diarrhea or dysuria. Pt has had 1 round of chemo, and due for next in 2 weeks.

## 2019-12-20 NOTE — TELEPHONE ENCOUNTER
Sister in [place] called back about patient's pain, states the Fentanyl is not working and the oxycodone is not helping either, wants to know if he can go back to taking the Oxycontin.  Please advise and f/u

## 2019-12-20 NOTE — ED PROVIDER NOTES
[address] [facility]  EMERGENCY DEPARTMENT HISTORY AND PHYSICAL EXAM       Date: 12/20/2019   Patient Name: Evans Balbuena   YOB: 1965  Medical Record Number: [id number]    HISTORY OF PRESENTING ILLNESS:     Evans Balbuena is a 62 y.o. male presenting with the noted PMH to the ED c/o epigastric abdominal pain. According to patient and patient's wife. They state that he has epigastric bowel pain. It started this morning. States it is constant. Feels better when he rubs it. No decreasing factors. 2 days ago he was seen by his pain specialist.  They switched him off the 30 mg of OxyContin and put him on 10 mg of OxyContin. Wife states he is not doing any better. Now he is doing worse. He states this morning started complaining of a sore throat. Increases when he swallows. No decreasing factors. Denies any fevers or chills. Denies any chest pain or shortness of breath. Denies any urinary changes. Patient states he is got history of problems with constipation however his last bowel movement was yesterday. He takes a stool softener. Had his first dose of chemo 5 days ago. Was diagnosed with pancreatic cancer stage IV about a month ago. Has been having pain since then. Denies any injury or trauma. Rest of 10 systems reviewed and negative. Primary Care Provider: Janel, MD Jewel   Specialist: + onco.     Past Medical History:   Past Medical History:   Diagnosis Date    Cancer [facility])     Pancreatic Adenocarcinoma. Diagnosed 11/2019.  Diabetes ([facility] [address] 10/2019    Diagnosed 10/2019.  Hepatitis C     Received Curative Treatment, but Hepatitis C was not cured and returned.  Left ulnar fracture     Thromboembolus ([address]     DVT of LLE 11/2019. Also, Pulmonary Embolism 11/2019. Treated with Rivaroxaban.         Past Surgical History:   Past Surgical History:   Procedure Laterality Date    HX TONSILLECTOMY      IR BX PANCREAS NEEDLE PERC  11/2019    Endoscopic Pancreatic Biopsy (?)    IR INSERT TUNL CVC W PORT OVER 5 YEARS  2019        Social History:   Social History     Tobacco Use    Smoking status: Former Smoker     Packs/day: 0.50     Years: 40.00     Pack years: 20.00     Last attempt to quit: 2018     Years since quittin.1    Smokeless tobacco: Never Used    Tobacco comment: Quit 2018   Substance Use Topics    Alcohol use: Not Currently    Drug use: Not Currently     Types: Marijuana, Cocaine, Heroin     Comment: Quit Cocaine, Heroin, and Marijuana since 2018        Allergies:   No Known Allergies     REVIEW OF SYSTEMS:  Review of Systems      PHYSICAL EXAM:  Vitals:    19 1448 19 1449 19 1700 19 1730   BP: 126/73  116/64 119/72   Pulse:  79 71 70   Resp:  17     Temp:       SpO2:  99% 99% 97%   Weight:       Height:           Physical Exam   Vital signs reviewed. Alert oriented x 3 in NAD. HEENT: normocephalic atraumatic. Eyes are PERRLA EOMI. Conjunctiva normal.    External ears and nose normal.    Neck: normal external exam. No midline neck or back TTP. Lungs are clear to ascultation bilaterally. normal effort  Heart is regular rate and rhythm with no murmurs. Abdomen soft and nontender. No rebound rigidity or guarding. Extremities: Moves all 4 extremities and no distress. Full range of motion. 2+ pulses and BCR in all 4 extremities. Neuro: Normal gait. 5 out of 5 strength in all 4 extremities. No facial droop. Skin examination: intact. no rashes. No petechia or purpura. \      Medications   famotidine (PF) (PEPCID) 20 mg/2 mL injection (has no administration in time range)   famotidine (PF) (PEPCID) 20 mg/2 mL injection (has no administration in time range)   famotidine (PF) (PEPCID) 20 mg/2 mL injection (has no administration in time range)   HYDROmorphone (DILAUDID) injection 1 mg (1 mg IntraVENous Given 19 1707)   famotidine (PF) (PEPCID) injection 20 mg (20 mg IntraVENous Given 19 6435) ondansetron [facility] injection 4 mg (4 mg IntraVENous Given 12/20/19 1705)   sodium chloride 0.9 % bolus infusion 1,000 mL (1,000 mL IntraVENous New Bag 12/20/19 1705)       RESULTS:    Labs -   Labs Reviewed   CBC WITH AUTOMATED DIFF - Abnormal; Notable for the following components:       Result Value    RBC 3.98 (*)     HGB 11.5 (*)     HCT 35.6 (*)     PLATELET 053 (*)     NEUTROPHILS 75 (*)     MONOCYTES 1 (*)     All other components within normal limits   METABOLIC PANEL, COMPREHENSIVE - Abnormal; Notable for the following components:    Sodium 135 (*)     Glucose 112 (*)     BUN 19 (*)     BUN/Creatinine ratio 22 (*)     AST (SGOT) 39 (*)     Alk. phosphatase 151 (*)     All other components within normal limits   LIPASE - Abnormal; Notable for the following components:    Lipase 36 (*)     All other components within normal limits   STREP THROAT SCREEN       Radiologic Studies -  No results found. MEDICAL DECISION MAKING    Abdomen soft and nonsurgical.  No signs or symptoms of acute appendicitis cholecystitis or pancreatitis. No rebound rigidity or guarding. Suspect changing his pain medicine and decreasing the dose has caused the increased pain today. Will give pain medicine and check blood work here. No evidence of James angina or peritonsillar abscess. Will check for strep. cx pending. Pt and family aware. 1750: Pt reassessed. Smiling feeling much better. results and precautions explained. Strep cx pending. Pt and family aware. Will follow up with his PMD and his oncologist to be rechecked. Patient has no new complaints, changes, or physical findings. Results were reviewed with the patient. Pt's questions and concerns were addressed. Care plan was outlined, including follow-up with PCP/specialist and return precautions were discussed. Patient is felt to be stable for discharge at this time. Diagnosis   Clinical Impression:   1.  Malignant neoplasm of pancreas, unspecified [facility] of [facility]    2. Abdominal pain, epigastric           Follow-up Information    None         Current Discharge Medication List          Discharged in stable and improved condition. This chart was completed using Dragon, a dictation transcription service. Errors may have resulted from using this device.

## 2019-12-23 ENCOUNTER — TELEPHONE (OUTPATIENT)
Dept: ONCOLOGY | Age: 54
End: 2019-12-23

## 2019-12-23 LAB
B-HEM STREP THROAT QL CULT: NEGATIVE
BACTERIA SPEC CULT: NORMAL
SERVICE CMNT-IMP: NORMAL

## 2019-12-24 RX ORDER — ALBUTEROL SULFATE 0.83 MG/ML
2.5 SOLUTION RESPIRATORY (INHALATION) AS NEEDED
Status: CANCELLED
Start: 2019-12-30

## 2019-12-24 RX ORDER — ACETAMINOPHEN 325 MG/1
650 TABLET ORAL AS NEEDED
Status: CANCELLED
Start: 2019-12-30

## 2019-12-24 RX ORDER — DIPHENHYDRAMINE HYDROCHLORIDE 50 MG/ML
50 INJECTION, SOLUTION INTRAMUSCULAR; INTRAVENOUS AS NEEDED
Status: CANCELLED
Start: 2019-12-30

## 2019-12-24 RX ORDER — SODIUM CHLORIDE 9 MG/ML
10 INJECTION INTRAMUSCULAR; INTRAVENOUS; SUBCUTANEOUS AS NEEDED
Status: CANCELLED | OUTPATIENT
Start: 2020-01-01

## 2019-12-24 RX ORDER — ATROPINE SULFATE 0.4 MG/ML
0.4 INJECTION, SOLUTION ENDOTRACHEAL; INTRAMEDULLARY; INTRAMUSCULAR; INTRAVENOUS; SUBCUTANEOUS
Status: CANCELLED | OUTPATIENT
Start: 2019-12-30

## 2019-12-24 RX ORDER — SODIUM CHLORIDE 9 MG/ML
10 INJECTION INTRAMUSCULAR; INTRAVENOUS; SUBCUTANEOUS AS NEEDED
Status: CANCELLED | OUTPATIENT
Start: 2019-12-30

## 2019-12-24 RX ORDER — ONDANSETRON 2 MG/ML
8 INJECTION INTRAMUSCULAR; INTRAVENOUS AS NEEDED
Status: CANCELLED | OUTPATIENT
Start: 2019-12-30

## 2019-12-24 RX ORDER — HEPARIN 100 UNIT/ML
300-500 SYRINGE INTRAVENOUS AS NEEDED
Status: CANCELLED
Start: 2019-12-30

## 2019-12-24 RX ORDER — SODIUM CHLORIDE 0.9 % (FLUSH) 0.9 %
10 SYRINGE (ML) INJECTION AS NEEDED
Status: CANCELLED
Start: 2020-01-01

## 2019-12-24 RX ORDER — HYDROCORTISONE SODIUM SUCCINATE 100 MG/2ML
100 INJECTION, POWDER, FOR SOLUTION INTRAMUSCULAR; INTRAVENOUS AS NEEDED
Status: CANCELLED | OUTPATIENT
Start: 2019-12-30

## 2019-12-24 RX ORDER — EPINEPHRINE 1 MG/ML
0.3 INJECTION, SOLUTION, CONCENTRATE INTRAVENOUS AS NEEDED
Status: CANCELLED | OUTPATIENT
Start: 2019-12-30

## 2019-12-24 RX ORDER — HEPARIN 100 UNIT/ML
300-500 SYRINGE INTRAVENOUS AS NEEDED
Status: CANCELLED
Start: 2020-01-01

## 2019-12-24 RX ORDER — FLUOROURACIL 50 MG/ML
900 INJECTION, SOLUTION INTRAVENOUS ONCE
Status: CANCELLED
Start: 2019-12-30 | End: 2019-12-30

## 2019-12-24 NOTE — TELEPHONE ENCOUNTER
Called and let the pt know that we sent Breo to her pharmacy.   Left barry Garcia requesting call back with additional information about patient's symptoms.

## 2019-12-27 ENCOUNTER — HOSPITAL ENCOUNTER (OUTPATIENT)
Dept: INFUSION THERAPY | Age: 54
Discharge: HOME | End: 2019-12-27
Payer: MEDICAID

## 2019-12-27 VITALS
DIASTOLIC BLOOD PRESSURE: 71 MMHG | HEART RATE: 103 BPM | SYSTOLIC BLOOD PRESSURE: 118 MMHG | TEMPERATURE: 96.9 F | HEIGHT: 72 IN | BODY MASS INDEX: 27.81 KG/M2 | WEIGHT: 205.3 LBS | RESPIRATION RATE: 18 BRPM

## 2019-12-27 LAB
ALBUMIN SERPL-MCNC: 3.3 G/DL (ref 3.4–5)
ALBUMIN/GLOB SERPL: 0.8 {RATIO} (ref 0.8–1.7)
ALP SERPL-CCNC: 146 U/L (ref 45–117)
ALT SERPL-CCNC: 22 U/L (ref 16–61)
ANION GAP SERPL CALC-SCNC: 8 MMOL/L (ref 3–18)
AST SERPL-CCNC: 25 U/L (ref 10–38)
BASO+EOS+MONOS # BLD AUTO: 0.7 K/UL (ref 0–2.3)
BASO+EOS+MONOS NFR BLD AUTO: 13 % (ref 0.1–17)
BILIRUB SERPL-MCNC: 0.4 MG/DL (ref 0.2–1)
BUN SERPL-MCNC: 19 MG/DL (ref 7–18)
BUN/CREAT SERPL: 22 (ref 12–20)
CALCIUM SERPL-MCNC: 8.6 MG/DL (ref 8.5–10.1)
CHLORIDE SERPL-SCNC: 108 MMOL/L (ref 100–111)
CO2 SERPL-SCNC: 25 MMOL/L (ref 21–32)
CREAT SERPL-MCNC: 0.88 MG/DL (ref 0.6–1.3)
DIFFERENTIAL METHOD BLD: ABNORMAL
ERYTHROCYTE [DISTWIDTH] IN BLOOD BY AUTOMATED COUNT: 13.2 % (ref 11.5–14.5)
GLOBULIN SER CALC-MCNC: 4.1 G/DL (ref 2–4)
GLUCOSE SERPL-MCNC: 114 MG/DL (ref 74–99)
HCT VFR BLD AUTO: 35.8 % (ref 36–48)
HGB BLD-MCNC: 12.1 G/DL (ref 12–16)
LYMPHOCYTES # BLD: 1.7 K/UL (ref 1.1–5.9)
LYMPHOCYTES NFR BLD: 29 % (ref 14–44)
MCH RBC QN AUTO: 29.4 PG (ref 25–35)
MCHC RBC AUTO-ENTMCNC: 33.8 G/DL (ref 31–37)
MCV RBC AUTO: 86.9 FL (ref 78–102)
NEUTS SEG # BLD: 3.5 K/UL (ref 1.8–9.5)
NEUTS SEG NFR BLD: 58 % (ref 40–70)
PLATELET # BLD AUTO: 94 K/UL (ref 140–440)
POTASSIUM SERPL-SCNC: 3.4 MMOL/L (ref 3.5–5.5)
PROT SERPL-MCNC: 7.4 G/DL (ref 6.4–8.2)
RBC # BLD AUTO: 4.12 M/UL (ref 4.1–5.1)
SODIUM SERPL-SCNC: 141 MMOL/L (ref 136–145)
WBC # BLD AUTO: 5.9 K/UL (ref 4.5–13)

## 2019-12-27 PROCEDURE — 36415 COLL VENOUS BLD VENIPUNCTURE: CPT

## 2019-12-27 PROCEDURE — 80053 COMPREHEN METABOLIC PANEL: CPT

## 2019-12-27 PROCEDURE — 85025 COMPLETE CBC W/AUTO DIFF WBC: CPT

## 2019-12-27 NOTE — PROGRESS NOTES
[facility] Progress Note    Date: 2019    Name: Lila Darling    MRN: [id number]         : 1965      Mr. Olmos arrived to [facility] at 0900 for peripheral lab draw for 0900. Mr. Ashley Glez vitals were reviewed. Visit Vitals  /71 (BP 1 Location: Right arm, BP Patient Position: Sitting)   Pulse (!) 103   Temp 96.9 °F (36.1 °C)   Resp 18     Recent Results (from the past 12 hour(s))   CBC WITH 3 PART DIFF    Collection Time: 19  9:05 AM   Result Value Ref Range    WBC 5.9 4.5 - 13.0 K/uL    RBC 4.12 4.10 - 5.10 M/uL    HGB 12.1 12.0 - 16.0 g/dL    HCT 35.8 (L) 36 - 48 %    MCV 86.9 78 - 102 FL    MCH 29.4 25.0 - 35.0 PG    MCHC 33.8 31 - 37 g/dL    RDW 13.2 11.5 - 14.5 %    PLATELET 94 (L) 616 - 440 K/uL    NEUTROPHILS 58 40 - 70 %    MIXED CELLS 13 0.1 - 17 %    LYMPHOCYTES 29 14 - 44 %    ABS. NEUTROPHILS 3.5 1.8 - 9.5 K/UL    ABS. MIXED CELLS 0.7 0.0 - 2.3 K/uL    ABS. LYMPHOCYTES 1.7 1.1 - 5.9 K/UL    DF AUTOMATED     METABOLIC PANEL, COMPREHENSIVE    Collection Time: 19  9:05 AM   Result Value Ref Range    Sodium 141 136 - 145 mmol/L    Potassium 3.4 (L) 3.5 - 5.5 mmol/L    Chloride 108 100 - 111 mmol/L    CO2 25 21 - 32 mmol/L    Anion gap 8 3.0 - 18 mmol/L    Glucose 114 (H) 74 - 99 mg/dL    BUN 19 (H) 7.0 - 18 MG/DL    Creatinine 0.88 0.6 - 1.3 MG/DL    BUN/Creatinine ratio 22 (H) 12 - 20      GFR est AA >60 >60 ml/min/1.73m2    GFR est non-AA >60 >60 ml/min/1.73m2    Calcium 8.6 8.5 - 10.1 MG/DL    Bilirubin, total 0.4 0.2 - 1.0 MG/DL    ALT (SGPT) 22 16 - 61 U/L    AST (SGOT) 25 10 - 38 U/L    Alk. phosphatase 146 (H) 45 - 117 U/L    Protein, total 7.4 6.4 - 8.2 g/dL    Albumin 3.3 (L) 3.4 - 5.0 g/dL    Globulin 4.1 (H) 2.0 - 4.0 g/dL    A-G Ratio 0.8 0.8 - 1.7         Blood drawn for labs via Right antecubital venipuncture x1 attempt. Gauze and coban applied to site. Mr. Bobbi Chamberlain tolerated well without complaints.     [facility] Bobbi [facility]lain [facility] at 0910.     Young Garcia RN  December 27, 2019

## 2019-12-30 ENCOUNTER — HOSPITAL ENCOUNTER (OUTPATIENT)
Dept: INFUSION THERAPY | Age: 54
Discharge: HOME | End: 2019-12-30
Payer: MEDICAID

## 2019-12-30 VITALS
BODY MASS INDEX: 27.81 KG/M2 | SYSTOLIC BLOOD PRESSURE: 119 MMHG | DIASTOLIC BLOOD PRESSURE: 73 MMHG | OXYGEN SATURATION: 99 % | RESPIRATION RATE: 18 BRPM | HEART RATE: 67 BPM | WEIGHT: 205.3 LBS | TEMPERATURE: 98 F | HEIGHT: 72 IN

## 2019-12-30 DIAGNOSIS — K86.89 PANCREATIC MASS: Primary | ICD-10-CM

## 2019-12-30 DIAGNOSIS — C78.7 LIVER METASTASIS (HCC): ICD-10-CM

## 2019-12-30 LAB
BASO+EOS+MONOS # BLD AUTO: 0.8 K/UL (ref 0–2.3)
BASO+EOS+MONOS NFR BLD AUTO: 14 % (ref 0.1–17)
DIFFERENTIAL METHOD BLD: ABNORMAL
ERYTHROCYTE [DISTWIDTH] IN BLOOD BY AUTOMATED COUNT: 13.6 % (ref 11.5–14.5)
HCT VFR BLD AUTO: 34.3 % (ref 36–48)
HGB BLD-MCNC: 11.7 G/DL (ref 12–16)
LYMPHOCYTES # BLD: 1.8 K/UL (ref 1.1–5.9)
LYMPHOCYTES NFR BLD: 30 % (ref 14–44)
MCH RBC QN AUTO: 29.5 PG (ref 25–35)
MCHC RBC AUTO-ENTMCNC: 34.1 G/DL (ref 31–37)
MCV RBC AUTO: 86.6 FL (ref 78–102)
NEUTS SEG # BLD: 3.3 K/UL (ref 1.8–9.5)
NEUTS SEG NFR BLD: 56 % (ref 40–70)
PLATELET # BLD AUTO: 91 K/UL (ref 140–440)
RBC # BLD AUTO: 3.96 M/UL (ref 4.1–5.1)
WBC # BLD AUTO: 5.9 K/UL (ref 4.5–13)

## 2019-12-30 PROCEDURE — 96415 CHEMO IV INFUSION ADDL HR: CPT

## 2019-12-30 PROCEDURE — 74011250636 HC RX REV CODE- 250/636: Performed by: INTERNAL MEDICINE

## 2019-12-30 PROCEDURE — 74011000258 HC RX REV CODE- 258: Performed by: INTERNAL MEDICINE

## 2019-12-30 PROCEDURE — 36591 DRAW BLOOD OFF VENOUS DEVICE: CPT

## 2019-12-30 PROCEDURE — 96375 TX/PRO/DX INJ NEW DRUG ADDON: CPT

## 2019-12-30 PROCEDURE — 85025 COMPLETE CBC W/AUTO DIFF WBC: CPT

## 2019-12-30 PROCEDURE — 96367 TX/PROPH/DG ADDL SEQ IV INF: CPT

## 2019-12-30 PROCEDURE — 96417 CHEMO IV INFUS EACH ADDL SEQ: CPT

## 2019-12-30 PROCEDURE — 96416 CHEMO PROLONG INFUSE W/PUMP: CPT

## 2019-12-30 PROCEDURE — 96413 CHEMO IV INFUSION 1 HR: CPT

## 2019-12-30 PROCEDURE — 77030012965 HC NDL HUBR BBMI -A

## 2019-12-30 RX ORDER — DEXTROSE MONOHYDRATE 50 MG/ML
25 INJECTION, SOLUTION INTRAVENOUS CONTINUOUS
Status: DISPENSED | OUTPATIENT
Start: 2019-12-30 | End: 2019-12-30

## 2019-12-30 RX ORDER — PALONOSETRON 0.05 MG/ML
0.25 INJECTION, SOLUTION INTRAVENOUS ONCE
Status: COMPLETED | OUTPATIENT
Start: 2019-12-30 | End: 2019-12-30

## 2019-12-30 RX ORDER — SODIUM CHLORIDE 0.9 % (FLUSH) 0.9 %
10 SYRINGE (ML) INJECTION AS NEEDED
Status: DISCONTINUED | OUTPATIENT
Start: 2019-12-30 | End: 2019-12-31 | Stop reason: HOSPADM

## 2019-12-30 RX ORDER — ATROPINE SULFATE 1 MG/ML
0.4 INJECTION, SOLUTION INTRAVENOUS ONCE
Status: COMPLETED | OUTPATIENT
Start: 2019-12-30 | End: 2019-12-30

## 2019-12-30 RX ADMIN — DEXTROSE 25 ML/HR: 5 SOLUTION INTRAVENOUS at 09:10

## 2019-12-30 RX ADMIN — FLUOROURACIL 5300 MG: 50 INJECTION, SOLUTION INTRAVENOUS at 14:38

## 2019-12-30 RX ADMIN — OXALIPLATIN 190 MG: 5 INJECTION, SOLUTION, CONCENTRATE INTRAVENOUS at 12:15

## 2019-12-30 RX ADMIN — IRINOTECAN HYDROCHLORIDE 400 MG: 20 INJECTION, SOLUTION INTRAVENOUS at 10:38

## 2019-12-30 RX ADMIN — PALONOSETRON 0.25 MG: 0.25 INJECTION, SOLUTION INTRAVENOUS at 09:32

## 2019-12-30 RX ADMIN — ATROPINE SULFATE 0.4 MG: 1 INJECTION, SOLUTION INTRAMUSCULAR; INTRAVENOUS; SUBCUTANEOUS at 09:32

## 2019-12-30 RX ADMIN — LEUCOVORIN CALCIUM 900 MG: 100 INJECTION, POWDER, LYOPHILIZED, FOR SOLUTION INTRAMUSCULAR; INTRAVENOUS at 10:37

## 2019-12-30 RX ADMIN — SODIUM CHLORIDE 150 MG: 900 INJECTION, SOLUTION INTRAVENOUS at 09:10

## 2019-12-30 NOTE — PROGRESS NOTES
[facility] Progress Note    Date: 2019    Name: Jorge Luis Rivas    MRN: [id number]         : 1965     Chemotherapy cycle : C2D1 Folfirinox(5FU bolus held). Mr. Mary Marquez arrived to [facility] at 65. Mr. Mary Marquez was assessed and education was provided. Mr. Praful Greco vitals were reviewed. Visit Vitals  /73 (BP 1 Location: Left arm, BP Patient Position: Sitting)   Pulse 67   Temp 98 °F (36.7 °C)   Resp 18   Ht 6' (1.829 m)   Wt 93.1 kg (205 lb 4.8 oz)   SpO2 99%   BMI 27.84 kg/m²     Labs obtained on 19 platelet count 97,812. CBC redrawn today results below shows platelets of 52,440. Dr. Ji Mccallum made aware, orders received to proceed with treatment today and HOLD 5FU bolus. Recent Results (from the past 12 hour(s))   CBC WITH 3 PART DIFF    Collection Time: 19  8:30 AM   Result Value Ref Range    WBC 5.9 4.5 - 13.0 K/uL    RBC 3.96 (L) 4.10 - 5.10 M/uL    HGB 11.7 (L) 12.0 - 16.0 g/dL    HCT 34.3 (L) 36 - 48 %    MCV 86.6 78 - 102 FL    MCH 29.5 25.0 - 35.0 PG    MCHC 34.1 31 - 37 g/dL    RDW 13.6 11.5 - 14.5 %    PLATELET 91 (L) 458 - 440 K/uL    NEUTROPHILS 56 40 - 70 %    MIXED CELLS 14 0.1 - 17 %    LYMPHOCYTES 30 14 - 44 %    ABS. NEUTROPHILS 3.3 1.8 - 9.5 K/UL    ABS. MIXED CELLS 0.8 0.0 - 2.3 K/uL    ABS. LYMPHOCYTES 1.8 1.1 - 5.9 K/UL    DF AUTOMATED         Right chest mediport accessed with 20 g 3/4 inch emmanuel needle. Port flushed easily and had brisk blood return. Blood drawn off and sent for CBC  per written orders after 10 ml waste. D5W initiated @ [facility]. Chemo dosages verified with today's BSA and found to be within 10% of ordered dosages. Pre-medications (Aloxi 0.25mg and Atropine 0.4mg IVP, Emend 150 mg IVPB) were administered as ordered and chemotherapy was initiated after blood return from port re-verified. Reviewed expected side effects of premeds with patient. Oxaliplatin 190 mg was infused at  157 ml/hr over 2 hours per order.  VS stable at end of infusion and pt denied complaints. Line flushed with D5W and blood return from port re-verified. Irinotecan 400 mg was infused at  368 ml/hr over 90 minutes per order. VS stable at end of infusion and pt denied complaints. Line flushed with D5W and blood return from port re-verified. Leucovorin 900 mg was infused at  213 ml/hr over 90 minutes per order. VS stable at end of infusion and pt denied complaints. Line flushed with D5W and blood return from port re-verified    5FU syringe 900 mg HELD per Dr. Jean Pierre Lara. 5FU 5300 mg infused via CADD pump and infusing without difficulty over 46 hours. Tubing connections reinforced with tape. Mr. Mark Anthony Lange tolerated infusion without complaints. Mr. Mark Anthony Lange was discharged from [place] [address] in stable condition at 1440. He is to return on 1/2/20 at 0830 for his next appointment.     Jessi Barker  December 30, 2019

## 2020-01-02 ENCOUNTER — TELEPHONE (OUTPATIENT)
Dept: ONCOLOGY | Age: 55
End: 2020-01-02

## 2020-01-02 ENCOUNTER — HOSPITAL ENCOUNTER (OUTPATIENT)
Dept: INFUSION THERAPY | Age: 55
Discharge: HOME | End: 2020-01-02
Payer: MEDICAID

## 2020-01-02 DIAGNOSIS — C78.7 LIVER METASTASIS (HCC): ICD-10-CM

## 2020-01-02 DIAGNOSIS — K86.89 PANCREATIC MASS: Primary | ICD-10-CM

## 2020-01-02 PROCEDURE — 96523 IRRIG DRUG DELIVERY DEVICE: CPT

## 2020-01-02 PROCEDURE — 74011250636 HC RX REV CODE- 250/636: Performed by: INTERNAL MEDICINE

## 2020-01-02 RX ORDER — HEPARIN 100 UNIT/ML
300-500 SYRINGE INTRAVENOUS AS NEEDED
Status: DISPENSED | OUTPATIENT
Start: 2020-01-02 | End: 2020-01-02

## 2020-01-02 RX ORDER — SODIUM CHLORIDE 0.9 % (FLUSH) 0.9 %
10 SYRINGE (ML) INJECTION AS NEEDED
Status: DISPENSED | OUTPATIENT
Start: 2020-01-02 | End: 2020-01-02

## 2020-01-02 RX ADMIN — Medication 500 UNITS: at 08:30

## 2020-01-02 RX ADMIN — Medication 20 ML: at 08:30

## 2020-01-02 NOTE — TELEPHONE ENCOUNTER
Spoke with nurse Ruddy Wright, she states that she saw patient and patient's sister today. She states that although today the patient had no pain or hallucinations, his sister notes that he has been having \"a lot of pain lately and that his Fentanyl patch doesn't seem to be working. The sister also mentioned he has been having hallucinations a few days ago he was punching the floor because he thought he saw spiders. He is also having anxiety and his sister wanted to know if you all could prescribe anything for that. \" Dr. Diego Kaminski notified, he states we will address this at the patient's follow up visit and if the patient has any more hallucinations he should go to the ER.

## 2020-01-02 NOTE — PROGRESS NOTES
[facility] Progress Note    Date: 2020    Name: Jorge Luis Rossi    MRN: [id number]         : 1965    D/C CADD PUMP    Mr. Ilya Ahuja arrived to [facility] at 65. Mr. Ilya Ahuja was assessed and education was provided. Mr. Coco Vyas vitals were reviewed. Patient's right upper chest port already accessed from yesterday's visit. CADD pump infusion complete. Disconnected from patient's port. Blood return verified. Right chest mediport flushed with 20 ml of NS and 500 units of heparin then de-accessed. Band aid applied. Mr. Ilya Ahuja tolerated infusion without complaints. Mr. Ilya Ahuja was discharged from [place] [address] in stable condition at 5986. He is to return on Juan 10, 2020 at 0815 for his next appointment.     Bhupendra Montes RN  2020

## 2020-01-02 NOTE — TELEPHONE ENCOUNTER
Rec'd call from [facility] Nurse with [facility]; stated she has questions and concerns regarding patient's pain medication and pain levels, also stated concerns about patient having hallucinations recently.   Would like to speak with nurse to discuss, please f/u     Jaye  [phone]

## 2020-01-07 RX ORDER — SODIUM CHLORIDE 0.9 % (FLUSH) 0.9 %
10 SYRINGE (ML) INJECTION AS NEEDED
Status: CANCELLED
Start: 2020-01-13

## 2020-01-07 RX ORDER — ATROPINE SULFATE 0.4 MG/ML
0.4 INJECTION, SOLUTION ENDOTRACHEAL; INTRAMEDULLARY; INTRAMUSCULAR; INTRAVENOUS; SUBCUTANEOUS
Status: CANCELLED | OUTPATIENT
Start: 2020-01-13

## 2020-01-07 RX ORDER — SODIUM CHLORIDE 9 MG/ML
10 INJECTION INTRAMUSCULAR; INTRAVENOUS; SUBCUTANEOUS AS NEEDED
Status: CANCELLED | OUTPATIENT
Start: 2020-01-15

## 2020-01-07 RX ORDER — SODIUM CHLORIDE 0.9 % (FLUSH) 0.9 %
10 SYRINGE (ML) INJECTION AS NEEDED
Status: CANCELLED
Start: 2020-01-15

## 2020-01-07 RX ORDER — ACETAMINOPHEN 325 MG/1
650 TABLET ORAL AS NEEDED
Status: CANCELLED
Start: 2020-01-13

## 2020-01-07 RX ORDER — ALBUTEROL SULFATE 0.83 MG/ML
2.5 SOLUTION RESPIRATORY (INHALATION) AS NEEDED
Status: CANCELLED
Start: 2020-01-13

## 2020-01-07 RX ORDER — EPINEPHRINE 1 MG/ML
0.3 INJECTION, SOLUTION, CONCENTRATE INTRAVENOUS AS NEEDED
Status: CANCELLED | OUTPATIENT
Start: 2020-01-13

## 2020-01-07 RX ORDER — HEPARIN 100 UNIT/ML
300-500 SYRINGE INTRAVENOUS AS NEEDED
Status: CANCELLED
Start: 2020-01-15

## 2020-01-07 RX ORDER — HEPARIN 100 UNIT/ML
300-500 SYRINGE INTRAVENOUS AS NEEDED
Status: CANCELLED
Start: 2020-01-13

## 2020-01-07 RX ORDER — ONDANSETRON 2 MG/ML
8 INJECTION INTRAMUSCULAR; INTRAVENOUS AS NEEDED
Status: CANCELLED | OUTPATIENT
Start: 2020-01-13

## 2020-01-07 RX ORDER — HYDROCORTISONE SODIUM SUCCINATE 100 MG/2ML
100 INJECTION, POWDER, FOR SOLUTION INTRAMUSCULAR; INTRAVENOUS AS NEEDED
Status: CANCELLED | OUTPATIENT
Start: 2020-01-13

## 2020-01-07 RX ORDER — DIPHENHYDRAMINE HYDROCHLORIDE 50 MG/ML
50 INJECTION, SOLUTION INTRAMUSCULAR; INTRAVENOUS AS NEEDED
Status: CANCELLED
Start: 2020-01-13

## 2020-01-10 ENCOUNTER — HOSPITAL ENCOUNTER (OUTPATIENT)
Dept: INFUSION THERAPY | Age: 55
Discharge: HOME | End: 2020-01-10
Payer: MEDICAID

## 2020-01-10 VITALS
BODY MASS INDEX: 26.92 KG/M2 | OXYGEN SATURATION: 98 % | TEMPERATURE: 98.3 F | HEART RATE: 72 BPM | DIASTOLIC BLOOD PRESSURE: 73 MMHG | WEIGHT: 198.5 LBS | SYSTOLIC BLOOD PRESSURE: 117 MMHG

## 2020-01-10 DIAGNOSIS — C25.9 PANCREATIC ADENOCARCINOMA (HCC): Primary | ICD-10-CM

## 2020-01-10 DIAGNOSIS — C78.7 LIVER METASTASES (HCC): ICD-10-CM

## 2020-01-10 DIAGNOSIS — G89.3 CANCER ASSOCIATED PAIN: ICD-10-CM

## 2020-01-10 LAB
ALBUMIN SERPL-MCNC: 3.5 G/DL (ref 3.4–5)
ALBUMIN/GLOB SERPL: 0.8 {RATIO} (ref 0.8–1.7)
ALP SERPL-CCNC: 128 U/L (ref 45–117)
ALT SERPL-CCNC: 20 U/L (ref 16–61)
ANION GAP SERPL CALC-SCNC: 7 MMOL/L (ref 3–18)
AST SERPL-CCNC: 26 U/L (ref 10–38)
BASO+EOS+MONOS # BLD AUTO: 0.8 K/UL (ref 0–2.3)
BASO+EOS+MONOS NFR BLD AUTO: 13 % (ref 0.1–17)
BILIRUB SERPL-MCNC: 0.5 MG/DL (ref 0.2–1)
BUN SERPL-MCNC: 15 MG/DL (ref 7–18)
BUN/CREAT SERPL: 15 (ref 12–20)
CALCIUM SERPL-MCNC: 9.3 MG/DL (ref 8.5–10.1)
CHLORIDE SERPL-SCNC: 104 MMOL/L (ref 100–111)
CO2 SERPL-SCNC: 28 MMOL/L (ref 21–32)
CREAT SERPL-MCNC: 0.97 MG/DL (ref 0.6–1.3)
DIFFERENTIAL METHOD BLD: ABNORMAL
ERYTHROCYTE [DISTWIDTH] IN BLOOD BY AUTOMATED COUNT: 13.8 % (ref 11.5–14.5)
GLOBULIN SER CALC-MCNC: 4.3 G/DL (ref 2–4)
GLUCOSE SERPL-MCNC: 145 MG/DL (ref 74–99)
HCT VFR BLD AUTO: 37.3 % (ref 36–48)
HGB BLD-MCNC: 12.5 G/DL (ref 12–16)
LYMPHOCYTES # BLD: 1.9 K/UL (ref 1.1–5.9)
LYMPHOCYTES NFR BLD: 31 % (ref 14–44)
MCH RBC QN AUTO: 29.2 PG (ref 25–35)
MCHC RBC AUTO-ENTMCNC: 33.5 G/DL (ref 31–37)
MCV RBC AUTO: 87.1 FL (ref 78–102)
NEUTS SEG # BLD: 3.3 K/UL (ref 1.8–9.5)
NEUTS SEG NFR BLD: 56 % (ref 40–70)
PLATELET # BLD AUTO: 74 K/UL (ref 140–440)
POTASSIUM SERPL-SCNC: 3.8 MMOL/L (ref 3.5–5.5)
PROT SERPL-MCNC: 7.8 G/DL (ref 6.4–8.2)
RBC # BLD AUTO: 4.28 M/UL (ref 4.1–5.1)
SODIUM SERPL-SCNC: 139 MMOL/L (ref 136–145)
WBC # BLD AUTO: 6 K/UL (ref 4.5–13)

## 2020-01-10 PROCEDURE — 36415 COLL VENOUS BLD VENIPUNCTURE: CPT

## 2020-01-10 PROCEDURE — 85025 COMPLETE CBC W/AUTO DIFF WBC: CPT

## 2020-01-10 PROCEDURE — 80053 COMPREHEN METABOLIC PANEL: CPT

## 2020-01-10 NOTE — TELEPHONE ENCOUNTER
Patient is requesting pain medication he states he is in pain, I tried to call him back to get more information unable to make contact

## 2020-01-10 NOTE — PROGRESS NOTES
[facility] Progress Note    Date: January 10, 2020    Name: Lizeth Vasquez    MRN: [id number]         : 1965    Peripheral Lab Draw      Mr. Alex Royal to [facility], ambulatory at [facility] accompanied by self. Pt was assessed and education was provided. Mr. Maria T Iqbal vitals were reviewed and patient was observed for 5 minutes prior to treatment. Visit Vitals  /73 (BP 1 Location: Left arm, BP Patient Position: Sitting)   Pulse 72   Temp 98.3 °F (36.8 °C)   Wt 90 kg (198 lb 8 oz)   SpO2 98%   BMI 26.92 kg/m²     Recent Results (from the past 12 hour(s))   METABOLIC PANEL, COMPREHENSIVE    Collection Time: 01/10/20  8:30 AM   Result Value Ref Range    Sodium 139 136 - 145 mmol/L    Potassium 3.8 3.5 - 5.5 mmol/L    Chloride 104 100 - 111 mmol/L    CO2 28 21 - 32 mmol/L    Anion gap 7 3.0 - 18 mmol/L    Glucose 145 (H) 74 - 99 mg/dL    BUN 15 7.0 - 18 MG/DL    Creatinine 0.97 0.6 - 1.3 MG/DL    BUN/Creatinine ratio 15 12 - 20      GFR est AA >60 >60 ml/min/1.73m2    GFR est non-AA >60 >60 ml/min/1.73m2    Calcium 9.3 8.5 - 10.1 MG/DL    Bilirubin, total 0.5 0.2 - 1.0 MG/DL    ALT (SGPT) 20 16 - 61 U/L    AST (SGOT) 26 10 - 38 U/L    Alk. phosphatase 128 (H) 45 - 117 U/L    Protein, total 7.8 6.4 - 8.2 g/dL    Albumin 3.5 3.4 - 5.0 g/dL    Globulin 4.3 (H) 2.0 - 4.0 g/dL    A-G Ratio 0.8 0.8 - 1.7     CBC WITH 3 PART DIFF    Collection Time: 01/10/20  8:31 AM   Result Value Ref Range    WBC 6.0 4.5 - 13.0 K/uL    RBC 4.28 4.10 - 5.10 M/uL    HGB 12.5 12.0 - 16.0 g/dL    HCT 37.3 36 - 48 %    MCV 87.1 78 - 102 FL    MCH 29.2 25.0 - 35.0 PG    MCHC 33.5 31 - 37 g/dL    RDW 13.8 11.5 - 14.5 %    PLATELET 74 (L) 103 - 440 K/uL    NEUTROPHILS 56 40 - 70 %    MIXED CELLS 13 0.1 - 17 %    LYMPHOCYTES 31 14 - 44 %    ABS. NEUTROPHILS 3.3 1.8 - 9.5 K/UL    ABS. MIXED CELLS 0.8 0.0 - 2.3 K/uL    ABS.  LYMPHOCYTES 1.9 1.1 - 5.9 K/UL    DF AUTOMATED         Blood obtained peripherally from left arm x 1 attempt with butterfly needle and sent to lab for Cbc w/diff and Cmp per written orders. No bleeding or hematoma noted at site. Gauze and coban applied. Mr. Bobbi Chamberlain tolerated the phlebotomy, and had no complaints. Patient armband removed and shredded. Mr. Bobbi Chamberlain was discharged from [place] [address] in stable condition at 6402.      Paola Hubbardtingham Phlebotomist PCT  January 10, 2020  1:19 PM

## 2020-01-13 ENCOUNTER — HOSPITAL ENCOUNTER (OUTPATIENT)
Dept: INFUSION THERAPY | Age: 55
Discharge: HOME | End: 2020-01-13
Payer: MEDICAID

## 2020-01-13 VITALS
WEIGHT: 198.5 LBS | OXYGEN SATURATION: 99 % | RESPIRATION RATE: 17 BRPM | HEIGHT: 72 IN | BODY MASS INDEX: 26.89 KG/M2 | SYSTOLIC BLOOD PRESSURE: 120 MMHG | HEART RATE: 61 BPM | DIASTOLIC BLOOD PRESSURE: 75 MMHG | TEMPERATURE: 98.2 F

## 2020-01-13 DIAGNOSIS — C25.9 PANCREATIC ADENOCARCINOMA (HCC): ICD-10-CM

## 2020-01-13 DIAGNOSIS — C78.7 LIVER METASTASIS (HCC): ICD-10-CM

## 2020-01-13 DIAGNOSIS — C78.7 LIVER METASTASES (HCC): ICD-10-CM

## 2020-01-13 DIAGNOSIS — G89.3 CANCER ASSOCIATED PAIN: ICD-10-CM

## 2020-01-13 DIAGNOSIS — K86.89 PANCREATIC MASS: Primary | ICD-10-CM

## 2020-01-13 LAB
BASO+EOS+MONOS # BLD AUTO: 0.6 K/UL (ref 0–2.3)
BASO+EOS+MONOS NFR BLD AUTO: 10 % (ref 0.1–17)
DIFFERENTIAL METHOD BLD: ABNORMAL
ERYTHROCYTE [DISTWIDTH] IN BLOOD BY AUTOMATED COUNT: 14.2 % (ref 11.5–14.5)
HCT VFR BLD AUTO: 35.6 % (ref 36–48)
HGB BLD-MCNC: 11.9 G/DL (ref 12–16)
LYMPHOCYTES # BLD: 1.6 K/UL (ref 1.1–5.9)
LYMPHOCYTES NFR BLD: 25 % (ref 14–44)
MCH RBC QN AUTO: 29.5 PG (ref 25–35)
MCHC RBC AUTO-ENTMCNC: 33.4 G/DL (ref 31–37)
MCV RBC AUTO: 88.1 FL (ref 78–102)
NEUTS SEG # BLD: 4 K/UL (ref 1.8–9.5)
NEUTS SEG NFR BLD: 65 % (ref 40–70)
PLATELET # BLD AUTO: 90 K/UL (ref 140–440)
RBC # BLD AUTO: 4.04 M/UL (ref 4.1–5.1)
WBC # BLD AUTO: 6.2 K/UL (ref 4.5–13)

## 2020-01-13 PROCEDURE — 96413 CHEMO IV INFUSION 1 HR: CPT

## 2020-01-13 PROCEDURE — 96417 CHEMO IV INFUS EACH ADDL SEQ: CPT

## 2020-01-13 PROCEDURE — 96415 CHEMO IV INFUSION ADDL HR: CPT

## 2020-01-13 PROCEDURE — 96416 CHEMO PROLONG INFUSE W/PUMP: CPT

## 2020-01-13 PROCEDURE — 77030012965 HC NDL HUBR BBMI -A

## 2020-01-13 PROCEDURE — 74011250636 HC RX REV CODE- 250/636: Performed by: INTERNAL MEDICINE

## 2020-01-13 PROCEDURE — 85025 COMPLETE CBC W/AUTO DIFF WBC: CPT

## 2020-01-13 PROCEDURE — 96375 TX/PRO/DX INJ NEW DRUG ADDON: CPT

## 2020-01-13 PROCEDURE — 74011000258 HC RX REV CODE- 258: Performed by: INTERNAL MEDICINE

## 2020-01-13 PROCEDURE — 96367 TX/PROPH/DG ADDL SEQ IV INF: CPT

## 2020-01-13 RX ORDER — OXYCODONE HYDROCHLORIDE 10 MG/1
10 TABLET ORAL
Qty: 120 TAB | Refills: 0 | OUTPATIENT
Start: 2020-01-13 | End: 2020-02-12

## 2020-01-13 RX ORDER — SODIUM CHLORIDE 9 MG/ML
10 INJECTION INTRAMUSCULAR; INTRAVENOUS; SUBCUTANEOUS AS NEEDED
Status: ACTIVE | OUTPATIENT
Start: 2020-01-13 | End: 2020-01-13

## 2020-01-13 RX ORDER — OXYCODONE HYDROCHLORIDE 10 MG/1
10 TABLET ORAL
Qty: 120 TAB | Refills: 0 | Status: SHIPPED | OUTPATIENT
Start: 2020-01-13 | End: 2020-01-13 | Stop reason: SDUPTHER

## 2020-01-13 RX ORDER — ATROPINE SULFATE 1 MG/ML
0.4 INJECTION, SOLUTION INTRAVENOUS ONCE
Status: ACTIVE | OUTPATIENT
Start: 2020-01-13 | End: 2020-01-13

## 2020-01-13 RX ORDER — PALONOSETRON 0.05 MG/ML
0.25 INJECTION, SOLUTION INTRAVENOUS ONCE
Status: COMPLETED | OUTPATIENT
Start: 2020-01-13 | End: 2020-01-13

## 2020-01-13 RX ORDER — OXYCODONE HYDROCHLORIDE 10 MG/1
10 TABLET ORAL
Qty: 120 TAB | Refills: 0 | OUTPATIENT
Start: 2020-01-13 | End: 2020-01-13 | Stop reason: SDUPTHER

## 2020-01-13 RX ORDER — FLUOROURACIL 50 MG/ML
900 INJECTION, SOLUTION INTRAVENOUS ONCE
Status: DISPENSED | OUTPATIENT
Start: 2020-01-13 | End: 2020-01-13

## 2020-01-13 RX ORDER — DEXTROSE MONOHYDRATE 50 MG/ML
25 INJECTION, SOLUTION INTRAVENOUS CONTINUOUS
Status: DISPENSED | OUTPATIENT
Start: 2020-01-13 | End: 2020-01-13

## 2020-01-13 RX ADMIN — LEUCOVORIN CALCIUM 900 MG: 200 INJECTION, POWDER, LYOPHILIZED, FOR SOLUTION INTRAMUSCULAR; INTRAVENOUS at 10:51

## 2020-01-13 RX ADMIN — FLUOROURACIL 5300 MG: 50 INJECTION, SOLUTION INTRAVENOUS at 14:55

## 2020-01-13 RX ADMIN — OXALIPLATIN 190 MG: 5 INJECTION, SOLUTION, CONCENTRATE INTRAVENOUS at 10:51

## 2020-01-13 RX ADMIN — DEXTROSE 25 ML/HR: 5 SOLUTION INTRAVENOUS at 08:55

## 2020-01-13 RX ADMIN — SODIUM CHLORIDE 150 MG: 900 INJECTION, SOLUTION INTRAVENOUS at 09:38

## 2020-01-13 RX ADMIN — IRINOTECAN HYDROCHLORIDE 400 MG: 20 INJECTION, SOLUTION INTRAVENOUS at 13:09

## 2020-01-13 RX ADMIN — PALONOSETRON 0.25 MG: 0.25 INJECTION, SOLUTION INTRAVENOUS at 09:35

## 2020-01-13 NOTE — PROGRESS NOTES
[facility] Progress Note    Date: 2020    Name: Mark Lara    MRN: [id number]         : 1965     Chemotherapy cycle : C3D1 Folfirinox (5FU bolus held). Mr. Kieran Alcantar arrived to [facility] at [facility]. Mr. Kieran Alcantar was assessed and education was provided. Mr. Rocky Jamison vitals were reviewed. Visit Vitals  /75 (BP 1 Location: Right arm, BP Patient Position: Sitting)   Pulse 61   Temp 98.2 °F (36.8 °C)   Resp 17   Ht 6' (1.829 m)   Wt 90 kg (198 lb 8 oz)   SpO2 99%   BMI 26.92 kg/m²     Labs obtained on 1/10/2020 platelet count 81,048. CBC redrawn today results below shows platelets of 95,651. Dr. Ruben Jewell made aware, orders received to proceed with treatment today and HOLD 5FU bolus. Recent Results (from the past 12 hour(s))   CBC WITH 3 PART DIFF    Collection Time: 20  8:49 AM   Result Value Ref Range    WBC 6.2 4.5 - 13.0 K/uL    RBC 4.04 (L) 4.10 - 5.10 M/uL    HGB 11.9 (L) 12.0 - 16.0 g/dL    HCT 35.6 (L) 36 - 48 %    MCV 88.1 78 - 102 FL    MCH 29.5 25.0 - 35.0 PG    MCHC 33.4 31 - 37 g/dL    RDW 14.2 11.5 - 14.5 %    PLATELET 90 (L) 190 - 440 K/uL    NEUTROPHILS 65 40 - 70 %    MIXED CELLS 10 0.1 - 17 %    LYMPHOCYTES 25 14 - 44 %    ABS. NEUTROPHILS 4.0 1.8 - 9.5 K/UL    ABS. MIXED CELLS 0.6 0.0 - 2.3 K/uL    ABS. LYMPHOCYTES 1.6 1.1 - 5.9 K/UL    DF AUTOMATED         Right chest mediport accessed with 20 g 1inch emmanuel needle. Port flushed easily and had brisk blood return. Blood drawn off and sent for CBC  per written orders after 10 ml waste. D5W initiated @ 25mL/hr KVO. Chemo dosages verified with today's BSA and found to be within 10% of ordered dosages. Pre-medications (Aloxi 0.25mg IVP, Emend 150 mg IVPB) were administered as ordered and chemotherapy was initiated after blood return from port re-verified. Reviewed expected side effects of premeds with patient.       Oxaliplatin 190mg ( at 156 mL/hr over 2 hours) infused simultaneously with Leucovorin 900mg (at per 213mL/hr over 90 minutes). VS stable at end of infusion and pt denied complaints. Line flushed with D5W and blood return from port re-verified. Irinotecan 400mg was infused at  380mL/hr over 90 minutes per order. VS stable at end of infusion and pt denied complaints. Line flushed with D5W and blood return from port re-verified. 5FU syringe 900 mg HELD per Dr. Avis Ho 5FU 5300 mg infused via CADD pump and infusing without difficulty over 46 hours. Tubing connections reinforced with tape. Mr. Mark Anthony Lange tolerated infusion without complaints. Mr. Mark Anthony Lange was discharged from [place] [address] in stable condition at 1500. He is to return on 1/15/2020 at 1500 for his next pre-chemo lab appointment.     Janiya Riggs RN  January 13, 2020  1500

## 2020-01-13 NOTE — PROGRESS NOTES
Recent Results (from the past 12 hour(s))   CBC WITH 3 PART DIFF    Collection Time: 01/13/20  8:49 AM   Result Value Ref Range    WBC 6.2 4.5 - 13.0 K/uL    RBC 4.04 (L) 4.10 - 5.10 M/uL    HGB 11.9 (L) 12.0 - 16.0 g/dL    HCT 35.6 (L) 36 - 48 %    MCV 88.1 78 - 102 FL    MCH 29.5 25.0 - 35.0 PG    MCHC 33.4 31 - 37 g/dL    RDW 14.2 11.5 - 14.5 %    PLATELET 90 (L) 426 - 440 K/uL    NEUTROPHILS 65 40 - 70 %    MIXED CELLS 10 0.1 - 17 %    LYMPHOCYTES 25 14 - 44 %    ABS. NEUTROPHILS 4.0 1.8 - 9.5 K/UL    ABS. MIXED CELLS 0.6 0.0 - 2.3 K/uL    ABS. LYMPHOCYTES 1.6 1.1 - 5.9 K/UL    DF AUTOMATED       Patient will not received 5FU Bolus today. Proceed with treatment per Dr. Anthony Tena.

## 2020-01-14 NOTE — PROGRESS NOTES
Carol Landers is a 62 y.o. 1965 male with past medical history including pulmonary embolus, on Lovenox, type 2 diabetes, GERD, who I was asked to see in consultation at the request of Kailee Berg for evaluation for newly diagnosed pancreatic adenocarcinoma.  Patient had fine-needle aspiration of pancreatic mass on 11/13/2019 and pathology showed pancreatic adenocarcinoma. I saw him in consultation for the first time on 11/19/2019) was to treat him with palliative FOLFIRINOX. He received C1D1 FOLFIRINOX on 12/16/2019 and tolerated well C2D1 on 12/30/19. Patient was seen and examined today. Elizabeth Abrams is awake alert oriented x3.    Denies any fevers, chills, or nausea and vomiting.  Denies any melena or bright red blood per rectum.  All also points of review of system have been reviewed and were negative.  ECOG performance status 1.  Independent with ADLs and IADLs.    DX: Pancreatic adenocarcinoma     STAGE: Stage IV     Treatment intent: Palliative     ONCOLOGY HISTORY:   11/03/2019: Went to ER due to 5 days complaints of epigastric pain.  CT abdomen and pelvis showed:  1. 5 cm mass within the pancreatic body highly suggestive of primary malignancy. Superimposed acute pancreatitis cannot entirely be excluded. 2. Multiple liver lesions highly suggestive of metastatic disease 3. Metastatic retroperitoneal and mesenteric lymphadenopathy. 4. Indeterminate hypodensity within the spleen. Diagnostic consideration include splenic infarct or metastatic lesion 5. Right lower lobe pulmonary emboli.       *MRI of the abdomen showed  1. Stable since same day CT abdomen pelvis. Elliptical 4 cm hypoenhancing mass,  body of pancreas, with upstream glandular atrophy and pancreatic ductal  dilatation, most likely adenocarcinoma. Associated local/regional likely metastatic lymphadenopathy including upper retroperitoneum, lesser omentum, portacaval/periportal space.  Associated encasement/narrowing of the adjacent  splenic vein. 2. Mild peripancreatic edema and soft tissue reticulation; may reflect secondary low-grade or chronic pancreatitis or adjacent peripancreatic tumor infiltration.   3. Numerous hypoenhancing and target-like small nodules and masses throughout  the liver, typical of metastatic disease. 4. Mild pericholecystic fluid, nonspecific, but may represent low-grade cholecystitis.   No evident cholecystolithiasis/choledocholithiasis or biliary ductal dilatation. 5. Mild splenomegaly with focal small splenic infarction, age indeterminate  perhaps recent. Small left lower pole, nonacute renal infarction. 6. Other minor and/or ancillary findings including lumbar disc herniations     11/15/2019: Duplex left  lower extremity showed Acute nonocclusive thrombus in the distal femoral vein, popliteal, peroneal and both posterior tibial veins. The thrombus in the distal femoral vein appears to be floating tail-like thrombus.     11/19/2019: First medical oncology visit with me    11/29/2019: PET/CT showed  Malignant metabolic activity corresponding with the known malignancy in the pancreatic body. Innumerable hypermetabolic liver metastases. Metastatic portal caval and aortocaval lymph nodes. Enlargement and malignant metabolic activity in the anterior aspect of the left  psoas muscle suspicious for metastatic disease. Distant metastatic disease to include the left supraclavicular region, left  superior mediastinum and possibly the left inferior hilum. 12/16/19: G9P1-6012/30/19:C2D1-1/13/20:C3D1    Past Medical History:   Diagnosis Date    Cancer [facility])     Pancreatic Adenocarcinoma. Diagnosed 11/2019.  Diabetes ([address] 10/2019    Diagnosed 10/2019.  Hepatitis C     Received Curative Treatment, but Hepatitis C was not cured and returned.  Left ulnar fracture     Thromboembolus ([address]     DVT of LLE 11/2019. Also, Pulmonary Embolism 11/2019. Treated with Rivaroxaban.      Past Surgical History:   Procedure Laterality Date  HX TONSILLECTOMY      IR BX PANCREAS NEEDLE PERC  2019    Endoscopic Pancreatic Biopsy (?)    IR INSERT TUNL CVC W PORT OVER 5 YEARS  2019     Social History     Socioeconomic History    Marital status:      Spouse name: Not on file    Number of children: Not on file    Years of education: Not on file    Highest education level: Not on file   Tobacco Use    Smoking status: Former Smoker     Packs/day: 0.50     Years: 40.00     Pack years: 20.00     Last attempt to quit: 2018     Years since quittin.2    Smokeless tobacco: Never Used    Tobacco comment: Quit 2018   Substance and Sexual Activity    Alcohol use: Not Currently    Drug use: Not Currently     Types: Marijuana, Cocaine, Heroin     Comment: Quit Cocaine, Heroin, and Marijuana since 2018    Sexual activity: Not Currently   Other Topics Concern     Family History   Problem Relation Age of Onset    Diabetes Mother     Kidney Disease Mother     Diabetes Father     Diabetes Brother     Cancer Maternal Grandmother     Cancer Maternal Grandfather     Cancer Maternal Aunt     Cancer Maternal Uncle        Current Outpatient Medications   Medication Sig Dispense Refill    oxyCODONE IR (ROXICODONE) 10 mg tab immediate release tablet Take 1 Tab by mouth every four (4) hours as needed for Pain for up to 30 days. Max Daily Amount: 60 mg. 120 Tab 0    fentaNYL (DURAGESIC) 25 mcg/hr PATCH 1 Patch by TransDERmal route every seventy-two (72) hours for 30 days. Max Daily Amount: 1 Patch. 10 Patch 0    lactulose (CHRONULAC) 10 gram/15 mL solution Take 15 mL by mouth three (3) times daily. 480 mL 3    naloxone (NARCAN) 4 mg/actuation nasal spray Use 1 spray intranasally, then discard. Repeat with new spray every 2 min as needed for opioid overdose symptoms, alternating nostrils. Indications: opioid overdose 1 Each 1    insulin glargine (LANTUS U-100 INSULIN) 100 unit/mL injection 28 Units by SubCUTAneous route nightly. 1 Vial 0    ondansetron hcl (ZOFRAN) 4 mg tablet Take 1 Tab by mouth every eight (8) hours as needed for Nausea. 15 Tab 0    insulin lispro (HUMALOG) 100 unit/mL injection 3-15 Units by SubCUTAneous route Before breakfast, lunch, and dinner. 1 Vial 0    OLANZapine (ZYPREXA ZYDIS) 5 mg disintegrating tablet Take 5mg PO the night before chemotherapy, then 5mg PO the morning of chemotherapy, then 5mg PO three times a day as needed for 1 week following chemotherapy. Indications: prevent nausea and vomiting from cancer chemotherapy 30 Tab 1    rivaroxaban (XARELTO) 15 mg (42)- 20 mg (9) DsPk Take 20 mg by mouth daily. Take one 15 mg tablet twice a day with food for the first 21 days. Then, take one 20 mg tablet once a day with food for 9 days.  naloxone (NARCAN) 4 mg/actuation nasal spray Use 1 spray intranasally, then discard. Repeat with new spray every 2 min as needed for opioid overdose symptoms, alternating nostrils. 1 Each 1    dexAMETHasone (DECADRON) 4 mg tablet Take 4mg by mouth twice a day the day before chemotherapy and the day after chemotherapy 30 Tab 0    lidocaine-prilocaine (EMLA) topical cream Apply  to affected area as needed for Pain (Apply to skin 30-60 minutes before mediport access). 30 g 0    nut.tx.gluc.intol,lac-free,soy (GLUCERNA ADVANCE) liqd Take 237 mL by mouth three (3) times daily. 90 Bottle 5    TRUE METRIX GLUCOSE METER misc USE AS DIRECTED  0    omeprazole (PRILOSEC) 20 mg capsule TAKE 1 CAPSULE BY MOUTH ONCE DAILY  3    QUEtiapine (SEROQUEL) 50 mg tablet Take 50 mg by mouth nightly.  ondansetron (ZOFRAN ODT) 4 mg disintegrating tablet Take 1 Tab by mouth every eight (8) hours as needed for Nausea. 20 Tab 0    lidocaine (LIDODERM) 5 % Apply patch to the affected area for 12 hours a day and remove for 12 hours a day.  5 Each 0     Facility-Administered Medications Ordered in Other Visits   Medication Dose Route Frequency Provider Last Rate Last Dose    PHARMACY INFORMATION NOTE  1 Each Other Rx Dosing/Monitoring Hilaria Barriga MD        fluorouracil (ADRUCIL) 5,300 mg in 0.9% sodium chloride 250 mL CADD Cassette  5,300 mg IntraVENous ONCE Hilaria Barriga MD 5.4 mL/hr at 01/13/20 1455 5,300 mg at 01/13/20 1455       No Known Allergies    Review of Systems  As per HPI    Objective: There were no vitals taken for this visit. Physical Exam:   General appearance - alert, well appearing, and in no distress  Mental status - alert, oriented to person, place, and time  EYE-ONOFRE, EOMI  ENT-ENT exam normal, no neck nodes or sinus tenderness  Mouth - mucous membranes moist, pharynx normal without lesions  Neck - supple, no significant adenopathy   Chest - clear to auscultation, no wheezes, rales or rhonchi, symmetric air entry   Heart - normal rate and regular rhythm   Abdomen - soft, nontender, nondistended, no masses or organomegaly  Lymph- no adenopathy palpable  Ext-no pedal edema noted  Skin-Warm and dry. Neuro -alert, oriented, normal speech, no focal findings or movement disorder noted      Diagnostic Imaging     No results found for this or any previous visit. Results for orders placed during the hospital encounter of 12/18/19   XR KNEE RT MIN 4 V    Narrative EXAM: XR KNEE RT MIN 4 V    CLINICAL INDICATION/HISTORY: Knee pain    > Additional: Right knee pain    COMPARISON: None. > Reference Exam: None. TECHNIQUE: 4 views right knee obtained.    _______________    FINDINGS:    No evidence of acute fracture or dislocation. There appears to be combination of  bipartite patella as well as adjacent well-corticated ossifications along  lateral aspect of patella, possible areas of heterotopic bone formation. There  is suggestion of small to moderate joint effusion. Diffuse chondrocalcinosis. Severe osteoarthritic changes with joint space narrowing patellofemoral joint.   Mild spurring along the medial lateral joint compartments without significant  joint space narrowing.    _______________      Impression IMPRESSION:    1. No acute osseous findings. 2. 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 3. Diffuse chondrocalcinosis. 4. Small to moderate joint effusion. Results for orders placed during the hospital encounter of 11/03/19   CT ABD PELV W CONT    Narrative EXAM: CT of the abdomen and pelvis    INDICATION: Upper abdominal pain radiating to back    COMPARISON: None. TECHNIQUE: Axial CT imaging of the abdomen and pelvis was performed with  intravenous contrast. Multiplanar reformats were generated. One or more dose  reduction techniques were used on this CT: automated exposure control,  adjustment of the mAs and/or kVp according to patient size, and iterative  reconstruction techniques. The specific techniques used on this CT exam have  been documented in the patient's electronic medical record. Digital Imaging and Communications in Medicine (DICOM) format image data are  available to nonaffiliated external healthcare facilities or entities on a  secure, media free, reciprocally searchable basis with patient authorization for  at least a 12 month period after this study. _______________    FINDINGS:    LOWER CHEST: There are filling defects within the segmental and subsegmental  branches of the right lower lobe pulmonary artery. Heart size is the upper limit  of normal. No pericardial effusion. A small hiatal hernia is present. LIVER, BILIARY: Liver contains numerous hypoattenuating masses of varying sizes  spanning the right and left hepatic lobes. No biliary dilation. Mild gallbladder  wall thickening is present. PANCREAS: 4.7 x 2.8 cm hypoattenuating heterogeneous infiltrative mass within  the pancreatic body and tail. There is peripancreatic inflammatory stranding. SPLEEN: Focal linear hypodensity is seen within the posterior splenic body.     ADRENALS: Normal.    KIDNEYS: Normal.    LYMPH NODES: Enlarged mesenteric and retroperitoneal lymph nodes. For example,  periaortic node measures 1.9 x 1.5 cm on series 3 image 82. Enlarged nodes are  also seen within the marquis hepatis and epigastric region. GASTROINTESTINAL TRACT: No bowel dilation or wall thickening. Normal appendix. PELVIC ORGANS: Prostate hypertrophy. VASCULATURE: Unremarkable. BONES: Mild thoracolumbar dextroscoliosis and multilevel spondylosis. No acute  or aggressive osseous abnormalities identified. OTHER: None.    _______________      Impression IMPRESSION:    1. 5 cm mass within the pancreatic body highly suggestive of primary malignancy. Superimposed acute pancreatitis cannot entirely be excluded. 2. Multiple liver lesions highly suggestive of metastatic disease    3. Metastatic retroperitoneal and mesenteric lymphadenopathy    4. Indeterminate hypodensity within the spleen. Diagnostic considerations  include splenic infarct or metastatic lesion    5. Right lower lobe pulmonary emboli. Critical result was discussed with ED  attending at 3:05 AM on November 4, 2019. This result was acknowledged and  understood. Preliminary interpretation provided by on-call radiology resident.         Lab Results  Lab Results   Component Value Date/Time    WBC 6.2 01/13/2020 08:49 AM    HGB 11.9 (L) 01/13/2020 08:49 AM    HCT 35.6 (L) 01/13/2020 08:49 AM    PLATELET 90 (L) 47/04/2829 08:49 AM    MCV 88.1 01/13/2020 08:49 AM       Lab Results   Component Value Date/Time    Sodium 139 01/10/2020 08:30 AM    Potassium 3.8 01/10/2020 08:30 AM    Chloride 104 01/10/2020 08:30 AM    CO2 28 01/10/2020 08:30 AM    Anion gap 7 01/10/2020 08:30 AM    Glucose 145 (H) 01/10/2020 08:30 AM    BUN 15 01/10/2020 08:30 AM    Creatinine 0.97 01/10/2020 08:30 AM    BUN/Creatinine ratio 15 01/10/2020 08:30 AM    GFR est AA >60 01/10/2020 08:30 AM    GFR est non-AA >60 01/10/2020 08:30 AM    Calcium 9.3 01/10/2020 08:30 AM    AST (SGOT) 26 01/10/2020 08:30 AM    Alk. phosphatase 128 (H) 01/10/2020 08:30 AM    Protein, total 7.8 01/10/2020 08:30 AM    Albumin 3.5 01/10/2020 08:30 AM    Globulin 4.3 (H) 01/10/2020 08:30 AM    A-G Ratio 0.8 01/10/2020 08:30 AM    ALT (SGPT) 20 01/10/2020 08:30 AM       Assessment/Plan:  62 y. o. male  1. Pancreatic adenocarcinoma ([address]  I had a long discussion with Mr. Samir Patel regarding his newly diagnosed metastatic pancreatic cancer.  I told him that in the first-line setting, palliative chemotherapy have been shown to improve 1 year survival by 73% compared to supportive care alone. *UG T1 A1  heterozygous  *Brain MRI patient could not do MRI due to claustrophobia. *On 12/18/2019, CA-19-9 was 85 from 112 on 11/19/2019. Labs on 1/13/2020 showed WBC 6.2, H&H 11.9/35.6, MCV 88.1, platelet 90. ANC 4.0. Proceeded with treatment without 5-FU bolus due to thrombocytopenia. He is status post C3 D1 FOLFIRINOX on 1/13/20 as follow:    Day 1: Oxaliplatin 85mg/m2 IV + irinotecan 180mg/m2 IV + leucovorin 400mg/m2 IV, followed by a 5-FU bolus of 400mg/m2 and a 46-hour continuous 5-FU infusion of 2,400mg/m2. Repeat cycle every 2 weeks until disease progression.     Patient tolerated well cycle 3 FOLFIRINOX so far with minimal side effects. Recheck labs before next cycle. *Check monthly CA-19-9     2. Liver metastases ([address]  I will follow-up with imaging after few cycles of treatment.     3. Epigastric pain  Still having severe epigastric especially after he eats. Stop Oxycontin-Give Fentanyl patch 25 mcg Q 72 hours dosing-Give Oxycodone 10 mg PO Q 4hours prn. Stop Norco-Due to tylenol        4. Pulmonary embolism without acute cor pulmonale, unspecified chronicity, unspecified pulmonary embolism type (HCC)  Continue Xarelto     5. Thrombocytopenia ([address]  This is likely secondary to the mild splenomegaly seen on imaging.  Continue follow-up.     6.  Insulin dependent diabetes:Likely from the pancreatic cancer. PCP to stop Metformin since patient is likely not making insulin anymore. Needs tight glucose control with insulin. No steroids for pre-chemotherapy. Give Zyprexa for nausea. 7. Constipation: Likely from opiates and zofran.  Give lactulose     RTC 4 weeks            Mylene Farley MD

## 2020-01-15 ENCOUNTER — HOSPITAL ENCOUNTER (OUTPATIENT)
Dept: INFUSION THERAPY | Age: 55
Discharge: HOME | End: 2020-01-15
Payer: MEDICAID

## 2020-01-15 ENCOUNTER — NURSE NAVIGATOR (OUTPATIENT)
Dept: OTHER | Age: 55
End: 2020-01-15

## 2020-01-15 ENCOUNTER — OFFICE VISIT (OUTPATIENT)
Dept: ONCOLOGY | Age: 55
End: 2020-01-15

## 2020-01-15 VITALS
OXYGEN SATURATION: 98 % | HEIGHT: 72 IN | RESPIRATION RATE: 16 BRPM | DIASTOLIC BLOOD PRESSURE: 76 MMHG | BODY MASS INDEX: 26.55 KG/M2 | WEIGHT: 196 LBS | SYSTOLIC BLOOD PRESSURE: 107 MMHG | TEMPERATURE: 97.2 F | HEART RATE: 92 BPM

## 2020-01-15 VITALS
SYSTOLIC BLOOD PRESSURE: 110 MMHG | TEMPERATURE: 98.9 F | DIASTOLIC BLOOD PRESSURE: 76 MMHG | RESPIRATION RATE: 16 BRPM | HEART RATE: 88 BPM | OXYGEN SATURATION: 97 %

## 2020-01-15 DIAGNOSIS — C78.7 LIVER METASTASIS (HCC): ICD-10-CM

## 2020-01-15 DIAGNOSIS — R11.0 NAUSEA: ICD-10-CM

## 2020-01-15 DIAGNOSIS — G89.3 CANCER ASSOCIATED PAIN: ICD-10-CM

## 2020-01-15 DIAGNOSIS — C25.9 MALIGNANT NEOPLASM OF PANCREAS, UNSPECIFIED LOCATION OF MALIGNANCY (HCC): Primary | ICD-10-CM

## 2020-01-15 DIAGNOSIS — C78.7 LIVER METASTASES (HCC): ICD-10-CM

## 2020-01-15 DIAGNOSIS — C25.9 PANCREATIC ADENOCARCINOMA (HCC): ICD-10-CM

## 2020-01-15 DIAGNOSIS — T45.1X5A CHEMOTHERAPY-INDUCED NEUROPATHY (HCC): ICD-10-CM

## 2020-01-15 DIAGNOSIS — K86.89 PANCREATIC MASS: Primary | ICD-10-CM

## 2020-01-15 DIAGNOSIS — G62.0 CHEMOTHERAPY-INDUCED NEUROPATHY (HCC): ICD-10-CM

## 2020-01-15 PROCEDURE — 96523 IRRIG DRUG DELIVERY DEVICE: CPT

## 2020-01-15 PROCEDURE — 74011250636 HC RX REV CODE- 250/636: Performed by: INTERNAL MEDICINE

## 2020-01-15 PROCEDURE — 86301 IMMUNOASSAY TUMOR CA 19-9: CPT

## 2020-01-15 PROCEDURE — 36415 COLL VENOUS BLD VENIPUNCTURE: CPT

## 2020-01-15 RX ORDER — ONDANSETRON 4 MG/1
4 TABLET, FILM COATED ORAL
Qty: 60 TAB | Refills: 3 | Status: SHIPPED | OUTPATIENT
Start: 2020-01-15 | End: 2020-02-11 | Stop reason: SDUPTHER

## 2020-01-15 RX ORDER — SODIUM CHLORIDE 9 MG/ML
10 INJECTION INTRAMUSCULAR; INTRAVENOUS; SUBCUTANEOUS AS NEEDED
Status: DISCONTINUED | OUTPATIENT
Start: 2020-01-15 | End: 2020-01-16 | Stop reason: HOSPADM

## 2020-01-15 RX ORDER — FENTANYL 25 UG/1
1 PATCH TRANSDERMAL
Qty: 10 PATCH | Refills: 0 | Status: CANCELLED | OUTPATIENT
Start: 2020-01-15 | End: 2020-02-14

## 2020-01-15 RX ORDER — OXYCODONE HYDROCHLORIDE 10 MG/1
10 TABLET ORAL
Qty: 120 TAB | Refills: 0 | Status: CANCELLED | OUTPATIENT
Start: 2020-01-15 | End: 2020-02-14

## 2020-01-15 RX ORDER — HEPARIN 100 UNIT/ML
300-500 SYRINGE INTRAVENOUS AS NEEDED
Status: DISCONTINUED | OUTPATIENT
Start: 2020-01-15 | End: 2020-01-16 | Stop reason: HOSPADM

## 2020-01-15 RX ORDER — FENTANYL 50 UG/1
1 PATCH TRANSDERMAL
Qty: 10 PATCH | Refills: 0 | Status: SHIPPED | OUTPATIENT
Start: 2020-01-15 | End: 2020-01-21 | Stop reason: SDUPTHER

## 2020-01-15 RX ORDER — DRONABINOL 5 MG/1
5 CAPSULE ORAL 2 TIMES DAILY
Qty: 60 CAP | Refills: 3 | Status: SHIPPED | OUTPATIENT
Start: 2020-01-15 | End: 2020-02-14

## 2020-01-15 RX ADMIN — HEPARIN 500 UNITS: 100 SYRINGE at 14:47

## 2020-01-15 RX ADMIN — SODIUM CHLORIDE 10 ML: 9 INJECTION INTRAMUSCULAR; INTRAVENOUS; SUBCUTANEOUS at 14:47

## 2020-01-15 NOTE — PROGRESS NOTES
Francine Thurman is a 62 y.o. male presenting today for a follow-up appointment. Patient is ambulatory with no assistive devices, is accompanied by family members ( Crow Dorsey), reports of  abdomen pain 8/10. Patient family members states that  \"the fentanyl patches does not help his pain. Chief Complaint   Patient presents with    Pancreatic Cancer    Follow-up     Visit Vitals  /76 (BP 1 Location: Left arm, BP Patient Position: Sitting)   Pulse 92   Temp 97.2 °F (36.2 °C) (Oral)   Resp 16   Ht 6' (1.829 m)   Wt 196 lb (88.9 kg)   SpO2 98%   BMI 26.58 kg/m²     Current Outpatient Medications   Medication Sig    oxyCODONE IR (ROXICODONE) 10 mg tab immediate release tablet Take 1 Tab by mouth every four (4) hours as needed for Pain for up to 30 days. Max Daily Amount: 60 mg.    fentaNYL (DURAGESIC) 25 mcg/hr PATCH 1 Patch by TransDERmal route every seventy-two (72) hours for 30 days. Max Daily Amount: 1 Patch.  lactulose (CHRONULAC) 10 gram/15 mL solution Take 15 mL by mouth three (3) times daily.  naloxone (NARCAN) 4 mg/actuation nasal spray Use 1 spray intranasally, then discard. Repeat with new spray every 2 min as needed for opioid overdose symptoms, alternating nostrils. Indications: opioid overdose    insulin glargine (LANTUS U-100 INSULIN) 100 unit/mL injection 28 Units by SubCUTAneous route nightly.  ondansetron hcl (ZOFRAN) 4 mg tablet Take 1 Tab by mouth every eight (8) hours as needed for Nausea.  insulin lispro (HUMALOG) 100 unit/mL injection 3-15 Units by SubCUTAneous route Before breakfast, lunch, and dinner.  OLANZapine (ZYPREXA ZYDIS) 5 mg disintegrating tablet Take 5mg PO the night before chemotherapy, then 5mg PO the morning of chemotherapy, then 5mg PO three times a day as needed for 1 week following chemotherapy. Indications: prevent nausea and vomiting from cancer chemotherapy    rivaroxaban (XARELTO) 15 mg (42)- 20 mg (9) DsPk Take 20 mg by mouth daily.  Take one 15 mg tablet twice a day with food for the first 21 days. Then, take one 20 mg tablet once a day with food for 9 days.  naloxone (NARCAN) 4 mg/actuation nasal spray Use 1 spray intranasally, then discard. Repeat with new spray every 2 min as needed for opioid overdose symptoms, alternating nostrils.  dexAMETHasone (DECADRON) 4 mg tablet Take 4mg by mouth twice a day the day before chemotherapy and the day after chemotherapy    lidocaine-prilocaine (EMLA) topical cream Apply  to affected area as needed for Pain (Apply to skin 30-60 minutes before mediport access).  nut.tx.gluc.intol,lac-free,soy (GLUCERNA ADVANCE) liqd Take 237 mL by mouth three (3) times daily.  TRUE METRIX GLUCOSE METER misc USE AS DIRECTED    omeprazole (PRILOSEC) 20 mg capsule TAKE 1 CAPSULE BY MOUTH ONCE DAILY    QUEtiapine (SEROQUEL) 50 mg tablet Take 50 mg by mouth nightly.  ondansetron (ZOFRAN ODT) 4 mg disintegrating tablet Take 1 Tab by mouth every eight (8) hours as needed for Nausea.  lidocaine (LIDODERM) 5 % Apply patch to the affected area for 12 hours a day and remove for 12 hours a day. No current facility-administered medications for this visit. Facility-Administered Medications Ordered in Other Visits   Medication Dose Route Frequency    PHARMACY INFORMATION NOTE  1 Each Other Rx Dosing/Monitoring    fluorouracil (ADRUCIL) 5,300 mg in 0.9% sodium chloride 250 mL CADD Cassette  5,300 mg IntraVENous ONCE     Fall Risk Assessment, last 12 mths 11/19/2019   Able to walk? Yes   Fall in past 12 months? No     3 most recent PHQ Screens 1/15/2020   Little interest or pleasure in doing things Not at all   Feeling down, depressed, irritable, or hopeless Not at all   Total Score PHQ 2 0     Abuse Screening Questionnaire 11/19/2019   Do you ever feel afraid of your partner? N   Are you in a relationship with someone who physically or mentally threatens you? N   Is it safe for you to go home?  Y     Health Maintenance Due   Topic Date Due    LIPID PANEL Q1  1965    Pneumococcal 0-64 years (1 of 3 - PCV13) 07/07/1969    FOOT EXAM Q1  07/07/1973    MICROALBUMIN Q1  07/07/1973    EYE EXAM RETINAL OR DILATED  07/07/1973    DTaP/Tdap/Td series (1 - Tdap) 07/07/1974    Shingrix Vaccine Age 50> (1 of 2) 07/07/2013    FOBT Q 1 YEAR AGE 50-75  07/07/2013    Influenza Age 9 to Adult  08/01/2019       Medications no longer taking/discontinued: none    Patient currently taking Antiplatelet therapy? no    1. Have you been to the ER, urgent care clinic since your last visit? Hospitalized since your last visit? no     2. Have you seen or consulted any other health care providers outside of the [address] [place] [address] since your last visit? Include any pap smears or colon screening.  no

## 2020-01-15 NOTE — NURSE NAVIGATOR
Saw pt in clinic with Dr. Dc Gutierrez for f/u c/o cancer related pain, nausea and loss of appetite. Pain meds adjusted, Marinol prescribed for nausea and appitite. Labs today, RTC in 1 month. All questions answered.

## 2020-01-16 LAB — CANCER AG19-9 SERPL-ACNC: 79 U/ML (ref 0–35)

## 2020-01-21 DIAGNOSIS — G89.3 CANCER ASSOCIATED PAIN: ICD-10-CM

## 2020-01-21 DIAGNOSIS — C25.9 PANCREATIC ADENOCARCINOMA (HCC): ICD-10-CM

## 2020-01-21 RX ORDER — EPINEPHRINE 1 MG/ML
0.3 INJECTION, SOLUTION, CONCENTRATE INTRAVENOUS AS NEEDED
Status: CANCELLED | OUTPATIENT
Start: 2020-01-27

## 2020-01-21 RX ORDER — SODIUM CHLORIDE 0.9 % (FLUSH) 0.9 %
10 SYRINGE (ML) INJECTION AS NEEDED
Status: CANCELLED
Start: 2020-01-27

## 2020-01-21 RX ORDER — FENTANYL 50 UG/1
1 PATCH TRANSDERMAL
Qty: 10 PATCH | Refills: 0 | Status: SHIPPED | OUTPATIENT
Start: 2020-01-21 | End: 2020-02-20

## 2020-01-21 RX ORDER — ONDANSETRON 2 MG/ML
8 INJECTION INTRAMUSCULAR; INTRAVENOUS AS NEEDED
Status: CANCELLED | OUTPATIENT
Start: 2020-01-27

## 2020-01-21 RX ORDER — SODIUM CHLORIDE 0.9 % (FLUSH) 0.9 %
10 SYRINGE (ML) INJECTION AS NEEDED
Status: CANCELLED
Start: 2020-01-29

## 2020-01-21 RX ORDER — DIPHENHYDRAMINE HYDROCHLORIDE 50 MG/ML
50 INJECTION, SOLUTION INTRAMUSCULAR; INTRAVENOUS AS NEEDED
Status: CANCELLED
Start: 2020-01-27

## 2020-01-21 RX ORDER — HEPARIN 100 UNIT/ML
300-500 SYRINGE INTRAVENOUS AS NEEDED
Status: CANCELLED
Start: 2020-01-29

## 2020-01-21 RX ORDER — ACETAMINOPHEN 325 MG/1
650 TABLET ORAL AS NEEDED
Status: CANCELLED
Start: 2020-01-27

## 2020-01-21 RX ORDER — FLUOROURACIL 50 MG/ML
900 INJECTION, SOLUTION INTRAVENOUS ONCE
Status: CANCELLED
Start: 2020-01-27 | End: 2020-01-27

## 2020-01-21 RX ORDER — SODIUM CHLORIDE 9 MG/ML
10 INJECTION INTRAMUSCULAR; INTRAVENOUS; SUBCUTANEOUS AS NEEDED
Status: CANCELLED | OUTPATIENT
Start: 2020-01-29

## 2020-01-21 RX ORDER — HYDROCORTISONE SODIUM SUCCINATE 100 MG/2ML
100 INJECTION, POWDER, FOR SOLUTION INTRAMUSCULAR; INTRAVENOUS AS NEEDED
Status: CANCELLED | OUTPATIENT
Start: 2020-01-27

## 2020-01-21 RX ORDER — ATROPINE SULFATE 0.4 MG/ML
0.4 INJECTION, SOLUTION ENDOTRACHEAL; INTRAMEDULLARY; INTRAMUSCULAR; INTRAVENOUS; SUBCUTANEOUS
Status: CANCELLED | OUTPATIENT
Start: 2020-01-27

## 2020-01-21 RX ORDER — HEPARIN 100 UNIT/ML
300-500 SYRINGE INTRAVENOUS AS NEEDED
Status: CANCELLED
Start: 2020-01-27

## 2020-01-21 RX ORDER — ALBUTEROL SULFATE 0.83 MG/ML
2.5 SOLUTION RESPIRATORY (INHALATION) AS NEEDED
Status: CANCELLED
Start: 2020-01-27

## 2020-01-21 NOTE — TELEPHONE ENCOUNTER
Patient needs a refill on Fentanyl patches, no more refills, he goes to [facility] on [facility]

## 2020-01-21 NOTE — TELEPHONE ENCOUNTER
Patient requesting refill for:   Requested Prescriptions     Pending Prescriptions Disp Refills    fentaNYL (DURAGESIC) 50 mcg/hr PATCH 10 Patch 0     Si Patch by TransDERmal route every seventy-two (72) hours for 30 days. Max Daily Amount: 1 Patch. Pharmacy on file. Patient last seen: 1/15/2020  [facility]  Future appointment: [email or web address]  Please advise.

## 2020-01-22 DIAGNOSIS — C25.9 PANCREATIC ADENOCARCINOMA (HCC): ICD-10-CM

## 2020-01-22 DIAGNOSIS — C78.7 LIVER METASTASES (HCC): ICD-10-CM

## 2020-01-22 DIAGNOSIS — R11.2 CINV (CHEMOTHERAPY-INDUCED NAUSEA AND VOMITING): ICD-10-CM

## 2020-01-22 DIAGNOSIS — T45.1X5A CINV (CHEMOTHERAPY-INDUCED NAUSEA AND VOMITING): ICD-10-CM

## 2020-01-22 RX ORDER — OLANZAPINE 5 MG/1
TABLET, ORALLY DISINTEGRATING ORAL
Qty: 30 TAB | Refills: 1 | Status: SHIPPED | OUTPATIENT
Start: 2020-01-22 | End: 2020-01-31

## 2020-01-22 NOTE — TELEPHONE ENCOUNTER
Spoke to patient about medication refill request and verified using 2 patient identifiers. Patient acknowledgeatient prescription sent to Rx on file.

## 2020-01-24 ENCOUNTER — HOSPITAL ENCOUNTER (OUTPATIENT)
Dept: INFUSION THERAPY | Age: 55
Discharge: HOME | End: 2020-01-24
Payer: MEDICAID

## 2020-01-24 ENCOUNTER — DOCUMENTATION ONLY (OUTPATIENT)
Dept: ONCOLOGY | Age: 55
End: 2020-01-24

## 2020-01-24 VITALS
DIASTOLIC BLOOD PRESSURE: 70 MMHG | SYSTOLIC BLOOD PRESSURE: 115 MMHG | TEMPERATURE: 97.7 F | HEART RATE: 91 BPM | WEIGHT: 204.2 LBS | BODY MASS INDEX: 27.69 KG/M2 | OXYGEN SATURATION: 96 %

## 2020-01-24 DIAGNOSIS — C25.9 PANCREATIC ADENOCARCINOMA (HCC): Primary | ICD-10-CM

## 2020-01-24 LAB
APPEARANCE UR: CLEAR
BACTERIA URNS QL MICRO: NEGATIVE /HPF
BASO+EOS+MONOS # BLD AUTO: 0.9 K/UL (ref 0–2.3)
BASO+EOS+MONOS NFR BLD AUTO: 13 % (ref 0.1–17)
BILIRUB UR QL: NEGATIVE
CAOX CRY URNS QL MICRO: ABNORMAL
COLOR UR: YELLOW
DIFFERENTIAL METHOD BLD: ABNORMAL
EPITH CASTS URNS QL MICRO: NEGATIVE /LPF (ref 0–5)
ERYTHROCYTE [DISTWIDTH] IN BLOOD BY AUTOMATED COUNT: 15.1 % (ref 11.5–14.5)
GLUCOSE UR STRIP.AUTO-MCNC: 100 MG/DL
HCT VFR BLD AUTO: 34.7 % (ref 36–48)
HGB BLD-MCNC: 11.8 G/DL (ref 12–16)
HGB UR QL STRIP: NEGATIVE
KETONES UR QL STRIP.AUTO: NEGATIVE MG/DL
LEUKOCYTE ESTERASE UR QL STRIP.AUTO: NEGATIVE
LYMPHOCYTES # BLD: 2.5 K/UL (ref 1.1–5.9)
LYMPHOCYTES NFR BLD: 37 % (ref 14–44)
MCH RBC QN AUTO: 29.9 PG (ref 25–35)
MCHC RBC AUTO-ENTMCNC: 34 G/DL (ref 31–37)
MCV RBC AUTO: 88.1 FL (ref 78–102)
NEUTS SEG # BLD: 3.5 K/UL (ref 1.8–9.5)
NEUTS SEG NFR BLD: 51 % (ref 40–70)
NITRITE UR QL STRIP.AUTO: NEGATIVE
PH UR STRIP: 5 [PH] (ref 5–8)
PLATELET # BLD AUTO: 60 K/UL (ref 140–440)
PROT UR STRIP-MCNC: NEGATIVE MG/DL
RBC # BLD AUTO: 3.94 M/UL (ref 4.1–5.1)
RBC #/AREA URNS HPF: NEGATIVE /HPF (ref 0–5)
SP GR UR REFRACTOMETRY: 1.02 (ref 1–1.03)
UROBILINOGEN UR QL STRIP.AUTO: 1 EU/DL (ref 0.2–1)
WBC # BLD AUTO: 6.9 K/UL (ref 4.5–13)
WBC URNS QL MICRO: NEGATIVE /HPF (ref 0–4)

## 2020-01-24 PROCEDURE — 85025 COMPLETE CBC W/AUTO DIFF WBC: CPT

## 2020-01-24 PROCEDURE — 81001 URINALYSIS AUTO W/SCOPE: CPT

## 2020-01-24 PROCEDURE — 36415 COLL VENOUS BLD VENIPUNCTURE: CPT

## 2020-01-24 NOTE — PROGRESS NOTES
[facility] Progress Note    Date: 2020    Name: Angel Luis Kate    MRN: [id number]         : 1965    Peripheral Lab Draw      Mr. Jamey Torres to [facility], ambulatory at 0826 accompanied by self. Pt was assessed and education was provided. Mr. Bin Walter vitals were reviewed and patient was observed for 5 minutes prior to treatment. Visit Vitals  /70 (BP 1 Location: Left arm, BP Patient Position: Sitting)   Pulse 91   Temp 97.7 °F (36.5 °C)   Wt 92.6 kg (204 lb 3.2 oz)   SpO2 96%   BMI 27.69 kg/m²     Recent Results (from the past 12 hour(s))   CBC WITH 3 PART DIFF    Collection Time: 20  8:35 AM   Result Value Ref Range    WBC 6.9 4.5 - 13.0 K/uL    RBC 3.94 (L) 4.10 - 5.10 M/uL    HGB 11.8 (L) 12.0 - 16.0 g/dL    HCT 34.7 (L) 36 - 48 %    MCV 88.1 78 - 102 FL    MCH 29.9 25.0 - 35.0 PG    MCHC 34.0 31 - 37 g/dL    RDW 15.1 (H) 11.5 - 14.5 %    PLATELET 60 (L) 047 - 440 K/uL    NEUTROPHILS 51 40 - 70 %    MIXED CELLS 13 0.1 - 17 %    LYMPHOCYTES 37 14 - 44 %    ABS. NEUTROPHILS 3.5 1.8 - 9.5 K/UL    ABS. MIXED CELLS 0.9 0.0 - 2.3 K/uL    ABS. LYMPHOCYTES 2.5 1.1 - 5.9 K/UL    DF AUTOMATED     URINALYSIS W/MICROSCOPIC    Collection Time: 20  9:00 AM   Result Value Ref Range    Color YELLOW      Appearance CLEAR      Specific gravity 1.025 1.005 - 1.030      pH (UA) 5.0 5.0 - 8.0      Protein NEGATIVE  NEG mg/dL    Glucose 100 (A) NEG mg/dL    Ketone NEGATIVE  NEG mg/dL    Bilirubin NEGATIVE  NEG      Blood NEGATIVE  NEG      Urobilinogen 1.0 0.2 - 1.0 EU/dL    Nitrites NEGATIVE  NEG      Leukocyte Esterase NEGATIVE  NEG      WBC PENDING /hpf    RBC PENDING /hpf    Epithelial cells PENDING /lpf    Bacteria PENDING /hpf       Blood obtained peripherally from left arm x 1 attempt with butterfly needle and sent to lab for Cbc w/diff and UA per written orders. No bleeding or hematoma noted at site. Gauze and coban applied. Mr. Jamey Torres tolerated the phlebotomy, and had no complaints. Patient armband removed and shredded. Mr. Connie Looney was discharged from [place] [address] in stable condition at 4359.      Deisy Valdes Phlebotomist PCT  January 24, 2020  11:16 AM

## 2020-01-27 ENCOUNTER — HOSPITAL ENCOUNTER (OUTPATIENT)
Dept: INFUSION THERAPY | Age: 55
Discharge: HOME | End: 2020-01-27
Payer: MEDICAID

## 2020-01-27 VITALS
SYSTOLIC BLOOD PRESSURE: 109 MMHG | DIASTOLIC BLOOD PRESSURE: 68 MMHG | OXYGEN SATURATION: 96 % | BODY MASS INDEX: 27.66 KG/M2 | TEMPERATURE: 96.2 F | WEIGHT: 204.2 LBS | RESPIRATION RATE: 18 BRPM | HEIGHT: 72 IN | HEART RATE: 57 BPM

## 2020-01-27 DIAGNOSIS — C25.9 MALIGNANT NEOPLASM OF PANCREAS, UNSPECIFIED LOCATION OF MALIGNANCY (HCC): ICD-10-CM

## 2020-01-27 DIAGNOSIS — K86.89 PANCREATIC MASS: Primary | ICD-10-CM

## 2020-01-27 DIAGNOSIS — C78.7 LIVER METASTASIS (HCC): ICD-10-CM

## 2020-01-27 LAB
BASO+EOS+MONOS # BLD AUTO: 0.8 K/UL (ref 0–2.3)
BASO+EOS+MONOS NFR BLD AUTO: 15 % (ref 0.1–17)
DIFFERENTIAL METHOD BLD: ABNORMAL
ERYTHROCYTE [DISTWIDTH] IN BLOOD BY AUTOMATED COUNT: 15.3 % (ref 11.5–14.5)
HCT VFR BLD AUTO: 35.2 % (ref 36–48)
HGB BLD-MCNC: 11.6 G/DL (ref 12–16)
LYMPHOCYTES # BLD: 1.8 K/UL (ref 1.1–5.9)
LYMPHOCYTES NFR BLD: 32 % (ref 14–44)
MCH RBC QN AUTO: 29.2 PG (ref 25–35)
MCHC RBC AUTO-ENTMCNC: 33 G/DL (ref 31–37)
MCV RBC AUTO: 88.7 FL (ref 78–102)
NEUTS SEG # BLD: 3 K/UL (ref 1.8–9.5)
NEUTS SEG NFR BLD: 54 % (ref 40–70)
PLATELET # BLD AUTO: 75 K/UL (ref 140–440)
RBC # BLD AUTO: 3.97 M/UL (ref 4.1–5.1)
WBC # BLD AUTO: 5.6 K/UL (ref 4.5–13)

## 2020-01-27 PROCEDURE — 96416 CHEMO PROLONG INFUSE W/PUMP: CPT

## 2020-01-27 PROCEDURE — 96375 TX/PRO/DX INJ NEW DRUG ADDON: CPT

## 2020-01-27 PROCEDURE — 74011250636 HC RX REV CODE- 250/636: Performed by: NURSE PRACTITIONER

## 2020-01-27 PROCEDURE — 99211 OFF/OP EST MAY X REQ PHY/QHP: CPT

## 2020-01-27 PROCEDURE — 96361 HYDRATE IV INFUSION ADD-ON: CPT

## 2020-01-27 PROCEDURE — 96367 TX/PROPH/DG ADDL SEQ IV INF: CPT

## 2020-01-27 PROCEDURE — 96413 CHEMO IV INFUSION 1 HR: CPT

## 2020-01-27 PROCEDURE — 74011250636 HC RX REV CODE- 250/636: Performed by: INTERNAL MEDICINE

## 2020-01-27 PROCEDURE — 96417 CHEMO IV INFUS EACH ADDL SEQ: CPT

## 2020-01-27 PROCEDURE — 77030012965 HC NDL HUBR BBMI -A

## 2020-01-27 PROCEDURE — 96415 CHEMO IV INFUSION ADDL HR: CPT

## 2020-01-27 PROCEDURE — 96368 THER/DIAG CONCURRENT INF: CPT

## 2020-01-27 PROCEDURE — 85025 COMPLETE CBC W/AUTO DIFF WBC: CPT

## 2020-01-27 PROCEDURE — 36591 DRAW BLOOD OFF VENOUS DEVICE: CPT

## 2020-01-27 PROCEDURE — 74011000258 HC RX REV CODE- 258: Performed by: INTERNAL MEDICINE

## 2020-01-27 RX ORDER — PALONOSETRON 0.05 MG/ML
0.25 INJECTION, SOLUTION INTRAVENOUS ONCE
Status: COMPLETED | OUTPATIENT
Start: 2020-01-27 | End: 2020-01-27

## 2020-01-27 RX ORDER — SODIUM CHLORIDE 9 MG/ML
10 INJECTION INTRAMUSCULAR; INTRAVENOUS; SUBCUTANEOUS AS NEEDED
Status: ACTIVE | OUTPATIENT
Start: 2020-01-27 | End: 2020-01-27

## 2020-01-27 RX ORDER — ATROPINE SULFATE 1 MG/ML
0.4 INJECTION, SOLUTION INTRAVENOUS ONCE
Status: COMPLETED | OUTPATIENT
Start: 2020-01-27 | End: 2020-01-27

## 2020-01-27 RX ORDER — DEXTROSE MONOHYDRATE 50 MG/ML
25 INJECTION, SOLUTION INTRAVENOUS CONTINUOUS
Status: DISPENSED | OUTPATIENT
Start: 2020-01-27 | End: 2020-01-27

## 2020-01-27 RX ADMIN — FLUOROURACIL 5300 MG: 50 INJECTION, SOLUTION INTRAVENOUS at 14:21

## 2020-01-27 RX ADMIN — DEXTROSE 25 ML/HR: 5 SOLUTION INTRAVENOUS at 10:40

## 2020-01-27 RX ADMIN — ATROPINE SULFATE 0.4 MG: 1 INJECTION, SOLUTION INTRAMUSCULAR; INTRAVENOUS; SUBCUTANEOUS at 09:11

## 2020-01-27 RX ADMIN — PALONOSETRON 0.25 MG: 0.05 INJECTION, SOLUTION INTRAVENOUS at 09:10

## 2020-01-27 RX ADMIN — SODIUM CHLORIDE 1000 ML: 9 INJECTION, SOLUTION INTRAVENOUS at 09:20

## 2020-01-27 RX ADMIN — LEUCOVORIN CALCIUM 900 MG: 200 INJECTION, POWDER, LYOPHILIZED, FOR SOLUTION INTRAMUSCULAR; INTRAVENOUS at 12:43

## 2020-01-27 RX ADMIN — SODIUM CHLORIDE 10 ML: 9 INJECTION INTRAMUSCULAR; INTRAVENOUS; SUBCUTANEOUS at 08:30

## 2020-01-27 RX ADMIN — FOSAPREPITANT 150 MG: 150 INJECTION, POWDER, LYOPHILIZED, FOR SOLUTION INTRAVENOUS at 09:15

## 2020-01-27 RX ADMIN — IRINOTECAN HYDROCHLORIDE 400 MG: 20 INJECTION, SOLUTION INTRAVENOUS at 12:43

## 2020-01-27 RX ADMIN — OXALIPLATIN 190 MG: 5 INJECTION, SOLUTION, CONCENTRATE INTRAVENOUS at 10:40

## 2020-01-27 NOTE — PROGRESS NOTES
[facility] Progress Note    Date: 2020    Name: Michelle Blackburn    MRN: [id number]         : 1965     Chemotherapy cycle : C4D1 Folfirinox      Mr. Olmos arrived to [facility] at 1705. Mr. Hortencia Saba was assessed and education was provided. Mr. Tu Ratliff vitals were reviewed. Visit Vitals  /68 (BP 1 Location: Left arm, BP Patient Position: Sitting)   Pulse (!) 57   Temp 96.2 °F (35.7 °C)   Resp 18   Ht 6' (1.829 m)   Wt 92.6 kg (204 lb 3.2 oz)   SpO2 96%   BMI 27.69 kg/m²           Recent Results (from the past 12 hour(s))   CBC WITH 3 PART DIFF    Collection Time: 20  8:25 AM   Result Value Ref Range    WBC 5.6 4.5 - 13.0 K/uL    RBC 3.97 (L) 4.10 - 5.10 M/uL    HGB 11.6 (L) 12.0 - 16.0 g/dL    HCT 35.2 (L) 36 - 48 %    MCV 88.7 78 - 102 FL    MCH 29.2 25.0 - 35.0 PG    MCHC 33.0 31 - 37 g/dL    RDW 15.3 (H) 11.5 - 14.5 %    PLATELET 75 (L) 695 - 440 K/uL    NEUTROPHILS 54 40 - 70 %    MIXED CELLS 15 0.1 - 17 %    LYMPHOCYTES 32 14 - 44 %    ABS. NEUTROPHILS 3.0 1.8 - 9.5 K/UL    ABS. MIXED CELLS 0.8 0.0 - 2.3 K/uL    ABS. LYMPHOCYTES 1.8 1.1 - 5.9 K/UL    DF AUTOMATED         Right chest mediport accessed with 20 g 3/4 inch emmanuel needle. Port flushed easily and had brisk blood return. Blood drawn off and sent for CBC per written orders after 10 ml waste. 1000 ml of NS was infused over 1 hour for dehydration per MD orders. D5W initiated @ [facility]. Lab results within ordered parameters to give chemo today per Dr. Ashanti Guillory hold 5 FU bolus this treatment due to platelet count. ANC = 3.2, PLT = 75. Chemo dosages verified with today's BSA and found to be within 10% of ordered dosages. Pre-medications (Aloxi 0.25mg and Atropine 0.4mg IVP, Emend 150 mg IVPB) were administered as ordered and chemotherapy was initiated after blood return from port re-verified. Reviewed expected side effects of premeds with patient. Oxaliplatin 190 mg was infused at  157 ml/hr over 2 hours per order. VS stable at end of infusion and pt denied complaints. Line flushed with D5W and blood return from port re-verified. Irinotecan 400 mg was infused at  368 ml/hr over 90 minutes per order. VS stable at end of infusion and pt denied complaints. Line flushed with D5W and blood return from port re-verified. Leucovorin 900 mg was infused at  213 ml/hr over 90 minutes per order. VS stable at end of infusion and pt denied complaints. Line flushed with D5W and blood return from port re-verified    5FU 5300 mg infused via CADD pump and infusing without difficulty over 46 hours. Tubing connections reinforced with tape. Mr. Julia Gan tolerated infusion without complaints. Mr. Julia Gan was discharged from [place] [address] in stable condition at 1430. He is to return on January 29, 2019 at 1500 for his next appointment.     Ezra Monge RN  January 27, 2020

## 2020-01-28 ENCOUNTER — DOCUMENTATION ONLY (OUTPATIENT)
Dept: ONCOLOGY | Age: 55
End: 2020-01-28

## 2020-01-28 NOTE — PROGRESS NOTES
Prior authorization for Dronabinol prescription sent to insurance via CoverMyMeds (Key: [id number] - [place] Case ID: [id number] - Rx #: [id number]).

## 2020-01-29 ENCOUNTER — HOSPITAL ENCOUNTER (OUTPATIENT)
Dept: INFUSION THERAPY | Age: 55
Discharge: HOME | End: 2020-01-29
Payer: MEDICAID

## 2020-01-29 VITALS
DIASTOLIC BLOOD PRESSURE: 67 MMHG | SYSTOLIC BLOOD PRESSURE: 100 MMHG | TEMPERATURE: 97.9 F | RESPIRATION RATE: 18 BRPM | HEART RATE: 83 BPM

## 2020-01-29 DIAGNOSIS — K86.89 PANCREATIC MASS: Primary | ICD-10-CM

## 2020-01-29 DIAGNOSIS — C78.7 LIVER METASTASIS (HCC): ICD-10-CM

## 2020-01-29 PROCEDURE — 74011250636 HC RX REV CODE- 250/636: Performed by: INTERNAL MEDICINE

## 2020-01-29 PROCEDURE — 96523 IRRIG DRUG DELIVERY DEVICE: CPT

## 2020-01-29 RX ORDER — SODIUM CHLORIDE 0.9 % (FLUSH) 0.9 %
10 SYRINGE (ML) INJECTION AS NEEDED
Status: DISCONTINUED | OUTPATIENT
Start: 2020-01-29 | End: 2020-01-30 | Stop reason: HOSPADM

## 2020-01-29 RX ORDER — HEPARIN 100 UNIT/ML
300-500 SYRINGE INTRAVENOUS AS NEEDED
Status: DISCONTINUED | OUTPATIENT
Start: 2020-01-29 | End: 2020-01-30 | Stop reason: HOSPADM

## 2020-01-29 RX ADMIN — Medication 10 ML: at 15:14

## 2020-01-29 RX ADMIN — Medication 500 UNITS: at 15:14

## 2020-01-29 NOTE — PROGRESS NOTES
[facility] Progress Note    Date: 2020    Name: Jorge Luis Rivas    MRN: [id number]         : 1965    D/C CADD pump    Mr. Olmos arrived to [facility] at 12. Mr. Mary Marquez was assessed and education was provided. Mr. Praful Greco vitals were reviewed. Visit Vitals  /67 (BP 1 Location: Right arm, BP Patient Position: Sitting)   Pulse 83   Temp 97.9 °F (36.6 °C)   Resp 18       Patient's right upper chest port already accessed from Monday's visit. CADD pump infusion complete. Disconnected from patient's port. Blood return verified. Port flushed with 20 ml normal saline followed by Heparin 500 nits/5ml. Mr. Mary Marquez tolerated infusion without complaints. Mr. Mary Marquez was discharged from [place] [address] in stable condition at 1515. He is to return on  at [facility] [address] for his next appointment for pre chemo labs.     Samir Meyers RN  2020

## 2020-01-31 DIAGNOSIS — C78.7 LIVER METASTASES (HCC): ICD-10-CM

## 2020-01-31 DIAGNOSIS — R11.2 CINV (CHEMOTHERAPY-INDUCED NAUSEA AND VOMITING): ICD-10-CM

## 2020-01-31 DIAGNOSIS — C25.9 PANCREATIC ADENOCARCINOMA (HCC): ICD-10-CM

## 2020-01-31 DIAGNOSIS — T45.1X5A CINV (CHEMOTHERAPY-INDUCED NAUSEA AND VOMITING): ICD-10-CM

## 2020-01-31 RX ORDER — OLANZAPINE 5 MG/1
TABLET, ORALLY DISINTEGRATING ORAL
Qty: 30 TAB | Refills: 1 | Status: SHIPPED | OUTPATIENT
Start: 2020-01-31 | End: 2020-03-23 | Stop reason: SDUPTHER

## 2020-02-03 ENCOUNTER — TELEPHONE (OUTPATIENT)
Dept: ONCOLOGY | Age: 55
End: 2020-02-03

## 2020-02-03 NOTE — TELEPHONE ENCOUNTER
Prior authorization request for ondansetron hcl (ZOFRAN) 4 mg tablet submitted via [email or web address] (Key: [id number] - Rx #: [id number]).

## 2020-02-04 RX ORDER — SODIUM CHLORIDE 0.9 % (FLUSH) 0.9 %
10 SYRINGE (ML) INJECTION AS NEEDED
Status: CANCELLED
Start: 2020-02-19

## 2020-02-04 RX ORDER — DIPHENHYDRAMINE HYDROCHLORIDE 50 MG/ML
50 INJECTION, SOLUTION INTRAMUSCULAR; INTRAVENOUS AS NEEDED
Status: CANCELLED
Start: 2020-02-10

## 2020-02-04 RX ORDER — SODIUM CHLORIDE 0.9 % (FLUSH) 0.9 %
10 SYRINGE (ML) INJECTION AS NEEDED
Status: CANCELLED
Start: 2020-02-10

## 2020-02-04 RX ORDER — ATROPINE SULFATE 0.4 MG/ML
0.4 INJECTION, SOLUTION ENDOTRACHEAL; INTRAMEDULLARY; INTRAMUSCULAR; INTRAVENOUS; SUBCUTANEOUS
Status: CANCELLED | OUTPATIENT
Start: 2020-02-10

## 2020-02-04 RX ORDER — ONDANSETRON 2 MG/ML
8 INJECTION INTRAMUSCULAR; INTRAVENOUS AS NEEDED
Status: CANCELLED | OUTPATIENT
Start: 2020-02-10

## 2020-02-04 RX ORDER — ALBUTEROL SULFATE 0.83 MG/ML
2.5 SOLUTION RESPIRATORY (INHALATION) AS NEEDED
Status: CANCELLED
Start: 2020-02-10

## 2020-02-04 RX ORDER — EPINEPHRINE 1 MG/ML
0.3 INJECTION, SOLUTION, CONCENTRATE INTRAVENOUS AS NEEDED
Status: CANCELLED | OUTPATIENT
Start: 2020-02-10

## 2020-02-04 RX ORDER — ACETAMINOPHEN 325 MG/1
650 TABLET ORAL AS NEEDED
Status: CANCELLED
Start: 2020-02-10

## 2020-02-04 RX ORDER — HEPARIN 100 UNIT/ML
300-500 SYRINGE INTRAVENOUS AS NEEDED
Status: CANCELLED
Start: 2020-02-19

## 2020-02-04 RX ORDER — ATROPINE SULFATE 0.4 MG/ML
0.4 INJECTION, SOLUTION ENDOTRACHEAL; INTRAMEDULLARY; INTRAMUSCULAR; INTRAVENOUS; SUBCUTANEOUS ONCE
Status: CANCELLED | OUTPATIENT
Start: 2020-02-10

## 2020-02-04 RX ORDER — HYDROCORTISONE SODIUM SUCCINATE 100 MG/2ML
100 INJECTION, POWDER, FOR SOLUTION INTRAMUSCULAR; INTRAVENOUS AS NEEDED
Status: CANCELLED | OUTPATIENT
Start: 2020-02-10

## 2020-02-04 RX ORDER — FLUOROURACIL 50 MG/ML
900 INJECTION, SOLUTION INTRAVENOUS ONCE
Status: CANCELLED
Start: 2020-02-10 | End: 2020-02-10

## 2020-02-04 RX ORDER — PALONOSETRON 0.05 MG/ML
0.25 INJECTION, SOLUTION INTRAVENOUS ONCE
Status: CANCELLED | OUTPATIENT
Start: 2020-02-10

## 2020-02-04 RX ORDER — SODIUM CHLORIDE 9 MG/ML
10 INJECTION INTRAMUSCULAR; INTRAVENOUS; SUBCUTANEOUS AS NEEDED
Status: CANCELLED | OUTPATIENT
Start: 2020-02-19

## 2020-02-07 ENCOUNTER — TELEPHONE (OUTPATIENT)
Dept: ONCOLOGY | Age: 55
End: 2020-02-07

## 2020-02-07 ENCOUNTER — HOSPITAL ENCOUNTER (OUTPATIENT)
Dept: INFUSION THERAPY | Age: 55
Discharge: HOME | End: 2020-02-07
Payer: MEDICAID

## 2020-02-07 VITALS
HEART RATE: 72 BPM | WEIGHT: 199.4 LBS | OXYGEN SATURATION: 97 % | BODY MASS INDEX: 27.04 KG/M2 | TEMPERATURE: 98.2 F | DIASTOLIC BLOOD PRESSURE: 73 MMHG | SYSTOLIC BLOOD PRESSURE: 116 MMHG

## 2020-02-07 LAB
ALBUMIN SERPL-MCNC: 3.4 G/DL (ref 3.4–5)
ALBUMIN/GLOB SERPL: 0.8 {RATIO} (ref 0.8–1.7)
ALP SERPL-CCNC: 94 U/L (ref 45–117)
ALT SERPL-CCNC: 23 U/L (ref 16–61)
ANION GAP SERPL CALC-SCNC: 7 MMOL/L (ref 3–18)
AST SERPL-CCNC: 27 U/L (ref 10–38)
BASO+EOS+MONOS # BLD AUTO: 0.6 K/UL (ref 0–2.3)
BASO+EOS+MONOS NFR BLD AUTO: 13 % (ref 0.1–17)
BILIRUB SERPL-MCNC: 0.4 MG/DL (ref 0.2–1)
BUN SERPL-MCNC: 11 MG/DL (ref 7–18)
BUN/CREAT SERPL: 13 (ref 12–20)
CALCIUM SERPL-MCNC: 9.1 MG/DL (ref 8.5–10.1)
CHLORIDE SERPL-SCNC: 106 MMOL/L (ref 100–111)
CO2 SERPL-SCNC: 25 MMOL/L (ref 21–32)
CREAT SERPL-MCNC: 0.86 MG/DL (ref 0.6–1.3)
DIFFERENTIAL METHOD BLD: ABNORMAL
ERYTHROCYTE [DISTWIDTH] IN BLOOD BY AUTOMATED COUNT: 16 % (ref 11.5–14.5)
GLOBULIN SER CALC-MCNC: 4.4 G/DL (ref 2–4)
GLUCOSE SERPL-MCNC: 118 MG/DL (ref 74–99)
HCT VFR BLD AUTO: 34.5 % (ref 36–48)
HGB BLD-MCNC: 11.7 G/DL (ref 12–16)
LYMPHOCYTES # BLD: 1.8 K/UL (ref 1.1–5.9)
LYMPHOCYTES NFR BLD: 38 % (ref 14–44)
MCH RBC QN AUTO: 30 PG (ref 25–35)
MCHC RBC AUTO-ENTMCNC: 33.9 G/DL (ref 31–37)
MCV RBC AUTO: 88.5 FL (ref 78–102)
NEUTS SEG # BLD: 2.3 K/UL (ref 1.8–9.5)
NEUTS SEG NFR BLD: 50 % (ref 40–70)
PLATELET # BLD AUTO: 67 K/UL (ref 140–440)
POTASSIUM SERPL-SCNC: 3.8 MMOL/L (ref 3.5–5.5)
PROT SERPL-MCNC: 7.8 G/DL (ref 6.4–8.2)
RBC # BLD AUTO: 3.9 M/UL (ref 4.1–5.1)
SODIUM SERPL-SCNC: 138 MMOL/L (ref 136–145)
WBC # BLD AUTO: 4.7 K/UL (ref 4.5–13)

## 2020-02-07 PROCEDURE — 36415 COLL VENOUS BLD VENIPUNCTURE: CPT

## 2020-02-07 PROCEDURE — 80053 COMPREHEN METABOLIC PANEL: CPT

## 2020-02-07 PROCEDURE — 85025 COMPLETE CBC W/AUTO DIFF WBC: CPT

## 2020-02-07 NOTE — TELEPHONE ENCOUNTER
Spoke with Radha regarding insurance denial of full amount of ondansetron rx (insurance plan's quantity limit is 15 tablets per 27 days). She states \"that's not enough, he's already out of that. \" She also states he is taking olanzapine 5mg tabs as prescribed for chemotherapy induced nausea/vomiting and it doesn't seem to be helping.

## 2020-02-07 NOTE — TELEPHONE ENCOUNTER
Additional questions populated for prior authorization request for ondansetron. Completed and submitted via [email or web address] com (Key: [id number] - [place] Case ID: [id number] - Rx #: [id number]).

## 2020-02-10 ENCOUNTER — HOSPITAL ENCOUNTER (OUTPATIENT)
Dept: INFUSION THERAPY | Age: 55
Discharge: HOME | End: 2020-02-10
Payer: MEDICAID

## 2020-02-10 VITALS
OXYGEN SATURATION: 99 % | DIASTOLIC BLOOD PRESSURE: 72 MMHG | HEIGHT: 72 IN | BODY MASS INDEX: 27.48 KG/M2 | HEART RATE: 61 BPM | TEMPERATURE: 97.8 F | WEIGHT: 202.9 LBS | RESPIRATION RATE: 19 BRPM | SYSTOLIC BLOOD PRESSURE: 111 MMHG

## 2020-02-10 DIAGNOSIS — C78.7 LIVER METASTASIS (HCC): ICD-10-CM

## 2020-02-10 DIAGNOSIS — C25.9 PANCREATIC ADENOCARCINOMA (HCC): ICD-10-CM

## 2020-02-10 DIAGNOSIS — K86.89 PANCREATIC MASS: Primary | ICD-10-CM

## 2020-02-10 DIAGNOSIS — D69.6 THROMBOCYTOPENIA (HCC): Primary | ICD-10-CM

## 2020-02-10 LAB
BASO+EOS+MONOS # BLD AUTO: 0.6 K/UL (ref 0–2.3)
BASO+EOS+MONOS NFR BLD AUTO: 11 % (ref 0.1–17)
DIFFERENTIAL METHOD BLD: ABNORMAL
ERYTHROCYTE [DISTWIDTH] IN BLOOD BY AUTOMATED COUNT: 16.7 % (ref 11.5–14.5)
HCT VFR BLD AUTO: 33.9 % (ref 36–48)
HGB BLD-MCNC: 11.1 G/DL (ref 12–16)
LYMPHOCYTES # BLD: 1.5 K/UL (ref 1.1–5.9)
LYMPHOCYTES NFR BLD: 28 % (ref 14–44)
MCH RBC QN AUTO: 29.7 PG (ref 25–35)
MCHC RBC AUTO-ENTMCNC: 32.7 G/DL (ref 31–37)
MCV RBC AUTO: 90.6 FL (ref 78–102)
NEUTS SEG # BLD: 3.2 K/UL (ref 1.8–9.5)
NEUTS SEG NFR BLD: 62 % (ref 40–70)
PLATELET # BLD AUTO: 67 K/UL (ref 140–440)
RBC # BLD AUTO: 3.74 M/UL (ref 4.1–5.1)
WBC # BLD AUTO: 5.3 K/UL (ref 4.5–13)

## 2020-02-10 PROCEDURE — 74011000258 HC RX REV CODE- 258: Performed by: INTERNAL MEDICINE

## 2020-02-10 PROCEDURE — 77030012965 HC NDL HUBR BBMI -A

## 2020-02-10 PROCEDURE — 74011250636 HC RX REV CODE- 250/636: Performed by: INTERNAL MEDICINE

## 2020-02-10 PROCEDURE — 36591 DRAW BLOOD OFF VENOUS DEVICE: CPT

## 2020-02-10 PROCEDURE — 85025 COMPLETE CBC W/AUTO DIFF WBC: CPT

## 2020-02-10 RX ORDER — HEPARIN 100 UNIT/ML
300-500 SYRINGE INTRAVENOUS AS NEEDED
Status: DISPENSED | OUTPATIENT
Start: 2020-02-10 | End: 2020-02-10

## 2020-02-10 RX ORDER — SODIUM CHLORIDE 9 MG/ML
10 INJECTION INTRAMUSCULAR; INTRAVENOUS; SUBCUTANEOUS AS NEEDED
Status: ACTIVE | OUTPATIENT
Start: 2020-02-10 | End: 2020-02-10

## 2020-02-10 RX ORDER — DEXTROSE MONOHYDRATE 50 MG/ML
25 INJECTION, SOLUTION INTRAVENOUS CONTINUOUS
Status: DISPENSED | OUTPATIENT
Start: 2020-02-10 | End: 2020-02-10

## 2020-02-10 RX ADMIN — DEXTROSE 25 ML/HR: 5 SOLUTION INTRAVENOUS at 08:50

## 2020-02-10 RX ADMIN — SODIUM CHLORIDE 10 ML: 9 INJECTION INTRAMUSCULAR; INTRAVENOUS; SUBCUTANEOUS at 10:25

## 2020-02-10 RX ADMIN — HEPARIN 500 UNITS: 100 SYRINGE at 10:24

## 2020-02-10 NOTE — TELEPHONE ENCOUNTER
MD requesting order for:   Requested Prescriptions     Pending Prescriptions Disp Refills    avatrombopag 20 mg tab       Sig: Take 20 mg by mouth daily. Folfirinox C5D1 held today d/t thrombocytopenia. Recent Results (from the past 12 hour(s))   CBC WITH 3 PART DIFF    Collection Time: 02/10/20  8:42 AM   Result Value Ref Range    WBC 5.3 4.5 - 13.0 K/uL    RBC 3.74 (L) 4.10 - 5.10 M/uL    HGB 11.1 (L) 12.0 - 16.0 g/dL    HCT 33.9 (L) 36 - 48 %    MCV 90.6 78 - 102 FL    MCH 29.7 25.0 - 35.0 PG    MCHC 32.7 31 - 37 g/dL    RDW 16.7 (H) 11.5 - 14.5 %    PLATELET 67 (L) 437 - 440 K/uL    NEUTROPHILS 62 40 - 70 %    MIXED CELLS 11 0.1 - 17 %    LYMPHOCYTES 28 14 - 44 %    ABS. NEUTROPHILS 3.2 1.8 - 9.5 K/UL    ABS. MIXED CELLS 0.6 0.0 - 2.3 K/uL    ABS.  LYMPHOCYTES 1.5 1.1 - 5.9 K/UL    DF AUTOMATED

## 2020-02-10 NOTE — PROGRESS NOTES
C5D1 FOLFIRINOX-Held for 1 week per Dr. Susan Mesa. Recent Results (from the past 12 hour(s))   CBC WITH 3 PART DIFF    Collection Time: 02/10/20  8:42 AM   Result Value Ref Range    WBC 5.3 4.5 - 13.0 K/uL    RBC 3.74 (L) 4.10 - 5.10 M/uL    HGB 11.1 (L) 12.0 - 16.0 g/dL    HCT 33.9 (L) 36 - 48 %    MCV 90.6 78 - 102 FL    MCH 29.7 25.0 - 35.0 PG    MCHC 32.7 31 - 37 g/dL    RDW 16.7 (H) 11.5 - 14.5 %    PLATELET 67 (L) 695 - 440 K/uL    NEUTROPHILS 62 40 - 70 %    MIXED CELLS 11 0.1 - 17 %    LYMPHOCYTES 28 14 - 44 %    ABS. NEUTROPHILS 3.2 1.8 - 9.5 K/UL    ABS. MIXED CELLS 0.6 0.0 - 2.3 K/uL    ABS. LYMPHOCYTES 1.5 1.1 - 5.9 K/UL    DF AUTOMATED       Doptelet 20mg PO daily ordered.

## 2020-02-11 DIAGNOSIS — C25.9 PANCREATIC ADENOCARCINOMA (HCC): ICD-10-CM

## 2020-02-11 DIAGNOSIS — T45.1X5A CHEMOTHERAPY INDUCED NAUSEA AND VOMITING: Primary | ICD-10-CM

## 2020-02-11 DIAGNOSIS — R11.2 CHEMOTHERAPY INDUCED NAUSEA AND VOMITING: Primary | ICD-10-CM

## 2020-02-11 RX ORDER — ONDANSETRON 4 MG/1
4 TABLET, FILM COATED ORAL
Qty: 90 TAB | Refills: 2 | Status: SHIPPED | OUTPATIENT
Start: 2020-02-11 | End: 2020-02-11 | Stop reason: ALTCHOICE

## 2020-02-11 RX ORDER — ONDANSETRON HYDROCHLORIDE 8 MG/1
8 TABLET, FILM COATED ORAL
Qty: 90 TAB | Refills: 1 | Status: SHIPPED | OUTPATIENT
Start: 2020-02-11 | End: 2020-03-12

## 2020-02-11 NOTE — TELEPHONE ENCOUNTER
Patient requesting refill for:   Requested Prescriptions     Pending Prescriptions Disp Refills    ondansetron hcl (ZOFRAN) 4 mg tablet 60 Tab 3     Sig: Take 1 Tab by mouth every eight (8) hours as needed for Nausea for up to 30 days.

## 2020-02-12 ENCOUNTER — APPOINTMENT (OUTPATIENT)
Dept: MRI IMAGING | Age: 55
End: 2020-02-12
Attending: EMERGENCY MEDICINE
Payer: MEDICAID

## 2020-02-12 ENCOUNTER — HOSPITAL ENCOUNTER (EMERGENCY)
Age: 55
Discharge: HOME | End: 2020-02-12
Attending: EMERGENCY MEDICINE | Admitting: EMERGENCY MEDICINE
Payer: MEDICAID

## 2020-02-12 ENCOUNTER — HOSPITAL ENCOUNTER (EMERGENCY)
Dept: CT IMAGING | Age: 55
Discharge: HOME | End: 2020-02-12
Attending: EMERGENCY MEDICINE
Payer: MEDICAID

## 2020-02-12 ENCOUNTER — APPOINTMENT (OUTPATIENT)
Dept: INFUSION THERAPY | Age: 55
End: 2020-02-12
Payer: MEDICAID

## 2020-02-12 VITALS
TEMPERATURE: 98.7 F | RESPIRATION RATE: 14 BRPM | DIASTOLIC BLOOD PRESSURE: 86 MMHG | OXYGEN SATURATION: 95 % | SYSTOLIC BLOOD PRESSURE: 110 MMHG | HEIGHT: 72 IN | BODY MASS INDEX: 27.36 KG/M2 | WEIGHT: 202 LBS | HEART RATE: 70 BPM

## 2020-02-12 DIAGNOSIS — C25.9 MALIGNANT NEOPLASM OF PANCREAS, UNSPECIFIED LOCATION OF MALIGNANCY (HCC): ICD-10-CM

## 2020-02-12 DIAGNOSIS — R11.2 NON-INTRACTABLE VOMITING WITH NAUSEA, UNSPECIFIED VOMITING TYPE: ICD-10-CM

## 2020-02-12 DIAGNOSIS — D61.810 PANCYTOPENIA DUE TO CHEMOTHERAPY (HCC): ICD-10-CM

## 2020-02-12 DIAGNOSIS — R51.9 ACUTE NONINTRACTABLE HEADACHE, UNSPECIFIED HEADACHE TYPE: ICD-10-CM

## 2020-02-12 DIAGNOSIS — R10.13 ABDOMINAL PAIN, ACUTE, EPIGASTRIC: Primary | ICD-10-CM

## 2020-02-12 LAB
ALBUMIN SERPL-MCNC: 3.7 G/DL (ref 3.4–5)
ALBUMIN/GLOB SERPL: 0.9 {RATIO} (ref 0.8–1.7)
ALP SERPL-CCNC: 109 U/L (ref 45–117)
ALT SERPL-CCNC: 19 U/L (ref 16–61)
ANION GAP SERPL CALC-SCNC: 8 MMOL/L (ref 3–18)
AST SERPL-CCNC: 25 U/L (ref 10–38)
BASOPHILS # BLD: 0 K/UL (ref 0–0.1)
BASOPHILS NFR BLD: 0 % (ref 0–2)
BILIRUB SERPL-MCNC: 0.4 MG/DL (ref 0.2–1)
BUN SERPL-MCNC: 11 MG/DL (ref 7–18)
BUN/CREAT SERPL: 11 (ref 12–20)
CALCIUM SERPL-MCNC: 9 MG/DL (ref 8.5–10.1)
CHLORIDE SERPL-SCNC: 105 MMOL/L (ref 100–111)
CO2 SERPL-SCNC: 25 MMOL/L (ref 21–32)
CREAT SERPL-MCNC: 0.96 MG/DL (ref 0.6–1.3)
DIFFERENTIAL METHOD BLD: ABNORMAL
EOSINOPHIL # BLD: 0.1 K/UL (ref 0–0.4)
EOSINOPHIL NFR BLD: 2 % (ref 0–5)
ERYTHROCYTE [DISTWIDTH] IN BLOOD BY AUTOMATED COUNT: 17 % (ref 11.6–14.5)
GLOBULIN SER CALC-MCNC: 4.1 G/DL (ref 2–4)
GLUCOSE SERPL-MCNC: 194 MG/DL (ref 74–99)
HCT VFR BLD AUTO: 27.2 % (ref 36–48)
HGB BLD-MCNC: 9 G/DL (ref 13–16)
LIPASE SERPL-CCNC: 63 U/L (ref 73–393)
LYMPHOCYTES # BLD: 1.5 K/UL (ref 0.9–3.6)
LYMPHOCYTES NFR BLD: 34 % (ref 21–52)
MCH RBC QN AUTO: 30 PG (ref 24–34)
MCHC RBC AUTO-ENTMCNC: 33.1 G/DL (ref 31–37)
MCV RBC AUTO: 90.7 FL (ref 74–97)
MONOCYTES # BLD: 0.6 K/UL (ref 0.05–1.2)
MONOCYTES NFR BLD: 12 % (ref 3–10)
NEUTS SEG # BLD: 2.3 K/UL (ref 1.8–8)
NEUTS SEG NFR BLD: 52 % (ref 40–73)
PLATELET # BLD AUTO: 74 K/UL (ref 135–420)
PMV BLD AUTO: 11.2 FL (ref 9.2–11.8)
POTASSIUM SERPL-SCNC: 3.8 MMOL/L (ref 3.5–5.5)
PROT SERPL-MCNC: 7.8 G/DL (ref 6.4–8.2)
RBC # BLD AUTO: 3 M/UL (ref 4.7–5.5)
SODIUM SERPL-SCNC: 138 MMOL/L (ref 136–145)
WBC # BLD AUTO: 4.5 K/UL (ref 4.6–13.2)

## 2020-02-12 PROCEDURE — 96361 HYDRATE IV INFUSION ADD-ON: CPT

## 2020-02-12 PROCEDURE — 99285 EMERGENCY DEPT VISIT HI MDM: CPT

## 2020-02-12 PROCEDURE — 96376 TX/PRO/DX INJ SAME DRUG ADON: CPT

## 2020-02-12 PROCEDURE — 96375 TX/PRO/DX INJ NEW DRUG ADDON: CPT

## 2020-02-12 PROCEDURE — 74177 CT ABD & PELVIS W/CONTRAST: CPT

## 2020-02-12 PROCEDURE — 74011636320 HC RX REV CODE- 636/320: Performed by: EMERGENCY MEDICINE

## 2020-02-12 PROCEDURE — 85025 COMPLETE CBC W/AUTO DIFF WBC: CPT

## 2020-02-12 PROCEDURE — 70450 CT HEAD/BRAIN W/O DYE: CPT

## 2020-02-12 PROCEDURE — 70553 MRI BRAIN STEM W/O & W/DYE: CPT

## 2020-02-12 PROCEDURE — 83690 ASSAY OF LIPASE: CPT

## 2020-02-12 PROCEDURE — 74011250636 HC RX REV CODE- 250/636: Performed by: EMERGENCY MEDICINE

## 2020-02-12 PROCEDURE — 96374 THER/PROPH/DIAG INJ IV PUSH: CPT

## 2020-02-12 PROCEDURE — 80053 COMPREHEN METABOLIC PANEL: CPT

## 2020-02-12 PROCEDURE — A9575 INJ GADOTERATE MEGLUMI 0.1ML: HCPCS | Performed by: EMERGENCY MEDICINE

## 2020-02-12 RX ORDER — EPINEPHRINE 1 MG/ML
0.3 INJECTION, SOLUTION, CONCENTRATE INTRAVENOUS AS NEEDED
Status: CANCELLED | OUTPATIENT
Start: 2020-02-17

## 2020-02-12 RX ORDER — PROMETHAZINE HYDROCHLORIDE 25 MG/1
25 SUPPOSITORY RECTAL
Qty: 24 SUPPOSITORY | Refills: 1 | Status: SHIPPED | OUTPATIENT
Start: 2020-02-12 | End: 2020-02-19

## 2020-02-12 RX ORDER — METOCLOPRAMIDE HYDROCHLORIDE 5 MG/ML
INJECTION INTRAMUSCULAR; INTRAVENOUS
Status: DISCONTINUED
Start: 2020-02-12 | End: 2020-02-12 | Stop reason: HOSPADM

## 2020-02-12 RX ORDER — HYDROCORTISONE SODIUM SUCCINATE 100 MG/2ML
100 INJECTION, POWDER, FOR SOLUTION INTRAMUSCULAR; INTRAVENOUS AS NEEDED
Status: CANCELLED | OUTPATIENT
Start: 2020-02-17

## 2020-02-12 RX ORDER — ONDANSETRON 4 MG/1
4 TABLET, ORALLY DISINTEGRATING ORAL
Qty: 20 TAB | Refills: 0 | Status: SHIPPED | OUTPATIENT
Start: 2020-02-12 | End: 2020-04-27 | Stop reason: ALTCHOICE

## 2020-02-12 RX ORDER — ATROPINE SULFATE 0.4 MG/ML
0.4 INJECTION, SOLUTION ENDOTRACHEAL; INTRAMEDULLARY; INTRAMUSCULAR; INTRAVENOUS; SUBCUTANEOUS ONCE
Status: CANCELLED | OUTPATIENT
Start: 2020-02-17

## 2020-02-12 RX ORDER — LORAZEPAM 2 MG/ML
1-2 INJECTION INTRAMUSCULAR
Status: COMPLETED | OUTPATIENT
Start: 2020-02-12 | End: 2020-02-12

## 2020-02-12 RX ORDER — DIPHENHYDRAMINE HYDROCHLORIDE 50 MG/ML
50 INJECTION, SOLUTION INTRAMUSCULAR; INTRAVENOUS AS NEEDED
Status: CANCELLED
Start: 2020-02-17

## 2020-02-12 RX ORDER — ONDANSETRON 2 MG/ML
8 INJECTION INTRAMUSCULAR; INTRAVENOUS AS NEEDED
Status: CANCELLED | OUTPATIENT
Start: 2020-02-17

## 2020-02-12 RX ORDER — METOCLOPRAMIDE HYDROCHLORIDE 5 MG/ML
5 INJECTION INTRAMUSCULAR; INTRAVENOUS
Status: COMPLETED | OUTPATIENT
Start: 2020-02-12 | End: 2020-02-12

## 2020-02-12 RX ORDER — SODIUM CHLORIDE 0.9 % (FLUSH) 0.9 %
10 SYRINGE (ML) INJECTION AS NEEDED
Status: CANCELLED
Start: 2020-02-17

## 2020-02-12 RX ORDER — METOCLOPRAMIDE HYDROCHLORIDE 5 MG/ML
10 INJECTION INTRAMUSCULAR; INTRAVENOUS
Status: COMPLETED | OUTPATIENT
Start: 2020-02-12 | End: 2020-02-12

## 2020-02-12 RX ORDER — HEPARIN 100 UNIT/ML
300-500 SYRINGE INTRAVENOUS AS NEEDED
Status: CANCELLED | OUTPATIENT
Start: 2020-02-17

## 2020-02-12 RX ORDER — ONDANSETRON 2 MG/ML
4 INJECTION INTRAMUSCULAR; INTRAVENOUS
Status: COMPLETED | OUTPATIENT
Start: 2020-02-12 | End: 2020-02-12

## 2020-02-12 RX ORDER — MORPHINE SULFATE 4 MG/ML
4 INJECTION, SOLUTION INTRAMUSCULAR; INTRAVENOUS
Status: COMPLETED | OUTPATIENT
Start: 2020-02-12 | End: 2020-02-12

## 2020-02-12 RX ORDER — DEXAMETHASONE SODIUM PHOSPHATE 4 MG/ML
10 INJECTION, SOLUTION INTRA-ARTICULAR; INTRALESIONAL; INTRAMUSCULAR; INTRAVENOUS; SOFT TISSUE
Status: COMPLETED | OUTPATIENT
Start: 2020-02-12 | End: 2020-02-12

## 2020-02-12 RX ORDER — PALONOSETRON 0.05 MG/ML
0.25 INJECTION, SOLUTION INTRAVENOUS ONCE
Status: CANCELLED | OUTPATIENT
Start: 2020-02-17

## 2020-02-12 RX ORDER — MORPHINE SULFATE 4 MG/ML
INJECTION, SOLUTION INTRAMUSCULAR; INTRAVENOUS
Status: DISCONTINUED
Start: 2020-02-12 | End: 2020-02-12 | Stop reason: HOSPADM

## 2020-02-12 RX ORDER — FAMOTIDINE 10 MG/ML
20 INJECTION INTRAVENOUS
Status: COMPLETED | OUTPATIENT
Start: 2020-02-12 | End: 2020-02-12

## 2020-02-12 RX ORDER — DEXAMETHASONE SODIUM PHOSPHATE 4 MG/ML
INJECTION, SOLUTION INTRA-ARTICULAR; INTRALESIONAL; INTRAMUSCULAR; INTRAVENOUS; SOFT TISSUE
Status: DISCONTINUED
Start: 2020-02-12 | End: 2020-02-12 | Stop reason: HOSPADM

## 2020-02-12 RX ORDER — SODIUM CHLORIDE 9 MG/ML
10 INJECTION INTRAMUSCULAR; INTRAVENOUS; SUBCUTANEOUS AS NEEDED
Status: CANCELLED | OUTPATIENT
Start: 2020-02-17

## 2020-02-12 RX ORDER — DIPHENHYDRAMINE HYDROCHLORIDE 50 MG/ML
12.5 INJECTION, SOLUTION INTRAMUSCULAR; INTRAVENOUS
Status: COMPLETED | OUTPATIENT
Start: 2020-02-12 | End: 2020-02-12

## 2020-02-12 RX ORDER — ALBUTEROL SULFATE 0.83 MG/ML
2.5 SOLUTION RESPIRATORY (INHALATION) AS NEEDED
Status: CANCELLED
Start: 2020-02-17

## 2020-02-12 RX ORDER — ACETAMINOPHEN 325 MG/1
650 TABLET ORAL AS NEEDED
Status: CANCELLED
Start: 2020-02-17

## 2020-02-12 RX ORDER — DEXTROSE MONOHYDRATE 50 MG/ML
25 INJECTION, SOLUTION INTRAVENOUS CONTINUOUS
Status: CANCELLED | OUTPATIENT
Start: 2020-02-17

## 2020-02-12 RX ORDER — FLUOROURACIL 50 MG/ML
900 INJECTION, SOLUTION INTRAVENOUS ONCE
Status: CANCELLED
Start: 2020-02-17 | End: 2020-02-17

## 2020-02-12 RX ORDER — ATROPINE SULFATE 0.4 MG/ML
0.4 INJECTION, SOLUTION ENDOTRACHEAL; INTRAMEDULLARY; INTRAMUSCULAR; INTRAVENOUS; SUBCUTANEOUS
Status: CANCELLED | OUTPATIENT
Start: 2020-02-17

## 2020-02-12 RX ADMIN — SODIUM CHLORIDE 1000 ML: 900 INJECTION, SOLUTION INTRAVENOUS at 02:27

## 2020-02-12 RX ADMIN — METOCLOPRAMIDE 10 MG: 5 INJECTION, SOLUTION INTRAMUSCULAR; INTRAVENOUS at 04:46

## 2020-02-12 RX ADMIN — IOPAMIDOL 100 ML: 612 INJECTION, SOLUTION INTRAVENOUS at 03:58

## 2020-02-12 RX ADMIN — GADOTERATE MEGLUMINE 15 ML: 376.9 INJECTION INTRAVENOUS at 07:05

## 2020-02-12 RX ADMIN — DEXAMETHASONE SODIUM PHOSPHATE 10 MG: 4 INJECTION, SOLUTION INTRAMUSCULAR; INTRAVENOUS at 04:51

## 2020-02-12 RX ADMIN — LORAZEPAM 2 MG: 2 INJECTION, SOLUTION INTRAMUSCULAR; INTRAVENOUS at 06:12

## 2020-02-12 RX ADMIN — METOCLOPRAMIDE 5 MG: 5 INJECTION, SOLUTION INTRAMUSCULAR; INTRAVENOUS at 02:32

## 2020-02-12 RX ADMIN — ONDANSETRON 4 MG: 2 INJECTION INTRAMUSCULAR; INTRAVENOUS at 02:29

## 2020-02-12 RX ADMIN — FAMOTIDINE 20 MG: 10 INJECTION, SOLUTION INTRAVENOUS at 02:28

## 2020-02-12 RX ADMIN — SODIUM CHLORIDE 1000 ML: 900 INJECTION, SOLUTION INTRAVENOUS at 02:47

## 2020-02-12 RX ADMIN — MORPHINE SULFATE 4 MG: 4 INJECTION, SOLUTION INTRAMUSCULAR; INTRAVENOUS at 04:46

## 2020-02-12 RX ADMIN — DIPHENHYDRAMINE HYDROCHLORIDE 12.5 MG: 50 INJECTION, SOLUTION INTRAMUSCULAR; INTRAVENOUS at 02:30

## 2020-02-12 RX ADMIN — MORPHINE SULFATE 4 MG: 4 INJECTION, SOLUTION INTRAMUSCULAR; INTRAVENOUS at 02:44

## 2020-02-12 NOTE — ED NOTES
7:08 AM :Pt care assumed from Dr. Colin Santiago , ED provider. Pt complaint(s), current treatment plan, progression and available diagnostic results have been discussed thoroughly. Rounding occurred: yes  Intended Disposition: TBD   Pending diagnostic reports and/or labs (please list): MRI brain w&w/out    Patient coming in with headache since yesterday, left-sided, mild, feeling much better now as well as abdominal pain in the epigastric and left upper quadrant area, known metastatic pancreatic cancer, no known mets to the brain. Hypodensity seen on CT head, we are pending MRI of the brain with and without contrast, disposition to be determined depending on what is seen, will likely need to discuss with his oncologist.  He is feeling better at this time, his exam is benign, no concern for meningitis at this time. D/w Dr. Alejandro Saenz, no new mets or cva found that correlate with symptoms, discussed adding aspirin to regimen given chronic ischemic foci but Dr Alejandro Saenz feels eliquis is enough coverage. Will follow him up in clinic, pt feeling better and okay with plan to discharge. Safe for d/c, careful return precautions given.  Pt/family comfortable with plan    Giselle Díaz MD

## 2020-02-12 NOTE — DISCHARGE INSTRUCTIONS
Patient Education        Abdominal Pain: Care Instructions  Your Care Instructions    Abdominal pain has many possible causes. Some aren't serious and get better on their own in a few days. Others need more testing and treatment. If your pain continues or gets worse, you need to be rechecked and may need more tests to find out what is wrong. You may need surgery to correct the problem. Don't ignore new symptoms, such as fever, nausea and vomiting, urination problems, pain that gets worse, and dizziness. These may be signs of a more serious problem. Your doctor may have recommended a follow-up visit in the next 8 to 12 hours. If you are not getting better, you may need more tests or treatment. The doctor has checked you carefully, but problems can develop later. If you notice any problems or new symptoms, get medical treatment right away. Follow-up care is a key part of your treatment and safety. Be sure to make and go to all appointments, and call your doctor if you are having problems. It's also a good idea to know your test results and keep a list of the medicines you take. How can you care for yourself at home? · Rest until you feel better. · To prevent dehydration, drink plenty of fluids, enough so that your urine is light yellow or clear like water. Choose water and other caffeine-free clear liquids until you feel better. If you have kidney, heart, or liver disease and have to limit fluids, talk with your doctor before you increase the amount of fluids you drink. · If your stomach is upset, eat mild foods, such as rice, dry toast or crackers, bananas, and applesauce. Try eating several small meals instead of two or three large ones. · Wait until 48 hours after all symptoms have gone away before you have spicy foods, alcohol, and drinks that contain caffeine. · Do not eat foods that are high in fat. · Avoid anti-inflammatory medicines such as aspirin, ibuprofen (Advil, Motrin), and naproxen (Aleve). These can cause stomach upset. Talk to your doctor if you take daily aspirin for another health problem. When should you call for help? Call 911 anytime you think you may need emergency care. For example, call if:    · You passed out (lost consciousness).     · You pass maroon or very bloody stools.     · You vomit blood or what looks like coffee grounds.     · You have new, severe belly pain.    Call your doctor now or seek immediate medical care if:    · Your pain gets worse, especially if it becomes focused in one area of your belly.     · You have a new or higher fever.     · Your stools are black and look like tar, or they have streaks of blood.     · You have unexpected vaginal bleeding.     · You have symptoms of a urinary tract infection. These may include:  ? Pain when you urinate. ? Urinating more often than usual.  ? Blood in your urine.     · You are dizzy or lightheaded, or you feel like you may faint.    Watch closely for changes in your health, and be sure to contact your doctor if:    · You are not getting better after 1 day (24 hours). Where can you learn more? Go to http://kelleySynchronicacarissa.info/. Enter J825 in the search box to learn more about \"Abdominal Pain: Care Instructions. \"  Current as of: June 26, 2019  Content Version: 12.2  © 9901-4268 [employer]. Care instructions adapted under license by [email or web address] (which disclaims liability or warranty for this information). If you have questions about a medical condition or this instruction, always ask your healthcare professional. [place] [address] any warranty or liability for your use of this information. Patient Education        Headache: Care Instructions  Your Care Instructions    Headaches have many possible causes. Most headaches aren't a sign of a more serious problem, and they will get better on their own. Home treatment may help you feel better faster.   The doctor has checked you carefully, but problems can develop later. If you notice any problems or new symptoms, get medical treatment right away. Follow-up care is a key part of your treatment and safety. Be sure to make and go to all appointments, and call your doctor if you are having problems. It's also a good idea to know your test results and keep a list of the medicines you take. How can you care for yourself at home? · Do not drive if you have taken a prescription pain medicine. · Rest in a quiet, dark room until your headache is gone. Close your eyes and try to relax or go to sleep. Don't watch TV or read. · Put a cold, moist cloth or cold pack on the painful area for 10 to 20 minutes at a time. Put a thin cloth between the cold pack and your skin. · Use a warm, moist towel or a heating pad set on low to relax tight shoulder and neck muscles. · Have someone gently massage your neck and shoulders. · Take pain medicines exactly as directed. ? If the doctor gave you a prescription medicine for pain, take it as prescribed. ? If you are not taking a prescription pain medicine, ask your doctor if you can take an over-the-counter medicine. · Be careful not to take pain medicine more often than the instructions allow, because you may get worse or more frequent headaches when the medicine wears off. · Do not ignore new symptoms that occur with a headache, such as a fever, weakness or numbness, vision changes, or confusion. These may be signs of a more serious problem. To prevent headaches  · Keep a headache diary so you can figure out what triggers your headaches. Avoiding triggers may help you prevent headaches. Record when each headache began, how long it lasted, and what the pain was like (throbbing, aching, stabbing, or dull). Write down any other symptoms you had with the headache, such as nausea, flashing lights or dark spots, or sensitivity to bright light or loud noise.  Note if the headache occurred near your period. List anything that might have triggered the headache, such as certain foods (chocolate, cheese, wine) or odors, smoke, bright light, stress, or lack of sleep. · Find healthy ways to deal with stress. Headaches are most common during or right after stressful times. Take time to relax before and after you do something that has caused a headache in the past.  · Try to keep your muscles relaxed by keeping good posture. Check your jaw, face, neck, and shoulder muscles for tension, and try relaxing them. When sitting at a desk, change positions often, and stretch for 30 seconds each hour. · Get plenty of sleep and exercise. · Eat regularly and well. Long periods without food can trigger a headache. · Treat yourself to a massage. Some people find that regular massages are very helpful in relieving tension. · Limit caffeine by not drinking too much coffee, tea, or soda. But don't quit caffeine suddenly, because that can also give you headaches. · Reduce eyestrain from computers by blinking frequently and looking away from the computer screen every so often. Make sure you have proper eyewear and that your monitor is set up properly, about an arm's length away. · Seek help if you have depression or anxiety. Your headaches may be linked to these conditions. Treatment can both prevent headaches and help with symptoms of anxiety or depression. When should you call for help? Call 911 anytime you think you may need emergency care. For example, call if:    · You have signs of a stroke. These may include:  ? Sudden numbness, paralysis, or weakness in your face, arm, or leg, especially on only one side of your body. ? Sudden vision changes. ? Sudden trouble speaking. ? Sudden confusion or trouble understanding simple statements. ? Sudden problems with walking or balance.   ? A sudden, severe headache that is different from past headaches.    Call your doctor now or seek immediate medical care if:    · You have a new or worse headache.     · Your headache gets much worse. Where can you learn more? Go to http://kelley-carissa.info/. Enter M271 in the search box to learn more about \"Headache: Care Instructions. \"  Current as of: March 28, 2019  Content Version: 12.2  © 0046-5532 [employer]. Care instructions adapted under license by [employer] (which disclaims liability or warranty for this information). If you have questions about a medical condition or this instruction, always ask your healthcare professional. [address] any warranty or liability for your use of this information.

## 2020-02-12 NOTE — ED NOTES
Topaz signing device is not working. But gives verbal answers to all questions on MRI form.  Charge nurse made aware of hardware glitch

## 2020-02-12 NOTE — ED TRIAGE NOTES
Ambulatory to triage. C/o stabbing upper abdominal pain and dizziness. Nausea and vomiting x2-3 hours. Undergoing chemotherapy for pancreatic cancer. Did not not have chemo this week because his platelets were too low.

## 2020-02-12 NOTE — ED PROVIDER NOTES
Raphael Torres is a 62 y.o. male with history of pancreatic cancer with complaints of increased upper abdominal pain tonight along with vomiting for the last several hours patient denies any diarrhea. Patient has any fever. Patient is also had a headache as well for the last couple days. He denies any numbness, tingling, weakness. He has no visual symptoms. There is no hematemesis or melena. Patient has not been able to get chemo for the last couple weeks secondary to his platelets being low. Patient also has not been able to get new prescription of his Zofran due to insurance not covering it    The history is provided by the patient and medical records. Past Medical History:   Diagnosis Date    Cancer [facility])     Pancreatic Adenocarcinoma. Diagnosed 11/2019.  Diabetes ([facility] [address] 10/2019    Diagnosed 10/2019.  Hepatitis C     Received Curative Treatment, but Hepatitis C was not cured and returned.  Left ulnar fracture     Thromboembolus ([facility]     DVT of LLE 11/2019. Also, Pulmonary Embolism 11/2019. Treated with Rivaroxaban.        Past Surgical History:   Procedure Laterality Date    HX TONSILLECTOMY      IR BX PANCREAS NEEDLE PERC  11/2019    Endoscopic Pancreatic Biopsy (?)    IR INSERT TUNL CVC W PORT OVER 5 YEARS  11/25/2019         Family History:   Problem Relation Age of Onset    Diabetes Mother     Kidney Disease Mother     Diabetes Father     Diabetes Brother     Cancer Maternal Grandmother     Cancer Maternal Grandfather     Cancer Maternal Aunt     Cancer Maternal Uncle        Social History     Socioeconomic History    Marital status:      Spouse name: Not on file    Number of children: Not on file    Years of education: Not on file    Highest education level: Not on file   Occupational History    Not on file   Social Needs    Financial resource strain: Not on file    Food insecurity:     Worry: Not on file     Inability: Not on file   Isabella Oliver needs: Medical: Not on file     Non-medical: Not on file   Tobacco Use    Smoking status: Former Smoker     Packs/day: 0.50     Years: 40.00     Pack years: 20.00     Last attempt to quit: 2018     Years since quittin.2    Smokeless tobacco: Never Used    Tobacco comment: Quit 2018   Substance and Sexual Activity    Alcohol use: Not Currently    Drug use: Not Currently     Types: Marijuana, Cocaine, Heroin     Comment: Quit Cocaine, Heroin, and Marijuana since 2018    Sexual activity: Not Currently   Lifestyle    Physical activity:     Days per week: Not on file     Minutes per session: Not on file    Stress: Not on file   Relationships    Social connections:     Talks on phone: Not on file     Gets together: Not on file     Attends [employer]alian service: Not on file     Active member of club or organization: Not on file     Attends meetings of clubs or organizations: Not on file     Relationship status: Not on file    Intimate partner violence:     Fear of current or ex partner: Not on file     Emotionally abused: Not on file     Physically abused: Not on file     Forced sexual activity: Not on file   Other Topics Concern     Service Not Asked    Blood Transfusions Not Asked    Caffeine Concern Not Asked    Occupational Exposure Not Asked   Avis Kipper Hazards Not Asked    Sleep Concern Not Asked    Stress Concern Not Asked    Weight Concern Not Asked    Special Diet Not Asked    Back Care Not Asked    Exercise Not Asked    Bike Helmet Not Asked   [address] Not Asked    Self-Exams Not Asked   Social History Narrative    Not on file         ALLERGIES: Patient has no known allergies. Review of Systems   Constitutional: Positive for appetite change and fatigue. Negative for fever. HENT: Negative for sore throat. Eyes: Negative for visual disturbance. Respiratory: Negative for shortness of breath. Cardiovascular: Negative for chest pain.    Gastrointestinal: Positive for abdominal pain. Negative for blood in stool. Endocrine: Negative for polyuria. Genitourinary: Negative for difficulty urinating. Musculoskeletal: Negative for gait problem. Skin: Negative for rash. Allergic/Immunologic: Negative for food allergies. Neurological: Positive for headaches. Negative for seizures. Psychiatric/Behavioral: Positive for sleep disturbance. Vitals:    02/12/20 0204   BP: 125/81   Pulse: 76   Resp: 15   Temp: 98.7 °F (37.1 °C)   SpO2: 98%   Weight: 91.6 kg (202 lb)   Height: 6' (1.829 m)            Physical Exam  Vitals signs and nursing note reviewed. Constitutional:       General: He is not in acute distress. Appearance: He is ill-appearing. He is not toxic-appearing or diaphoretic. HENT:      Head: Normocephalic and atraumatic. Right Ear: External ear normal.      Left Ear: External ear normal.      Nose: Nose normal.      Mouth/Throat:      Pharynx: No oropharyngeal exudate. Eyes:      Conjunctiva/sclera: Conjunctivae normal.   Neck:      Musculoskeletal: Normal range of motion. Cardiovascular:      Rate and Rhythm: Normal rate and regular rhythm. Heart sounds: Normal heart sounds. Pulmonary:      Effort: Pulmonary effort is normal. No respiratory distress. Breath sounds: Normal breath sounds. Abdominal:      General: Abdomen is flat. Palpations: Abdomen is soft. Tenderness: There is abdominal tenderness in the epigastric area. There is guarding. There is no right CVA tenderness, left CVA tenderness or rebound. Musculoskeletal: Normal range of motion. Skin:     General: Skin is warm and dry. Capillary Refill: Capillary refill takes less than 2 seconds. Coloration: Skin is not jaundiced. Neurological:      Mental Status: He is alert and oriented to person, place, and time.    Psychiatric:         Behavior: Behavior normal.          MDM       Procedures    Vitals:  Patient Vitals for the past 12 hrs:   Temp Pulse Resp BP SpO2   02/12/20 0204 98.7 °F (37.1 °C) 76 15 125/81 98 %         Medications ordered:   Medications   metoclopramide HCl (REGLAN) injection 10 mg (has no administration in time range)   morphine injection 4 mg (has no administration in time range)   sodium chloride 0.9 % bolus infusion 1,000 mL (0 mL IntraVENous IV Completed 2/12/20 0327)   ondansetron (ZOFRAN) injection 4 mg (4 mg IntraVENous Given 2/12/20 0229)   metoclopramide HCl (REGLAN) injection 5 mg (5 mg IntraVENous Given 2/12/20 0232)   diphenhydrAMINE (BENADRYL) injection 12.5 mg (12.5 mg IntraVENous Given 2/12/20 0230)   famotidine (PF) (PEPCID) injection 20 mg (20 mg IntraVENous Given 2/12/20 0228)   morphine injection 4 mg (4 mg IntraVENous Given 2/12/20 0244)   sodium chloride 0.9 % bolus infusion 1,000 mL (0 mL IntraVENous IV Completed 2/12/20 0347)         Lab findings:  Recent Results (from the past 12 hour(s))   CBC WITH AUTOMATED DIFF    Collection Time: 02/12/20  2:15 AM   Result Value Ref Range    WBC 4.5 (L) 4.6 - 13.2 K/uL    RBC 3.00 (L) 4.70 - 5.50 M/uL    HGB 9.0 (L) 13.0 - 16.0 g/dL    HCT 27.2 (L) 36.0 - 48.0 %    MCV 90.7 74.0 - 97.0 FL    MCH 30.0 24.0 - 34.0 PG    MCHC 33.1 31.0 - 37.0 g/dL    RDW 17.0 (H) 11.6 - 14.5 %    PLATELET 74 (L) 885 - 420 K/uL    MPV 11.2 9.2 - 11.8 FL    NEUTROPHILS 52 40 - 73 %    LYMPHOCYTES 34 21 - 52 %    MONOCYTES 12 (H) 3 - 10 %    EOSINOPHILS 2 0 - 5 %    BASOPHILS 0 0 - 2 %    ABS. NEUTROPHILS 2.3 1.8 - 8.0 K/UL    ABS. LYMPHOCYTES 1.5 0.9 - 3.6 K/UL    ABS. MONOCYTES 0.6 0.05 - 1.2 K/UL    ABS. EOSINOPHILS 0.1 0.0 - 0.4 K/UL    ABS.  BASOPHILS 0.0 0.0 - 0.1 K/UL    DF AUTOMATED     METABOLIC PANEL, COMPREHENSIVE    Collection Time: 02/12/20  2:15 AM   Result Value Ref Range    Sodium 138 136 - 145 mmol/L    Potassium 3.8 3.5 - 5.5 mmol/L    Chloride 105 100 - 111 mmol/L    CO2 25 21 - 32 mmol/L    Anion gap 8 3.0 - 18 mmol/L    Glucose 194 (H) 74 - 99 mg/dL    BUN 11 7.0 - 18 MG/DL    Creatinine 0.96 0.6 - 1.3 MG/DL    BUN/Creatinine ratio 11 (L) 12 - 20      GFR est AA >60 >60 ml/min/1.73m2    GFR est non-AA >60 >60 ml/min/1.73m2    Calcium 9.0 8.5 - 10.1 MG/DL    Bilirubin, total 0.4 0.2 - 1.0 MG/DL    ALT (SGPT) 19 16 - 61 U/L    AST (SGOT) 25 10 - 38 U/L    Alk. phosphatase 109 45 - 117 U/L    Protein, total 7.8 6.4 - 8.2 g/dL    Albumin 3.7 3.4 - 5.0 g/dL    Globulin 4.1 (H) 2.0 - 4.0 g/dL    A-G Ratio 0.9 0.8 - 1.7     LIPASE    Collection Time: 02/12/20  2:15 AM   Result Value Ref Range    Lipase 63 (L) 73 - 393 U/L       EKG interpretation by ED Physician:      X-Ray, CT or other radiology findings or impressions:  CT HEAD WO CONT    (Results Pending)   CT ABD PELV W CONT    (Results Pending)   CT abdomen: Redemonstration of pancreatic body/tail mass with liver and ernesto metastases  Slight increased alex-cholecystic fluid. Question enhancement left ureter. CT head: Focal hypoattenuation in the posterior right. Possible subacute chronic infarct. Possible metastatic disease. MRI would be helpful    Progress notes, Consult notes or additional Procedure notes:   Headache is improved here. Patient without acute neurologic findings. Very low suspicion this could be actual infarct. Be more concerned about possibility of metastatic disease. Patient was possible getting MRI of the brain for further evaluation of his cancer so we will get that this morning and turned over to Dr. Blaine Hamilton who will be taking him for me at approximate 6 AM  Will also give dose of Decadron if metastatic disease present. Reevaluation of patient:   stable    Disposition:  Diagnosis:   1. Abdominal pain, acute, epigastric    2. Non-intractable vomiting with nausea, unspecified vomiting type    3. Acute nonintractable headache, unspecified headache type    4.  Malignant neoplasm of pancreas, unspecified location of malignancy [facility])        Disposition: pending      Follow-up Information Follow up With Specialties Details Why Daniela Shine MD Hematology and Oncology Schedule an appointment as soon as possible for a visit call office this morning to inform of ER visit and to get recheck this week in office [id number] [address] [address] [phone]      [facility] EMERGENCY DEPT Emergency Medicine  If symptoms worsen [address]  [phone]            Patient's Medications   Start Taking    No medications on file   Continue Taking    AVATROMBOPAG 20 MG TAB    Take 20 mg by mouth daily for 30 days. DEXAMETHASONE (DECADRON) 4 MG TABLET    Take 4mg by mouth twice a day the day before chemotherapy and the day after chemotherapy    DRONABINOL (MARINOL) 5 MG CAPSULE    Take 1 Cap by mouth two (2) times a day for 30 days. Max Daily Amount: 10 mg. FENTANYL (DURAGESIC) 50 MCG/HR PATCH    1 Patch by TransDERmal route every seventy-two (72) hours for 30 days. Max Daily Amount: 1 Patch. INSULIN GLARGINE (LANTUS U-100 INSULIN) 100 UNIT/ML INJECTION    28 Units by SubCUTAneous route nightly. INSULIN LISPRO (HUMALOG) 100 UNIT/ML INJECTION    3-15 Units by SubCUTAneous route Before breakfast, lunch, and dinner. L-MFOLATE-B6 PHOS-METHYL-B12 (METANX) 3-35-2 MG TAB TAB    Take 1 Tab by mouth two (2) times a day. Indications: peripheral neuropathy    LACTULOSE (CHRONULAC) 10 GRAM/15 ML SOLUTION    Take 15 mL by mouth three (3) times daily. LIDOCAINE (LIDODERM) 5 %    Apply patch to the affected area for 12 hours a day and remove for 12 hours a day. LIDOCAINE-PRILOCAINE (EMLA) TOPICAL CREAM    Apply  to affected area as needed for Pain (Apply to skin 30-60 minutes before mediport access). NALOXONE (NARCAN) 4 MG/ACTUATION NASAL SPRAY    Use 1 spray intranasally, then discard. Repeat with new spray every 2 min as needed for opioid overdose symptoms, alternating nostrils.     NALOXONE (NARCAN) 4 MG/ACTUATION NASAL SPRAY    Use 1 spray intranasally, then discard. Repeat with new spray every 2 min as needed for opioid overdose symptoms, alternating nostrils. Indications: opioid overdose    NUT. TX.GLUC. INTOL,LAC-FREE,SOY (GLUCERNA ADVANCE) LIQD    Take 237 mL by mouth three (3) times daily. OLANZAPINE (ZYPREXA ZYDIS) 5 MG DISINTEGRATING TABLET    take 1 tablet by mouth AT NIGHT BEFORE CHEMOTHERAPY, THEN 1 TABLET MORNING OF CHEMOTHERAPY,AND THEN 1 TABLET BY MOUTH 3 TIMES A DAY AS NEEDED FOR 1 WEEK FOLLOWING CHEMOTHERAPY    OMEPRAZOLE (PRILOSEC) 20 MG CAPSULE    TAKE 1 CAPSULE BY MOUTH ONCE DAILY    ONDANSETRON (ZOFRAN ODT) 4 MG DISINTEGRATING TABLET    Take 1 Tab by mouth every eight (8) hours as needed for Nausea. ONDANSETRON HCL (ZOFRAN) 8 MG TABLET    Take 1 Tab by mouth every eight (8) hours as needed for Nausea or Vomiting for up to 30 days. OXYCODONE IR (ROXICODONE) 10 MG TAB IMMEDIATE RELEASE TABLET    Take 1 Tab by mouth every four (4) hours as needed for Pain for up to 30 days. Max Daily Amount: 60 mg. QUETIAPINE (SEROQUEL) 50 MG TABLET    Take 50 mg by mouth nightly. RIVAROXABAN (XARELTO) 15 MG (42)- 20 MG (9) DSPK    Take 20 mg by mouth daily. Take one 15 mg tablet twice a day with food for the first 21 days. Then, take one 20 mg tablet once a day with food for 9 days.     TRUE METRIX GLUCOSE METER MISC    USE AS DIRECTED   These Medications have changed    No medications on file   Stop Taking    No medications on file

## 2020-02-14 ENCOUNTER — HOSPITAL ENCOUNTER (OUTPATIENT)
Dept: INFUSION THERAPY | Age: 55
Discharge: HOME | End: 2020-02-14
Payer: MEDICAID

## 2020-02-14 ENCOUNTER — TELEPHONE (OUTPATIENT)
Dept: ONCOLOGY | Age: 55
End: 2020-02-14

## 2020-02-14 VITALS
OXYGEN SATURATION: 97 % | SYSTOLIC BLOOD PRESSURE: 114 MMHG | HEIGHT: 72 IN | HEART RATE: 72 BPM | WEIGHT: 202.4 LBS | TEMPERATURE: 98.3 F | DIASTOLIC BLOOD PRESSURE: 70 MMHG | BODY MASS INDEX: 27.41 KG/M2

## 2020-02-14 DIAGNOSIS — C25.9 PANCREATIC ADENOCARCINOMA (HCC): ICD-10-CM

## 2020-02-14 DIAGNOSIS — C78.7 LIVER METASTASES (HCC): ICD-10-CM

## 2020-02-14 DIAGNOSIS — G89.3 CANCER ASSOCIATED PAIN: ICD-10-CM

## 2020-02-14 LAB
ALBUMIN SERPL-MCNC: 3.5 G/DL (ref 3.4–5)
ALBUMIN/GLOB SERPL: 0.8 {RATIO} (ref 0.8–1.7)
ALP SERPL-CCNC: 102 U/L (ref 45–117)
ALT SERPL-CCNC: 19 U/L (ref 16–61)
ANION GAP SERPL CALC-SCNC: 8 MMOL/L (ref 3–18)
AST SERPL-CCNC: 22 U/L (ref 10–38)
BASO+EOS+MONOS # BLD AUTO: 0.7 K/UL (ref 0–2.3)
BASO+EOS+MONOS NFR BLD AUTO: 10 % (ref 0.1–17)
BILIRUB SERPL-MCNC: 0.2 MG/DL (ref 0.2–1)
BUN SERPL-MCNC: 15 MG/DL (ref 7–18)
BUN/CREAT SERPL: 17 (ref 12–20)
CALCIUM SERPL-MCNC: 9.3 MG/DL (ref 8.5–10.1)
CHLORIDE SERPL-SCNC: 102 MMOL/L (ref 100–111)
CO2 SERPL-SCNC: 28 MMOL/L (ref 21–32)
CREAT SERPL-MCNC: 0.87 MG/DL (ref 0.6–1.3)
DIFFERENTIAL METHOD BLD: ABNORMAL
ERYTHROCYTE [DISTWIDTH] IN BLOOD BY AUTOMATED COUNT: 16.7 % (ref 11.5–14.5)
GLOBULIN SER CALC-MCNC: 4.5 G/DL (ref 2–4)
GLUCOSE SERPL-MCNC: 153 MG/DL (ref 74–99)
HCT VFR BLD AUTO: 35.9 % (ref 36–48)
HGB BLD-MCNC: 11.9 G/DL (ref 12–16)
LYMPHOCYTES # BLD: 1.7 K/UL (ref 1.1–5.9)
LYMPHOCYTES NFR BLD: 27 % (ref 14–44)
MCH RBC QN AUTO: 30.1 PG (ref 25–35)
MCHC RBC AUTO-ENTMCNC: 33.1 G/DL (ref 31–37)
MCV RBC AUTO: 90.9 FL (ref 78–102)
NEUTS SEG # BLD: 4 K/UL (ref 1.8–9.5)
NEUTS SEG NFR BLD: 63 % (ref 40–70)
PLATELET # BLD AUTO: 94 K/UL (ref 140–440)
POTASSIUM SERPL-SCNC: 4 MMOL/L (ref 3.5–5.5)
PROT SERPL-MCNC: 8 G/DL (ref 6.4–8.2)
RBC # BLD AUTO: 3.95 M/UL (ref 4.1–5.1)
SODIUM SERPL-SCNC: 138 MMOL/L (ref 136–145)
WBC # BLD AUTO: 6.4 K/UL (ref 4.5–13)

## 2020-02-14 PROCEDURE — 80053 COMPREHEN METABOLIC PANEL: CPT

## 2020-02-14 PROCEDURE — 85025 COMPLETE CBC W/AUTO DIFF WBC: CPT

## 2020-02-14 PROCEDURE — 36415 COLL VENOUS BLD VENIPUNCTURE: CPT

## 2020-02-14 RX ORDER — OXYCODONE HYDROCHLORIDE 10 MG/1
TABLET ORAL
Qty: 120 TAB | Refills: 0 | Status: SHIPPED | OUTPATIENT
Start: 2020-02-14 | End: 2020-03-15

## 2020-02-14 NOTE — TELEPHONE ENCOUNTER
Spoke with Michele at [facility] to follow up on patient's Doptelet rx. He states that \"I don't see where the prescription is ready to ship yet. .. it looks like we spoke with  Jeane Councilman earlier today. \" He then states \"due to the patient's insurance this prescription will need to go through [facility] or [facility]" and provided me with their phone numbers.

## 2020-02-14 NOTE — PROGRESS NOTES
[facility] Progress Note    Date: 2020    Name: Leslee Bishop    MRN: [id number]         : 1965    Peripheral Lab Draw      Mr. Odilon Blum to [facility], ambulatory at 1220 accompanied by self. Pt was assessed and education was provided. Mr. Chayo Darnell vitals were reviewed and patient was observed for 5 minutes prior to treatment. Visit Vitals  /70 (BP 1 Location: Right arm, BP Patient Position: Sitting)   Pulse 72   Temp 98.3 °F (36.8 °C)   Ht 6' (1.829 m)   Wt 91.8 kg (202 lb 6.4 oz)   SpO2 97%   BMI 27.45 kg/m²     Recent Results (from the past 12 hour(s))   CBC WITH 3 PART DIFF    Collection Time: 20 12:28 PM   Result Value Ref Range    WBC 6.4 4.5 - 13.0 K/uL    RBC 3.95 (L) 4.10 - 5.10 M/uL    HGB 11.9 (L) 12.0 - 16.0 g/dL    HCT 35.9 (L) 36 - 48 %    MCV 90.9 78 - 102 FL    MCH 30.1 25.0 - 35.0 PG    MCHC 33.1 31 - 37 g/dL    RDW 16.7 (H) 11.5 - 14.5 %    PLATELET 94 (L) 677 - 440 K/uL    NEUTROPHILS 63 40 - 70 %    MIXED CELLS 10 0.1 - 17 %    LYMPHOCYTES 27 14 - 44 %    ABS. NEUTROPHILS 4.0 1.8 - 9.5 K/UL    ABS. MIXED CELLS 0.7 0.0 - 2.3 K/uL    ABS. LYMPHOCYTES 1.7 1.1 - 5.9 K/UL    DF AUTOMATED         Blood obtained peripherally from left arm x 1 attempt with butterfly needle and sent to lab for Cbc w/diff and Cmp per written orders. No bleeding or hematoma noted at site. Gauze and coban applied. Mr. Odilon Blum tolerated the phlebotomy, and had no complaints. Patient armband removed and shredded. Mr. Odilon Blum was discharged from [place] [address] in stable condition at 1230.      Laura Montes De Oca Phlebotomist PCT  2020  1:22 PM

## 2020-02-17 ENCOUNTER — NURSE NAVIGATOR (OUTPATIENT)
Dept: OTHER | Age: 55
End: 2020-02-17

## 2020-02-17 ENCOUNTER — OFFICE VISIT (OUTPATIENT)
Dept: ONCOLOGY | Age: 55
End: 2020-02-17

## 2020-02-17 ENCOUNTER — HOSPITAL ENCOUNTER (OUTPATIENT)
Dept: INFUSION THERAPY | Age: 55
Discharge: HOME | End: 2020-02-17
Payer: MEDICAID

## 2020-02-17 VITALS
HEART RATE: 65 BPM | SYSTOLIC BLOOD PRESSURE: 99 MMHG | TEMPERATURE: 97 F | BODY MASS INDEX: 27.36 KG/M2 | HEIGHT: 72 IN | OXYGEN SATURATION: 97 % | RESPIRATION RATE: 16 BRPM | DIASTOLIC BLOOD PRESSURE: 65 MMHG | WEIGHT: 202 LBS

## 2020-02-17 VITALS — DIASTOLIC BLOOD PRESSURE: 65 MMHG | TEMPERATURE: 97 F | SYSTOLIC BLOOD PRESSURE: 99 MMHG | HEART RATE: 65 BPM

## 2020-02-17 DIAGNOSIS — K86.89 PANCREATIC MASS: ICD-10-CM

## 2020-02-17 DIAGNOSIS — C78.7 LIVER METASTASIS (HCC): ICD-10-CM

## 2020-02-17 DIAGNOSIS — D69.6 THROMBOCYTOPENIA (HCC): ICD-10-CM

## 2020-02-17 DIAGNOSIS — C25.9 PANCREATIC ADENOCARCINOMA (HCC): Primary | ICD-10-CM

## 2020-02-17 DIAGNOSIS — C78.7 LIVER METASTASES (HCC): ICD-10-CM

## 2020-02-17 DIAGNOSIS — K59.00 CONSTIPATION, UNSPECIFIED CONSTIPATION TYPE: ICD-10-CM

## 2020-02-17 DIAGNOSIS — I26.99 PULMONARY EMBOLISM WITHOUT ACUTE COR PULMONALE, UNSPECIFIED CHRONICITY, UNSPECIFIED PULMONARY EMBOLISM TYPE (HCC): ICD-10-CM

## 2020-02-17 DIAGNOSIS — R10.13 EPIGASTRIC PAIN: ICD-10-CM

## 2020-02-17 LAB
BASO+EOS+MONOS # BLD AUTO: 0.7 K/UL (ref 0–2.3)
BASO+EOS+MONOS NFR BLD AUTO: 12 % (ref 0.1–17)
DIFFERENTIAL METHOD BLD: ABNORMAL
ERYTHROCYTE [DISTWIDTH] IN BLOOD BY AUTOMATED COUNT: 17.3 % (ref 11.5–14.5)
HCT VFR BLD AUTO: 35.7 % (ref 36–48)
HGB BLD-MCNC: 11.9 G/DL (ref 12–16)
LYMPHOCYTES # BLD: 2 K/UL (ref 1.1–5.9)
LYMPHOCYTES NFR BLD: 37 % (ref 14–44)
MCH RBC QN AUTO: 30.5 PG (ref 25–35)
MCHC RBC AUTO-ENTMCNC: 33.3 G/DL (ref 31–37)
MCV RBC AUTO: 91.5 FL (ref 78–102)
NEUTS SEG # BLD: 2.8 K/UL (ref 1.8–9.5)
NEUTS SEG NFR BLD: 51 % (ref 40–70)
PLATELET # BLD AUTO: 84 K/UL (ref 140–440)
RBC # BLD AUTO: 3.9 M/UL (ref 4.1–5.1)
WBC # BLD AUTO: 5.5 K/UL (ref 4.5–13)

## 2020-02-17 PROCEDURE — 96415 CHEMO IV INFUSION ADDL HR: CPT

## 2020-02-17 PROCEDURE — 96413 CHEMO IV INFUSION 1 HR: CPT

## 2020-02-17 PROCEDURE — 77030012965 HC NDL HUBR BBMI -A

## 2020-02-17 PROCEDURE — 74011250636 HC RX REV CODE- 250/636: Performed by: INTERNAL MEDICINE

## 2020-02-17 PROCEDURE — 96417 CHEMO IV INFUS EACH ADDL SEQ: CPT

## 2020-02-17 PROCEDURE — 86301 IMMUNOASSAY TUMOR CA 19-9: CPT

## 2020-02-17 PROCEDURE — 96416 CHEMO PROLONG INFUSE W/PUMP: CPT

## 2020-02-17 PROCEDURE — 96367 TX/PROPH/DG ADDL SEQ IV INF: CPT

## 2020-02-17 PROCEDURE — 74011000258 HC RX REV CODE- 258: Performed by: INTERNAL MEDICINE

## 2020-02-17 PROCEDURE — 85025 COMPLETE CBC W/AUTO DIFF WBC: CPT

## 2020-02-17 PROCEDURE — 96375 TX/PRO/DX INJ NEW DRUG ADDON: CPT

## 2020-02-17 RX ORDER — FLUOROURACIL 50 MG/ML
900 INJECTION, SOLUTION INTRAVENOUS ONCE
Status: DISPENSED | OUTPATIENT
Start: 2020-02-17 | End: 2020-02-17

## 2020-02-17 RX ORDER — PALONOSETRON 0.05 MG/ML
0.25 INJECTION, SOLUTION INTRAVENOUS ONCE
Status: COMPLETED | OUTPATIENT
Start: 2020-02-17 | End: 2020-02-17

## 2020-02-17 RX ORDER — ATROPINE SULFATE 1 MG/ML
0.4 INJECTION, SOLUTION INTRAVENOUS ONCE
Status: COMPLETED | OUTPATIENT
Start: 2020-02-17 | End: 2020-02-17

## 2020-02-17 RX ORDER — DEXTROSE MONOHYDRATE 50 MG/ML
25 INJECTION, SOLUTION INTRAVENOUS CONTINUOUS
Status: DISPENSED | OUTPATIENT
Start: 2020-02-17 | End: 2020-02-17

## 2020-02-17 RX ORDER — SODIUM CHLORIDE 0.9 % (FLUSH) 0.9 %
10 SYRINGE (ML) INJECTION AS NEEDED
Status: DISPENSED | OUTPATIENT
Start: 2020-02-17 | End: 2020-02-17

## 2020-02-17 RX ADMIN — SODIUM CHLORIDE 150 MG: 900 INJECTION, SOLUTION INTRAVENOUS at 10:28

## 2020-02-17 RX ADMIN — DEXTROSE 25 ML/HR: 5 SOLUTION INTRAVENOUS at 10:28

## 2020-02-17 RX ADMIN — FLUOROURACIL 5300 MG: 50 INJECTION, SOLUTION INTRAVENOUS at 15:16

## 2020-02-17 RX ADMIN — PALONOSETRON 0.25 MG: 0.05 INJECTION, SOLUTION INTRAVENOUS at 10:22

## 2020-02-17 RX ADMIN — OXALIPLATIN 190 MG: 5 INJECTION, SOLUTION, CONCENTRATE INTRAVENOUS at 11:27

## 2020-02-17 RX ADMIN — IRINOTECAN HYDROCHLORIDE 400 MG: 20 INJECTION, SOLUTION INTRAVENOUS at 13:38

## 2020-02-17 RX ADMIN — LEUCOVORIN CALCIUM 900 MG: 350 INJECTION, POWDER, LYOPHILIZED, FOR SUSPENSION INTRAMUSCULAR; INTRAVENOUS at 13:38

## 2020-02-17 RX ADMIN — ATROPINE SULFATE 0.4 MG: 1 INJECTION, SOLUTION INTRAMUSCULAR; INTRAVENOUS; SUBCUTANEOUS at 10:23

## 2020-02-17 NOTE — NURSE NAVIGATOR
Saw pt in clinic with Dr. Diego Kaminski for f/u, no complaints voiced at this time. Labs today, monthly  ordered. RTC in 1 month, all questions answered.

## 2020-02-17 NOTE — PROGRESS NOTES
Eloisa Benavidez is a 62 y.o. male presenting today for a follow-up appointment. Patient is ambulatory with no assistive devices, is accompanied by mother, denies any generalized pain. Patient has no other complaints at this time. Chief Complaint   Patient presents with    Pancreatic Cancer    Follow-up     Visit Vitals  BP 99/65 (BP 1 Location: Left arm, BP Patient Position: Sitting)   Pulse 65   Temp 97 °F (36.1 °C) (Oral)   Resp 16   Ht 6' (1.829 m)   Wt 202 lb (91.6 kg)   SpO2 97%   BMI 27.40 kg/m²     Current Outpatient Medications   Medication Sig    oxyCODONE IR (ROXICODONE) 10 mg tab immediate release tablet take 1 tablet by mouth every 4 hours if needed for pain up to 30 DAYS    promethazine (PHENERGAN) 25 mg suppository Insert 1 Suppository into rectum every six (6) hours as needed for Nausea for up to 7 days.  ondansetron (ZOFRAN ODT) 4 mg disintegrating tablet Take 1 Tab by mouth every eight (8) hours as needed for Nausea for up to 20 doses.  ondansetron hcl (ZOFRAN) 8 mg tablet Take 1 Tab by mouth every eight (8) hours as needed for Nausea or Vomiting for up to 30 days.  avatrombopag 20 mg tab Take 20 mg by mouth daily for 30 days.  OLANZapine (ZYPREXA ZYDIS) 5 mg disintegrating tablet take 1 tablet by mouth AT NIGHT BEFORE CHEMOTHERAPY, THEN 1 TABLET MORNING OF CHEMOTHERAPY,AND THEN 1 TABLET BY MOUTH 3 TIMES A DAY AS NEEDED FOR 1 WEEK FOLLOWING CHEMOTHERAPY    fentaNYL (DURAGESIC) 50 mcg/hr PATCH 1 Patch by TransDERmal route every seventy-two (72) hours for 30 days. Max Daily Amount: 1 Patch.  L-Mfolate-B6 Phos-Methyl-B12 (METANX) 3-35-2 mg tab tab Take 1 Tab by mouth two (2) times a day. Indications: peripheral neuropathy    lactulose (CHRONULAC) 10 gram/15 mL solution Take 15 mL by mouth three (3) times daily.  naloxone (NARCAN) 4 mg/actuation nasal spray Use 1 spray intranasally, then discard.  Repeat with new spray every 2 min as needed for opioid overdose symptoms, alternating nostrils. Indications: opioid overdose    insulin glargine (LANTUS U-100 INSULIN) 100 unit/mL injection 28 Units by SubCUTAneous route nightly.  insulin lispro (HUMALOG) 100 unit/mL injection 3-15 Units by SubCUTAneous route Before breakfast, lunch, and dinner.  rivaroxaban (XARELTO) 15 mg (42)- 20 mg (9) DsPk Take 20 mg by mouth daily. Take one 15 mg tablet twice a day with food for the first 21 days. Then, take one 20 mg tablet once a day with food for 9 days.  naloxone (NARCAN) 4 mg/actuation nasal spray Use 1 spray intranasally, then discard. Repeat with new spray every 2 min as needed for opioid overdose symptoms, alternating nostrils.  dexAMETHasone (DECADRON) 4 mg tablet Take 4mg by mouth twice a day the day before chemotherapy and the day after chemotherapy    lidocaine-prilocaine (EMLA) topical cream Apply  to affected area as needed for Pain (Apply to skin 30-60 minutes before mediport access).  nut.tx.gluc.intol,lac-free,soy (GLUCERNA ADVANCE) liqd Take 237 mL by mouth three (3) times daily.  TRUE METRIX GLUCOSE METER misc USE AS DIRECTED    omeprazole (PRILOSEC) 20 mg capsule TAKE 1 CAPSULE BY MOUTH ONCE DAILY    QUEtiapine (SEROQUEL) 50 mg tablet Take 50 mg by mouth nightly.  lidocaine (LIDODERM) 5 % Apply patch to the affected area for 12 hours a day and remove for 12 hours a day. No current facility-administered medications for this visit. Fall Risk Assessment, last 12 mths 2/17/2020   Able to walk? Yes   Fall in past 12 months? No     3 most recent PHQ Screens 2/17/2020   Little interest or pleasure in doing things Not at all   Feeling down, depressed, irritable, or hopeless Not at all   Total Score PHQ 2 0     Abuse Screening Questionnaire 2/17/2020   Do you ever feel afraid of your partner? N   Are you in a relationship with someone who physically or mentally threatens you? N   Is it safe for you to go home?  Y     Health Maintenance Due   Topic Date Due    Pneumococcal 0-64 years (1 of 3 - PCV13) 07/07/1969    Foot Exam Q1  07/07/1973    MICROALBUMIN Q1  07/07/1973    Eye Exam Retinal or Dilated  07/07/1973    DTaP/Tdap/Td series (1 - Tdap) 07/07/1974    Shingrix Vaccine Age 50> (1 of 2) 07/07/2013    FOBT Q1Y Age 54-65  07/07/2013    Influenza Age 5 to Adult  08/01/2019    A1C test (Diabetic or Prediabetic)  02/25/2020       Medications no longer taking/discontinued: none    Patient currently taking Antiplatelet therapy? no    1. Have you been to the ER, urgent care clinic since your last visit? Hospitalized since your last visit? no     2. Have you seen or consulted any other health care providers outside of the [address] [place] [address] since your last visit? Include any pap smears or colon screening.  no

## 2020-02-17 NOTE — PROGRESS NOTES
Lesly Connors is a 62 y.o. 1965 male with past medical history including pulmonary embolus, on Lovenox, type 2 diabetes, GERD, who I was asked to see in consultation at the request of Antelmo Berg for evaluation for newly diagnosed pancreatic adenocarcinoma.  Patient had fine-needle aspiration of pancreatic mass on 11/13/2019 and pathology showed pancreatic adenocarcinoma. I saw him in consultation for the first time on 11/19/2019) was to treat him with palliative FOLFIRINOX. He received C1D1 FOLFIRINOX on 12/16/2019 and tolerated well overall but cycle 5 was held on 2/10/2020 due to thrombocytopenia. Patient was started on avatrombopag but we still waiting for the insurance to have it shipped to his house. Here for follow-up. Patient was seen and examined today. Chloe Brooks is awake alert oriented x3.    Denies any fevers, chills, or nausea and vomiting.  Denies any melena or bright red blood per rectum. He is clinically doing very well.  All also points of review of system have been reviewed and were negative.  ECOG performance status 1.  Independent with ADLs and IADLs.    DX: Pancreatic adenocarcinoma     STAGE: Stage IV     Treatment intent: Palliative     ONCOLOGY HISTORY:   11/03/2019: Went to ER due to 5 days complaints of epigastric pain.  CT abdomen and pelvis showed:  1. 5 cm mass within the pancreatic body highly suggestive of primary malignancy. Superimposed acute pancreatitis cannot entirely be excluded. 2. Multiple liver lesions highly suggestive of metastatic disease 3. Metastatic retroperitoneal and mesenteric lymphadenopathy. 4. Indeterminate hypodensity within the spleen. Diagnostic consideration include splenic infarct or metastatic lesion 5. Right lower lobe pulmonary emboli.       *MRI of the abdomen showed  1. Stable since same day CT abdomen pelvis.  Elliptical 4 cm hypoenhancing mass,  body of pancreas, with upstream glandular atrophy and pancreatic ductal  dilatation, most likely adenocarcinoma. Associated local/regional likely metastatic lymphadenopathy including upper retroperitoneum, lesser omentum, portacaval/periportal space. Associated encasement/narrowing of the adjacent  splenic vein. 2. Mild peripancreatic edema and soft tissue reticulation; may reflect secondary low-grade or chronic pancreatitis or adjacent peripancreatic tumor infiltration.   3. Numerous hypoenhancing and target-like small nodules and masses throughout  the liver, typical of metastatic disease. 4. Mild pericholecystic fluid, nonspecific, but may represent low-grade cholecystitis.   No evident cholecystolithiasis/choledocholithiasis or biliary ductal dilatation. 5. Mild splenomegaly with focal small splenic infarction, age indeterminate  perhaps recent. Small left lower pole, nonacute renal infarction. 6. Other minor and/or ancillary findings including lumbar disc herniations     11/15/2019: Duplex left  lower extremity showed Acute nonocclusive thrombus in the distal femoral vein, popliteal, peroneal and both posterior tibial veins. The thrombus in the distal femoral vein appears to be floating tail-like thrombus.     11/19/2019: First medical oncology visit with me    11/29/2019: PET/CT showed  Malignant metabolic activity corresponding with the known malignancy in the pancreatic body. Innumerable hypermetabolic liver metastases. Metastatic portal caval and aortocaval lymph nodes. Enlargement and malignant metabolic activity in the anterior aspect of the left  psoas muscle suspicious for metastatic disease. Distant metastatic disease to include the left supraclavicular region, left  superior mediastinum and possibly the left inferior hilum. 12/16/19: [id number]/30/19:C2D1-1/13/20:C3D1- 1/14/2020: CA-19-9 = 79 1/27/20:C4D1-2/10/20:C5 held due to thrombocytopenia. Started Avatrombopag    Past Medical History:   Diagnosis Date    [facility])     Pancreatic Adenocarcinoma. Diagnosed 11/2019.     Diabetes ([facility] [address] 10/2019    Diagnosed 10/2019.  Hepatitis C     Received Curative Treatment, but Hepatitis C was not cured and returned.  Left ulnar fracture     Thromboembolus ([facility] [address]     DVT of LLE 2019. Also, Pulmonary Embolism 2019. Treated with Rivaroxaban. Past Surgical History:   Procedure Laterality Date    HX TONSILLECTOMY      IR BX PANCREAS NEEDLE PERC  2019    Endoscopic Pancreatic Biopsy (?)    IR INSERT TUNL CVC W PORT OVER 5 YEARS  2019     Social History     Socioeconomic History    Marital status:      Spouse name: Not on file    Number of children: Not on file    Years of education: Not on file    Highest education level: Not on file   Tobacco Use    Smoking status: Former Smoker     Packs/day: 0.50     Years: 40.00     Pack years: 20.00     Last attempt to quit: 2018     Years since quittin.2    Smokeless tobacco: Never Used    Tobacco comment: Quit 2018   Substance and Sexual Activity    Alcohol use: Not Currently    Drug use: Not Currently     Types: Marijuana, Cocaine, Heroin     Comment: Quit Cocaine, Heroin, and Marijuana since 2018    Sexual activity: Not Currently   Other Topics Concern     Family History   Problem Relation Age of Onset    Diabetes Mother     Kidney Disease Mother     Diabetes Father     Diabetes Brother     Cancer Maternal Grandmother     Cancer Maternal Grandfather     Cancer Maternal Aunt     Cancer Maternal Uncle        Current Outpatient Medications   Medication Sig Dispense Refill    oxyCODONE IR (ROXICODONE) 10 mg tab immediate release tablet take 1 tablet by mouth every 4 hours if needed for pain up to 30 DAYS 120 Tab 0    promethazine (PHENERGAN) 25 mg suppository Insert 1 Suppository into rectum every six (6) hours as needed for Nausea for up to 7 days. 24 Suppository 1    ondansetron (ZOFRAN ODT) 4 mg disintegrating tablet Take 1 Tab by mouth every eight (8) hours as needed for Nausea for up to 20 doses. 20 Tab 0    ondansetron hcl (ZOFRAN) 8 mg tablet Take 1 Tab by mouth every eight (8) hours as needed for Nausea or Vomiting for up to 30 days. 90 Tab 1    avatrombopag 20 mg tab Take 20 mg by mouth daily for 30 days. 30 Tab 3    OLANZapine (ZYPREXA ZYDIS) 5 mg disintegrating tablet take 1 tablet by mouth AT NIGHT BEFORE CHEMOTHERAPY, THEN 1 TABLET MORNING OF CHEMOTHERAPY,AND THEN 1 TABLET BY MOUTH 3 TIMES A DAY AS NEEDED FOR 1 WEEK FOLLOWING CHEMOTHERAPY 30 Tab 1    fentaNYL (DURAGESIC) 50 mcg/hr PATCH 1 Patch by TransDERmal route every seventy-two (72) hours for 30 days. Max Daily Amount: 1 Patch. 10 Patch 0    L-Mfolate-B6 Phos-Methyl-B12 (METANX) 3-35-2 mg tab tab Take 1 Tab by mouth two (2) times a day. Indications: peripheral neuropathy 60 Tab 1    lactulose (CHRONULAC) 10 gram/15 mL solution Take 15 mL by mouth three (3) times daily. 480 mL 3    naloxone (NARCAN) 4 mg/actuation nasal spray Use 1 spray intranasally, then discard. Repeat with new spray every 2 min as needed for opioid overdose symptoms, alternating nostrils. Indications: opioid overdose 1 Each 1    insulin glargine (LANTUS U-100 INSULIN) 100 unit/mL injection 28 Units by SubCUTAneous route nightly. 1 Vial 0    insulin lispro (HUMALOG) 100 unit/mL injection 3-15 Units by SubCUTAneous route Before breakfast, lunch, and dinner. 1 Vial 0    rivaroxaban (XARELTO) 15 mg (42)- 20 mg (9) DsPk Take 20 mg by mouth daily. Take one 15 mg tablet twice a day with food for the first 21 days. Then, take one 20 mg tablet once a day with food for 9 days.  naloxone (NARCAN) 4 mg/actuation nasal spray Use 1 spray intranasally, then discard. Repeat with new spray every 2 min as needed for opioid overdose symptoms, alternating nostrils.  1 Each 1    dexAMETHasone (DECADRON) 4 mg tablet Take 4mg by mouth twice a day the day before chemotherapy and the day after chemotherapy 30 Tab 0    lidocaine-prilocaine (EMLA) topical cream Apply  to affected area as needed for Pain (Apply to skin 30-60 minutes before mediport access). 30 g 0    nut.tx.gluc.intol,lac-free,soy (GLUCERNA ADVANCE) liqd Take 237 mL by mouth three (3) times daily. 90 Bottle 5    TRUE METRIX GLUCOSE METER misc USE AS DIRECTED  0    omeprazole (PRILOSEC) 20 mg capsule TAKE 1 CAPSULE BY MOUTH ONCE DAILY  3    QUEtiapine (SEROQUEL) 50 mg tablet Take 50 mg by mouth nightly.  lidocaine (LIDODERM) 5 % Apply patch to the affected area for 12 hours a day and remove for 12 hours a day. 5 Each 0       No Known Allergies    Review of Systems  As per HPI    Objective:  Visit Vitals  BP 99/65 (BP 1 Location: Left arm, BP Patient Position: Sitting)   Pulse 65   Temp 97 °F (36.1 °C) (Oral)   Resp 16   Ht 6' (1.829 m)   Wt 91.6 kg (202 lb)   SpO2 97%   BMI 27.40 kg/m²       Physical Exam:   General appearance - alert, well appearing, and in no distress  Mental status - alert, oriented to person, place, and time  EYE-ONOFRE, EOMI  ENT-ENT exam normal, no neck nodes or sinus tenderness  Mouth - mucous membranes moist, pharynx normal without lesions  Neck - supple, no significant adenopathy   Chest - clear to auscultation, no wheezes, rales or rhonchi, symmetric air entry   Heart - normal rate and regular rhythm   Abdomen - soft, nontender, nondistended, no masses or organomegaly  Lymph- no adenopathy palpable  Ext-no pedal edema noted  Skin-Warm and dry. Neuro -alert, oriented, normal speech, no focal findings or movement disorder noted      Diagnostic Imaging     No results found for this or any previous visit. Results for orders placed during the hospital encounter of 12/18/19   XR KNEE RT MIN 4 V    Narrative EXAM: XR KNEE RT MIN 4 V    CLINICAL INDICATION/HISTORY: Knee pain    > Additional: Right knee pain    COMPARISON: None. > Reference Exam: None. TECHNIQUE: 4 views right knee obtained.    _______________    FINDINGS:    No evidence of acute fracture or dislocation.  There appears to be combination of  bipartite patella as well as adjacent well-corticated ossifications along  lateral aspect of patella, possible areas of heterotopic bone formation. There  is suggestion of small to moderate joint effusion. Diffuse chondrocalcinosis. Severe osteoarthritic changes with joint space narrowing patellofemoral joint. Mild spurring along the medial lateral joint compartments without significant  joint space narrowing.    _______________      Impression IMPRESSION:    1. No acute osseous findings. 2. 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 3. Diffuse chondrocalcinosis. 4. Small to moderate joint effusion. Results for orders placed during the hospital encounter of 02/12/20   CT ABD PELV W CONT    Narrative EXAM: CT of the abdomen and pelvis    INDICATION: 68-year-old patient with abdominal pain, vomiting, history of  metastatic pancreatic cancer. COMPARISON: Abdominal/pelvic CT 11/3/2019; MRCP with MR abdomen 11/3/2019; PET  CT 11/29/2019. TECHNIQUE: Axial CT imaging of the abdomen and pelvis was performed without oral  and with intravenous contrast. Multiplanar reformats were generated. One or more dose reduction techniques were used on this CT: automated exposure  control, adjustment of the mAs and/or kVp according to patient size, and  iterative reconstruction techniques. The specific techniques used on this CT  exam have been documented in the patient's electronic medical record. Digital  Imaging and Communications in Medicine (DICOM) format image data are available  to nonaffiliated external healthcare facilities or entities on a secure, media  free, reciprocally searchable basis with patient authorization for at least a  12-month period after this study.     _______________    FINDINGS:    LOWER CHEST: Basilar changes of atelectasis are noted without evidence of  alveolar consolidation or pleural effusion. Cardiac size is normal. There is no  pericardial effusion present. LIVER, BILIARY: Hepatic parenchymal enhancement is uniform. Redemonstrated are  multiple low attenuating mass lesions throughout all hepatic segments. Overall  size and number of these lesions does not appear to be appreciably changed from  comparison CT 11/3/2019. No biliary ductal dilatation. Bladder is decompressed. PANCREAS: Well-defined low attenuating mass lesion extending across the  pancreatic body and proximal tail, estimated at approximately 6.5 x 2.8 cm in  size. Distal portion of the celiac artery and splenic artery redemonstrated. Splenic vein appears thrombosed, unchanged. SPLEEN: Peripheral wedge-shaped area of low attenuation within the upper pole of  the spleen in keeping with splenic infarct, unchanged. Spleen is mildly  enlarged, estimated at 15 cm in greatest craniocaudal span. ADRENALS: Normal.    KIDNEYS/URETERS/BLADDER: Renal enhancement appears symmetric with detail mildly  limited secondary to beam hardening artifact from side-lying arms. No evidence  of hydronephrosis involving either kidney. Urinary bladder is unremarkable in  appearance. Mild bilateral urothelial enhancement. PELVIC ORGANS: Unremarkable. VASCULATURE: Vascular findings related to the patient's pancreatic malignancy as  above. The abdominal aorta is of normal course and caliber without evidence of  aneurysmal dilatation. Multiple mesenteric collaterals noted in keeping with  splenic vein thrombosis. LYMPH NODES: Interval improvement in the appearance of upper retroperitoneal  adenopathy in the interval from comparison examination. Persistent prominent  peripancreatic/portal lymph nodes are noted, largest seen at image 61, measuring  approximately 2.9 x 2.1 cm in size, appearing similar to slightly enlarged from  prior study. Upper portacaval adenopathy    GASTROINTESTINAL TRACT: Small hiatal hernia.  No focal bowel wall thickening or  dilatation. No morphology of bowel obstruction. No free intraperitoneal gas. Normal appendix. BONES: No acute or aggressive osseous abnormalities identified. Multilevel lower  thoracic and lumbar spondylosis is present. OTHER: None.    _______________      Impression IMPRESSION:    1. Redemonstration of multiple stigmata related to the patient's pancreatic  malignancy and accompanying ernesto/hepatic metastasis. 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     > Some interval improvement in the appearance of upper abdominal  retroperitoneal adenopathy with persistently abnormal/necrotic periportal and  peripancreatic lymph nodes. 2. No evidence of bowel obstruction. No free intraperitoneal gas. 3. Mild nonspecific urothelial enhancement. Correlation with urinalysis may be  helpful if symptoms of urinary tract infection are present. Note: Preliminary report sent to the Emergency Department by the radiology  resident at the time of the study. Lab Results  Lab Results   Component Value Date/Time    WBC 6.4 02/14/2020 12:28 PM    HGB 11.9 (L) 02/14/2020 12:28 PM    HCT 35.9 (L) 02/14/2020 12:28 PM    PLATELET 94 (L) 10/27/7723 12:28 PM    MCV 90.9 02/14/2020 12:28 PM       Lab Results   Component Value Date/Time    Sodium 138 02/14/2020 12:28 PM    Potassium 4.0 02/14/2020 12:28 PM    Chloride 102 02/14/2020 12:28 PM    CO2 28 02/14/2020 12:28 PM    Anion gap 8 02/14/2020 12:28 PM    Glucose 153 (H) 02/14/2020 12:28 PM    BUN 15 02/14/2020 12:28 PM    Creatinine 0.87 02/14/2020 12:28 PM    BUN/Creatinine ratio 17 02/14/2020 12:28 PM    GFR est AA >60 02/14/2020 12:28 PM    GFR est non-AA >60 02/14/2020 12:28 PM    Calcium 9.3 02/14/2020 12:28 PM    AST (SGOT) 22 02/14/2020 12:28 PM    Alk.  phosphatase 102 02/14/2020 12:28 PM    Protein, total 8.0 02/14/2020 12:28 PM    Albumin 3.5 02/14/2020 12:28 PM    Globulin 4.5 (H) 02/14/2020 12:28 PM    A-G Ratio 0.8 02/14/2020 12:28 PM    ALT (SGPT) 19 02/14/2020 12:28 PM       Assessment/Plan:  62 y. o. male  1. Pancreatic adenocarcinoma ([facility] [address]  I had a long discussion with MrKimberlyn Sweet regarding his newly diagnosed metastatic pancreatic cancer.  I told him that in the first-line setting, palliative chemotherapy have been shown to improve 1 year survival by 73% compared to supportive care alone. *UG T1 A1  heterozygous  *Brain MRI patient could not do MRI due to claustrophobia. *On 12/18/2019, CA-19-9 was 85 from 112 on 11/19/2019. Reviewed labs done on 2/14/2020 which showed a platelet count of 18,895. Proceed with delayed C5D1 today as follow:    Day 1: Oxaliplatin 85mg/m2 IV + irinotecan 180mg/m2 IV + leucovorin 400mg/m2 IV, followed by a 5-FU bolus of 400mg/m2 and a 46-hour continuous 5-FU infusion of 2,400mg/m2. Repeat cycle every 2 weeks until disease progression.     Patient tolerated well cycle 4 FOLFIRINOX so far with minimal side effects. Clinically doing very well. Awaiting for insurance to approve avatrombopag. *Recheck CBC today and CA-19-9. *Recheck labs before next cycle. *Check monthly CA-19-9     2. Liver metastases ([facility] [address]  I will follow-up with imaging after few cycles of treatment.     3. Epigastric pain  Resolved with pain medication and chemotherapy. Continue  Oxycontin-Give Fentanyl patch 25 mcg Q 72 hours dosing-Give Oxycodone 10 mg PO Q 4hours prn. Stop Norco-Due to tylenol    4. Pulmonary embolism without acute cor pulmonale, unspecified chronicity, unspecified pulmonary embolism type (HCC)  Continue Xarelto     5. Thrombocytopenia ([address]  Multifactorial from chemotherapy, and hypersplenism. Awaiting for insurance to approve avatrombopag.     6. Insulin dependent diabetes:Likely from the pancreatic cancer. PCP to stop Metformin since patient is likely not making insulin anymore. Needs tight glucose control with insulin. No steroids for pre-chemotherapy.   Give Zyprexa for nausea. 7. Constipation: Better with lactulose. Likely from opiates and zofran.  Continue  lactulose     RTC 4 weeks            Jose Kessler MD

## 2020-02-17 NOTE — PROGRESS NOTES
[facility] Progress Note    Date: 2020    Name: Antoine Austin    MRN: [id number]         : 1965     Chemotherapy cycle : Roro Wood      Mr. Olmos arrived to [facility] at 9798. Mr. Connie Looney was assessed and education was provided. Mr. Tabitha Eller vitals were reviewed. Visit Vitals  BP 99/65 (BP 1 Location: Left arm, BP Patient Position: Sitting)   Pulse 65   Temp 97 °F (36.1 °C)           Recent Results (from the past 12 hour(s))   CBC WITH 3 PART DIFF    Collection Time: 20  9:25 AM   Result Value Ref Range    WBC 5.5 4.5 - 13.0 K/uL    RBC 3.90 (L) 4.10 - 5.10 M/uL    HGB 11.9 (L) 12.0 - 16.0 g/dL    HCT 35.7 (L) 36 - 48 %    MCV 91.5 78 - 102 FL    MCH 30.5 25.0 - 35.0 PG    MCHC 33.3 31 - 37 g/dL    RDW 17.3 (H) 11.5 - 14.5 %    PLATELET 84 (L) 454 - 440 K/uL    NEUTROPHILS 51 40 - 70 %    MIXED CELLS 12 0.1 - 17 %    LYMPHOCYTES 37 14 - 44 %    ABS. NEUTROPHILS 2.8 1.8 - 9.5 K/UL    ABS. MIXED CELLS 0.7 0.0 - 2.3 K/uL    ABS. LYMPHOCYTES 2.0 1.1 - 5.9 K/UL    DF AUTOMATED         Right chest mediport accessed with 20 g 3/4 inch emmanuel needle. Port flushed easily and had brisk blood return. Blood drawn off and sent for CBC per written orders after 10 ml waste. D5W initiated @ [facility]. Lab results within ordered parameters to give chemo today per Dr. Maile Power hold 5 FU bolus this treatment due to platelet count. ANC = 2.8, PLT = 84. Chemo dosages verified with today's BSA and found to be within 10% of ordered dosages. Pre-medications Aloxi 0.25mg and Atropine 0.4mg IVP, Emend 150 mg IVPB were administered as ordered and chemotherapy was initiated after blood return from port re-verified. Reviewed expected side effects of premeds with patient. Oxaliplatin 190 mg was infused at  157 ml/hr over 2 hours per order. VS stable at end of infusion and pt denied complaints. Line flushed with D5W and blood return from port re-verified.     Irinotecan 400 mg was infused at  368 ml/hr over 90 minutes per order. VS stable at end of infusion and pt denied complaints. Line flushed with D5W and blood return from port re-verified. Leucovorin 900 mg was infused at  213 ml/hr over 90 minutes per order. VS stable at end of infusion and pt denied complaints. Line flushed with D5W and blood return from port re-verified    5FU 5300 mg infused via CADD pump and infusing without difficulty over 46 hours. Tubing connections reinforced with tape. Mr. Jamee Randolph tolerated infusion without complaints. Mr. Jamee Randolph was discharged from [place] [address] in stable condition at 1525. He is to return on 2/19/20 at 1500 for his next appointment.     Abigail Galicia RN  February 17, 2020

## 2020-02-17 NOTE — TELEPHONE ENCOUNTER
Spoke with Nancy Mcallister at [facility] regarding [place] prescription, she states they received the prescription and it is currently in process and can take up to 72 hours until they are ready to contact the patient for shipment.

## 2020-02-18 LAB — CANCER AG19-9 SERPL-ACNC: 87 U/ML (ref 0–35)

## 2020-02-19 ENCOUNTER — HOSPITAL ENCOUNTER (EMERGENCY)
Age: 55
Discharge: HOME | End: 2020-02-19
Attending: EMERGENCY MEDICINE | Admitting: EMERGENCY MEDICINE
Payer: MEDICAID

## 2020-02-19 ENCOUNTER — HOSPITAL ENCOUNTER (OUTPATIENT)
Dept: INFUSION THERAPY | Age: 55
Discharge: HOME | End: 2020-02-19
Payer: MEDICAID

## 2020-02-19 VITALS
WEIGHT: 202 LBS | OXYGEN SATURATION: 98 % | RESPIRATION RATE: 18 BRPM | SYSTOLIC BLOOD PRESSURE: 113 MMHG | HEIGHT: 72 IN | BODY MASS INDEX: 27.36 KG/M2 | TEMPERATURE: 98.4 F | DIASTOLIC BLOOD PRESSURE: 74 MMHG | HEART RATE: 79 BPM

## 2020-02-19 VITALS
OXYGEN SATURATION: 98 % | SYSTOLIC BLOOD PRESSURE: 113 MMHG | HEART RATE: 83 BPM | RESPIRATION RATE: 18 BRPM | TEMPERATURE: 98.4 F | DIASTOLIC BLOOD PRESSURE: 78 MMHG

## 2020-02-19 DIAGNOSIS — R11.0 CHEMOTHERAPY-INDUCED NAUSEA: ICD-10-CM

## 2020-02-19 DIAGNOSIS — C25.9 MALIGNANT NEOPLASM OF PANCREAS, UNSPECIFIED LOCATION OF MALIGNANCY (HCC): ICD-10-CM

## 2020-02-19 DIAGNOSIS — C25.9 PANCREATIC ADENOCARCINOMA (HCC): Primary | ICD-10-CM

## 2020-02-19 DIAGNOSIS — Z45.2 ENCOUNTER FOR CARE RELATED TO VASCULAR ACCESS PORT: Primary | ICD-10-CM

## 2020-02-19 DIAGNOSIS — T45.1X5A CHEMOTHERAPY-INDUCED NAUSEA: ICD-10-CM

## 2020-02-19 DIAGNOSIS — R63.0 APPETITE LOSS: ICD-10-CM

## 2020-02-19 PROCEDURE — 74011250636 HC RX REV CODE- 250/636

## 2020-02-19 PROCEDURE — 99211 OFF/OP EST MAY X REQ PHY/QHP: CPT

## 2020-02-19 PROCEDURE — 77030012965 HC NDL HUBR BBMI -A

## 2020-02-19 PROCEDURE — 99281 EMR DPT VST MAYX REQ PHY/QHP: CPT

## 2020-02-19 PROCEDURE — 96523 IRRIG DRUG DELIVERY DEVICE: CPT

## 2020-02-19 RX ORDER — SODIUM CHLORIDE 0.9 % (FLUSH) 0.9 %
10-40 SYRINGE (ML) INJECTION AS NEEDED
Status: DISCONTINUED | OUTPATIENT
Start: 2020-02-19 | End: 2020-02-23 | Stop reason: HOSPADM

## 2020-02-19 RX ORDER — DRONABINOL 5 MG/1
5 CAPSULE ORAL 2 TIMES DAILY
Qty: 60 CAP | Refills: 3 | Status: SHIPPED | OUTPATIENT
Start: 2020-02-19 | End: 2020-03-23 | Stop reason: SDUPTHER

## 2020-02-19 RX ORDER — HEPARIN 100 UNIT/ML
SYRINGE INTRAVENOUS
Status: COMPLETED
Start: 2020-02-19 | End: 2020-02-19

## 2020-02-19 RX ORDER — HEPARIN 100 UNIT/ML
500 SYRINGE INTRAVENOUS ONCE
Status: COMPLETED | OUTPATIENT
Start: 2020-02-19 | End: 2020-02-19

## 2020-02-19 RX ADMIN — HEPARIN 500 UNITS: 100 SYRINGE at 09:25

## 2020-02-19 RX ADMIN — Medication 20 ML: at 09:25

## 2020-02-19 RX ADMIN — Medication 500 UNITS: at 09:25

## 2020-02-19 NOTE — ED PROVIDER NOTES
Lesly Connors is a 62 y.o. male with history of pancreatic cancer who is on chemo infusion through a Mediport as right chest states tonight that the infusion tubing came disattached from the needle and started bleeding. Patient clamped the infusion line and bleeding had stopped. Patient denies any new swelling around the site. There is no fever. There is no skin changes. The history is provided by the patient and medical records. Past Medical History:   Diagnosis Date    Cancer [facility])     Pancreatic Adenocarcinoma. Diagnosed 2019.  Diabetes ([facility] [address] 10/2019    Diagnosed 10/2019.  Hepatitis C     Received Curative Treatment, but Hepatitis C was not cured and returned.  Left ulnar fracture     Thromboembolus ([facility] [address]     DVT of LLE 2019. Also, Pulmonary Embolism 2019. Treated with Rivaroxaban.        Past Surgical History:   Procedure Laterality Date    HX TONSILLECTOMY      IR BX PANCREAS NEEDLE PERC  2019    Endoscopic Pancreatic Biopsy (?)    IR INSERT TUNL CVC W PORT OVER 5 YEARS  2019         Family History:   Problem Relation Age of Onset    Diabetes Mother     Kidney Disease Mother     Diabetes Father     Diabetes Brother     Cancer Maternal Grandmother     Cancer Maternal Grandfather     Cancer Maternal Aunt     Cancer Maternal Uncle        Social History     Socioeconomic History    Marital status:      Spouse name: Not on file    Number of children: Not on file    Years of education: Not on file    Highest education level: Not on file   Occupational History    Not on file   Social Needs    Financial resource strain: Not on file    Food insecurity:     Worry: Not on file     Inability: Not on file    Transportation needs:     Medical: Not on file     Non-medical: Not on file   Tobacco Use    Smoking status: Former Smoker     Packs/day: 0.50     Years: 40.00     Pack years: 20.00     Last attempt to quit: 2018     Years since quittin.3  Smokeless tobacco: Never Used    Tobacco comment: Quit 11/2018   Substance and Sexual Activity    Alcohol use: Not Currently    Drug use: Not Currently     Types: Marijuana, Cocaine, Heroin     Comment: Quit Cocaine, Heroin, and Marijuana since 11/2018    Sexual activity: Not Currently   Lifestyle    Physical activity:     Days per week: Not on file     Minutes per session: Not on file    Stress: Not on file   Relationships    Social connections:     Talks on phone: Not on file     Gets together: Not on file     Attends Yazdanism service: Not on file     Active member of club or organization: Not on file     Attends meetings of clubs or organizations: Not on file     Relationship status: Not on file    Intimate partner violence:     Fear of current or ex partner: Not on file     Emotionally abused: Not on file     Physically abused: Not on file     Forced sexual activity: Not on file   Other Topics Concern     Service Not Asked    Blood Transfusions Not Asked    Caffeine Concern Not Asked    Occupational Exposure Not Asked   Imelda Pinna Hazards Not Asked    Sleep Concern Not Asked    Stress Concern Not Asked    Weight Concern Not Asked    Special Diet Not Asked    Back Care Not Asked    Exercise Not Asked    Bike Helmet Not Asked   [address] Not Asked    Self-Exams Not Asked   Social History Narrative    Not on file         ALLERGIES: Patient has no known allergies. Review of Systems   Constitutional: Negative for fever. Respiratory: Negative for shortness of breath. Gastrointestinal: Positive for abdominal pain. Musculoskeletal: Negative for gait problem. Skin: Negative for wound. Neurological: Negative for syncope. Psychiatric/Behavioral: Positive for sleep disturbance.        Vitals:    02/19/20 0515   BP: 113/74   Pulse: 79   Resp: 18   Temp: 98.4 °F (36.9 °C)   SpO2: 98%   Weight: 91.6 kg (202 lb)   Height: 6' (1.829 m)            Physical Exam  Vitals signs and nursing note reviewed. Constitutional:       General: He is not in acute distress. Appearance: He is not ill-appearing, toxic-appearing or diaphoretic. HENT:      Head: Normocephalic and atraumatic. Right Ear: External ear normal.      Left Ear: External ear normal.      Nose: Nose normal.      Mouth/Throat:      Pharynx: No oropharyngeal exudate. Eyes:      Conjunctiva/sclera: Conjunctivae normal.   Neck:      Musculoskeletal: Normal range of motion. Cardiovascular:      Rate and Rhythm: Normal rate and regular rhythm. Heart sounds: Normal heart sounds. Pulmonary:      Effort: Pulmonary effort is normal. No respiratory distress. Breath sounds: Normal breath sounds. Comments: Mediport site with no swelling or erythema. Slightly tender but no crepitus or other skin changes. Abdominal:      Palpations: Abdomen is soft. Musculoskeletal: Normal range of motion. Skin:     General: Skin is warm and dry. Neurological:      Mental Status: He is alert and oriented to person, place, and time. Psychiatric:         Behavior: Behavior normal.          MDM       Procedures  Vitals:  Patient Vitals for the past 12 hrs:   Temp Pulse Resp BP SpO2   02/19/20 0515 98.4 °F (36.9 °C) 79 18 113/74 98 %         Medications ordered:   Medications - No data to display      Lab findings:  No results found for this or any previous visit (from the past 12 hour(s)). EKG interpretation by ED Physician:      X-Ray, CT or other radiology findings or impressions:  No orders to display       Progress notes, Consult notes or additional Procedure notes:   No signs of infection at the port site. Nurse unable to get re-access at the site. Stop the pump. Needle removed. Will refer back to the infusion center for re-access and restart of his infusion.     I have discussed with patient and/or family/sig other the results, interpretation of any imaging if performed, suspected diagnosis and treatment plan to include instructions regarding the diagnoses listed to which understanding was expressed with all questions answered      Reevaluation of patient:   stable    Disposition:  Diagnosis:   1. Encounter for care related to vascular access port    2. Malignant neoplasm of pancreas, unspecified location of malignancy ([facility] [address]        Disposition: home      Follow-up Information     Follow up With Specialties Details Why Contact Info    Go to the infusion center this morning to get re-access and restart of your infusion                Patient's Medications   Start Taking    No medications on file   Continue Taking    AVATROMBOPAG 20 MG TAB    Take 20 mg by mouth daily for 30 days. DEXAMETHASONE (DECADRON) 4 MG TABLET    Take 4mg by mouth twice a day the day before chemotherapy and the day after chemotherapy    FENTANYL (DURAGESIC) 50 MCG/HR PATCH    1 Patch by TransDERmal route every seventy-two (72) hours for 30 days. Max Daily Amount: 1 Patch. INSULIN GLARGINE (LANTUS U-100 INSULIN) 100 UNIT/ML INJECTION    28 Units by SubCUTAneous route nightly. INSULIN LISPRO (HUMALOG) 100 UNIT/ML INJECTION    3-15 Units by SubCUTAneous route Before breakfast, lunch, and dinner. L-MFOLATE-B6 PHOS-METHYL-B12 (METANX) 3-35-2 MG TAB TAB    Take 1 Tab by mouth two (2) times a day. Indications: peripheral neuropathy    LACTULOSE (CHRONULAC) 10 GRAM/15 ML SOLUTION    Take 15 mL by mouth three (3) times daily. LIDOCAINE (LIDODERM) 5 %    Apply patch to the affected area for 12 hours a day and remove for 12 hours a day. LIDOCAINE-PRILOCAINE (EMLA) TOPICAL CREAM    Apply  to affected area as needed for Pain (Apply to skin 30-60 minutes before mediport access). NALOXONE (NARCAN) 4 MG/ACTUATION NASAL SPRAY    Use 1 spray intranasally, then discard. Repeat with new spray every 2 min as needed for opioid overdose symptoms, alternating nostrils.     NALOXONE (NARCAN) 4 MG/ACTUATION NASAL SPRAY    Use 1 spray intranasally, then discard. Repeat with new spray every 2 min as needed for opioid overdose symptoms, alternating nostrils. Indications: opioid overdose    NUT. TX.GLUC. INTOL,LAC-FREE,SOY (GLUCERNA ADVANCE) LIQD    Take 237 mL by mouth three (3) times daily. OLANZAPINE (ZYPREXA ZYDIS) 5 MG DISINTEGRATING TABLET    take 1 tablet by mouth AT NIGHT BEFORE CHEMOTHERAPY, THEN 1 TABLET MORNING OF CHEMOTHERAPY,AND THEN 1 TABLET BY MOUTH 3 TIMES A DAY AS NEEDED FOR 1 WEEK FOLLOWING CHEMOTHERAPY    OMEPRAZOLE (PRILOSEC) 20 MG CAPSULE    TAKE 1 CAPSULE BY MOUTH ONCE DAILY    ONDANSETRON (ZOFRAN ODT) 4 MG DISINTEGRATING TABLET    Take 1 Tab by mouth every eight (8) hours as needed for Nausea for up to 20 doses. ONDANSETRON HCL (ZOFRAN) 8 MG TABLET    Take 1 Tab by mouth every eight (8) hours as needed for Nausea or Vomiting for up to 30 days. OXYCODONE IR (ROXICODONE) 10 MG TAB IMMEDIATE RELEASE TABLET    take 1 tablet by mouth every 4 hours if needed for pain up to 30 DAYS    PROMETHAZINE (PHENERGAN) 25 MG SUPPOSITORY    Insert 1 Suppository into rectum every six (6) hours as needed for Nausea for up to 7 days. QUETIAPINE (SEROQUEL) 50 MG TABLET    Take 50 mg by mouth nightly. RIVAROXABAN (XARELTO) 15 MG (42)- 20 MG (9) DSPK    Take 20 mg by mouth daily. Take one 15 mg tablet twice a day with food for the first 21 days. Then, take one 20 mg tablet once a day with food for 9 days.     TRUE METRIX GLUCOSE METER MISC    USE AS DIRECTED   These Medications have changed    No medications on file   Stop Taking    No medications on file

## 2020-02-19 NOTE — PROGRESS NOTES
[facility] Progress Note    Date: 2020    Name: Caro Morales    MRN: [id number]         : 1965    D/C CADD pump    Mr. Olmos arrived to [facility] at 0900. Mr. Janet Pearson was assessed and education was provided. Mr. Sabina Petersen vitals were reviewed. Visit Vitals  /78 (BP 1 Location: Left arm, BP Patient Position: Sitting)   Pulse 83   Temp 98.4 °F (36.9 °C)   Resp 18   SpO2 98%       Patient's right upper chest port covered with gauze and tegederm from ER visit. Patient states tubing was disconnected from mediport in the early morning hours. Removed dressing, site is clean dry and intact. There is no visible swelling or hematoma noted to site. Upon inspection of CADD pump, its noted with 205.65 ml given and 44 ml remaining. Notified Dr. Pam armenta not to give remaining chemo. Re-access right upper chest mediport with 20 g 1 in emmanuel needle under sterile process. Port flushed easily with brisk blood return. Port flushed with 20 ml normal saline followed by Heparin 500 nits/5ml then de-accessed. Band-aid applied. Mr. Janet Pearson tolerated infusion without complaints. Mr. Janet Pearson was discharged from [place] [address] in stable condition at 0930. He is to return on 2020 at 0815 for his next appointment.     Laurence Mcmillan RN  2020

## 2020-02-19 NOTE — ED NOTES
I have reviewed discharge instructions with the patient. The patient verbalized understanding. Patient instructed to go to infusion center had have port re-access/evaluated and medication restarted.

## 2020-02-19 NOTE — TELEPHONE ENCOUNTER
Requesting refill, stated insurance company would not cover refill and needs new script. Please f/u     dronabinol (MARINOL) 5 mg capsule [[id number]]  ENDED     Order Details   Dose: 5 mg Route: Oral Frequency: 2 TIMES DAILY   Dispense Quantity: 60 Cap Refills: 3 Fills remaining: --           Sig: Take 1 Cap by mouth two (2) times a day for 30 days.  Max Daily Amount: 10 mg.          Written Date: 01/15/20 Expiration Date: --     Start Date: 01/15/20 End Date: 02/14/20 after 60 doses

## 2020-02-19 NOTE — ED NOTES
Russ needle removed from [address]. Attempted reinsertion of new needle. Unable to get blood return. Needle removed. Sterile dressing placed over [address]. Provider notified.

## 2020-02-20 ENCOUNTER — TELEPHONE (OUTPATIENT)
Dept: ONCOLOGY | Age: 55
End: 2020-02-20

## 2020-02-20 NOTE — TELEPHONE ENCOUNTER
Received a call from Romi Goode, they called patient insurance company and [employer] is requesting a prior authorization for dronabinol

## 2020-02-21 ENCOUNTER — APPOINTMENT (OUTPATIENT)
Dept: INFUSION THERAPY | Age: 55
End: 2020-02-21
Payer: MEDICAID

## 2020-02-21 NOTE — TELEPHONE ENCOUNTER
Prior authorization request for dronabinol submitted via [email or web address] (Key: [id number] - Rx #: [id number]).

## 2020-02-24 ENCOUNTER — APPOINTMENT (OUTPATIENT)
Dept: INFUSION THERAPY | Age: 55
End: 2020-02-24
Payer: MEDICAID

## 2020-02-25 ENCOUNTER — TELEPHONE (OUTPATIENT)
Dept: ONCOLOGY | Age: 55
End: 2020-02-25

## 2020-02-25 DIAGNOSIS — C25.9 PANCREATIC ADENOCARCINOMA (HCC): Primary | ICD-10-CM

## 2020-02-25 DIAGNOSIS — C78.7 LIVER METASTASES (HCC): ICD-10-CM

## 2020-02-25 DIAGNOSIS — G89.3 CANCER ASSOCIATED PAIN: ICD-10-CM

## 2020-02-25 RX ORDER — HEPARIN 100 UNIT/ML
300-500 SYRINGE INTRAVENOUS AS NEEDED
Status: CANCELLED
Start: 2020-03-02

## 2020-02-25 RX ORDER — DEXTROSE MONOHYDRATE 50 MG/ML
25 INJECTION, SOLUTION INTRAVENOUS CONTINUOUS
Status: CANCELLED
Start: 2020-03-02

## 2020-02-25 RX ORDER — ALBUTEROL SULFATE 0.83 MG/ML
2.5 SOLUTION RESPIRATORY (INHALATION) AS NEEDED
Status: CANCELLED
Start: 2020-03-02

## 2020-02-25 RX ORDER — SODIUM CHLORIDE 9 MG/ML
10 INJECTION INTRAMUSCULAR; INTRAVENOUS; SUBCUTANEOUS AS NEEDED
Status: CANCELLED | OUTPATIENT
Start: 2020-03-02

## 2020-02-25 RX ORDER — EPINEPHRINE 1 MG/ML
0.3 INJECTION, SOLUTION, CONCENTRATE INTRAVENOUS AS NEEDED
Status: CANCELLED | OUTPATIENT
Start: 2020-03-02

## 2020-02-25 RX ORDER — HYDROCORTISONE SODIUM SUCCINATE 100 MG/2ML
100 INJECTION, POWDER, FOR SOLUTION INTRAMUSCULAR; INTRAVENOUS AS NEEDED
Status: CANCELLED | OUTPATIENT
Start: 2020-03-02

## 2020-02-25 RX ORDER — ATROPINE SULFATE 0.4 MG/ML
0.4 INJECTION, SOLUTION ENDOTRACHEAL; INTRAMEDULLARY; INTRAMUSCULAR; INTRAVENOUS; SUBCUTANEOUS ONCE
Status: CANCELLED | OUTPATIENT
Start: 2020-03-02

## 2020-02-25 RX ORDER — HEPARIN 100 UNIT/ML
300-500 SYRINGE INTRAVENOUS AS NEEDED
Status: CANCELLED
Start: 2020-03-11

## 2020-02-25 RX ORDER — ATROPINE SULFATE 0.4 MG/ML
0.4 INJECTION, SOLUTION ENDOTRACHEAL; INTRAMEDULLARY; INTRAMUSCULAR; INTRAVENOUS; SUBCUTANEOUS
Status: CANCELLED | OUTPATIENT
Start: 2020-03-02

## 2020-02-25 RX ORDER — PALONOSETRON 0.05 MG/ML
0.25 INJECTION, SOLUTION INTRAVENOUS ONCE
Status: CANCELLED | OUTPATIENT
Start: 2020-03-02

## 2020-02-25 RX ORDER — FENTANYL 50 UG/1
1 PATCH TRANSDERMAL
Qty: 10 PATCH | Refills: 0 | Status: SHIPPED | OUTPATIENT
Start: 2020-02-25 | End: 2020-03-23 | Stop reason: SDUPTHER

## 2020-02-25 RX ORDER — DIPHENHYDRAMINE HYDROCHLORIDE 50 MG/ML
50 INJECTION, SOLUTION INTRAMUSCULAR; INTRAVENOUS AS NEEDED
Status: CANCELLED
Start: 2020-03-02

## 2020-02-25 RX ORDER — SODIUM CHLORIDE 0.9 % (FLUSH) 0.9 %
10 SYRINGE (ML) INJECTION AS NEEDED
Status: CANCELLED
Start: 2020-03-02

## 2020-02-25 RX ORDER — SODIUM CHLORIDE 0.9 % (FLUSH) 0.9 %
10 SYRINGE (ML) INJECTION AS NEEDED
Status: CANCELLED
Start: 2020-03-11

## 2020-02-25 RX ORDER — ACETAMINOPHEN 325 MG/1
650 TABLET ORAL AS NEEDED
Status: CANCELLED
Start: 2020-03-02

## 2020-02-25 RX ORDER — ONDANSETRON 2 MG/ML
8 INJECTION INTRAMUSCULAR; INTRAVENOUS AS NEEDED
Status: CANCELLED | OUTPATIENT
Start: 2020-03-02

## 2020-02-25 RX ORDER — SODIUM CHLORIDE 9 MG/ML
10 INJECTION INTRAMUSCULAR; INTRAVENOUS; SUBCUTANEOUS AS NEEDED
Status: CANCELLED | OUTPATIENT
Start: 2020-03-11

## 2020-02-25 RX ORDER — FLUOROURACIL 50 MG/ML
900 INJECTION, SOLUTION INTRAVENOUS ONCE
Status: CANCELLED
Start: 2020-03-02 | End: 2020-03-02

## 2020-02-25 NOTE — TELEPHONE ENCOUNTER
Spoke with Alecia Omalley regarding patient's refill of fentaNYL ([address]) 50 mcg/hr PATCH, advised her that this was sent to patient's preferred pharmacy. She verbalized understanding.

## 2020-02-25 NOTE — TELEPHONE ENCOUNTER
Patient requesting refill:    fentaNYL (DURAGESIC) 50 mcg/hr PATCH [[id number]]  ENDED     Order Details   Dose: 1 Patch Route: TransDERmal Frequency: EVERY 72 HOURS   Dispense Quantity: 10 Patch Refills: 0 Fills remaining: --           Si Patch by TransDERmal route every seventy-two (72) hours for 30 days.  Max Daily Amount: 1 Patch.          Written Date: 20 Expiration Date: --     Start Date: 20 End Date: 20 after 10 doses     Earliest Fill Date: 20

## 2020-02-25 NOTE — TELEPHONE ENCOUNTER
Patient requesting refill for:   Requested Prescriptions     Pending Prescriptions Disp Refills    fentaNYL (DURAGESIC) 50 mcg/hr PATCH  0     Si Patch by TransDERmal route every seventy-two (72) hours for 30 days. Max Daily Amount: 1 Patch. Pharmacy on file. Patient last seen: 2020  [facility] 20  Future appointment: 20  Please advise.

## 2020-02-26 ENCOUNTER — TELEPHONE (OUTPATIENT)
Dept: ONCOLOGY | Age: 55
End: 2020-02-26

## 2020-02-26 ENCOUNTER — APPOINTMENT (OUTPATIENT)
Dept: INFUSION THERAPY | Age: 55
End: 2020-02-26
Payer: MEDICAID

## 2020-02-26 NOTE — TELEPHONE ENCOUNTER
[facility] called regarding patient's prescription for Doptelet. States that insurance will only allow 15 to be dispensed. If provider is ok with 15 then a new script will need to be sent. If provider wants 30 then a new prior [facility]ist is needed.   Please f/u [phone]

## 2020-02-28 ENCOUNTER — APPOINTMENT (OUTPATIENT)
Dept: INFUSION THERAPY | Age: 55
End: 2020-02-28
Payer: MEDICAID

## 2020-02-28 DIAGNOSIS — C25.9 PANCREATIC ADENOCARCINOMA (HCC): ICD-10-CM

## 2020-02-28 DIAGNOSIS — D69.6 THROMBOCYTOPENIA (HCC): ICD-10-CM

## 2020-02-28 DIAGNOSIS — C78.7 LIVER METASTASES (HCC): Primary | ICD-10-CM

## 2020-02-28 NOTE — TELEPHONE ENCOUNTER
Spoke with Leonarda Ibarra at [place] [address] regarding Doptelet rx. She states insurance will only approve a 15 day supply. I advised per Dr. Neftali Wall that patient can receive the 15 day supply. She states they will need a new prescription reflecting the 15 day supply and to fax to [phone].

## 2020-02-28 NOTE — TELEPHONE ENCOUNTER
[facility] is trying to clarify if prescription Doptelet  is 15 or 30 pills, insurance co., is only allowing 13,

## 2020-02-28 NOTE — TELEPHONE ENCOUNTER
Insurance will only approve a 15 day supply and [facility] needs a new rx that reflects this change.

## 2020-03-02 ENCOUNTER — TELEPHONE (OUTPATIENT)
Dept: ONCOLOGY | Age: 55
End: 2020-03-02

## 2020-03-02 ENCOUNTER — HOSPITAL ENCOUNTER (OUTPATIENT)
Dept: INFUSION THERAPY | Age: 55
End: 2020-03-02
Payer: MEDICAID

## 2020-03-02 RX ORDER — EPINEPHRINE 1 MG/ML
0.3 INJECTION, SOLUTION, CONCENTRATE INTRAVENOUS AS NEEDED
Status: CANCELLED | OUTPATIENT
Start: 2020-03-09

## 2020-03-02 RX ORDER — ATROPINE SULFATE 0.4 MG/ML
0.4 INJECTION, SOLUTION ENDOTRACHEAL; INTRAMEDULLARY; INTRAMUSCULAR; INTRAVENOUS; SUBCUTANEOUS
Status: CANCELLED | OUTPATIENT
Start: 2020-03-09

## 2020-03-02 RX ORDER — HEPARIN 100 UNIT/ML
300-500 SYRINGE INTRAVENOUS AS NEEDED
Status: CANCELLED
Start: 2020-03-09

## 2020-03-02 RX ORDER — DIPHENHYDRAMINE HYDROCHLORIDE 50 MG/ML
50 INJECTION, SOLUTION INTRAMUSCULAR; INTRAVENOUS AS NEEDED
Status: CANCELLED
Start: 2020-03-09

## 2020-03-02 RX ORDER — ONDANSETRON 2 MG/ML
8 INJECTION INTRAMUSCULAR; INTRAVENOUS AS NEEDED
Status: CANCELLED | OUTPATIENT
Start: 2020-03-09

## 2020-03-02 RX ORDER — PALONOSETRON 0.05 MG/ML
0.25 INJECTION, SOLUTION INTRAVENOUS ONCE
Status: CANCELLED | OUTPATIENT
Start: 2020-03-09

## 2020-03-02 RX ORDER — ACETAMINOPHEN 325 MG/1
650 TABLET ORAL AS NEEDED
Status: CANCELLED
Start: 2020-03-09

## 2020-03-02 RX ORDER — SODIUM CHLORIDE 0.9 % (FLUSH) 0.9 %
10 SYRINGE (ML) INJECTION AS NEEDED
Status: CANCELLED
Start: 2020-03-09

## 2020-03-02 RX ORDER — ATROPINE SULFATE 0.4 MG/ML
0.4 INJECTION, SOLUTION ENDOTRACHEAL; INTRAMEDULLARY; INTRAMUSCULAR; INTRAVENOUS; SUBCUTANEOUS ONCE
Status: CANCELLED | OUTPATIENT
Start: 2020-03-09

## 2020-03-02 RX ORDER — DEXTROSE MONOHYDRATE 50 MG/ML
25 INJECTION, SOLUTION INTRAVENOUS CONTINUOUS
Status: CANCELLED
Start: 2020-03-09

## 2020-03-02 RX ORDER — SODIUM CHLORIDE 9 MG/ML
10 INJECTION INTRAMUSCULAR; INTRAVENOUS; SUBCUTANEOUS AS NEEDED
Status: CANCELLED | OUTPATIENT
Start: 2020-03-09

## 2020-03-02 RX ORDER — FLUOROURACIL 50 MG/ML
900 INJECTION, SOLUTION INTRAVENOUS ONCE
Status: CANCELLED
Start: 2020-03-09 | End: 2020-03-09

## 2020-03-02 RX ORDER — HYDROCORTISONE SODIUM SUCCINATE 100 MG/2ML
100 INJECTION, POWDER, FOR SOLUTION INTRAMUSCULAR; INTRAVENOUS AS NEEDED
Status: CANCELLED | OUTPATIENT
Start: 2020-03-09

## 2020-03-02 RX ORDER — ALBUTEROL SULFATE 0.83 MG/ML
2.5 SOLUTION RESPIRATORY (INHALATION) AS NEEDED
Status: CANCELLED
Start: 2020-03-09

## 2020-03-04 ENCOUNTER — APPOINTMENT (OUTPATIENT)
Dept: INFUSION THERAPY | Age: 55
End: 2020-03-04
Payer: MEDICAID

## 2020-03-06 ENCOUNTER — HOSPITAL ENCOUNTER (OUTPATIENT)
Dept: INFUSION THERAPY | Age: 55
Discharge: HOME | End: 2020-03-06
Payer: MEDICAID

## 2020-03-06 VITALS
HEIGHT: 72 IN | BODY MASS INDEX: 27.09 KG/M2 | OXYGEN SATURATION: 99 % | TEMPERATURE: 98.4 F | WEIGHT: 200 LBS | SYSTOLIC BLOOD PRESSURE: 116 MMHG | DIASTOLIC BLOOD PRESSURE: 77 MMHG | HEART RATE: 72 BPM

## 2020-03-06 LAB
BASO+EOS+MONOS # BLD AUTO: 0.9 K/UL (ref 0–2.3)
BASO+EOS+MONOS NFR BLD AUTO: 14 % (ref 0.1–17)
DIFFERENTIAL METHOD BLD: ABNORMAL
ERYTHROCYTE [DISTWIDTH] IN BLOOD BY AUTOMATED COUNT: 17.5 % (ref 11.5–14.5)
HCT VFR BLD AUTO: 37.3 % (ref 36–48)
HGB BLD-MCNC: 12.8 G/DL (ref 12–16)
LYMPHOCYTES # BLD: 2.3 K/UL (ref 1.1–5.9)
LYMPHOCYTES NFR BLD: 34 % (ref 14–44)
MCH RBC QN AUTO: 31.8 PG (ref 25–35)
MCHC RBC AUTO-ENTMCNC: 34.3 G/DL (ref 31–37)
MCV RBC AUTO: 92.6 FL (ref 78–102)
NEUTS SEG # BLD: 3.7 K/UL (ref 1.8–9.5)
NEUTS SEG NFR BLD: 52 % (ref 40–70)
PLATELET # BLD AUTO: 120 K/UL (ref 140–440)
RBC # BLD AUTO: 4.03 M/UL (ref 4.1–5.1)
WBC # BLD AUTO: 6.9 K/UL (ref 4.5–13)

## 2020-03-06 PROCEDURE — 36415 COLL VENOUS BLD VENIPUNCTURE: CPT

## 2020-03-06 PROCEDURE — 85025 COMPLETE CBC W/AUTO DIFF WBC: CPT

## 2020-03-06 NOTE — PROGRESS NOTES
[facility] Progress Note    Date: 2020    Name: Jorge Luis Rossi    MRN: [id number]         : 1965    Peripheral Lab Draw      Mr. Ilya Ahuja to [facility], ambulatory at 0813 accompanied by family. Pt was assessed and education was provided. Mr. Coco Vyas vitals were reviewed and patient was observed for 5 minutes prior to treatment. Visit Vitals  /77 (BP 1 Location: Left arm, BP Patient Position: Sitting)   Pulse 72   Temp 98.4 °F (36.9 °C)   Ht 6' (1.829 m)   Wt 90.7 kg (200 lb)   SpO2 99%   BMI 27.12 kg/m²     Recent Results (from the past 12 hour(s))   CBC WITH 3 PART DIFF    Collection Time: 20  8:23 AM   Result Value Ref Range    WBC 6.9 4.5 - 13.0 K/uL    RBC 4.03 (L) 4.10 - 5.10 M/uL    HGB 12.8 12.0 - 16.0 g/dL    HCT 37.3 36 - 48 %    MCV 92.6 78 - 102 FL    MCH 31.8 25.0 - 35.0 PG    MCHC 34.3 31 - 37 g/dL    RDW 17.5 (H) 11.5 - 14.5 %    PLATELET 969 (L) 590 - 440 K/uL    NEUTROPHILS 52 40 - 70 %    MIXED CELLS 14 0.1 - 17 %    LYMPHOCYTES 34 14 - 44 %    ABS. NEUTROPHILS 3.7 1.8 - 9.5 K/UL    ABS. MIXED CELLS 0.9 0.0 - 2.3 K/uL    ABS. LYMPHOCYTES 2.3 1.1 - 5.9 K/UL    DF AUTOMATED         Blood obtained peripherally from left arm x 1 attempt with butterfly needle and sent to lab for Cbc w/diff per written orders. No bleeding or hematoma noted at site. Gauze and coban applied. Mr. Ilya Ahuja tolerated the phlebotomy, and had no complaints. Patient armband removed and shredded. Mr. Ilya Ahuja was discharged from [place] [address] in stable condition at 2311.      Sophia Hampton Phlebotomist PCT  2020  12:56 PM

## 2020-03-09 ENCOUNTER — HOSPITAL ENCOUNTER (OUTPATIENT)
Dept: INFUSION THERAPY | Age: 55
Discharge: HOME | End: 2020-03-09
Payer: MEDICAID

## 2020-03-09 VITALS
SYSTOLIC BLOOD PRESSURE: 114 MMHG | DIASTOLIC BLOOD PRESSURE: 74 MMHG | RESPIRATION RATE: 18 BRPM | HEIGHT: 72 IN | TEMPERATURE: 98 F | OXYGEN SATURATION: 97 % | HEART RATE: 68 BPM | WEIGHT: 200 LBS | BODY MASS INDEX: 27.09 KG/M2

## 2020-03-09 DIAGNOSIS — C78.7 LIVER METASTASIS (HCC): ICD-10-CM

## 2020-03-09 DIAGNOSIS — K86.89 PANCREATIC MASS: Primary | ICD-10-CM

## 2020-03-09 PROCEDURE — 96415 CHEMO IV INFUSION ADDL HR: CPT

## 2020-03-09 PROCEDURE — 96417 CHEMO IV INFUS EACH ADDL SEQ: CPT

## 2020-03-09 PROCEDURE — 96367 TX/PROPH/DG ADDL SEQ IV INF: CPT

## 2020-03-09 PROCEDURE — 74011000258 HC RX REV CODE- 258: Performed by: INTERNAL MEDICINE

## 2020-03-09 PROCEDURE — 96416 CHEMO PROLONG INFUSE W/PUMP: CPT

## 2020-03-09 PROCEDURE — 96413 CHEMO IV INFUSION 1 HR: CPT

## 2020-03-09 PROCEDURE — 74011250636 HC RX REV CODE- 250/636: Performed by: INTERNAL MEDICINE

## 2020-03-09 PROCEDURE — 96375 TX/PRO/DX INJ NEW DRUG ADDON: CPT

## 2020-03-09 PROCEDURE — 96411 CHEMO IV PUSH ADDL DRUG: CPT

## 2020-03-09 PROCEDURE — 77030012965 HC NDL HUBR BBMI -A

## 2020-03-09 PROCEDURE — 74011000250 HC RX REV CODE- 250: Performed by: INTERNAL MEDICINE

## 2020-03-09 RX ORDER — ATROPINE SULFATE 0.4 MG/ML
0.4 INJECTION, SOLUTION ENDOTRACHEAL; INTRAMEDULLARY; INTRAMUSCULAR; INTRAVENOUS; SUBCUTANEOUS ONCE
Status: DISPENSED | OUTPATIENT
Start: 2020-03-09 | End: 2020-03-09

## 2020-03-09 RX ORDER — PALONOSETRON 0.05 MG/ML
0.25 INJECTION, SOLUTION INTRAVENOUS ONCE
Status: COMPLETED | OUTPATIENT
Start: 2020-03-09 | End: 2020-03-09

## 2020-03-09 RX ORDER — FLUOROURACIL 50 MG/ML
900 INJECTION, SOLUTION INTRAVENOUS ONCE
Status: COMPLETED | OUTPATIENT
Start: 2020-03-09 | End: 2020-03-09

## 2020-03-09 RX ORDER — SODIUM CHLORIDE 9 MG/ML
10 INJECTION INTRAMUSCULAR; INTRAVENOUS; SUBCUTANEOUS AS NEEDED
Status: ACTIVE | OUTPATIENT
Start: 2020-03-09 | End: 2020-03-09

## 2020-03-09 RX ORDER — DEXTROSE MONOHYDRATE 50 MG/ML
25 INJECTION, SOLUTION INTRAVENOUS CONTINUOUS
Status: DISPENSED | OUTPATIENT
Start: 2020-03-09 | End: 2020-03-09

## 2020-03-09 RX ADMIN — OXALIPLATIN 190 MG: 100 INJECTION, SOLUTION, CONCENTRATE INTRAVENOUS at 11:00

## 2020-03-09 RX ADMIN — IRINOTECAN HYDROCHLORIDE 400 MG: 20 INJECTION, SOLUTION INTRAVENOUS at 13:28

## 2020-03-09 RX ADMIN — FLUOROURACIL 5300 MG: 50 INJECTION, SOLUTION INTRAVENOUS at 15:17

## 2020-03-09 RX ADMIN — LEUCOVORIN CALCIUM 900 MG: 350 INJECTION, POWDER, LYOPHILIZED, FOR SUSPENSION INTRAMUSCULAR; INTRAVENOUS at 11:00

## 2020-03-09 RX ADMIN — DEXTROSE 25 ML/HR: 5 SOLUTION INTRAVENOUS at 09:44

## 2020-03-09 RX ADMIN — FLUOROURACIL 900 MG: 50 INJECTION, SOLUTION INTRAVENOUS at 15:10

## 2020-03-09 RX ADMIN — PALONOSETRON 0.25 MG: 0.05 INJECTION, SOLUTION INTRAVENOUS at 09:43

## 2020-03-09 RX ADMIN — SODIUM CHLORIDE 10 ML: 9 INJECTION INTRAMUSCULAR; INTRAVENOUS; SUBCUTANEOUS at 15:16

## 2020-03-09 RX ADMIN — FOSAPREPITANT 150 MG: 150 INJECTION, POWDER, LYOPHILIZED, FOR SOLUTION INTRAVENOUS at 09:45

## 2020-03-09 NOTE — PROGRESS NOTES
[facility] Progress Note    Date: 2020    Name: Mary Kay Rhoades    MRN: [id number]         : 1965     Chemotherapy cycle : N3C0 Folfirinox      Mr. Olmos arrived to [facility] at 8539. Mr. Khai Nava was assessed and education was provided. Mr. Kendell Goodwin vitals were reviewed. Visit Vitals  /74 (BP 1 Location: Right arm, BP Patient Position: Sitting)   Pulse 68   Temp 98 °F (36.7 °C)   Resp 18   Ht 6' (1.829 m)   Wt 90.7 kg (200 lb)   SpO2 97%   BMI 27.12 kg/m²     Labs obtained on 3/6/2020. Right chest mediport accessed with 20 g 1inch emmanuel needle. Port flushed easily and had brisk blood return. D5W initiated @ 25mL/hr KVO. Chemo dosages verified with today's BSA and found to be within 10% of ordered dosages. Pre-medications (Aloxi 0.25mg IVP, Emend 150 mg IVPB) were administered as ordered and chemotherapy was initiated after blood return from port re-verified. Reviewed expected side effects of premeds with patient. Oxaliplatin 190mg ( at 156 mL/hr over 2 hours) infused simultaneously with Leucovorin 900mg (at per 213mL/hr over 90 minutes). VS stable at end of infusion and pt denied complaints. Line flushed with D5W and blood return from port re-verified. Irinotecan 400mg was infused at  380mL/hr over 90 minutes per order. VS stable at end of infusion and pt denied complaints. Line flushed with D5W and blood return from port re-verified. 5FU syringe 900 mg pushed over 5 minutes per order. 5FU 5300 mg infused via CADD pump and infusing without difficulty over 46 hours. Tubing connections reinforced with tape. Mr. Khai Nava tolerated infusion without complaints. Mr. Khai Nava was discharged from [place] [address] in stable condition at 1525. He is to return on 3/11/2020 at 1400 for his next pre-chemo lab appointment.     Rakesh Chadwick RN  2020  1525

## 2020-03-11 ENCOUNTER — APPOINTMENT (OUTPATIENT)
Dept: INFUSION THERAPY | Age: 55
End: 2020-03-11
Payer: MEDICAID

## 2020-03-12 ENCOUNTER — HOSPITAL ENCOUNTER (OUTPATIENT)
Dept: INFUSION THERAPY | Age: 55
Discharge: HOME | End: 2020-03-12
Payer: MEDICAID

## 2020-03-12 VITALS
SYSTOLIC BLOOD PRESSURE: 116 MMHG | TEMPERATURE: 98.1 F | DIASTOLIC BLOOD PRESSURE: 76 MMHG | RESPIRATION RATE: 18 BRPM | OXYGEN SATURATION: 97 % | HEART RATE: 66 BPM

## 2020-03-12 DIAGNOSIS — K86.89 PANCREATIC MASS: Primary | ICD-10-CM

## 2020-03-12 DIAGNOSIS — C78.7 LIVER METASTASIS (HCC): ICD-10-CM

## 2020-03-12 PROCEDURE — 74011250636 HC RX REV CODE- 250/636

## 2020-03-12 PROCEDURE — 96523 IRRIG DRUG DELIVERY DEVICE: CPT

## 2020-03-12 RX ORDER — HEPARIN 100 UNIT/ML
300-500 SYRINGE INTRAVENOUS AS NEEDED
Status: ACTIVE | OUTPATIENT
Start: 2020-03-12 | End: 2020-03-12

## 2020-03-12 RX ORDER — HEPARIN 100 UNIT/ML
SYRINGE INTRAVENOUS
Status: COMPLETED
Start: 2020-03-12 | End: 2020-03-12

## 2020-03-12 RX ORDER — SODIUM CHLORIDE 9 MG/ML
10 INJECTION INTRAMUSCULAR; INTRAVENOUS; SUBCUTANEOUS AS NEEDED
Status: ACTIVE | OUTPATIENT
Start: 2020-03-12 | End: 2020-03-12

## 2020-03-12 RX ADMIN — Medication 500 UNITS: at 08:28

## 2020-03-12 RX ADMIN — HEPARIN 500 UNITS: 100 SYRINGE at 08:28

## 2020-03-12 RX ADMIN — SODIUM CHLORIDE 10 ML: 9 INJECTION INTRAMUSCULAR; INTRAVENOUS; SUBCUTANEOUS at 08:28

## 2020-03-12 NOTE — PROGRESS NOTES
[facility] Progress Note    Date: 2020    Name: Terrance Pelaez    MRN: [id number]         : 1965    D/C CADD pump    Mr. Olmos arrived to [facility] at [address] [facility]. Mr. Luis Alfonso was assessed and education was provided. Mr. Jarrod [place] vitals were reviewed. Visit Vitals  /76 (BP 1 Location: Right arm, BP Patient Position: Sitting)   Pulse 66   Temp 98.1 °F (36.7 °C)   Resp 18   SpO2 97%       Patient's right upper chest port already accessed from Monday's visit. CADD pump infusion complete. Disconnected from patient's port. Blood return verified. Port flushed with 20 ml normal saline followed by Heparin 500 nits/5ml. Mr. Luis Alfonso tolerated infusion without complaints. Mr. Luis Alfonso was discharged from [place] [address] in stable condition at 0830. He is to return on 3/20/20 at 0900 for his next appointment for pre chemo labs.     Talha Edwards RN  2020

## 2020-03-13 ENCOUNTER — APPOINTMENT (OUTPATIENT)
Dept: INFUSION THERAPY | Age: 55
End: 2020-03-13
Payer: MEDICAID

## 2020-03-16 ENCOUNTER — APPOINTMENT (OUTPATIENT)
Dept: INFUSION THERAPY | Age: 55
End: 2020-03-16
Payer: MEDICAID

## 2020-03-16 RX ORDER — EPINEPHRINE 1 MG/ML
0.3 INJECTION, SOLUTION, CONCENTRATE INTRAVENOUS AS NEEDED
Status: CANCELLED | OUTPATIENT
Start: 2020-03-23

## 2020-03-16 RX ORDER — ONDANSETRON 2 MG/ML
8 INJECTION INTRAMUSCULAR; INTRAVENOUS AS NEEDED
Status: CANCELLED | OUTPATIENT
Start: 2020-03-23

## 2020-03-16 RX ORDER — SODIUM CHLORIDE 0.9 % (FLUSH) 0.9 %
10 SYRINGE (ML) INJECTION AS NEEDED
Status: CANCELLED
Start: 2020-03-25

## 2020-03-16 RX ORDER — ACETAMINOPHEN 325 MG/1
650 TABLET ORAL AS NEEDED
Status: CANCELLED
Start: 2020-03-23

## 2020-03-16 RX ORDER — HEPARIN 100 UNIT/ML
300-500 SYRINGE INTRAVENOUS AS NEEDED
Status: CANCELLED
Start: 2020-03-25

## 2020-03-16 RX ORDER — ATROPINE SULFATE 0.4 MG/ML
0.4 INJECTION, SOLUTION ENDOTRACHEAL; INTRAMEDULLARY; INTRAMUSCULAR; INTRAVENOUS; SUBCUTANEOUS
Status: CANCELLED | OUTPATIENT
Start: 2020-03-23

## 2020-03-16 RX ORDER — HYDROCORTISONE SODIUM SUCCINATE 100 MG/2ML
100 INJECTION, POWDER, FOR SOLUTION INTRAMUSCULAR; INTRAVENOUS AS NEEDED
Status: CANCELLED | OUTPATIENT
Start: 2020-03-23

## 2020-03-16 RX ORDER — DIPHENHYDRAMINE HYDROCHLORIDE 50 MG/ML
50 INJECTION, SOLUTION INTRAMUSCULAR; INTRAVENOUS AS NEEDED
Status: CANCELLED
Start: 2020-03-23

## 2020-03-16 RX ORDER — ALBUTEROL SULFATE 0.83 MG/ML
2.5 SOLUTION RESPIRATORY (INHALATION) AS NEEDED
Status: CANCELLED
Start: 2020-03-23

## 2020-03-16 RX ORDER — SODIUM CHLORIDE 9 MG/ML
10 INJECTION INTRAMUSCULAR; INTRAVENOUS; SUBCUTANEOUS AS NEEDED
Status: CANCELLED | OUTPATIENT
Start: 2020-03-25

## 2020-03-18 ENCOUNTER — APPOINTMENT (OUTPATIENT)
Dept: INFUSION THERAPY | Age: 55
End: 2020-03-18
Payer: MEDICAID

## 2020-03-19 ENCOUNTER — TELEPHONE (OUTPATIENT)
Dept: ONCOLOGY | Age: 55
End: 2020-03-19

## 2020-03-20 ENCOUNTER — TELEPHONE (OUTPATIENT)
Dept: ONCOLOGY | Age: 55
End: 2020-03-20

## 2020-03-20 ENCOUNTER — HOSPITAL ENCOUNTER (OUTPATIENT)
Dept: INFUSION THERAPY | Age: 55
Discharge: HOME | End: 2020-03-20
Payer: MEDICAID

## 2020-03-20 VITALS
BODY MASS INDEX: 27.87 KG/M2 | WEIGHT: 205.8 LBS | OXYGEN SATURATION: 96 % | SYSTOLIC BLOOD PRESSURE: 115 MMHG | HEIGHT: 72 IN | DIASTOLIC BLOOD PRESSURE: 69 MMHG | HEART RATE: 66 BPM | TEMPERATURE: 97.2 F

## 2020-03-20 LAB
ALBUMIN SERPL-MCNC: 3.5 G/DL (ref 3.4–5)
ALBUMIN/GLOB SERPL: 0.8 {RATIO} (ref 0.8–1.7)
ALP SERPL-CCNC: 83 U/L (ref 45–117)
ALT SERPL-CCNC: 18 U/L (ref 16–61)
ANION GAP SERPL CALC-SCNC: 5 MMOL/L (ref 3–18)
AST SERPL-CCNC: 25 U/L (ref 10–38)
BASO+EOS+MONOS # BLD AUTO: 0.4 K/UL (ref 0–2.3)
BASO+EOS+MONOS NFR BLD AUTO: 10 % (ref 0.1–17)
BILIRUB SERPL-MCNC: 0.4 MG/DL (ref 0.2–1)
BUN SERPL-MCNC: 15 MG/DL (ref 7–18)
BUN/CREAT SERPL: 15 (ref 12–20)
CALCIUM SERPL-MCNC: 9.4 MG/DL (ref 8.5–10.1)
CHLORIDE SERPL-SCNC: 105 MMOL/L (ref 100–111)
CO2 SERPL-SCNC: 27 MMOL/L (ref 21–32)
CREAT SERPL-MCNC: 1.01 MG/DL (ref 0.6–1.3)
DIFFERENTIAL METHOD BLD: ABNORMAL
ERYTHROCYTE [DISTWIDTH] IN BLOOD BY AUTOMATED COUNT: 15.9 % (ref 11.5–14.5)
GLOBULIN SER CALC-MCNC: 4.5 G/DL (ref 2–4)
GLUCOSE SERPL-MCNC: 212 MG/DL (ref 74–99)
HCT VFR BLD AUTO: 33.3 % (ref 36–48)
HGB BLD-MCNC: 11.5 G/DL (ref 12–16)
LYMPHOCYTES # BLD: 1.8 K/UL (ref 1.1–5.9)
LYMPHOCYTES NFR BLD: 44 % (ref 14–44)
MCH RBC QN AUTO: 32 PG (ref 25–35)
MCHC RBC AUTO-ENTMCNC: 34.5 G/DL (ref 31–37)
MCV RBC AUTO: 92.8 FL (ref 78–102)
NEUTS SEG # BLD: 1.9 K/UL (ref 1.8–9.5)
NEUTS SEG NFR BLD: 45 % (ref 40–70)
PLATELET # BLD AUTO: 51 K/UL (ref 135–420)
POTASSIUM SERPL-SCNC: 3.8 MMOL/L (ref 3.5–5.5)
PROT SERPL-MCNC: 8 G/DL (ref 6.4–8.2)
RBC # BLD AUTO: 3.59 M/UL (ref 4.1–5.1)
SODIUM SERPL-SCNC: 137 MMOL/L (ref 136–145)
WBC # BLD AUTO: 4.1 K/UL (ref 4.5–13)

## 2020-03-20 PROCEDURE — 85025 COMPLETE CBC W/AUTO DIFF WBC: CPT

## 2020-03-20 PROCEDURE — 80053 COMPREHEN METABOLIC PANEL: CPT

## 2020-03-20 PROCEDURE — 85049 AUTOMATED PLATELET COUNT: CPT

## 2020-03-20 PROCEDURE — 36415 COLL VENOUS BLD VENIPUNCTURE: CPT

## 2020-03-20 NOTE — PROGRESS NOTES
[facility] Progress Note    Date: 2020    Name: Martha Mcclure    MRN: [id number]         : 1965    Peripheral Lab Draw      Mr. Sulema Epperson to [facility], ambulatory at [phone] accompanied by self. Pt was assessed and education was provided. Mr. Judith Grace vitals were reviewed and patient was observed for 5 minutes prior to treatment. Visit Vitals  /69 (BP 1 Location: Left arm, BP Patient Position: Sitting)   Pulse 66   Temp 97.2 °F (36.2 °C)   Ht 6' (1.829 m)   Wt 93.4 kg (205 lb 12.8 oz)   SpO2 96%   BMI 27.91 kg/m²     Recent Results (from the past 12 hour(s))   CBC WITH 3 PART DIFF    Collection Time: 20  9:00 AM   Result Value Ref Range    WBC 4.1 (L) 4.5 - 13.0 K/uL    RBC 3.59 (L) 4.10 - 5.10 M/uL    HGB 11.5 (L) 12.0 - 16.0 g/dL    HCT 33.3 (L) 36 - 48 %    MCV 92.8 78 - 102 FL    MCH 32.0 25.0 - 35.0 PG    MCHC 34.5 31 - 37 g/dL    RDW 15.9 (H) 11.5 - 14.5 %    NEUTROPHILS 45 40 - 70 %    MIXED CELLS 10 0.1 - 17 %    LYMPHOCYTES 44 14 - 44 %    ABS. NEUTROPHILS 1.9 1.8 - 9.5 K/UL    ABS. MIXED CELLS 0.4 0.0 - 2.3 K/uL    ABS. LYMPHOCYTES 1.8 1.1 - 5.9 K/UL    DF AUTOMATED     METABOLIC PANEL, COMPREHENSIVE    Collection Time: 20  9:00 AM   Result Value Ref Range    Sodium 137 136 - 145 mmol/L    Potassium 3.8 3.5 - 5.5 mmol/L    Chloride 105 100 - 111 mmol/L    CO2 27 21 - 32 mmol/L    Anion gap 5 3.0 - 18 mmol/L    Glucose 212 (H) 74 - 99 mg/dL    BUN 15 7.0 - 18 MG/DL    Creatinine 1.01 0.6 - 1.3 MG/DL    BUN/Creatinine ratio 15 12 - 20      GFR est AA >60 >60 ml/min/1.73m2    GFR est non-AA >60 >60 ml/min/1.73m2    Calcium 9.4 8.5 - 10.1 MG/DL    Bilirubin, total 0.4 0.2 - 1.0 MG/DL    ALT (SGPT) 18 16 - 61 U/L    AST (SGOT) 25 10 - 38 U/L    Alk.  phosphatase 83 45 - 117 U/L    Protein, total 8.0 6.4 - 8.2 g/dL    Albumin 3.5 3.4 - 5.0 g/dL    Globulin 4.5 (H) 2.0 - 4.0 g/dL    A-G Ratio 0.8 0.8 - 1.7     PLATELET COUNT    Collection Time: 20  9:00 AM   Result Value Ref Range    PLATELET 51 (L) 382 - 420 K/uL       Blood obtained peripherally from left arm x 1 attempt with butterfly needle and sent to lab for Cbc w/diff and Cmp per written orders. No bleeding or hematoma noted at site. Gauze and coban applied. Mr. Hortencia Saba tolerated the phlebotomy, and had no complaints. Patient armband removed and shredded. Mr. Hortencia Saba was discharged from [place] [address] in stable condition at 0900.      Jan Sauer Phlebotomist PCT  March 20, 2020  10:29 AM

## 2020-03-23 ENCOUNTER — OFFICE VISIT (OUTPATIENT)
Dept: ONCOLOGY | Age: 55
End: 2020-03-23

## 2020-03-23 ENCOUNTER — NURSE NAVIGATOR (OUTPATIENT)
Dept: OTHER | Age: 55
End: 2020-03-23

## 2020-03-23 ENCOUNTER — HOSPITAL ENCOUNTER (OUTPATIENT)
Dept: INFUSION THERAPY | Age: 55
Discharge: HOME | End: 2020-03-23
Payer: MEDICAID

## 2020-03-23 VITALS
SYSTOLIC BLOOD PRESSURE: 116 MMHG | HEART RATE: 50 BPM | TEMPERATURE: 97 F | OXYGEN SATURATION: 99 % | DIASTOLIC BLOOD PRESSURE: 70 MMHG | RESPIRATION RATE: 18 BRPM

## 2020-03-23 VITALS
HEIGHT: 72 IN | TEMPERATURE: 97.3 F | DIASTOLIC BLOOD PRESSURE: 70 MMHG | BODY MASS INDEX: 27.77 KG/M2 | HEART RATE: 78 BPM | SYSTOLIC BLOOD PRESSURE: 95 MMHG | RESPIRATION RATE: 18 BRPM | OXYGEN SATURATION: 96 % | WEIGHT: 205 LBS

## 2020-03-23 DIAGNOSIS — R11.0 CHEMOTHERAPY-INDUCED NAUSEA: ICD-10-CM

## 2020-03-23 DIAGNOSIS — C25.9 PANCREATIC ADENOCARCINOMA (HCC): ICD-10-CM

## 2020-03-23 DIAGNOSIS — C78.7 LIVER METASTASES (HCC): ICD-10-CM

## 2020-03-23 DIAGNOSIS — G89.3 CANCER ASSOCIATED PAIN: ICD-10-CM

## 2020-03-23 DIAGNOSIS — K86.89 PANCREATIC MASS: Primary | ICD-10-CM

## 2020-03-23 DIAGNOSIS — T45.1X5A CINV (CHEMOTHERAPY-INDUCED NAUSEA AND VOMITING): ICD-10-CM

## 2020-03-23 DIAGNOSIS — R11.2 CINV (CHEMOTHERAPY-INDUCED NAUSEA AND VOMITING): ICD-10-CM

## 2020-03-23 DIAGNOSIS — T45.1X5A CHEMOTHERAPY-INDUCED NAUSEA: ICD-10-CM

## 2020-03-23 DIAGNOSIS — R63.0 APPETITE LOSS: ICD-10-CM

## 2020-03-23 DIAGNOSIS — C78.7 LIVER METASTASIS (HCC): ICD-10-CM

## 2020-03-23 LAB
BASO+EOS+MONOS # BLD AUTO: 0.6 K/UL (ref 0–2.3)
BASO+EOS+MONOS NFR BLD AUTO: 13 % (ref 0.1–17)
DIFFERENTIAL METHOD BLD: ABNORMAL
ERYTHROCYTE [DISTWIDTH] IN BLOOD BY AUTOMATED COUNT: 16.2 % (ref 11.5–14.5)
HCT VFR BLD AUTO: 34.2 % (ref 36–48)
HGB BLD-MCNC: 11.5 G/DL (ref 12–16)
LYMPHOCYTES # BLD: 1.7 K/UL (ref 1.1–5.9)
LYMPHOCYTES NFR BLD: 39 % (ref 14–44)
MCH RBC QN AUTO: 31.7 PG (ref 25–35)
MCHC RBC AUTO-ENTMCNC: 33.6 G/DL (ref 31–37)
MCV RBC AUTO: 94.2 FL (ref 78–102)
NEUTS SEG # BLD: 2.1 K/UL (ref 1.8–9.5)
NEUTS SEG NFR BLD: 48 % (ref 40–70)
PLATELET # BLD AUTO: 93 K/UL (ref 140–440)
RBC # BLD AUTO: 3.63 M/UL (ref 4.1–5.1)
WBC # BLD AUTO: 4.4 K/UL (ref 4.5–13)

## 2020-03-23 PROCEDURE — 96366 THER/PROPH/DIAG IV INF ADDON: CPT

## 2020-03-23 PROCEDURE — 85025 COMPLETE CBC W/AUTO DIFF WBC: CPT

## 2020-03-23 PROCEDURE — 74011250636 HC RX REV CODE- 250/636: Performed by: INTERNAL MEDICINE

## 2020-03-23 PROCEDURE — 77030012965 HC NDL HUBR BBMI -A

## 2020-03-23 PROCEDURE — 96375 TX/PRO/DX INJ NEW DRUG ADDON: CPT

## 2020-03-23 PROCEDURE — 96417 CHEMO IV INFUS EACH ADDL SEQ: CPT

## 2020-03-23 PROCEDURE — 74011000258 HC RX REV CODE- 258: Performed by: INTERNAL MEDICINE

## 2020-03-23 PROCEDURE — 96413 CHEMO IV INFUSION 1 HR: CPT

## 2020-03-23 PROCEDURE — 96416 CHEMO PROLONG INFUSE W/PUMP: CPT

## 2020-03-23 PROCEDURE — 96415 CHEMO IV INFUSION ADDL HR: CPT

## 2020-03-23 RX ORDER — OLANZAPINE 5 MG/1
5 TABLET, ORALLY DISINTEGRATING ORAL 2 TIMES DAILY
Qty: 30 TAB | Refills: 1 | Status: SHIPPED | OUTPATIENT
Start: 2020-03-23 | End: 2020-04-01 | Stop reason: SDUPTHER

## 2020-03-23 RX ORDER — FENTANYL 50 UG/1
1 PATCH TRANSDERMAL
Qty: 10 PATCH | Refills: 0 | Status: SHIPPED | OUTPATIENT
Start: 2020-03-23 | End: 2020-03-30 | Stop reason: SDUPTHER

## 2020-03-23 RX ORDER — DEXTROSE MONOHYDRATE 50 MG/ML
25 INJECTION, SOLUTION INTRAVENOUS CONTINUOUS
Status: DISPENSED | OUTPATIENT
Start: 2020-03-23 | End: 2020-03-23

## 2020-03-23 RX ORDER — SODIUM CHLORIDE 0.9 % (FLUSH) 0.9 %
10 SYRINGE (ML) INJECTION AS NEEDED
Status: DISPENSED | OUTPATIENT
Start: 2020-03-23 | End: 2020-03-23

## 2020-03-23 RX ORDER — OXYCODONE HYDROCHLORIDE 10 MG/1
10 TABLET ORAL
Qty: 120 TAB | Refills: 0 | Status: SHIPPED | OUTPATIENT
Start: 2020-03-23 | End: 2020-06-04 | Stop reason: SDUPTHER

## 2020-03-23 RX ORDER — FLUOROURACIL 50 MG/ML
900 INJECTION, SOLUTION INTRAVENOUS ONCE
Status: COMPLETED | OUTPATIENT
Start: 2020-03-23 | End: 2020-03-23

## 2020-03-23 RX ORDER — PALONOSETRON 0.05 MG/ML
0.25 INJECTION, SOLUTION INTRAVENOUS ONCE
Status: COMPLETED | OUTPATIENT
Start: 2020-03-23 | End: 2020-03-23

## 2020-03-23 RX ORDER — ONDANSETRON 4 MG/1
8 TABLET, FILM COATED ORAL
Qty: 90 TAB | Refills: 1 | Status: SHIPPED | OUTPATIENT
Start: 2020-03-23 | End: 2020-06-04 | Stop reason: SDUPTHER

## 2020-03-23 RX ORDER — HEPARIN 100 UNIT/ML
300-500 SYRINGE INTRAVENOUS AS NEEDED
Status: ACTIVE | OUTPATIENT
Start: 2020-03-23 | End: 2020-03-23

## 2020-03-23 RX ORDER — ONDANSETRON 4 MG/1
TABLET, FILM COATED ORAL
COMMUNITY
Start: 2020-03-11 | End: 2020-03-23 | Stop reason: SDUPTHER

## 2020-03-23 RX ORDER — CALCIUM CITRATE/VITAMIN D3 200MG-6.25
TABLET ORAL
COMMUNITY
Start: 2020-01-30

## 2020-03-23 RX ORDER — ATROPINE SULFATE 1 MG/ML
0.4 INJECTION, SOLUTION INTRAVENOUS ONCE
Status: COMPLETED | OUTPATIENT
Start: 2020-03-23 | End: 2020-03-23

## 2020-03-23 RX ORDER — DRONABINOL 5 MG/1
5 CAPSULE ORAL 2 TIMES DAILY
Qty: 60 CAP | Refills: 3 | Status: SHIPPED | OUTPATIENT
Start: 2020-03-23 | End: 2020-04-01 | Stop reason: SDUPTHER

## 2020-03-23 RX ORDER — SODIUM CHLORIDE 9 MG/ML
10 INJECTION INTRAMUSCULAR; INTRAVENOUS; SUBCUTANEOUS AS NEEDED
Status: ACTIVE | OUTPATIENT
Start: 2020-03-23 | End: 2020-03-23

## 2020-03-23 RX ADMIN — OXALIPLATIN 190 MG: 100 INJECTION, SOLUTION, CONCENTRATE INTRAVENOUS at 11:11

## 2020-03-23 RX ADMIN — FLUOROURACIL 5300 MG: 50 INJECTION, SOLUTION INTRAVENOUS at 14:54

## 2020-03-23 RX ADMIN — PALONOSETRON 0.25 MG: 0.05 INJECTION, SOLUTION INTRAVENOUS at 10:36

## 2020-03-23 RX ADMIN — LEUCOVORIN CALCIUM 900 MG: 200 INJECTION, POWDER, LYOPHILIZED, FOR SUSPENSION INTRAMUSCULAR; INTRAVENOUS at 11:11

## 2020-03-23 RX ADMIN — FLUOROURACIL 900 MG: 50 INJECTION, SOLUTION INTRAVENOUS at 14:54

## 2020-03-23 RX ADMIN — IRINOTECAN HYDROCHLORIDE 400 MG: 20 INJECTION, SOLUTION INTRAVENOUS at 13:11

## 2020-03-23 RX ADMIN — FOSAPREPITANT 150 MG: 150 INJECTION, POWDER, LYOPHILIZED, FOR SOLUTION INTRAVENOUS at 10:13

## 2020-03-23 RX ADMIN — ATROPINE SULFATE 0.4 MG: 1 INJECTION, SOLUTION INTRAMUSCULAR; INTRAVENOUS; SUBCUTANEOUS at 11:05

## 2020-03-23 RX ADMIN — DEXTROSE 25 ML/HR: 5 SOLUTION INTRAVENOUS at 10:17

## 2020-03-23 RX ADMIN — Medication 20 ML: at 10:00

## 2020-03-23 NOTE — PROGRESS NOTES
[facility] Progress Note    Date: 2020    Name: Tiffany Heredia    MRN: [id number]         : 1965     Chemotherapy cycle : C8D2 Folfirinox      Mr. Olmos arrived to [facility] at [address] Mr. Bruce Mo was assessed and education was provided. Mr. Akbar Obregon vitals were reviewed. Visit Vitals  BP 96/68 (BP 1 Location: Left arm, BP Patient Position: Sitting)   Pulse 72   Temp 96.5 °F (35.8 °C)   Resp 18   SpO2 96%     Labs obtained on 3/20/2020. Results for Emili Dupont (MRN [id number]) as of 3/20/2020 14:12   Ref. Range 3/20/2020 09:00   WBC Latest Ref Range: 4.5 - 13.0 K/uL 4.1 (L)   RBC Latest Ref Range: 4.10 - 5.10 M/uL 3.59 (L)   HGB Latest Ref Range: 12.0 - 16.0 g/dL 11.5 (L)   HCT Latest Ref Range: 36 - 48 % 33.3 (L)   MCV Latest Ref Range: 78 - 102 FL 92.8   MCH Latest Ref Range: 25.0 - 35.0 PG 32.0   MCHC Latest Ref Range: 31 - 37 g/dL 34.5   RDW Latest Ref Range: 11.5 - 14.5 % 15.9 (H)   PLATELET Latest Ref Range: 135 - 420 K/uL 51 (L)   NEUTROPHILS Latest Ref Range: 40 - 70 % 45   MIXED CELLS Latest Ref Range: 0.1 - 17 % 10   LYMPHOCYTES Latest Ref Range: 14 - 44 % 44   DF Latest Units:   AUTOMATED   ABS. NEUTROPHILS Latest Ref Range: 1.8 - 9.5 K/UL 1.9   ABS. LYMPHOCYTES Latest Ref Range: 1.1 - 5.9 K/UL 1.8   ABS.  MIXED CELLS Latest Ref Range: 0.0 - 2.3 K/uL 0.4   Sodium Latest Ref Range: 136 - 145 mmol/L 137   Potassium Latest Ref Range: 3.5 - 5.5 mmol/L 3.8   Chloride Latest Ref Range: 100 - 111 mmol/L 105   CO2 Latest Ref Range: 21 - 32 mmol/L 27   Anion gap Latest Ref Range: 3.0 - 18 mmol/L 5   Glucose Latest Ref Range: 74 - 99 mg/dL 212 (H)   BUN Latest Ref Range: 7.0 - 18 MG/DL 15   Creatinine Latest Ref Range: 0.6 - 1.3 MG/DL 1.01   BUN/Creatinine ratio Latest Ref Range: 12 - 20   15   Calcium Latest Ref Range: 8.5 - 10.1 MG/DL 9.4   GFR est non-AA Latest Ref Range: >60 ml/min/1.73m2 >60   GFR est AA Latest Ref Range: >60 ml/min/1.73m2 >60   Bilirubin, total Latest Ref Range: 0.2 - 1.0 MG/DL 0.4   Protein, total Latest Ref Range: 6.4 - 8.2 g/dL 8.0   Albumin Latest Ref Range: 3.4 - 5.0 g/dL 3.5   Globulin Latest Ref Range: 2.0 - 4.0 g/dL 4.5 (H)   A-G Ratio Latest Ref Range: 0.8 - 1.7   0.8   ALT (SGPT) Latest Ref Range: 16 - 61 U/L 18   AST Latest Ref Range: 10 - 38 U/L 25   Alk. phosphatase Latest Ref Range: 45 - 117 U/L 83     Recent Results (from the past 12 hour(s))   CBC WITH 3 PART DIFF    Collection Time: 03/23/20  9:56 AM   Result Value Ref Range    WBC 4.4 (L) 4.5 - 13.0 K/uL    RBC 3.63 (L) 4.10 - 5.10 M/uL    HGB 11.5 (L) 12.0 - 16.0 g/dL    HCT 34.2 (L) 36 - 48 %    MCV 94.2 78 - 102 FL    MCH 31.7 25.0 - 35.0 PG    MCHC 33.6 31 - 37 g/dL    RDW 16.2 (H) 11.5 - 14.5 %    PLATELET 93 (L) 621 - 440 K/uL    NEUTROPHILS 48 40 - 70 %    MIXED CELLS 13 0.1 - 17 %    LYMPHOCYTES 39 14 - 44 %    ABS. NEUTROPHILS 2.1 1.8 - 9.5 K/UL    ABS. MIXED CELLS 0.6 0.0 - 2.3 K/uL    ABS. LYMPHOCYTES 1.7 1.1 - 5.9 K/UL    DF AUTOMATED     Per Dr. Neftali Wall ok to proceed with chemotherapy with today's labs, platelets 93. Right chest mediport accessed with sterile technique, 20 g 1inch emmanuel needle. Port flushed easily and had brisk blood return. D5W initiated @ 25mL/hr KVO. Chemo dosages verified with today's BSA and found to be within 10% of ordered dosages. Pre-medications (Aloxi 0.25mg IVP, Emend 150 mg IVPB and Atropine 0.4mg IVP) were administered as ordered and chemotherapy was initiated after blood return from port re-verified. Reviewed expected side effects of premeds with patient. Oxaliplatin 190mg ( at 156.5 mL/hr over 2 hours) infused simultaneously with Leucovorin 900mg (at per 213mL/hr over 90 minutes). VS stable at end of infusion and pt denied complaints. Line flushed with D5W and blood return from port re-verified. Irinotecan 400mg was infused at  380mL/hr over 90 minutes per order. VS stable at end of infusion and pt denied complaints.  Line flushed with D5W and blood return from port re-verified. 5FU syringe 900 mg pushed over 5 minutes per order. 5FU 5300 mg infused via CADD pump and infusing without difficulty over 46 hours. Tubing connections reinforced with tape. Mr. Carol Godinez tolerated infusion without complaints. Mr. Carol Godinez was discharged from [place] [address] in stable condition at 1505. He is to return on 3/25/2020 at 1400 for his pump DC appointment.     Tiny Richards RN  March 23, 2020  1529

## 2020-03-23 NOTE — PROGRESS NOTES
Lesly Connors is a 62 y.o. male presenting today for a follow-up appointment. Patient is ambulatory with no assistive device(s), is accompanied by sister in law, denies any generalized pain. Patient has no other complaints at this time. Chief Complaint   Patient presents with    Pancreatic Cancer     f/u     COVID-19 Screening Questions 3/23/2020 2/19/2020 2/17/2020 2/12/2020 1/15/2020 12/20/2019 12/3/2019   Have you been in contact with someone who was sick? - No / Unsure No / Unsure No / Unsure No / Unsure No / Unsure No / Unsure   Have you traveled internationally in the last month? No No No No No No No     Visit Vitals  BP 95/70 (BP 1 Location: Left arm, BP Patient Position: Sitting)   Pulse 78   Temp 97.3 °F (36.3 °C) (Oral)   Resp 18   Ht 6' (1.829 m)   Wt 205 lb (93 kg)   SpO2 96%   BMI 27.80 kg/m²     Current Outpatient Medications   Medication Sig    ondansetron hcl (ZOFRAN) 4 mg tablet     True Metrix Glucose Test Strip strip     avatrombopag 20 mg tab Take 20 mg by mouth daily.  fentaNYL (DURAGESIC) 50 mcg/hr PATCH 1 Patch by TransDERmal route every seventy-two (72) hours for 30 days. Max Daily Amount: 1 Patch.  dronabinoL (MARINOL) 5 mg capsule Take 1 Cap by mouth two (2) times a day. Max Daily Amount: 10 mg.    ondansetron (ZOFRAN ODT) 4 mg disintegrating tablet Take 1 Tab by mouth every eight (8) hours as needed for Nausea for up to 20 doses.  L-Mfolate-B6 Phos-Methyl-B12 (METANX) 3-35-2 mg tab tab Take 1 Tab by mouth two (2) times a day. Indications: peripheral neuropathy    lactulose (CHRONULAC) 10 gram/15 mL solution Take 15 mL by mouth three (3) times daily.  naloxone (NARCAN) 4 mg/actuation nasal spray Use 1 spray intranasally, then discard. Repeat with new spray every 2 min as needed for opioid overdose symptoms, alternating nostrils. Indications: opioid overdose    insulin glargine (LANTUS U-100 INSULIN) 100 unit/mL injection 28 Units by SubCUTAneous route nightly.     insulin lispro (HUMALOG) 100 unit/mL injection 3-15 Units by SubCUTAneous route Before breakfast, lunch, and dinner.  rivaroxaban (XARELTO) 15 mg (42)- 20 mg (9) DsPk Take 20 mg by mouth daily. Take one 15 mg tablet twice a day with food for the first 21 days. Then, take one 20 mg tablet once a day with food for 9 days.  naloxone (NARCAN) 4 mg/actuation nasal spray Use 1 spray intranasally, then discard. Repeat with new spray every 2 min as needed for opioid overdose symptoms, alternating nostrils.  dexAMETHasone (DECADRON) 4 mg tablet Take 4mg by mouth twice a day the day before chemotherapy and the day after chemotherapy    lidocaine-prilocaine (EMLA) topical cream Apply  to affected area as needed for Pain (Apply to skin 30-60 minutes before mediport access).  nut.tx.gluc.intol,lac-free,soy (GLUCERNA ADVANCE) liqd Take 237 mL by mouth three (3) times daily.  TRUE METRIX GLUCOSE METER misc USE AS DIRECTED    omeprazole (PRILOSEC) 20 mg capsule TAKE 1 CAPSULE BY MOUTH ONCE DAILY    QUEtiapine (SEROQUEL) 50 mg tablet Take 50 mg by mouth nightly.  lidocaine (LIDODERM) 5 % Apply patch to the affected area for 12 hours a day and remove for 12 hours a day.  OLANZapine (ZYPREXA ZYDIS) 5 mg disintegrating tablet take 1 tablet by mouth AT NIGHT BEFORE CHEMOTHERAPY, THEN 1 TABLET MORNING OF CHEMOTHERAPY,AND THEN 1 TABLET BY MOUTH 3 TIMES A DAY AS NEEDED FOR 1 WEEK FOLLOWING CHEMOTHERAPY     No current facility-administered medications for this visit. Fall Risk Assessment, last 12 mths 2/17/2020   Able to walk? Yes   Fall in past 12 months? No     3 most recent PHQ Screens 3/23/2020   Little interest or pleasure in doing things Not at all   Feeling down, depressed, irritable, or hopeless Not at all   Total Score PHQ 2 0     Abuse Screening Questionnaire 2/17/2020   Do you ever feel afraid of your partner? N   Are you in a relationship with someone who physically or mentally threatens you?  N   Is it safe for you to go home? Y     Health Maintenance Due   Topic Date Due    Pneumococcal 0-64 years (1 of 3 - PCV13) 07/07/1969    Foot Exam Q1  07/07/1973    MICROALBUMIN Q1  07/07/1973    Eye Exam Retinal or Dilated  07/07/1973    Lipid Screen  07/07/1973    DTaP/Tdap/Td series (1 - Tdap) 07/07/1984    Shingrix Vaccine Age 50> (1 of 2) 07/07/2013    FOBT Q1Y Age 54-65  07/07/2013    Influenza Age 9 to Adult  08/01/2019    A1C test (Diabetic or Prediabetic)  02/25/2020       Medications no longer taking/discontinued:  none    Patient currently taking Antiplatelet therapy?  no    1. Have you been to the ER, urgent care clinic since your last visit? Hospitalized since your last visit?  no     2. Have you seen or consulted any other health care providers outside of the [address] [place] [address] since your last visit? Include any pap smears or colon screening.   no

## 2020-03-23 NOTE — NURSE NAVIGATOR
Saw pt in clinic with Dr. Slim Graves for f/u, no complaints voiced at this time. On pain regiment, labs today. RTC in 1 month all questions answered.

## 2020-03-23 NOTE — PROGRESS NOTES
Raphael Torres is a 62 y.o. 1965 male with past medical history including pulmonary embolus, on Lovenox, type 2 diabetes, GERD, who I was asked to see in consultation at the request of Isabel Berg for evaluation for newly diagnosed pancreatic adenocarcinoma.  Patient had fine-needle aspiration of pancreatic mass on 11/13/2019 and pathology showed pancreatic adenocarcinoma. I saw him in consultation for the first time on 11/19/2019) was to treat him with palliative FOLFIRINOX. He received C1D1 FOLFIRINOX on 12/16/2019 and tolerated well overall but cycle 5 was held on 2/10/2020 due to thrombocytopenia. Patient was started on avatrombopag but we still waiting for the insurance to have it shipped to his house. He completed C6 D1 on 3/09/20, here for follow-up. CA-19-9 was 87 on 2/17/2020. Labs on 3/20/2020 showed WBC 4.1, H&H 11.5/33.3, MCV 93, platelets 51. BUN 15, creatinine 1.01. Patient was seen and examined today. Miriam Talavera is awake alert oriented x3.    Denies any fevers, chills, or nausea and vomiting.  Denies any melena or bright red blood per rectum. He is clinically doing very well.  All also points of review of system have been reviewed and were negative.  ECOG performance status 1.  Independent with ADLs and IADLs.    DX: Pancreatic adenocarcinoma     STAGE: Stage IV     Treatment intent: Palliative     ONCOLOGY HISTORY: BRCA1/2 negative-Pan-NTRK negative-No available activating mutation by Emilianois  11/03/2019: Went to ER due to 5 days complaints of epigastric pain.  CT abdomen and pelvis showed:  1. 5 cm mass within the pancreatic body highly suggestive of primary malignancy. Superimposed acute pancreatitis cannot entirely be excluded. 2. Multiple liver lesions highly suggestive of metastatic disease 3. Metastatic retroperitoneal and mesenteric lymphadenopathy. 4. Indeterminate hypodensity within the spleen.  Diagnostic consideration include splenic infarct or metastatic lesion 5. Right lower lobe pulmonary emboli.       *MRI of the abdomen showed  1. Stable since same day CT abdomen pelvis. Elliptical 4 cm hypoenhancing mass,  body of pancreas, with upstream glandular atrophy and pancreatic ductal  dilatation, most likely adenocarcinoma. Associated local/regional likely metastatic lymphadenopathy including upper retroperitoneum, lesser omentum, portacaval/periportal space. Associated encasement/narrowing of the adjacent  splenic vein. 2. Mild peripancreatic edema and soft tissue reticulation; may reflect secondary low-grade or chronic pancreatitis or adjacent peripancreatic tumor infiltration.   3. Numerous hypoenhancing and target-like small nodules and masses throughout  the liver, typical of metastatic disease. 4. Mild pericholecystic fluid, nonspecific, but may represent low-grade cholecystitis.   No evident cholecystolithiasis/choledocholithiasis or biliary ductal dilatation. 5. Mild splenomegaly with focal small splenic infarction, age indeterminate  perhaps recent. Small left lower pole, nonacute renal infarction. 6. Other minor and/or ancillary findings including lumbar disc herniations     11/15/2019: Duplex left  lower extremity showed Acute nonocclusive thrombus in the distal femoral vein, popliteal, peroneal and both posterior tibial veins. The thrombus in the distal femoral vein appears to be floating tail-like thrombus.     11/19/2019: First medical oncology visit with me    11/29/2019: PET/CT showed  Malignant metabolic activity corresponding with the known malignancy in the pancreatic body. Innumerable hypermetabolic liver metastases. Metastatic portal caval and aortocaval lymph nodes. Enlargement and malignant metabolic activity in the anterior aspect of the left  psoas muscle suspicious for metastatic disease. Distant metastatic disease to include the left supraclavicular region, left  superior mediastinum and possibly the left inferior hilum.   12/16/19: [id number]:C2D1-20:C3D1- 2020: CA-19-9 = 79 20:C4D1-2/10/20:C5 held due to thrombocytopenia. Started Avatrombopag (waiting for approval)-2020: C5D1(delayed)-CA-19-9 = 87 3/09/20: C6 D1  20:CT CAP showed  1. Redemonstration of multiple stigmata related to the patient's pancreatic  malignancy and accompanying ernesto/hepatic metastasis. 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     > Some interval improvement in the appearance of upper abdominal  retroperitoneal adenopathy with persistently abnormal/necrotic periportal and  peripancreatic lymph nodes.     2. No evidence of bowel obstruction. No free intraperitoneal gas.     3. Mild nonspecific urothelial enhancement. Correlation with urinalysis may be  helpful if symptoms of urinary tract infection are present.       Past Medical History:   Diagnosis Date    Cancer [facility])     Pancreatic Adenocarcinoma. Diagnosed 2019.  Diabetes ([address] 10/2019    Diagnosed 10/2019.  Hepatitis C     Received Curative Treatment, but Hepatitis C was not cured and returned.  Left ulnar fracture     Thromboembolus ([address]     DVT of LLE 2019. Also, Pulmonary Embolism 2019. Treated with Rivaroxaban.      Past Surgical History:   Procedure Laterality Date    HX TONSILLECTOMY      IR BX PANCREAS NEEDLE PERC  2019    Endoscopic Pancreatic Biopsy (?)    IR INSERT TUNL CVC W PORT OVER 5 YEARS  2019     Social History     Socioeconomic History    Marital status:      Spouse name: Not on file    Number of children: Not on file    Years of education: Not on file    Highest education level: Not on file   Tobacco Use    Smoking status: Former Smoker     Packs/day: 0.50     Years: 40.00     Pack years: 20.00     Last attempt to quit: 2018     Years since quittin.3    Smokeless tobacco: Never Used    Tobacco comment: Quit 2018   Substance and Sexual Activity    Alcohol use: Not Currently    Drug use: Not Currently     Types: Marijuana, Cocaine, Heroin     Comment: Quit Cocaine, Heroin, and Marijuana since 11/2018    Sexual activity: Not Currently   Other Topics Concern     Family History   Problem Relation Age of Onset    Diabetes Mother     Kidney Disease Mother     Diabetes Father     Diabetes Brother     Cancer Maternal Grandmother     Cancer Maternal Grandfather     Cancer Maternal Aunt     Cancer Maternal Uncle        Current Outpatient Medications   Medication Sig Dispense Refill    True Metrix Glucose Test Strip strip       dronabinoL (MARINOL) 5 mg capsule Take 1 Cap by mouth two (2) times a day. Max Daily Amount: 10 mg. 60 Cap 3    ondansetron hcl (ZOFRAN) 4 mg tablet Take 2 Tabs by mouth every eight (8) hours as needed for Nausea or Vomiting for up to 30 days. 90 Tab 1    OLANZapine (ZyPREXA zydis) 5 mg disintegrating tablet Take 1 Tab by mouth two (2) times a day. 30 Tab 1    oxyCODONE IR (ROXICODONE) 10 mg tab immediate release tablet Take 1 Tab by mouth every four (4) hours as needed for Pain for up to 30 days. Max Daily Amount: 60 mg. 120 Tab 0    avatrombopag 20 mg tab Take 20 mg by mouth daily. 15 Tab 3    fentaNYL (DURAGESIC) 50 mcg/hr PATCH 1 Patch by TransDERmal route every seventy-two (72) hours for 30 days. Max Daily Amount: 1 Patch. 10 Patch 0    ondansetron (ZOFRAN ODT) 4 mg disintegrating tablet Take 1 Tab by mouth every eight (8) hours as needed for Nausea for up to 20 doses. 20 Tab 0    L-Mfolate-B6 Phos-Methyl-B12 (METANX) 3-35-2 mg tab tab Take 1 Tab by mouth two (2) times a day. Indications: peripheral neuropathy 60 Tab 1    lactulose (CHRONULAC) 10 gram/15 mL solution Take 15 mL by mouth three (3) times daily. 480 mL 3    naloxone (NARCAN) 4 mg/actuation nasal spray Use 1 spray intranasally, then discard. Repeat with new spray every 2 min as needed for opioid overdose symptoms, alternating nostrils.   Indications: opioid overdose 1 Each 1  insulin glargine (LANTUS U-100 INSULIN) 100 unit/mL injection 28 Units by SubCUTAneous route nightly. 1 Vial 0    insulin lispro (HUMALOG) 100 unit/mL injection 3-15 Units by SubCUTAneous route Before breakfast, lunch, and dinner. 1 Vial 0    rivaroxaban (XARELTO) 15 mg (42)- 20 mg (9) DsPk Take 20 mg by mouth daily. Take one 15 mg tablet twice a day with food for the first 21 days. Then, take one 20 mg tablet once a day with food for 9 days.  naloxone (NARCAN) 4 mg/actuation nasal spray Use 1 spray intranasally, then discard. Repeat with new spray every 2 min as needed for opioid overdose symptoms, alternating nostrils. 1 Each 1    dexAMETHasone (DECADRON) 4 mg tablet Take 4mg by mouth twice a day the day before chemotherapy and the day after chemotherapy 30 Tab 0    lidocaine-prilocaine (EMLA) topical cream Apply  to affected area as needed for Pain (Apply to skin 30-60 minutes before mediport access). 30 g 0    nut.tx.gluc.intol,lac-free,soy (GLUCERNA ADVANCE) liqd Take 237 mL by mouth three (3) times daily. 90 Bottle 5    TRUE METRIX GLUCOSE METER misc USE AS DIRECTED  0    omeprazole (PRILOSEC) 20 mg capsule TAKE 1 CAPSULE BY MOUTH ONCE DAILY  3    QUEtiapine (SEROQUEL) 50 mg tablet Take 50 mg by mouth nightly.  lidocaine (LIDODERM) 5 % Apply patch to the affected area for 12 hours a day and remove for 12 hours a day.  5 Each 0       No Known Allergies    Review of Systems  As per HPI    Objective:  Visit Vitals  BP 95/70 (BP 1 Location: Left arm, BP Patient Position: Sitting)   Pulse 78   Temp 97.3 °F (36.3 °C) (Oral)   Resp 18   Ht 6' (1.829 m)   Wt 93 kg (205 lb)   SpO2 96%   BMI 27.80 kg/m²       Physical Exam:   General appearance - alert, well appearing, and in no distress  Mental status - alert, oriented to person, place, and time  EYE-ONOFRE, EOMI  ENT-ENT exam normal, no neck nodes or sinus tenderness  Mouth - mucous membranes moist, pharynx normal without lesions  Neck - supple, no significant adenopathy   Chest - clear to auscultation, no wheezes, rales or rhonchi, symmetric air entry   Heart - normal rate and regular rhythm   Abdomen - soft, nontender, nondistended, no masses or organomegaly  Lymph- no adenopathy palpable  Ext-no pedal edema noted  Skin-Warm and dry. Neuro -alert, oriented, normal speech, no focal findings or movement disorder noted      Diagnostic Imaging     No results found for this or any previous visit. Results for orders placed during the hospital encounter of 12/18/19   XR KNEE RT MIN 4 V    Narrative EXAM: XR KNEE RT MIN 4 V    CLINICAL INDICATION/HISTORY: Knee pain    > Additional: Right knee pain    COMPARISON: None. > Reference Exam: None. TECHNIQUE: 4 views right knee obtained.    _______________    FINDINGS:    No evidence of acute fracture or dislocation. There appears to be combination of  bipartite patella as well as adjacent well-corticated ossifications along  lateral aspect of patella, possible areas of heterotopic bone formation. There  is suggestion of small to moderate joint effusion. Diffuse chondrocalcinosis. Severe osteoarthritic changes with joint space narrowing patellofemoral joint. Mild spurring along the medial lateral joint compartments without significant  joint space narrowing.    _______________      Impression IMPRESSION:    1. No acute osseous findings. 2. 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 3. Diffuse chondrocalcinosis. 4. Small to moderate joint effusion. Results for orders placed during the hospital encounter of 02/12/20   CT ABD PELV W CONT    Narrative EXAM: CT of the abdomen and pelvis    INDICATION: 78-year-old patient with abdominal pain, vomiting, history of  metastatic pancreatic cancer. COMPARISON: Abdominal/pelvic CT 11/3/2019; MRCP with MR abdomen 11/3/2019; PET  CT 11/29/2019.     TECHNIQUE: Axial CT imaging of the abdomen and pelvis was performed without oral  and with intravenous contrast. Multiplanar reformats were generated. One or more dose reduction techniques were used on this CT: automated exposure  control, adjustment of the mAs and/or kVp according to patient size, and  iterative reconstruction techniques. The specific techniques used on this CT  exam have been documented in the patient's electronic medical record. Digital  Imaging and Communications in Medicine (DICOM) format image data are available  to nonaffiliated external healthcare facilities or entities on a secure, media  free, reciprocally searchable basis with patient authorization for at least a  12-month period after this study. _______________    FINDINGS:    LOWER CHEST: Basilar changes of atelectasis are noted without evidence of  alveolar consolidation or pleural effusion. Cardiac size is normal. There is no  pericardial effusion present. LIVER, BILIARY: Hepatic parenchymal enhancement is uniform. Redemonstrated are  multiple low attenuating mass lesions throughout all hepatic segments. Overall  size and number of these lesions does not appear to be appreciably changed from  comparison CT 11/3/2019. No biliary ductal dilatation. Bladder is decompressed. PANCREAS: Well-defined low attenuating mass lesion extending across the  pancreatic body and proximal tail, estimated at approximately 6.5 x 2.8 cm in  size. Distal portion of the celiac artery and splenic artery redemonstrated. Splenic vein appears thrombosed, unchanged. SPLEEN: Peripheral wedge-shaped area of low attenuation within the upper pole of  the spleen in keeping with splenic infarct, unchanged. Spleen is mildly  enlarged, estimated at 15 cm in greatest craniocaudal span. ADRENALS: Normal.    KIDNEYS/URETERS/BLADDER: Renal enhancement appears symmetric with detail mildly  limited secondary to beam hardening artifact from side-lying arms.  No evidence  of hydronephrosis involving either kidney. Urinary bladder is unremarkable in  appearance. Mild bilateral urothelial enhancement. PELVIC ORGANS: Unremarkable. VASCULATURE: Vascular findings related to the patient's pancreatic malignancy as  above. The abdominal aorta is of normal course and caliber without evidence of  aneurysmal dilatation. Multiple mesenteric collaterals noted in keeping with  splenic vein thrombosis. LYMPH NODES: Interval improvement in the appearance of upper retroperitoneal  adenopathy in the interval from comparison examination. Persistent prominent  peripancreatic/portal lymph nodes are noted, largest seen at image 61, measuring  approximately 2.9 x 2.1 cm in size, appearing similar to slightly enlarged from  prior study. Upper portacaval adenopathy    GASTROINTESTINAL TRACT: Small hiatal hernia. No focal bowel wall thickening or  dilatation. No morphology of bowel obstruction. No free intraperitoneal gas. Normal appendix. BONES: No acute or aggressive osseous abnormalities identified. Multilevel lower  thoracic and lumbar spondylosis is present. OTHER: None.    _______________      Impression IMPRESSION:    1. Redemonstration of multiple stigmata related to the patient's pancreatic  malignancy and accompanying ernesto/hepatic metastasis. 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     > Some interval improvement in the appearance of upper abdominal  retroperitoneal adenopathy with persistently abnormal/necrotic periportal and  peripancreatic lymph nodes. 2. No evidence of bowel obstruction. No free intraperitoneal gas. 3. Mild nonspecific urothelial enhancement. Correlation with urinalysis may be  helpful if symptoms of urinary tract infection are present. Note: Preliminary report sent to the Emergency Department by the radiology  resident at the time of the study.          Lab Results  Lab Results   Component Value Date/Time    WBC 4.1 (L) 03/20/2020 09:00 AM    HGB 11.5 (L) 03/20/2020 09:00 AM    HCT 33.3 (L) 03/20/2020 09:00 AM    PLATELET 51 (L) 71/80/5981 09:00 AM    MCV 92.8 03/20/2020 09:00 AM       Lab Results   Component Value Date/Time    Sodium 137 03/20/2020 09:00 AM    Potassium 3.8 03/20/2020 09:00 AM    Chloride 105 03/20/2020 09:00 AM    CO2 27 03/20/2020 09:00 AM    Anion gap 5 03/20/2020 09:00 AM    Glucose 212 (H) 03/20/2020 09:00 AM    BUN 15 03/20/2020 09:00 AM    Creatinine 1.01 03/20/2020 09:00 AM    BUN/Creatinine ratio 15 03/20/2020 09:00 AM    GFR est AA >60 03/20/2020 09:00 AM    GFR est non-AA >60 03/20/2020 09:00 AM    Calcium 9.4 03/20/2020 09:00 AM    AST (SGOT) 25 03/20/2020 09:00 AM    Alk. phosphatase 83 03/20/2020 09:00 AM    Protein, total 8.0 03/20/2020 09:00 AM    Albumin 3.5 03/20/2020 09:00 AM    Globulin 4.5 (H) 03/20/2020 09:00 AM    A-G Ratio 0.8 03/20/2020 09:00 AM    ALT (SGPT) 18 03/20/2020 09:00 AM       Assessment/Plan:  62 y. o. male  1. Panc[address]ic adenocarcinoma [address] [address]  I had a long discussion with Mr. Eleazar Lowery regarding his newly diagnosed metastatic pancreatic cancer.  I told him that in the first-line setting, palliative chemotherapy have been shown to improve 1 year survival by 73% compared to supportive care alone. Patient tolerasted well C6, clinically much better. Gaining weight. *UG T1 A1  heterozygous  *Brain MRI patient could not do MRI due to claustrophobia. *On 12/18/2019, CA-19-9 was 85 from 112 on 11/19/2019. Day 1: Oxaliplatin 85mg/m2 IV + irinotecan 180mg/m2 IV + leucovorin 400mg/m2 IV, followed by a 5-FU bolus of 400mg/m2 and a 46-hour continuous 5-FU infusion of 2,400mg/m2. Repeat cycle every 2 weeks until disease progression.     Reviewed CT CAP done on 2/12/20 which showed improved or satble disease. Patient tolerated well cycle 6 FOLFIRINOX so far with minimal side effects. Clinically doing very well. Awaiting for insurance to approve avatrombopag.     *Recheck CBC today and CA-19-9. *Recheck labs before next cycle. *Check monthly CA-19-9  If Labs ok we will proceed to C7 today 3/23/20     2. Liver metastases ([address]  Stable as of CT on 2/12/20     3. Epigastric pain  Resolved with pain medication and chemotherapy. Continue  Oxycontin-Give Fentanyl patch 25 mcg Q 72 hours dosing-Give Oxycodone 10 mg PO Q 4hours prn.      4. Pulmonary embolism without acute cor pulmonale, unspecified chronicity, unspecified pulmonary embolism type (HCC)  Continue Xarelto     5. Thrombocytopenia ([address]  Multifactorial from chemotherapy, and hypersplenism. Completed avatrombopag. Recheck CBC today before  C7-If ok then proceed to C7 today     6. Insulin dependent diabetes:Blood sugars much better controlled now. 7. Constipation: Resolved.  Has lactulose if needed     RTC 4 weeks            Nikolai Leedsma MD

## 2020-03-25 ENCOUNTER — HOSPITAL ENCOUNTER (OUTPATIENT)
Dept: INFUSION THERAPY | Age: 55
Discharge: HOME | End: 2020-03-25
Payer: MEDICAID

## 2020-03-25 VITALS
TEMPERATURE: 99.4 F | HEART RATE: 88 BPM | SYSTOLIC BLOOD PRESSURE: 106 MMHG | DIASTOLIC BLOOD PRESSURE: 73 MMHG | OXYGEN SATURATION: 95 % | RESPIRATION RATE: 18 BRPM

## 2020-03-25 DIAGNOSIS — C78.7 LIVER METASTASIS (HCC): ICD-10-CM

## 2020-03-25 DIAGNOSIS — K86.89 PANCREATIC MASS: Primary | ICD-10-CM

## 2020-03-25 PROCEDURE — 74011250636 HC RX REV CODE- 250/636: Performed by: INTERNAL MEDICINE

## 2020-03-25 PROCEDURE — 96523 IRRIG DRUG DELIVERY DEVICE: CPT

## 2020-03-25 RX ORDER — HEPARIN 100 UNIT/ML
300-500 SYRINGE INTRAVENOUS AS NEEDED
Status: DISCONTINUED | OUTPATIENT
Start: 2020-03-25 | End: 2020-03-26 | Stop reason: HOSPADM

## 2020-03-25 RX ORDER — SODIUM CHLORIDE 9 MG/ML
10 INJECTION INTRAMUSCULAR; INTRAVENOUS; SUBCUTANEOUS AS NEEDED
Status: DISCONTINUED | OUTPATIENT
Start: 2020-03-25 | End: 2020-03-26 | Stop reason: HOSPADM

## 2020-03-25 RX ADMIN — SODIUM CHLORIDE 10 ML: 9 INJECTION INTRAMUSCULAR; INTRAVENOUS; SUBCUTANEOUS at 14:44

## 2020-03-25 RX ADMIN — Medication 500 UNITS: at 14:44

## 2020-03-25 NOTE — PROGRESS NOTES
[facility] Progress Note    Date: 2020    Name: Kamila Phillips    MRN: [id number]         : 1965     D/C cadd pump      Mr. Olmos arrived to [facility] at 1440. Mr. Shady Chaidez was assessed and education was provided. Mr. Joaquina Cardozo vitals were reviewed. Visit Vitals  /73 (BP 1 Location: Left arm, BP Patient Position: Sitting)   Pulse 88   Temp 99.4 °F (37.4 °C)   Resp 18   SpO2 95%       Patient's right upper chest port already accessed from yesterday's visit. CADD pump infusion complete. Disconnected from patient's port. Blood return verified. 20 ml of NS and 500 units of heparin then de-accessed. Band aid applied. Mr. Shady Chaidez tolerated infusion without complaints. Mr. Shady Chaidez was discharged from [place] [address] in stable condition at 1450. He is to return on April 3 at 0800 for his next appointment.     Humberto Rosenbaum RN  2020

## 2020-03-30 DIAGNOSIS — G89.3 CANCER ASSOCIATED PAIN: ICD-10-CM

## 2020-03-30 DIAGNOSIS — C78.7 LIVER METASTASES (HCC): ICD-10-CM

## 2020-03-30 DIAGNOSIS — C25.9 PANCREATIC ADENOCARCINOMA (HCC): ICD-10-CM

## 2020-03-30 RX ORDER — SODIUM CHLORIDE 9 MG/ML
10 INJECTION INTRAMUSCULAR; INTRAVENOUS; SUBCUTANEOUS AS NEEDED
Status: CANCELLED | OUTPATIENT
Start: 2020-04-15

## 2020-03-30 RX ORDER — SODIUM CHLORIDE 0.9 % (FLUSH) 0.9 %
10 SYRINGE (ML) INJECTION AS NEEDED
Status: CANCELLED
Start: 2020-04-15

## 2020-03-30 RX ORDER — FENTANYL 50 UG/1
1 PATCH TRANSDERMAL
Qty: 10 PATCH | Refills: 0 | Status: SHIPPED | OUTPATIENT
Start: 2020-03-30 | End: 2020-04-27 | Stop reason: SDUPTHER

## 2020-03-30 RX ORDER — ATROPINE SULFATE 0.4 MG/ML
0.4 INJECTION, SOLUTION ENDOTRACHEAL; INTRAMEDULLARY; INTRAMUSCULAR; INTRAVENOUS; SUBCUTANEOUS
Status: CANCELLED | OUTPATIENT
Start: 2020-04-06

## 2020-03-30 RX ORDER — DIPHENHYDRAMINE HYDROCHLORIDE 50 MG/ML
50 INJECTION, SOLUTION INTRAMUSCULAR; INTRAVENOUS AS NEEDED
Status: CANCELLED
Start: 2020-04-06

## 2020-03-30 RX ORDER — FENTANYL 50 UG/1
1 PATCH TRANSDERMAL
Qty: 10 PATCH | Refills: 0 | Status: CANCELLED | OUTPATIENT
Start: 2020-03-30 | End: 2020-04-29

## 2020-03-30 RX ORDER — FLUOROURACIL 50 MG/ML
900 INJECTION, SOLUTION INTRAVENOUS ONCE
Status: CANCELLED
Start: 2020-04-06 | End: 2020-04-06

## 2020-03-30 RX ORDER — ATROPINE SULFATE 0.4 MG/ML
0.4 INJECTION, SOLUTION ENDOTRACHEAL; INTRAMEDULLARY; INTRAMUSCULAR; INTRAVENOUS; SUBCUTANEOUS ONCE
Status: CANCELLED | OUTPATIENT
Start: 2020-04-06

## 2020-03-30 RX ORDER — PALONOSETRON 0.05 MG/ML
0.25 INJECTION, SOLUTION INTRAVENOUS ONCE
Status: CANCELLED | OUTPATIENT
Start: 2020-04-06

## 2020-03-30 RX ORDER — SODIUM CHLORIDE 9 MG/ML
10 INJECTION INTRAMUSCULAR; INTRAVENOUS; SUBCUTANEOUS AS NEEDED
Status: CANCELLED | OUTPATIENT
Start: 2020-04-06

## 2020-03-30 RX ORDER — ACETAMINOPHEN 325 MG/1
650 TABLET ORAL AS NEEDED
Status: CANCELLED
Start: 2020-04-06

## 2020-03-30 RX ORDER — ALBUTEROL SULFATE 0.83 MG/ML
2.5 SOLUTION RESPIRATORY (INHALATION) AS NEEDED
Status: CANCELLED
Start: 2020-04-06

## 2020-03-30 RX ORDER — HEPARIN 100 UNIT/ML
300-500 SYRINGE INTRAVENOUS AS NEEDED
Status: CANCELLED
Start: 2020-04-15

## 2020-03-30 RX ORDER — EPINEPHRINE 1 MG/ML
0.3 INJECTION, SOLUTION, CONCENTRATE INTRAVENOUS AS NEEDED
Status: CANCELLED | OUTPATIENT
Start: 2020-04-06

## 2020-03-30 RX ORDER — SODIUM CHLORIDE 0.9 % (FLUSH) 0.9 %
10 SYRINGE (ML) INJECTION AS NEEDED
Status: CANCELLED
Start: 2020-04-06

## 2020-03-30 RX ORDER — DEXTROSE MONOHYDRATE 50 MG/ML
25 INJECTION, SOLUTION INTRAVENOUS CONTINUOUS
Status: CANCELLED
Start: 2020-04-06

## 2020-03-30 RX ORDER — HEPARIN 100 UNIT/ML
300-500 SYRINGE INTRAVENOUS AS NEEDED
Status: CANCELLED
Start: 2020-04-06

## 2020-03-30 RX ORDER — ONDANSETRON 2 MG/ML
8 INJECTION INTRAMUSCULAR; INTRAVENOUS AS NEEDED
Status: CANCELLED | OUTPATIENT
Start: 2020-04-06

## 2020-03-30 RX ORDER — HYDROCORTISONE SODIUM SUCCINATE 100 MG/2ML
100 INJECTION, POWDER, FOR SOLUTION INTRAMUSCULAR; INTRAVENOUS AS NEEDED
Status: CANCELLED | OUTPATIENT
Start: 2020-04-06

## 2020-03-30 NOTE — TELEPHONE ENCOUNTER
First attempted to contact patient but no answer,Unable to LVM phone number was busy. Will try again later. Fentanyl patches were sent to the wrong pharmacy. Need to go to Local Rx: [facility]. Patient requesting refill for:   Requested Prescriptions     Pending Prescriptions Disp Refills    fentaNYL (DURAGESIC) 50 mcg/hr PATCH 10 Patch 0     Si Patch by TransDERmal route every seventy-two (72) hours for 30 days. Max Daily Amount: 1 Patch. Pharmacy on file. Patient last seen: 3/23/20  [facility] 2020  Future appointment: 2020  Awaiting to speak to pt about   suppository information. Please advise.

## 2020-03-30 NOTE — TELEPHONE ENCOUNTER
Patient is asking that all his medication from 3/23/2020 be sent to [facility] [address], he needs a refill on his suppositories and he is out of his fentaNYL patches and states he needs them soon as possible

## 2020-03-31 ENCOUNTER — DOCUMENTATION ONLY (OUTPATIENT)
Dept: ONCOLOGY | Age: 55
End: 2020-03-31

## 2020-03-31 DIAGNOSIS — R11.0 CHEMOTHERAPY-INDUCED NAUSEA: ICD-10-CM

## 2020-03-31 DIAGNOSIS — T45.1X5A CINV (CHEMOTHERAPY-INDUCED NAUSEA AND VOMITING): ICD-10-CM

## 2020-03-31 DIAGNOSIS — R11.2 CINV (CHEMOTHERAPY-INDUCED NAUSEA AND VOMITING): ICD-10-CM

## 2020-03-31 DIAGNOSIS — T45.1X5A CHEMOTHERAPY-INDUCED NAUSEA: ICD-10-CM

## 2020-03-31 DIAGNOSIS — C25.9 PANCREATIC ADENOCARCINOMA (HCC): ICD-10-CM

## 2020-03-31 DIAGNOSIS — C78.7 LIVER METASTASES (HCC): ICD-10-CM

## 2020-03-31 DIAGNOSIS — R63.0 APPETITE LOSS: ICD-10-CM

## 2020-03-31 NOTE — TELEPHONE ENCOUNTER
Sangeetha Jones advised patient is having a hard time getting his medication filled.     I called the patient and spoke with his sister who stated he needs a refill on: ondansetron, dronabinol and olanzapine

## 2020-03-31 NOTE — PROGRESS NOTES
Received Prior Authorization for Zofran from [employer]. Approval status: Reference number: N/A                             Approval number: [id number]  Valid from 03/31/2020 through 03/31/2020. Prior authorization request for key #  [id number] submitted via "[employer].  Awaiting approval or denial.

## 2020-04-01 NOTE — TELEPHONE ENCOUNTER
Patient requesting refill for:   Requested Prescriptions     Pending Prescriptions Disp Refills    OLANZapine (ZyPREXA zydis) 5 mg disintegrating tablet 30 Tab 1     Sig: Take 1 Tab by mouth two (2) times a day.  dronabinoL (MARINOL) 5 mg capsule 60 Cap 3     Sig: Take 1 Cap by mouth two (2) times a day. Max Daily Amount: 10 mg. Pharmacy on file. Patient last seen: 3/23/2020  [facility]  reviewed: N/A  Future appointment: 4/23/2020  Prescriptions sent to wrong Rx; prescriptions need to be sent to [facility] [address]. Please advise.

## 2020-04-02 RX ORDER — DRONABINOL 5 MG/1
5 CAPSULE ORAL 2 TIMES DAILY
Qty: 60 CAP | Refills: 3 | Status: SHIPPED | OUTPATIENT
Start: 2020-04-02 | End: 2020-10-01

## 2020-04-02 RX ORDER — OLANZAPINE 5 MG/1
5 TABLET, ORALLY DISINTEGRATING ORAL 2 TIMES DAILY
Qty: 30 TAB | Refills: 1 | Status: SHIPPED | OUTPATIENT
Start: 2020-04-02 | End: 2020-04-27 | Stop reason: SDUPTHER

## 2020-04-03 ENCOUNTER — HOSPITAL ENCOUNTER (OUTPATIENT)
Dept: INFUSION THERAPY | Age: 55
Discharge: HOME | End: 2020-04-03
Payer: MEDICAID

## 2020-04-03 VITALS
HEIGHT: 72 IN | TEMPERATURE: 97.4 F | OXYGEN SATURATION: 97 % | SYSTOLIC BLOOD PRESSURE: 96 MMHG | WEIGHT: 203.1 LBS | HEART RATE: 64 BPM | DIASTOLIC BLOOD PRESSURE: 60 MMHG | BODY MASS INDEX: 27.51 KG/M2

## 2020-04-03 LAB
BASO+EOS+MONOS # BLD AUTO: 0.6 K/UL (ref 0–2.3)
BASO+EOS+MONOS NFR BLD AUTO: 13 % (ref 0.1–17)
DIFFERENTIAL METHOD BLD: ABNORMAL
ERYTHROCYTE [DISTWIDTH] IN BLOOD BY AUTOMATED COUNT: 16.1 % (ref 11.5–14.5)
HCT VFR BLD AUTO: 34 % (ref 36–48)
HGB BLD-MCNC: 11.8 G/DL (ref 12–16)
LYMPHOCYTES # BLD: 2.4 K/UL (ref 1.1–5.9)
LYMPHOCYTES NFR BLD: 56 % (ref 14–44)
MCH RBC QN AUTO: 32.7 PG (ref 25–35)
MCHC RBC AUTO-ENTMCNC: 34.7 G/DL (ref 31–37)
MCV RBC AUTO: 94.2 FL (ref 78–102)
NEUTS SEG # BLD: 1.3 K/UL (ref 1.8–9.5)
NEUTS SEG NFR BLD: 31 % (ref 40–70)
PLATELET # BLD AUTO: 49 K/UL (ref 140–440)
RBC # BLD AUTO: 3.61 M/UL (ref 4.1–5.1)
WBC # BLD AUTO: 4.3 K/UL (ref 4.5–13)

## 2020-04-03 PROCEDURE — 85025 COMPLETE CBC W/AUTO DIFF WBC: CPT

## 2020-04-03 PROCEDURE — 36415 COLL VENOUS BLD VENIPUNCTURE: CPT

## 2020-04-03 NOTE — PROGRESS NOTES
[facility] Progress Note    Date: April 3, 2020    Name: Kamila Phillips    MRN: [id number]         : 1965    Peripheral Lab Draw      Mr. Shady Chaidez to [address] [place] [address], ambulatory at 0810 accompanied by self. Pt was assessed and education was provided. Mr. Joaquina Cardozo vitals were reviewed and patient was observed for 5 minutes prior to treatment. Visit Vitals  BP 96/60 (BP 1 Location: Left arm, BP Patient Position: Sitting)   Pulse 64   Temp 97.4 °F (36.3 °C)   Ht 6' (1.829 m)   Wt 92.1 kg (203 lb 1.6 oz)   SpO2 97%   BMI 27.55 kg/m²     Recent Results (from the past 12 hour(s))   CBC WITH 3 PART DIFF    Collection Time: 20  8:20 AM   Result Value Ref Range    WBC 4.3 (L) 4.5 - 13.0 K/uL    RBC 3.61 (L) 4.10 - 5.10 M/uL    HGB 11.8 (L) 12.0 - 16.0 g/dL    HCT 34.0 (L) 36 - 48 %    MCV 94.2 78 - 102 FL    MCH 32.7 25.0 - 35.0 PG    MCHC 34.7 31 - 37 g/dL    RDW 16.1 (H) 11.5 - 14.5 %    PLATELET 49 (L) 734 - 440 K/uL    NEUTROPHILS 31 (L) 40 - 70 %    MIXED CELLS 13 0.1 - 17 %    LYMPHOCYTES 56 (H) 14 - 44 %    ABS. NEUTROPHILS 1.3 (L) 1.8 - 9.5 K/UL    ABS. MIXED CELLS 0.6 0.0 - 2.3 K/uL    ABS. LYMPHOCYTES 2.4 1.1 - 5.9 K/UL    DF AUTOMATED         Blood obtained peripherally from left arm x 1 attempt with butterfly needle and sent to lab for Cbc w/diff per written orders. No bleeding or hematoma noted at site. Gauze and coban applied. Mr. Shady Chaidez tolerated the phlebotomy, and had no complaints. Patient armband removed and shredded. Mr. Shady Chaidez was discharged from [place] [address] in stable condition at [address] [place] [address].      Kailee Cervantes Phlebotomist PCT  April 3, 2020  1:14 PM

## 2020-04-06 ENCOUNTER — HOSPITAL ENCOUNTER (OUTPATIENT)
Dept: INFUSION THERAPY | Age: 55
Discharge: HOME | End: 2020-04-06
Payer: MEDICAID

## 2020-04-06 VITALS
HEART RATE: 70 BPM | TEMPERATURE: 98.3 F | RESPIRATION RATE: 18 BRPM | SYSTOLIC BLOOD PRESSURE: 107 MMHG | DIASTOLIC BLOOD PRESSURE: 68 MMHG | OXYGEN SATURATION: 97 %

## 2020-04-06 LAB
BASO+EOS+MONOS # BLD AUTO: 0.6 K/UL (ref 0–2.3)
BASO+EOS+MONOS NFR BLD AUTO: 13 % (ref 0.1–17)
DIFFERENTIAL METHOD BLD: ABNORMAL
ERYTHROCYTE [DISTWIDTH] IN BLOOD BY AUTOMATED COUNT: 16.6 % (ref 11.5–14.5)
HCT VFR BLD AUTO: 32.2 % (ref 36–48)
HGB BLD-MCNC: 11 G/DL (ref 12–16)
LYMPHOCYTES # BLD: 1.8 K/UL (ref 1.1–5.9)
LYMPHOCYTES NFR BLD: 40 % (ref 14–44)
MCH RBC QN AUTO: 32.5 PG (ref 25–35)
MCHC RBC AUTO-ENTMCNC: 34.2 G/DL (ref 31–37)
MCV RBC AUTO: 95.3 FL (ref 78–102)
NEUTS SEG # BLD: 2 K/UL (ref 1.8–9.5)
NEUTS SEG NFR BLD: 47 % (ref 40–70)
PLATELET # BLD AUTO: 51 K/UL (ref 140–440)
RBC # BLD AUTO: 3.38 M/UL (ref 4.1–5.1)
WBC # BLD AUTO: 4.4 K/UL (ref 4.5–13)

## 2020-04-06 PROCEDURE — 36415 COLL VENOUS BLD VENIPUNCTURE: CPT

## 2020-04-06 PROCEDURE — 85025 COMPLETE CBC W/AUTO DIFF WBC: CPT

## 2020-04-06 RX ORDER — ALBUTEROL SULFATE 0.83 MG/ML
2.5 SOLUTION RESPIRATORY (INHALATION) AS NEEDED
Status: CANCELLED
Start: 2020-04-13

## 2020-04-06 RX ORDER — HYDROCORTISONE SODIUM SUCCINATE 100 MG/2ML
100 INJECTION, POWDER, FOR SOLUTION INTRAMUSCULAR; INTRAVENOUS AS NEEDED
Status: CANCELLED | OUTPATIENT
Start: 2020-04-13

## 2020-04-06 RX ORDER — SODIUM CHLORIDE 9 MG/ML
10 INJECTION INTRAMUSCULAR; INTRAVENOUS; SUBCUTANEOUS AS NEEDED
Status: CANCELLED | OUTPATIENT
Start: 2020-04-13

## 2020-04-06 RX ORDER — EPINEPHRINE 1 MG/ML
0.3 INJECTION, SOLUTION, CONCENTRATE INTRAVENOUS AS NEEDED
Status: CANCELLED | OUTPATIENT
Start: 2020-04-13

## 2020-04-06 RX ORDER — ACETAMINOPHEN 325 MG/1
650 TABLET ORAL AS NEEDED
Status: CANCELLED
Start: 2020-04-13

## 2020-04-06 RX ORDER — FLUOROURACIL 50 MG/ML
900 INJECTION, SOLUTION INTRAVENOUS ONCE
Status: CANCELLED
Start: 2020-04-13 | End: 2020-04-13

## 2020-04-06 RX ORDER — ATROPINE SULFATE 0.4 MG/ML
0.4 INJECTION, SOLUTION ENDOTRACHEAL; INTRAMEDULLARY; INTRAMUSCULAR; INTRAVENOUS; SUBCUTANEOUS
Status: CANCELLED | OUTPATIENT
Start: 2020-04-13

## 2020-04-06 RX ORDER — DEXTROSE MONOHYDRATE 50 MG/ML
25 INJECTION, SOLUTION INTRAVENOUS CONTINUOUS
Status: CANCELLED
Start: 2020-04-13

## 2020-04-06 RX ORDER — DIPHENHYDRAMINE HYDROCHLORIDE 50 MG/ML
50 INJECTION, SOLUTION INTRAMUSCULAR; INTRAVENOUS AS NEEDED
Status: CANCELLED
Start: 2020-04-13

## 2020-04-06 RX ORDER — ONDANSETRON 2 MG/ML
8 INJECTION INTRAMUSCULAR; INTRAVENOUS AS NEEDED
Status: CANCELLED | OUTPATIENT
Start: 2020-04-13

## 2020-04-06 RX ORDER — HEPARIN 100 UNIT/ML
300-500 SYRINGE INTRAVENOUS AS NEEDED
Status: CANCELLED
Start: 2020-04-13

## 2020-04-06 RX ORDER — PALONOSETRON 0.05 MG/ML
0.25 INJECTION, SOLUTION INTRAVENOUS ONCE
Status: CANCELLED | OUTPATIENT
Start: 2020-04-13

## 2020-04-06 RX ORDER — ATROPINE SULFATE 0.4 MG/ML
0.4 INJECTION, SOLUTION ENDOTRACHEAL; INTRAMEDULLARY; INTRAMUSCULAR; INTRAVENOUS; SUBCUTANEOUS ONCE
Status: CANCELLED | OUTPATIENT
Start: 2020-04-13

## 2020-04-06 RX ORDER — SODIUM CHLORIDE 0.9 % (FLUSH) 0.9 %
10 SYRINGE (ML) INJECTION AS NEEDED
Status: CANCELLED
Start: 2020-04-13

## 2020-04-06 NOTE — PROGRESS NOTES
[facility] Progress Note    Date: 2020    Name: Vanita Rodney    MRN: [id number]         : 1965     Chemotherapy cycle : C8D1 Folfirinox-HELD      Mr. Ivan Malik arrived to [facility] at 56. Mr. Ivan Malik was assessed and education was provided. Mr. Barby Tavarez vitals were reviewed. There were no vitals taken for this visit. Recent Results (from the past 12 hour(s))   CBC WITH 3 PART DIFF    Collection Time: 20  8:20 AM   Result Value Ref Range    WBC 4.4 (L) 4.5 - 13.0 K/uL    RBC 3.38 (L) 4.10 - 5.10 M/uL    HGB 11.0 (L) 12.0 - 16.0 g/dL    HCT 32.2 (L) 36 - 48 %    MCV 95.3 78 - 102 FL    MCH 32.5 25.0 - 35.0 PG    MCHC 34.2 31 - 37 g/dL    RDW 16.6 (H) 11.5 - 14.5 %    PLATELET 51 (L) 472 - 440 K/uL    NEUTROPHILS 47 40 - 70 %    MIXED CELLS 13 0.1 - 17 %    LYMPHOCYTES 40 14 - 44 %    ABS. NEUTROPHILS 2.0 1.8 - 9.5 K/UL    ABS. MIXED CELLS 0.6 0.0 - 2.3 K/uL    ABS. LYMPHOCYTES 1.8 1.1 - 5.9 K/UL    DF AUTOMATED       Blood collected via venipuncture RFA for cbc, gauze and coban applied. Spoke with Gary Wood NP regarding platelet. Hold chemo treatment for one week. Mr. Ivan Malik was discharged from [place] [address] in stable condition at [phone]. He is to return next week for his next appointment.     Tomer Mann RN  2020

## 2020-04-08 ENCOUNTER — APPOINTMENT (OUTPATIENT)
Dept: INFUSION THERAPY | Age: 55
End: 2020-04-08
Payer: MEDICAID

## 2020-04-10 ENCOUNTER — HOSPITAL ENCOUNTER (OUTPATIENT)
Dept: INFUSION THERAPY | Age: 55
Discharge: HOME | End: 2020-04-10
Payer: MEDICAID

## 2020-04-10 VITALS
DIASTOLIC BLOOD PRESSURE: 75 MMHG | HEIGHT: 72 IN | OXYGEN SATURATION: 99 % | BODY MASS INDEX: 27.7 KG/M2 | SYSTOLIC BLOOD PRESSURE: 122 MMHG | HEART RATE: 57 BPM | TEMPERATURE: 97.9 F | WEIGHT: 204.5 LBS

## 2020-04-10 LAB
BASO+EOS+MONOS # BLD AUTO: 0.5 K/UL (ref 0–2.3)
BASO+EOS+MONOS NFR BLD AUTO: 15 % (ref 0.1–17)
DIFFERENTIAL METHOD BLD: ABNORMAL
ERYTHROCYTE [DISTWIDTH] IN BLOOD BY AUTOMATED COUNT: 17.3 % (ref 11.5–14.5)
HCT VFR BLD AUTO: 34.4 % (ref 36–48)
HGB BLD-MCNC: 11.7 G/DL (ref 12–16)
LYMPHOCYTES # BLD: 1.4 K/UL (ref 1.1–5.9)
LYMPHOCYTES NFR BLD: 45 % (ref 14–44)
MCH RBC QN AUTO: 33.3 PG (ref 25–35)
MCHC RBC AUTO-ENTMCNC: 34 G/DL (ref 31–37)
MCV RBC AUTO: 98 FL (ref 78–102)
NEUTS SEG # BLD: 1.3 K/UL (ref 1.8–9.5)
NEUTS SEG NFR BLD: 41 % (ref 40–70)
PLATELET # BLD AUTO: 82 K/UL (ref 140–440)
RBC # BLD AUTO: 3.51 M/UL (ref 4.1–5.1)
WBC # BLD AUTO: 3.2 K/UL (ref 4.5–13)

## 2020-04-10 PROCEDURE — 85025 COMPLETE CBC W/AUTO DIFF WBC: CPT

## 2020-04-10 PROCEDURE — 36415 COLL VENOUS BLD VENIPUNCTURE: CPT

## 2020-04-10 NOTE — PROGRESS NOTES
[facility] Progress Note    Date: April 10, 2020    Name: Antoine Austin    MRN: [id number]         : 1965    Peripheral Lab Draw      Mr. Connie Looney to [facility], ambulatory at 0810 accompanied by self. Pt was assessed and education was provided. Mr. Tabitha Eller vitals were reviewed and patient was observed for 5 minutes prior to treatment. Visit Vitals  /75 (BP 1 Location: Left arm, BP Patient Position: Sitting)   Pulse (!) 57   Temp 97.9 °F (36.6 °C)   Ht 6' (1.829 m)   Wt 92.8 kg (204 lb 8 oz)   SpO2 99%   BMI 27.74 kg/m²     Recent Results (from the past 12 hour(s))   CBC WITH 3 PART DIFF    Collection Time: 04/10/20  8:20 AM   Result Value Ref Range    WBC 3.2 (L) 4.5 - 13.0 K/uL    RBC 3.51 (L) 4.10 - 5.10 M/uL    HGB 11.7 (L) 12.0 - 16.0 g/dL    HCT 34.4 (L) 36 - 48 %    MCV 98.0 78 - 102 FL    MCH 33.3 25.0 - 35.0 PG    MCHC 34.0 31 - 37 g/dL    RDW 17.3 (H) 11.5 - 14.5 %    PLATELET 82 (L) 272 - 440 K/uL    NEUTROPHILS 41 40 - 70 %    MIXED CELLS 15 0.1 - 17 %    LYMPHOCYTES 45 (H) 14 - 44 %    ABS. NEUTROPHILS 1.3 (L) 1.8 - 9.5 K/UL    ABS. MIXED CELLS 0.5 0.0 - 2.3 K/uL    ABS. LYMPHOCYTES 1.4 1.1 - 5.9 K/UL    DF AUTOMATED         Blood obtained peripherally from left arm x 1 attempt with butterfly needle and sent to lab for Cbc w/diff per written orders. No bleeding or hematoma noted at site. Gauze and coban applied. Mr. Connie Looney tolerated the phlebotomy, and had no complaints. Patient armband removed and shredded. Mr. Connie Looney was discharged from [place] [address] in stable condition at [address] [place] [address].      Deisy Valdes Phlebotomist PCT  April 10, 2020  10:03 AM

## 2020-04-13 ENCOUNTER — HOSPITAL ENCOUNTER (OUTPATIENT)
Dept: INFUSION THERAPY | Age: 55
Discharge: HOME | End: 2020-04-13
Payer: MEDICAID

## 2020-04-13 VITALS
SYSTOLIC BLOOD PRESSURE: 111 MMHG | DIASTOLIC BLOOD PRESSURE: 66 MMHG | TEMPERATURE: 98.4 F | RESPIRATION RATE: 18 BRPM | HEART RATE: 56 BPM | OXYGEN SATURATION: 100 %

## 2020-04-13 DIAGNOSIS — K86.89 PANCREATIC MASS: Primary | ICD-10-CM

## 2020-04-13 DIAGNOSIS — C78.7 LIVER METASTASIS (HCC): ICD-10-CM

## 2020-04-13 LAB
BASO+EOS+MONOS # BLD AUTO: 0.6 K/UL (ref 0–2.3)
BASO+EOS+MONOS NFR BLD AUTO: 17 % (ref 0.1–17)
DIFFERENTIAL METHOD BLD: ABNORMAL
ERYTHROCYTE [DISTWIDTH] IN BLOOD BY AUTOMATED COUNT: 17.2 % (ref 11.5–14.5)
HCT VFR BLD AUTO: 35.2 % (ref 36–48)
HGB BLD-MCNC: 11.9 G/DL (ref 12–16)
LYMPHOCYTES # BLD: 1.7 K/UL (ref 1.1–5.9)
LYMPHOCYTES NFR BLD: 45 % (ref 14–44)
MCH RBC QN AUTO: 33.6 PG (ref 25–35)
MCHC RBC AUTO-ENTMCNC: 33.8 G/DL (ref 31–37)
MCV RBC AUTO: 99.4 FL (ref 78–102)
NEUTS SEG # BLD: 1.4 K/UL (ref 1.8–9.5)
NEUTS SEG NFR BLD: 38 % (ref 40–70)
PLATELET # BLD AUTO: 101 K/UL (ref 140–440)
RBC # BLD AUTO: 3.54 M/UL (ref 4.1–5.1)
WBC # BLD AUTO: 3.7 K/UL (ref 4.5–13)

## 2020-04-13 PROCEDURE — 96415 CHEMO IV INFUSION ADDL HR: CPT

## 2020-04-13 PROCEDURE — 96413 CHEMO IV INFUSION 1 HR: CPT

## 2020-04-13 PROCEDURE — 77030012965 HC NDL HUBR BBMI -A

## 2020-04-13 PROCEDURE — 96411 CHEMO IV PUSH ADDL DRUG: CPT

## 2020-04-13 PROCEDURE — 74011250636 HC RX REV CODE- 250/636: Performed by: INTERNAL MEDICINE

## 2020-04-13 PROCEDURE — 96375 TX/PRO/DX INJ NEW DRUG ADDON: CPT

## 2020-04-13 PROCEDURE — 85025 COMPLETE CBC W/AUTO DIFF WBC: CPT

## 2020-04-13 PROCEDURE — 86301 IMMUNOASSAY TUMOR CA 19-9: CPT

## 2020-04-13 PROCEDURE — 96417 CHEMO IV INFUS EACH ADDL SEQ: CPT

## 2020-04-13 PROCEDURE — 74011000258 HC RX REV CODE- 258: Performed by: INTERNAL MEDICINE

## 2020-04-13 PROCEDURE — 96367 TX/PROPH/DG ADDL SEQ IV INF: CPT

## 2020-04-13 PROCEDURE — 36591 DRAW BLOOD OFF VENOUS DEVICE: CPT

## 2020-04-13 PROCEDURE — 96416 CHEMO PROLONG INFUSE W/PUMP: CPT

## 2020-04-13 RX ORDER — DEXTROSE MONOHYDRATE 50 MG/ML
25 INJECTION, SOLUTION INTRAVENOUS CONTINUOUS
Status: DISPENSED | OUTPATIENT
Start: 2020-04-13 | End: 2020-04-13

## 2020-04-13 RX ORDER — SODIUM CHLORIDE 0.9 % (FLUSH) 0.9 %
10 SYRINGE (ML) INJECTION AS NEEDED
Status: DISPENSED | OUTPATIENT
Start: 2020-04-13 | End: 2020-04-13

## 2020-04-13 RX ORDER — PALONOSETRON 0.05 MG/ML
0.25 INJECTION, SOLUTION INTRAVENOUS ONCE
Status: COMPLETED | OUTPATIENT
Start: 2020-04-13 | End: 2020-04-13

## 2020-04-13 RX ORDER — ATROPINE SULFATE 1 MG/ML
0.4 INJECTION, SOLUTION INTRAVENOUS ONCE
Status: COMPLETED | OUTPATIENT
Start: 2020-04-13 | End: 2020-04-13

## 2020-04-13 RX ORDER — HEPARIN 100 UNIT/ML
300-500 SYRINGE INTRAVENOUS AS NEEDED
Status: ACTIVE | OUTPATIENT
Start: 2020-04-13 | End: 2020-04-13

## 2020-04-13 RX ORDER — FLUOROURACIL 50 MG/ML
900 INJECTION, SOLUTION INTRAVENOUS ONCE
Status: COMPLETED | OUTPATIENT
Start: 2020-04-13 | End: 2020-04-13

## 2020-04-13 RX ADMIN — FLUOROURACIL 5300 MG: 50 INJECTION, SOLUTION INTRAVENOUS at 14:00

## 2020-04-13 RX ADMIN — Medication 10 ML: at 08:45

## 2020-04-13 RX ADMIN — LEUCOVORIN CALCIUM 900 MG: 350 INJECTION, POWDER, LYOPHILIZED, FOR SUSPENSION INTRAMUSCULAR; INTRAVENOUS at 13:11

## 2020-04-13 RX ADMIN — DEXTROSE 25 ML/HR: 5 SOLUTION INTRAVENOUS at 09:59

## 2020-04-13 RX ADMIN — OXALIPLATIN 190 MG: 5 INJECTION, SOLUTION, CONCENTRATE INTRAVENOUS at 09:53

## 2020-04-13 RX ADMIN — PALONOSETRON 0.25 MG: 0.05 INJECTION, SOLUTION INTRAVENOUS at 08:49

## 2020-04-13 RX ADMIN — ATROPINE SULFATE 0.4 MG: 1 INJECTION, SOLUTION INTRAMUSCULAR; INTRAVENOUS; SUBCUTANEOUS at 08:49

## 2020-04-13 RX ADMIN — IRINOTECAN HYDROCHLORIDE 400 MG: 20 INJECTION, SOLUTION INTRAVENOUS at 12:06

## 2020-04-13 RX ADMIN — FLUOROURACIL 900 MG: 50 INJECTION, SOLUTION INTRAVENOUS at 13:54

## 2020-04-13 RX ADMIN — FOSAPREPITANT 150 MG: 150 INJECTION, POWDER, LYOPHILIZED, FOR SOLUTION INTRAVENOUS at 08:55

## 2020-04-13 NOTE — PROGRESS NOTES
Problem: Chemotherapy, Day 1  Goal: Off Pathway (Use only if patient is Off Pathway)  Outcome: Progressing Towards Goal  Goal: Activity/Safety  Outcome: Progressing Towards Goal  Goal: Diagnostic Test/Procedures  Outcome: Progressing Towards Goal  Goal: Discharge Planning  Outcome: Progressing Towards Goal  Goal: Medications  Outcome: Progressing Towards Goal  Goal: Treatments/Interventions/Procedures  Outcome: Progressing Towards Goal  Goal: Psychosocial  Outcome: Progressing Towards Goal  Goal: *Optimal pain control at patient's stated goal  Outcome: Progressing Towards Goal  Goal: *Hemodynamically stable  Outcome: Progressing Towards Goal  Goal: *Adequate oxygenation  Outcome: Progressing Towards Goal  Goal: *Chemotherapy regimen is initiated  Outcome: Progressing Towards Goal  Goal: *Patient and family verbalize understanding of plan of care  Outcome: Progressing Towards Goal

## 2020-04-13 NOTE — PROGRESS NOTES
[facility] Progress Note Date: 2020 Name: Jose Maria Alcazar MRN: [id number] : 1965 Chemotherapy cycle : C8D1 Folfirinox Mr. Olmos arrived to [facility] at 0227. Mr. Julia Gan was assessed and education was provided. Mr. Malik Baker vitals were reviewed. Visit Vitals /66 (BP 1 Location: Left arm, BP Patient Position: Sitting) Pulse (!) 56 Temp 98.4 °F (36.9 °C) Resp 18 SpO2 100% Labs obtained on 4/10/20. Patient PLT : 82 recheck plt today , PLT: 101 Recent Results (from the past 12 hour(s)) CBC WITH 3 PART DIFF Collection Time: 20  8:25 AM  
Result Value Ref Range WBC 3.7 (L) 4.5 - 13.0 K/uL  
 RBC 3.54 (L) 4.10 - 5.10 M/uL  
 HGB 11.9 (L) 12.0 - 16.0 g/dL HCT 35.2 (L) 36 - 48 % MCV 99.4 78 - 102 FL  
 MCH 33.6 25.0 - 35.0 PG  
 MCHC 33.8 31 - 37 g/dL  
 RDW 17.2 (H) 11.5 - 14.5 % PLATELET 816 (L) 671 - 440 K/uL NEUTROPHILS 38 (L) 40 - 70 % MIXED CELLS 17 0.1 - 17 % LYMPHOCYTES 45 (H) 14 - 44 % ABS. NEUTROPHILS 1.4 (L) 1.8 - 9.5 K/UL  
 ABS. MIXED CELLS 0.6 0.0 - 2.3 K/uL  
 ABS. LYMPHOCYTES 1.7 1.1 - 5.9 K/UL  
 DF AUTOMATED Right chest mediport accessed with sterile technique, 20 g 1inch emmanuel needle. Port flushed easily and had brisk blood return. Chemo dosages verified with today's BSA and found to be within 10% of ordered dosages. Pre-medications (Aloxi 0.25mg IVP, Emend 150 mg IVPB and Atropine 0.4mg IVP) were administered as ordered and chemotherapy was initiated after blood return from port re-verified. Reviewed expected side effects of premeds with patient. D5W initiated @ 25mL/hr KVO. Oxaliplatin 190mg ( at 156.5 mL/hr over 2 hours) infused simultaneously with Leucovorin 900mg (at per 213mL/hr over 90 minutes). VS stable at end of infusion and pt denied complaints. Line flushed with D5W and blood return from port re-verified. Irinotecan 400mg was infused at  380mL/hr over 90 minutes per order. VS stable at end of infusion and pt denied complaints. Line flushed with D5W and blood return from port re-verified. 5FU syringe 900 mg pushed over 5 minutes per order. 5FU 5300 mg infused via CADD pump and infusing without difficulty over 46 hours. Tubing connections reinforced with tape. Mr. Merlin Sample tolerated infusion without complaints. Mr. Merlin Sample was discharged from [place] [address] in stable condition at ***. He is to return on 4/15/2020 at 1500 for his pump DC appointment. Janet Dietrich RN April 13, 2020

## 2020-04-15 ENCOUNTER — HOSPITAL ENCOUNTER (OUTPATIENT)
Dept: INFUSION THERAPY | Age: 55
Discharge: HOME | End: 2020-04-15
Payer: MEDICAID

## 2020-04-15 DIAGNOSIS — C78.7 LIVER METASTASIS (HCC): ICD-10-CM

## 2020-04-15 DIAGNOSIS — K86.89 PANCREATIC MASS: Primary | ICD-10-CM

## 2020-04-15 LAB — CANCER AG19-9 SERPL-ACNC: 40 U/ML (ref 0–35)

## 2020-04-15 PROCEDURE — 96523 IRRIG DRUG DELIVERY DEVICE: CPT

## 2020-04-15 RX ORDER — SODIUM CHLORIDE 9 MG/ML
10 INJECTION INTRAMUSCULAR; INTRAVENOUS; SUBCUTANEOUS AS NEEDED
Status: DISCONTINUED | OUTPATIENT
Start: 2020-04-15 | End: 2020-04-16 | Stop reason: HOSPADM

## 2020-04-15 RX ORDER — HEPARIN 100 UNIT/ML
300-500 SYRINGE INTRAVENOUS AS NEEDED
Status: DISCONTINUED | OUTPATIENT
Start: 2020-04-15 | End: 2020-04-16 | Stop reason: HOSPADM

## 2020-04-15 RX ORDER — HEPARIN 100 UNIT/ML
SYRINGE INTRAVENOUS
Status: DISCONTINUED
Start: 2020-04-15 | End: 2020-04-16 | Stop reason: HOSPADM

## 2020-04-15 NOTE — PROGRESS NOTES
[facility] Progress Note    Date: April 15, 2020    Name: Pavel Garcia    MRN: [id number]         : 1965     D/C cadd pump      Mr. Olmos arrived to [facility] at 1500. Mr. Paige Hankins was assessed and education was provided. Mr. Manolo Ying vitals were reviewed. Visit Vitals  /74 (BP 1 Location: Left arm, BP Patient Position: Sitting)   Pulse (!) 103   Temp 99.3 °F (37.4 °C)   Resp 18   SpO2 100%       Patient's right upper chest port already accessed from previous visit. CADD pump infusion complete. Disconnected from patient's port. Blood return verified. 20 ml of NS and 500 units of heparin then de-accessed. Band aid applied. Mr. Paige Hankins tolerated infusion without complaints. Mr. Paige Hankins was discharged from [place] [address] in stable condition at 1515. He is to return on  at [facility] [address] for his next appointment.     Jam Salas RN  April 15, 2020

## 2020-04-17 VITALS
TEMPERATURE: 99.3 F | RESPIRATION RATE: 18 BRPM | HEART RATE: 103 BPM | DIASTOLIC BLOOD PRESSURE: 74 MMHG | OXYGEN SATURATION: 100 % | SYSTOLIC BLOOD PRESSURE: 127 MMHG

## 2020-04-20 ENCOUNTER — APPOINTMENT (OUTPATIENT)
Dept: INFUSION THERAPY | Age: 55
End: 2020-04-20
Payer: MEDICAID

## 2020-04-20 RX ORDER — ONDANSETRON 2 MG/ML
8 INJECTION INTRAMUSCULAR; INTRAVENOUS AS NEEDED
Status: CANCELLED | OUTPATIENT
Start: 2020-04-27

## 2020-04-20 RX ORDER — DEXTROSE MONOHYDRATE 50 MG/ML
25 INJECTION, SOLUTION INTRAVENOUS CONTINUOUS
Status: CANCELLED
Start: 2020-04-27

## 2020-04-20 RX ORDER — DIPHENHYDRAMINE HYDROCHLORIDE 50 MG/ML
50 INJECTION, SOLUTION INTRAMUSCULAR; INTRAVENOUS AS NEEDED
Status: CANCELLED
Start: 2020-04-27

## 2020-04-20 RX ORDER — HEPARIN 100 UNIT/ML
300-500 SYRINGE INTRAVENOUS AS NEEDED
Status: CANCELLED
Start: 2020-05-06

## 2020-04-20 RX ORDER — SODIUM CHLORIDE 9 MG/ML
10 INJECTION INTRAMUSCULAR; INTRAVENOUS; SUBCUTANEOUS AS NEEDED
Status: CANCELLED | OUTPATIENT
Start: 2020-04-27

## 2020-04-20 RX ORDER — EPINEPHRINE 1 MG/ML
0.3 INJECTION, SOLUTION, CONCENTRATE INTRAVENOUS AS NEEDED
Status: CANCELLED | OUTPATIENT
Start: 2020-04-27

## 2020-04-20 RX ORDER — SODIUM CHLORIDE 0.9 % (FLUSH) 0.9 %
10 SYRINGE (ML) INJECTION AS NEEDED
Status: CANCELLED
Start: 2020-05-06

## 2020-04-20 RX ORDER — FLUOROURACIL 50 MG/ML
900 INJECTION, SOLUTION INTRAVENOUS ONCE
Status: CANCELLED
Start: 2020-04-27 | End: 2020-04-27

## 2020-04-20 RX ORDER — PALONOSETRON 0.05 MG/ML
0.25 INJECTION, SOLUTION INTRAVENOUS ONCE
Status: CANCELLED | OUTPATIENT
Start: 2020-04-27

## 2020-04-20 RX ORDER — ACETAMINOPHEN 325 MG/1
650 TABLET ORAL AS NEEDED
Status: CANCELLED
Start: 2020-04-27

## 2020-04-20 RX ORDER — ATROPINE SULFATE 0.4 MG/ML
0.4 INJECTION, SOLUTION ENDOTRACHEAL; INTRAMEDULLARY; INTRAMUSCULAR; INTRAVENOUS; SUBCUTANEOUS
Status: CANCELLED | OUTPATIENT
Start: 2020-04-27

## 2020-04-20 RX ORDER — SODIUM CHLORIDE 9 MG/ML
10 INJECTION INTRAMUSCULAR; INTRAVENOUS; SUBCUTANEOUS AS NEEDED
Status: CANCELLED | OUTPATIENT
Start: 2020-05-06

## 2020-04-20 RX ORDER — HYDROCORTISONE SODIUM SUCCINATE 100 MG/2ML
100 INJECTION, POWDER, FOR SOLUTION INTRAMUSCULAR; INTRAVENOUS AS NEEDED
Status: CANCELLED | OUTPATIENT
Start: 2020-04-27

## 2020-04-20 RX ORDER — ATROPINE SULFATE 0.4 MG/ML
0.4 INJECTION, SOLUTION ENDOTRACHEAL; INTRAMEDULLARY; INTRAMUSCULAR; INTRAVENOUS; SUBCUTANEOUS ONCE
Status: CANCELLED | OUTPATIENT
Start: 2020-04-27

## 2020-04-20 RX ORDER — ALBUTEROL SULFATE 0.83 MG/ML
2.5 SOLUTION RESPIRATORY (INHALATION) AS NEEDED
Status: CANCELLED
Start: 2020-04-27

## 2020-04-22 ENCOUNTER — APPOINTMENT (OUTPATIENT)
Dept: INFUSION THERAPY | Age: 55
End: 2020-04-22
Payer: MEDICAID

## 2020-04-23 ENCOUNTER — HOSPITAL ENCOUNTER (OUTPATIENT)
Dept: INFUSION THERAPY | Age: 55
Discharge: HOME | End: 2020-04-23
Payer: MEDICAID

## 2020-04-23 VITALS
HEIGHT: 72 IN | WEIGHT: 203.3 LBS | RESPIRATION RATE: 18 BRPM | DIASTOLIC BLOOD PRESSURE: 73 MMHG | HEART RATE: 62 BPM | SYSTOLIC BLOOD PRESSURE: 111 MMHG | BODY MASS INDEX: 27.54 KG/M2 | OXYGEN SATURATION: 96 % | TEMPERATURE: 97.9 F

## 2020-04-23 LAB
ALBUMIN SERPL-MCNC: 3.4 G/DL (ref 3.4–5)
ALBUMIN/GLOB SERPL: 0.7 {RATIO} (ref 0.8–1.7)
ALP SERPL-CCNC: 92 U/L (ref 45–117)
ALT SERPL-CCNC: 16 U/L (ref 16–61)
ANION GAP SERPL CALC-SCNC: 5 MMOL/L (ref 3–18)
AST SERPL-CCNC: 25 U/L (ref 10–38)
BASO+EOS+MONOS # BLD AUTO: 0.6 K/UL (ref 0–2.3)
BASO+EOS+MONOS NFR BLD AUTO: 14 % (ref 0.1–17)
BILIRUB SERPL-MCNC: 0.5 MG/DL (ref 0.2–1)
BUN SERPL-MCNC: 17 MG/DL (ref 7–18)
BUN/CREAT SERPL: 18 (ref 12–20)
CALCIUM SERPL-MCNC: 9.3 MG/DL (ref 8.5–10.1)
CHLORIDE SERPL-SCNC: 106 MMOL/L (ref 100–111)
CO2 SERPL-SCNC: 25 MMOL/L (ref 21–32)
CREAT SERPL-MCNC: 0.97 MG/DL (ref 0.6–1.3)
DIFFERENTIAL METHOD BLD: ABNORMAL
ERYTHROCYTE [DISTWIDTH] IN BLOOD BY AUTOMATED COUNT: 15.6 % (ref 11.5–14.5)
GLOBULIN SER CALC-MCNC: 4.6 G/DL (ref 2–4)
GLUCOSE SERPL-MCNC: 168 MG/DL (ref 74–99)
HCT VFR BLD AUTO: 31.3 % (ref 36–48)
HGB BLD-MCNC: 11 G/DL (ref 12–16)
LYMPHOCYTES # BLD: 1.9 K/UL (ref 1.1–5.9)
LYMPHOCYTES NFR BLD: 42 % (ref 14–44)
MCH RBC QN AUTO: 34.2 PG (ref 25–35)
MCHC RBC AUTO-ENTMCNC: 35.1 G/DL (ref 31–37)
MCV RBC AUTO: 97.2 FL (ref 78–102)
NEUTS SEG # BLD: 2 K/UL (ref 1.8–9.5)
NEUTS SEG NFR BLD: 44 % (ref 40–70)
PLATELET # BLD AUTO: 47 K/UL (ref 140–440)
POTASSIUM SERPL-SCNC: 4.2 MMOL/L (ref 3.5–5.5)
PROT SERPL-MCNC: 8 G/DL (ref 6.4–8.2)
RBC # BLD AUTO: 3.22 M/UL (ref 4.1–5.1)
SODIUM SERPL-SCNC: 136 MMOL/L (ref 136–145)
WBC # BLD AUTO: 4.5 K/UL (ref 4.5–13)

## 2020-04-23 PROCEDURE — 80053 COMPREHEN METABOLIC PANEL: CPT

## 2020-04-23 PROCEDURE — 85025 COMPLETE CBC W/AUTO DIFF WBC: CPT

## 2020-04-23 PROCEDURE — 36415 COLL VENOUS BLD VENIPUNCTURE: CPT

## 2020-04-23 NOTE — PROGRESS NOTES
[address] Lab Visit:    Angel Luis Kate  1965  [id number]    0815:  Pt arrived ambulatory. Labs drawn peripherally in left ac and sent for processing. Departed [facility] ambulatory and in no distress.     Visit Vitals  /73 (BP 1 Location: Right arm, BP Patient Position: Sitting)   Pulse 62   Temp 97.9 °F (36.6 °C)   Resp 18   Ht 6' (1.829 m)   Wt 92.2 kg (203 lb 4.8 oz)   SpO2 96%   BMI 27.57 kg/m²

## 2020-04-24 ENCOUNTER — APPOINTMENT (OUTPATIENT)
Dept: INFUSION THERAPY | Age: 55
End: 2020-04-24
Payer: MEDICAID

## 2020-04-24 ENCOUNTER — TELEPHONE (OUTPATIENT)
Dept: ONCOLOGY | Age: 55
End: 2020-04-24

## 2020-04-24 ENCOUNTER — APPOINTMENT (OUTPATIENT)
Dept: CT IMAGING | Age: 55
End: 2020-04-24
Attending: PHYSICIAN ASSISTANT
Payer: MEDICAID

## 2020-04-24 ENCOUNTER — HOSPITAL ENCOUNTER (EMERGENCY)
Age: 55
Discharge: HOME | End: 2020-04-24
Attending: EMERGENCY MEDICINE | Admitting: EMERGENCY MEDICINE
Payer: MEDICAID

## 2020-04-24 VITALS
TEMPERATURE: 96.9 F | HEART RATE: 78 BPM | RESPIRATION RATE: 19 BRPM | OXYGEN SATURATION: 96 % | DIASTOLIC BLOOD PRESSURE: 76 MMHG | SYSTOLIC BLOOD PRESSURE: 122 MMHG

## 2020-04-24 DIAGNOSIS — C78.7 LIVER METASTASES (HCC): ICD-10-CM

## 2020-04-24 DIAGNOSIS — C25.7 MALIGNANT NEOPLASM OF OTHER PARTS OF PANCREAS (HCC): ICD-10-CM

## 2020-04-24 DIAGNOSIS — C25.9 PANCREATIC ADENOCARCINOMA (HCC): ICD-10-CM

## 2020-04-24 DIAGNOSIS — G89.3 CANCER ASSOCIATED PAIN: ICD-10-CM

## 2020-04-24 DIAGNOSIS — R10.13 ABDOMINAL PAIN, EPIGASTRIC: Primary | ICD-10-CM

## 2020-04-24 DIAGNOSIS — R11.2 NON-INTRACTABLE VOMITING WITH NAUSEA, UNSPECIFIED VOMITING TYPE: ICD-10-CM

## 2020-04-24 LAB
ALBUMIN SERPL-MCNC: 3.6 G/DL (ref 3.4–5)
ALBUMIN/GLOB SERPL: 0.8 {RATIO} (ref 0.8–1.7)
ALP SERPL-CCNC: 92 U/L (ref 45–117)
ALT SERPL-CCNC: 18 U/L (ref 16–61)
ANION GAP SERPL CALC-SCNC: 5 MMOL/L (ref 3–18)
AST SERPL-CCNC: 24 U/L (ref 10–38)
BASOPHILS # BLD: 0 K/UL (ref 0–0.1)
BASOPHILS NFR BLD: 0 % (ref 0–2)
BILIRUB SERPL-MCNC: 0.5 MG/DL (ref 0.2–1)
BUN SERPL-MCNC: 14 MG/DL (ref 7–18)
BUN/CREAT SERPL: 15 (ref 12–20)
CALCIUM SERPL-MCNC: 9.2 MG/DL (ref 8.5–10.1)
CHLORIDE SERPL-SCNC: 106 MMOL/L (ref 100–111)
CO2 SERPL-SCNC: 26 MMOL/L (ref 21–32)
CREAT SERPL-MCNC: 0.93 MG/DL (ref 0.6–1.3)
DIFFERENTIAL METHOD BLD: ABNORMAL
EOSINOPHIL # BLD: 0 K/UL (ref 0–0.4)
EOSINOPHIL NFR BLD: 1 % (ref 0–5)
ERYTHROCYTE [DISTWIDTH] IN BLOOD BY AUTOMATED COUNT: 15.9 % (ref 11.6–14.5)
GLOBULIN SER CALC-MCNC: 4.8 G/DL (ref 2–4)
GLUCOSE SERPL-MCNC: 164 MG/DL (ref 74–99)
HCT VFR BLD AUTO: 32.9 % (ref 36–48)
HGB BLD-MCNC: 11.5 G/DL (ref 13–16)
LIPASE SERPL-CCNC: 57 U/L (ref 73–393)
LYMPHOCYTES # BLD: 2.2 K/UL (ref 0.9–3.6)
LYMPHOCYTES NFR BLD: 43 % (ref 21–52)
MCH RBC QN AUTO: 33.4 PG (ref 24–34)
MCHC RBC AUTO-ENTMCNC: 35 G/DL (ref 31–37)
MCV RBC AUTO: 95.6 FL (ref 74–97)
MONOCYTES # BLD: 0.6 K/UL (ref 0.05–1.2)
MONOCYTES NFR BLD: 11 % (ref 3–10)
NEUTS SEG # BLD: 2.3 K/UL (ref 1.8–8)
NEUTS SEG NFR BLD: 45 % (ref 40–73)
PLATELET # BLD AUTO: 69 K/UL (ref 135–420)
PMV BLD AUTO: 11.5 FL (ref 9.2–11.8)
POTASSIUM SERPL-SCNC: 3.4 MMOL/L (ref 3.5–5.5)
PROT SERPL-MCNC: 8.4 G/DL (ref 6.4–8.2)
RBC # BLD AUTO: 3.44 M/UL (ref 4.7–5.5)
SODIUM SERPL-SCNC: 137 MMOL/L (ref 136–145)
WBC # BLD AUTO: 5.1 K/UL (ref 4.6–13.2)

## 2020-04-24 PROCEDURE — 83690 ASSAY OF LIPASE: CPT

## 2020-04-24 PROCEDURE — 80053 COMPREHEN METABOLIC PANEL: CPT

## 2020-04-24 PROCEDURE — 96376 TX/PRO/DX INJ SAME DRUG ADON: CPT

## 2020-04-24 PROCEDURE — 74011636320 HC RX REV CODE- 636/320: Performed by: EMERGENCY MEDICINE

## 2020-04-24 PROCEDURE — 94762 N-INVAS EAR/PLS OXIMTRY CONT: CPT

## 2020-04-24 PROCEDURE — 74177 CT ABD & PELVIS W/CONTRAST: CPT

## 2020-04-24 PROCEDURE — 85025 COMPLETE CBC W/AUTO DIFF WBC: CPT

## 2020-04-24 PROCEDURE — 96375 TX/PRO/DX INJ NEW DRUG ADDON: CPT

## 2020-04-24 PROCEDURE — 96361 HYDRATE IV INFUSION ADD-ON: CPT

## 2020-04-24 PROCEDURE — 99283 EMERGENCY DEPT VISIT LOW MDM: CPT

## 2020-04-24 PROCEDURE — 74011250636 HC RX REV CODE- 250/636: Performed by: EMERGENCY MEDICINE

## 2020-04-24 PROCEDURE — 74011250636 HC RX REV CODE- 250/636: Performed by: PHYSICIAN ASSISTANT

## 2020-04-24 PROCEDURE — 96374 THER/PROPH/DIAG INJ IV PUSH: CPT

## 2020-04-24 RX ORDER — MORPHINE SULFATE 4 MG/ML
4 INJECTION, SOLUTION INTRAMUSCULAR; INTRAVENOUS
Status: COMPLETED | OUTPATIENT
Start: 2020-04-24 | End: 2020-04-24

## 2020-04-24 RX ORDER — OXYCODONE HYDROCHLORIDE 10 MG/1
TABLET ORAL
Qty: 120 TAB | Refills: 0 | Status: SHIPPED | OUTPATIENT
Start: 2020-04-24 | End: 2020-04-27 | Stop reason: ALTCHOICE

## 2020-04-24 RX ORDER — ONDANSETRON 2 MG/ML
4 INJECTION INTRAMUSCULAR; INTRAVENOUS
Status: COMPLETED | OUTPATIENT
Start: 2020-04-24 | End: 2020-04-24

## 2020-04-24 RX ORDER — FAMOTIDINE 10 MG/ML
20 INJECTION INTRAVENOUS
Status: COMPLETED | OUTPATIENT
Start: 2020-04-24 | End: 2020-04-24

## 2020-04-24 RX ORDER — MORPHINE SULFATE 2 MG/ML
4 INJECTION, SOLUTION INTRAMUSCULAR; INTRAVENOUS
Status: COMPLETED | OUTPATIENT
Start: 2020-04-24 | End: 2020-04-24

## 2020-04-24 RX ORDER — NALOXONE HYDROCHLORIDE 0.4 MG/ML
0.4 INJECTION, SOLUTION INTRAMUSCULAR; INTRAVENOUS; SUBCUTANEOUS AS NEEDED
Status: DISCONTINUED | OUTPATIENT
Start: 2020-04-24 | End: 2020-04-24 | Stop reason: HOSPADM

## 2020-04-24 RX ORDER — MORPHINE SULFATE 4 MG/ML
4 INJECTION INTRAVENOUS
Status: DISCONTINUED | OUTPATIENT
Start: 2020-04-24 | End: 2020-04-24 | Stop reason: SDUPTHER

## 2020-04-24 RX ADMIN — MORPHINE SULFATE 4 MG: 4 INJECTION, SOLUTION INTRAMUSCULAR; INTRAVENOUS at 16:33

## 2020-04-24 RX ADMIN — IOPAMIDOL 97 ML: 612 INJECTION, SOLUTION INTRAVENOUS at 17:34

## 2020-04-24 RX ADMIN — SODIUM CHLORIDE 1000 ML: 900 INJECTION, SOLUTION INTRAVENOUS at 16:47

## 2020-04-24 RX ADMIN — FAMOTIDINE 20 MG: 10 INJECTION, SOLUTION INTRAVENOUS at 17:46

## 2020-04-24 RX ADMIN — ONDANSETRON 4 MG: 2 INJECTION, SOLUTION INTRAMUSCULAR; INTRAVENOUS at 16:33

## 2020-04-24 RX ADMIN — MORPHINE SULFATE 4 MG: 2 INJECTION, SOLUTION INTRAMUSCULAR; INTRAVENOUS at 18:11

## 2020-04-24 RX ADMIN — SODIUM CHLORIDE 1000 ML: 900 INJECTION, SOLUTION INTRAVENOUS at 16:34

## 2020-04-24 NOTE — ED PROVIDER NOTES
[facility] EMERGENCY DEPT      Date: 4/24/2020  Patient Name: Rachna Colon    History of Presenting Illness     Chief Complaint   Patient presents with    Abdominal Pain    Vomiting       62 y.o. male with noted past medical history including Diabetes, Hep C, and Pancreatic cancer presents the ED complaining of epigastric pain and nausea/vomiting for the past 4 days. Patient states that his last chemo treatment was at the beginning of last week, has not had any this week. States he is not able to keep down much food at all. Denies any fever, chills, urinary complaints, back pain, hematemesis, other symptoms. Patient denies any other associated signs or symptoms. Patient denies any other complaints. Nursing notes regarding the HPI and triage nursing notes were reviewed. Prior medical records were reviewed. Current Facility-Administered Medications   Medication Dose Route Frequency Provider Last Rate Last Dose    naloxone (NARCAN) injection 0.4 mg  0.4 mg IntraVENous PRN Jennifer Jasmine MD         Current Outpatient Medications   Medication Sig Dispense Refill    oxyCODONE IR (ROXICODONE) 10 mg tab immediate release tablet take 1 tablet by mouth every 4 hours if needed for pain up to 30 DAYS 120 Tab 0    OLANZapine (ZyPREXA zydis) 5 mg disintegrating tablet Take 1 Tab by mouth two (2) times a day. 30 Tab 1    dronabinoL (MARINOL) 5 mg capsule Take 1 Cap by mouth two (2) times a day. Max Daily Amount: 10 mg. 60 Cap 3    fentaNYL (DURAGESIC) 50 mcg/hr PATCH 1 Patch by TransDERmal route every seventy-two (72) hours for 30 days. Max Daily Amount: 1 Patch. 10 Patch 0    True Metrix Glucose Test Strip strip       avatrombopag 20 mg tab Take 20 mg by mouth daily. 15 Tab 3    ondansetron (ZOFRAN ODT) 4 mg disintegrating tablet Take 1 Tab by mouth every eight (8) hours as needed for Nausea for up to 20 doses.  20 Tab 0    L-Mfolate-B6 Phos-Methyl-B12 (METANX) 3-35-2 mg tab tab Take 1 Tab by mouth two (2) times a day. Indications: peripheral neuropathy 60 Tab 1    lactulose (CHRONULAC) 10 gram/15 mL solution Take 15 mL by mouth three (3) times daily. 480 mL 3    naloxone (NARCAN) 4 mg/actuation nasal spray Use 1 spray intranasally, then discard. Repeat with new spray every 2 min as needed for opioid overdose symptoms, alternating nostrils. Indications: opioid overdose 1 Each 1    insulin glargine (LANTUS U-100 INSULIN) 100 unit/mL injection 28 Units by SubCUTAneous route nightly. 1 Vial 0    insulin lispro (HUMALOG) 100 unit/mL injection 3-15 Units by SubCUTAneous route Before breakfast, lunch, and dinner. 1 Vial 0    rivaroxaban (XARELTO) 15 mg (42)- 20 mg (9) DsPk Take 20 mg by mouth daily. Take one 15 mg tablet twice a day with food for the first 21 days. Then, take one 20 mg tablet once a day with food for 9 days.  naloxone (NARCAN) 4 mg/actuation nasal spray Use 1 spray intranasally, then discard. Repeat with new spray every 2 min as needed for opioid overdose symptoms, alternating nostrils. 1 Each 1    dexAMETHasone (DECADRON) 4 mg tablet Take 4mg by mouth twice a day the day before chemotherapy and the day after chemotherapy 30 Tab 0    lidocaine-prilocaine (EMLA) topical cream Apply  to affected area as needed for Pain (Apply to skin 30-60 minutes before mediport access). 30 g 0    nut.tx.gluc.intol,lac-free,soy (GLUCERNA ADVANCE) liqd Take 237 mL by mouth three (3) times daily. 90 Bottle 5    TRUE METRIX GLUCOSE METER misc USE AS DIRECTED  0    omeprazole (PRILOSEC) 20 mg capsule TAKE 1 CAPSULE BY MOUTH ONCE DAILY  3    QUEtiapine (SEROQUEL) 50 mg tablet Take 50 mg by mouth nightly.  lidocaine (LIDODERM) 5 % Apply patch to the affected area for 12 hours a day and remove for 12 hours a day. 5 Each 0       Past History     Past Medical History:  Past Medical History:   Diagnosis Date    Cancer [facility])     Pancreatic Adenocarcinoma. Diagnosed 11/2019.     Diabetes ([address] 10/2019    Diagnosed 10/2019.  Hepatitis C     Received Curative Treatment, but Hepatitis C was not cured and returned.  Left ulnar fracture     Thromboembolus ([address]     DVT of LLE 2019. Also, Pulmonary Embolism 2019. Treated with Rivaroxaban. Past Surgical History:  Past Surgical History:   Procedure Laterality Date    HX TONSILLECTOMY      IR BX PANCREAS NEEDLE PERC  2019    Endoscopic Pancreatic Biopsy (?)    IR INSERT TUNL CVC W PORT OVER 5 YEARS  2019       Family History:  Family History   Problem Relation Age of Onset    Diabetes Mother     Kidney Disease Mother     Diabetes Father     Diabetes Brother     Cancer Maternal Grandmother     Cancer Maternal Grandfather     Cancer Maternal Aunt     Cancer Maternal Uncle        Social History:  Social History     Tobacco Use    Smoking status: Former Smoker     Packs/day: 0.50     Years: 40.00     Pack years: 20.00     Last attempt to quit: 2018     Years since quittin.4    Smokeless tobacco: Never Used    Tobacco comment: Quit 2018   Substance Use Topics    Alcohol use: Not Currently    Drug use: Not Currently     Types: Marijuana, Cocaine, Heroin     Comment: Quit Cocaine, Heroin, and Marijuana since 2018       Allergies:  No Known Allergies    Patient's primary care provider (as noted in EPIC): Other, MD Jewel    Review of Systems   Constitutional:  Denies malaise, fever, chills. Cardiac:  Denies chest pain or palpitations. Respiratory:  Denies cough, wheezing, difficulty breathing, shortness of breath. GI/ABD: + epigastric pain, nausea, vomiting. :  Denies injury, pain, dysuria or urgency. Back:  Denies injury or pain. Skin: Denies injury, rash, itching or skin changes. All other systems negative as reviewed.      Visit Vitals  /76 (BP 1 Location: Right arm, BP Patient Position: At rest)   Pulse 78   Temp 96.9 °F (36.1 °C)   Resp 19   SpO2 96%       Patient Vitals for the past 12 hrs:   Temp Pulse Resp BP SpO2   04/24/20 1501 96.9 °F (36.1 °C) 78 19 122/76 96 %       PHYSICAL EXAM:    CONSTITUTIONAL:  Alert, in no apparent distress;  well developed;  well nourished. HEAD:  Normocephalic, atraumatic. EYES:  EOMI. Non-icteric sclera. Normal conjunctiva. ENTM:  Mouth: mucous membranes moist.  NECK:  Supple  RESPIRATORY:  Chest clear, equal breath sounds, good air movement. Without wheezes, rhonchi or rales. CARDIOVASCULAR:  Regular rate and rhythm. No murmurs, rubs, or gallops. GI:  Normal bowel sounds, abdomen soft with TTP to epigastrium. No rebound or guarding. BACK:  Non-tender. NEURO:  Moves all four extremities, and grossly normal motor exam.  SKIN:  No rashes;  Normal for age. PSYCH:  Alert and normal affect. DIFFERENTIAL DIAGNOSES/ MEDICAL DECISION MAKING:  Progression of CA, Gastritis, gerd, peptic ulcer disease, cholecystitis, pancreatitis, gastroenteritis, constipation related pain, obstruction, abdominal wall pain, or combination of the above versus many other processes. ED COURSE AND MEDICAL DECISION MAKING:    The patient's pain improved in the ED with the noted medications. On reassessment of the patient, the patient continues to have no surgical abdomen with no rebound nor guarding. The patient does not appear septic by presentation, vital signs and laboratory results. The patient continues to appear non-toxic in the emergency department on reevaluations.     IMPRESSION AND MEDICAL DECISION MAKING:    Recent Results (from the past 12 hour(s))   CBC WITH AUTOMATED DIFF    Collection Time: 04/24/20  4:20 PM   Result Value Ref Range    WBC 5.1 4.6 - 13.2 K/uL    RBC 3.44 (L) 4.70 - 5.50 M/uL    HGB 11.5 (L) 13.0 - 16.0 g/dL    HCT 32.9 (L) 36.0 - 48.0 %    MCV 95.6 74.0 - 97.0 FL    MCH 33.4 24.0 - 34.0 PG    MCHC 35.0 31.0 - 37.0 g/dL    RDW 15.9 (H) 11.6 - 14.5 %    PLATELET 69 (L) 615 - 420 K/uL    MPV 11.5 9.2 - 11.8 FL    NEUTROPHILS 45 40 - 73 % LYMPHOCYTES 43 21 - 52 %    MONOCYTES 11 (H) 3 - 10 %    EOSINOPHILS 1 0 - 5 %    BASOPHILS 0 0 - 2 %    ABS. NEUTROPHILS 2.3 1.8 - 8.0 K/UL    ABS. LYMPHOCYTES 2.2 0.9 - 3.6 K/UL    ABS. MONOCYTES 0.6 0.05 - 1.2 K/UL    ABS. EOSINOPHILS 0.0 0.0 - 0.4 K/UL    ABS. BASOPHILS 0.0 0.0 - 0.1 K/UL    DF AUTOMATED     METABOLIC PANEL, COMPREHENSIVE    Collection Time: 04/24/20  4:20 PM   Result Value Ref Range    Sodium 137 136 - 145 mmol/L    Potassium 3.4 (L) 3.5 - 5.5 mmol/L    Chloride 106 100 - 111 mmol/L    CO2 26 21 - 32 mmol/L    Anion gap 5 3.0 - 18 mmol/L    Glucose 164 (H) 74 - 99 mg/dL    BUN 14 7.0 - 18 MG/DL    Creatinine 0.93 0.6 - 1.3 MG/DL    BUN/Creatinine ratio 15 12 - 20      GFR est AA >60 >60 ml/min/1.73m2    GFR est non-AA >60 >60 ml/min/1.73m2    Calcium 9.2 8.5 - 10.1 MG/DL    Bilirubin, total 0.5 0.2 - 1.0 MG/DL    ALT (SGPT) 18 16 - 61 U/L    AST (SGOT) 24 10 - 38 U/L    Alk. phosphatase 92 45 - 117 U/L    Protein, total 8.4 (H) 6.4 - 8.2 g/dL    Albumin 3.6 3.4 - 5.0 g/dL    Globulin 4.8 (H) 2.0 - 4.0 g/dL    A-G Ratio 0.8 0.8 - 1.7     LIPASE    Collection Time: 04/24/20  4:20 PM   Result Value Ref Range    Lipase 57 (L) 73 - 393 U/L      Ct Abd Pelv W Cont    Result Date: 4/24/2020  EXAM: CT of the abdomen and pelvis INDICATION: Epigastric abdominal pain, history of pancreatic carcinoma COMPARISON: PET/CT from 11/29/2019, MRI abdomen from 11/3/2019, CT abdomen pelvis from 11/3/2019 TECHNIQUE: Axial CT imaging of the abdomen and pelvis was performed with intravenous contrast. Multiplanar reformats were generated. One or more dose reduction techniques were used on this CT: automated exposure control, adjustment of the mAs and/or kVp according to patient size, and iterative reconstruction techniques. The specific techniques used on this CT exam have been documented in the patient's electronic medical record.  Digital Imaging and Communications in Medicine (DICOM) format image data are available to nonaffiliated external healthcare facilities or entities on a secure, media free, reciprocally searchable basis with patient authorization for at least a 12-month period after this study. _______________ FINDINGS: LOWER CHEST: No acute or suspicious pulmonary parenchymal process LIVER, BILIARY: Stable to minimally decreased innumerable hepatic parenchymal hypoattenuating masses involving both lobes liver. For example:   > Superior liver, 2.7 cm lesion (axial 23) junction segment 4/8 previously 3 cm.   > Left lobe segment 4 (axial 37) 2.7 cm lesion, previously 2.7 cm. .   > Right lobe segment 7/4 lesion measuring 1.2 cm (image 40), previously 1.7. Stable mild intrahepatic and extra hepatic biliary duct dilatation, with the common bile duct measuring 9 mm; unchanged to prior exam. Small volume pericholecystic ascites, present on prior exam. PANCREAS: Ill-defined infiltrative mass at the junction of the pancreatic body and tail, decrease size to prior CT scan measuring approximately 3.4 x 2.3 cm (axial 48), previously 6.5 x 2.8 cm. Similar peripancreatic stranding with downstream duct dilatation and atrophy of the tail. SPLEEN: Mild persistent splenomegaly with unchanged bandlike low-density at the posterior splenic body. Unchanged thrombosed appearance of the portions of the splenic vein ADRENALS: Normal. KIDNEYS, URETERS, BLADDER: Symmetric enhancing bilateral kidneys without hydronephrosis, perinephric fluid or induration. No hydroureter. Decompressed bladder GASTROINTESTINAL TRACT: No bowel dilation or wall thickening. Small hiatal hernia. LYMPH NODES: Persistent enlarged peripancreatic/portacaval lymph nodes measuring 2.3 x 1.5 cm (axial 46), previously 2.9 x 2.1. Stable subcentimeter short axis upper abdominal aortocaval lymph node. PELVIC ORGANS: Unremarkable. VASCULATURE: Patent portal vein. 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  Stable abdominal aorta OSSEOUS: No acute or aggressive osseous abnormalities identified. OTHER: None. _______________     IMPRESSION: 1. No CT findings of an acute intra-abdominal or intrapelvic obstructive or inflammatory process. 2. Decreased size of the ill-defined infiltrative mass at the junction of the pancreatic body and tail, as described above. 3. Innumerable hepatic metastatic lesions, stable to mildly decreased size to the preceding CT scan of the abdomen/pelvis from 2/12/2020. However, I do believe that the patient is stable and can be discharged home with further outpatient evaluation of the abdominal pain. Pt to call his oncologist for follow-up. Patient is currently on Zofran and Phenergan suppository at home as well as pain medication which he does not know the name of. He may continue to take this at home. Patient has not had any vomiting while in the ED. He otherwise looks well and was given a liter of IV fluids in the department. Diagnosis:   1. Abdominal pain, epigastric    2. Malignant neoplasm of other parts of pancreas ([facility] [address]    3. Non-intractable vomiting with nausea, unspecified vomiting type      Disposition: Discharge    Follow-up Information     Follow up With Specialties Details Why Saman Laboy MD Hematology and Oncology In 3 days  [address] [address] [phone]      [facility] EMERGENCY DEPT Emergency Medicine  If symptoms worsen [address]  [phone]          Patient's Medications   Start Taking    No medications on file   Continue Taking    AVATROMBOPAG 20 MG TAB    Take 20 mg by mouth daily. DEXAMETHASONE (DECADRON) 4 MG TABLET    Take 4mg by mouth twice a day the day before chemotherapy and the day after chemotherapy    DRONABINOL (MARINOL) 5 MG CAPSULE    Take 1 Cap by mouth two (2) times a day. Max Daily Amount: 10 mg. FENTANYL (DURAGESIC) 50 MCG/HR PATCH    1 Patch by TransDERmal route every seventy-two (72) hours for 30 days. Max Daily Amount: 1 Patch. INSULIN GLARGINE (LANTUS U-100 INSULIN) 100 UNIT/ML INJECTION    28 Units by SubCUTAneous route nightly. INSULIN LISPRO (HUMALOG) 100 UNIT/ML INJECTION    3-15 Units by SubCUTAneous route Before breakfast, lunch, and dinner. L-MFOLATE-B6 PHOS-METHYL-B12 (METANX) 3-35-2 MG TAB TAB    Take 1 Tab by mouth two (2) times a day. Indications: peripheral neuropathy    LACTULOSE (CHRONULAC) 10 GRAM/15 ML SOLUTION    Take 15 mL by mouth three (3) times daily. LIDOCAINE (LIDODERM) 5 %    Apply patch to the affected area for 12 hours a day and remove for 12 hours a day. LIDOCAINE-PRILOCAINE (EMLA) TOPICAL CREAM    Apply  to affected area as needed for Pain (Apply to skin 30-60 minutes before mediport access). NALOXONE (NARCAN) 4 MG/ACTUATION NASAL SPRAY    Use 1 spray intranasally, then discard. Repeat with new spray every 2 min as needed for opioid overdose symptoms, alternating nostrils. NALOXONE (NARCAN) 4 MG/ACTUATION NASAL SPRAY    Use 1 spray intranasally, then discard. Repeat with new spray every 2 min as needed for opioid overdose symptoms, alternating nostrils. Indications: opioid overdose    NUT. TX.GLUC. INTOL,LAC-FREE,SOY (GLUCERNA ADVANCE) LIQD    Take 237 mL by mouth three (3) times daily. OLANZAPINE (ZYPREXA ZYDIS) 5 MG DISINTEGRATING TABLET    Take 1 Tab by mouth two (2) times a day. OMEPRAZOLE (PRILOSEC) 20 MG CAPSULE    TAKE 1 CAPSULE BY MOUTH ONCE DAILY    ONDANSETRON (ZOFRAN ODT) 4 MG DISINTEGRATING TABLET    Take 1 Tab by mouth every eight (8) hours as needed for Nausea for up to 20 doses. OXYCODONE IR (ROXICODONE) 10 MG TAB IMMEDIATE RELEASE TABLET    take 1 tablet by mouth every 4 hours if needed for pain up to 30 DAYS    QUETIAPINE (SEROQUEL) 50 MG TABLET    Take 50 mg by mouth nightly. RIVAROXABAN (XARELTO) 15 MG (42)- 20 MG (9) DSPK    Take 20 mg by mouth daily. Take one 15 mg tablet twice a day with food for the first 21 days.  Then, take one 20 mg tablet once a day with food for 9 days.     TRUE METRIX GLUCOSE METER MISC    USE AS DIRECTED    TRUE METRIX GLUCOSE TEST STRIP STRIP       These Medications have changed    No medications on file   Stop Taking    No medications on file     COLLEEN Colon

## 2020-04-24 NOTE — ED TRIAGE NOTES
Pt arrives through triage c/o 4-5 days of abdominal pain and vomiting. Pt has hx of cancer. States he has been on chemo. Last week was last dose.

## 2020-04-24 NOTE — TELEPHONE ENCOUNTER
Spoke with Radha who states patient cannot come to the phone \"he's in the other room\". She states Brian Suazo has had severe nausea/vomiting since his last chemotherapy treatment on 4/13/20 \"it's every day. He can't keep any of his medications down and he's in a lot of pain since he can't take his pain medication\". When asked if he has been taking Zydis 5mg disintegrating tabs she states \"he only takes that the week of chemo\" and states he does not have Zofran disintegrating tablets (per medical record patient was prescribed these on 2/12/20), only Zofran tablets. I advised her per Dr. Kim Brannon that the patient needs to go to the ER for evaluation d/t intractable N/V and severe pain, she was hesitant d/t COVID-19 concerns but agreed and states she will take him now. Per Dr. Kim Brannon, he will see the patient Monday 4/27/20 before his scheduled chemotherapy appointment.

## 2020-04-24 NOTE — TELEPHONE ENCOUNTER
Patient is unable to keep anything down & in a lot of pain, he is throwing up the pain pills & all his medication--his sister did mention hospice-

## 2020-04-25 ENCOUNTER — PATIENT OUTREACH (OUTPATIENT)
Dept: CASE MANAGEMENT | Age: 55
End: 2020-04-25

## 2020-04-25 NOTE — PROGRESS NOTES
Patient contacted regarding recent discharge and COVID-19 risk   Care Transition Nurse/ Ambulatory Care Manager contacted the patient by telephone to perform post discharge assessment. Verified name and  with patient as identifiers. Patient has following risk factors of: immunocompromised and diabetes. CTN/ACM reviewed discharge instructions, medical action plan and red flags related to discharge diagnosis. Reviewed and educated them on any new and changed medications related to discharge diagnosis. Advised obtaining a 90-day supply of all daily and as-needed medications. Education provided regarding infection prevention, and signs and symptoms of COVID-19 and when to seek medical attention with patient who verbalized understanding. Discussed exposure protocols and quarantine from [address] you at higher risk for severe illness  and given an opportunity for questions and concerns. The patient agrees to contact the COVID-19 hotline [phone] or PCP office for questions related to their healthcare. CTN/ACM provided contact information for future reference. From CDC: Are you at higher risk for severe illness?  Wash your hands often.  Avoid close contact (6 feet, which is about two arm lengths) with people who are sick.  Put distance between yourself and other people if COVID-19 is spreading in your community.  Clean and disinfect frequently touched surfaces.  Avoid all cruise travel and non-essential air travel.  Call your healthcare professional if you have concerns about COVID-19 and your underlying condition or if you are sick. For more information on steps you can take to protect yourself, see CDC's How to Protect Yourself      Patient/family/caregiver given information for Martha Brooks and agrees to enroll yes  Patient's preferred e-mail:  [email or web address]  Patient's preferred phone number: [phone]  Based on Loop alert triggers, patient will be contacted by nurse care manager for worsening symptoms. Pt will be further monitored by COVID Loop Team based on severity of symptoms and risk factors.

## 2020-04-26 NOTE — PROGRESS NOTES
Schuyler Closs is a 62 y.o. 1965 male with past medical history including pulmonary embolus, on Lovenox, type 2 diabetes, GERD, who I was asked to see in consultation at the request of Jenna Berg for evaluation for newly diagnosed pancreatic adenocarcinoma.  Patient had fine-needle aspiration of pancreatic mass on 11/13/2019 and pathology showed pancreatic adenocarcinoma. I saw him in consultation for the first time on 11/19/2019) was to treat him with palliative FOLFIRINOX. He received C1D1 FOLFIRINOX on 12/16/2019 and tolerated well overall but cycle 5 was held on 2/10/2020 due to thrombocytopenia. Patient was started on avatrombopag but we still waiting for the insurance to have it shipped to his house. He completed C8 D1 on 4/13/20, here for follow-up. In the interim, he went to ER on 4/24/20 with c/o nausea, vomiting and epigastric pain. CT scan as below. Patient was seen and examined today. [place] is awake alert oriented x3.    Denies any fevers, chills, or nausea and vomiting today.  Denies any melena or bright red blood per rectum. He reports 9/10 abdominal pain. He is clinically stable.  All also points of review of system have been reviewed and were negative.  ECOG performance status 1.  Independent with ADLs and IADLs.    DX: Pancreatic adenocarcinoma     STAGE: Stage IV     Treatment intent: Palliative     ONCOLOGY HISTORY: BRCA1/2 negative-Pan-NTRK negative-No available activating mutation by [facility]  11/03/2019: Went to ER due to 5 days complaints of epigastric pain.  CT abdomen and pelvis showed:  1. 5 cm mass within the pancreatic body highly suggestive of primary malignancy. Superimposed acute pancreatitis cannot entirely be excluded. 2. Multiple liver lesions highly suggestive of metastatic disease 3. Metastatic retroperitoneal and mesenteric lymphadenopathy. 4. Indeterminate hypodensity within the spleen.  Diagnostic consideration include splenic infarct or metastatic lesion 5. Right lower lobe pulmonary emboli.       *MRI of the abdomen showed  1. Stable since same day CT abdomen pelvis. Elliptical 4 cm hypoenhancing mass,  body of pancreas, with upstream glandular atrophy and pancreatic ductal  dilatation, most likely adenocarcinoma. Associated local/regional likely metastatic lymphadenopathy including upper retroperitoneum, lesser omentum, portacaval/periportal space. Associated encasement/narrowing of the adjacent  splenic vein. 2. Mild peripancreatic edema and soft tissue reticulation; may reflect secondary low-grade or chronic pancreatitis or adjacent peripancreatic tumor infiltration.   3. Numerous hypoenhancing and target-like small nodules and masses throughout  the liver, typical of metastatic disease. 4. Mild pericholecystic fluid, nonspecific, but may represent low-grade cholecystitis.   No evident cholecystolithiasis/choledocholithiasis or biliary ductal dilatation. 5. Mild splenomegaly with focal small splenic infarction, age indeterminate  perhaps recent. Small left lower pole, nonacute renal infarction. 6. Other minor and/or ancillary findings including lumbar disc herniations     11/15/2019: Duplex left  lower extremity showed Acute nonocclusive thrombus in the distal femoral vein, popliteal, peroneal and both posterior tibial veins. The thrombus in the distal femoral vein appears to be floating tail-like thrombus.     11/19/2019: First medical oncology visit with me    11/29/2019: PET/CT showed  Malignant metabolic activity corresponding with the known malignancy in the pancreatic body. Innumerable hypermetabolic liver metastases. Metastatic portal caval and aortocaval lymph nodes. Enlargement and malignant metabolic activity in the anterior aspect of the left  psoas muscle suspicious for metastatic disease. Distant metastatic disease to include the left supraclavicular region, left  superior mediastinum and possibly the left inferior hilum.   12/16/19: [id number]/30/19:C2D1-1/13/20:C3D1- 1/14/2020: CA-19-9 = 79 1/27/20:C4D1-2/10/20:C5 held due to thrombocytopenia. Started Avatrombopag (waiting for approval)-2/17/2020: C5D1(delayed)-CA-19-9 = 87 3/09/20: C6 D1  2/12/20:CT CAP showed  1. Redemonstration of multiple stigmata related to the patient's pancreatic  malignancy and accompanying ernesto/hepatic metastasis. 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     > Some interval improvement in the appearance of upper abdominal  retroperitoneal adenopathy with persistently abnormal/necrotic periportal and  peripancreatic lymph nodes.     2. No evidence of bowel obstruction. No free intraperitoneal gas.     3. Mild nonspecific urothelial enhancement. Correlation with urinalysis may be  helpful if symptoms of urinary tract infection are present. 4/24/20: Restaging CT CAP showed   1. No CT findings of an acute intra-abdominal or intrapelvic obstructive or  inflammatory process. 2. Decreased size of the ill-defined infiltrative mass at the junction of the pancreatic body and tail measuring approximately 3.4 x 2.3 cm  (axial 48), previously 6.5 x 2.8 cm. Similar peripancreatic stranding with  downstream duct dilatation and atrophy of the tail. 3. Innumerable hepatic metastatic lesions, stable to mildly decreased size to the preceding CT scan of the abdomen/pelvis from 2/12/2020.          Past Medical History:   Diagnosis Date    Cancer [facility])     Pancreatic Adenocarcinoma. Diagnosed 11/2019.  Diabetes ([facility] [address] 10/2019    Diagnosed 10/2019.  Hepatitis C     Received Curative Treatment, but Hepatitis C was not cured and returned.  Left ulnar fracture     Thromboembolus ([address]     DVT of LLE 11/2019. Also, Pulmonary Embolism 11/2019. Treated with Rivaroxaban.      Past Surgical History:   Procedure Laterality Date    HX TONSILLECTOMY      IR BX PANCREAS NEEDLE PERC  11/2019    Endoscopic Pancreatic Biopsy (?)    IR INSERT TUNL CVC W TESS OVER 5 YEARS  2019     Social History     Socioeconomic History    Marital status:      Spouse name: Not on file    Number of children: Not on file    Years of education: Not on file    Highest education level: Not on file   Tobacco Use    Smoking status: Former Smoker     Packs/day: 0.50     Years: 40.00     Pack years: 20.00     Last attempt to quit: 2018     Years since quittin.4    Smokeless tobacco: Never Used    Tobacco comment: Quit 2018   Substance and Sexual Activity    Alcohol use: Not Currently    Drug use: Not Currently     Types: Marijuana, Cocaine, Heroin     Comment: Quit Cocaine, Heroin, and Marijuana since 2018    Sexual activity: Not Currently   Other Topics Concern     Family History   Problem Relation Age of Onset    Diabetes Mother     Kidney Disease Mother     Diabetes Father     Diabetes Brother     Cancer Maternal Grandmother     Cancer Maternal Grandfather     Cancer Maternal Aunt     Cancer Maternal Uncle        Current Outpatient Medications   Medication Sig Dispense Refill    oxyCODONE IR (ROXICODONE) 10 mg tab immediate release tablet take 1 tablet by mouth every 4 hours if needed for pain up to 30 DAYS 120 Tab 0    OLANZapine (ZyPREXA zydis) 5 mg disintegrating tablet Take 1 Tab by mouth two (2) times a day. 30 Tab 1    dronabinoL (MARINOL) 5 mg capsule Take 1 Cap by mouth two (2) times a day. Max Daily Amount: 10 mg. 60 Cap 3    fentaNYL (DURAGESIC) 50 mcg/hr PATCH 1 Patch by TransDERmal route every seventy-two (72) hours for 30 days. Max Daily Amount: 1 Patch. 10 Patch 0    True Metrix Glucose Test Strip strip       avatrombopag 20 mg tab Take 20 mg by mouth daily. 15 Tab 3    ondansetron (ZOFRAN ODT) 4 mg disintegrating tablet Take 1 Tab by mouth every eight (8) hours as needed for Nausea for up to 20 doses. 20 Tab 0    L-Mfolate-B6 Phos-Methyl-B12 (METANX) 3-35-2 mg tab tab Take 1 Tab by mouth two (2) times a day. Indications: peripheral neuropathy 60 Tab 1    lactulose (CHRONULAC) 10 gram/15 mL solution Take 15 mL by mouth three (3) times daily. 480 mL 3    naloxone (NARCAN) 4 mg/actuation nasal spray Use 1 spray intranasally, then discard. Repeat with new spray every 2 min as needed for opioid overdose symptoms, alternating nostrils. Indications: opioid overdose 1 Each 1    insulin glargine (LANTUS U-100 INSULIN) 100 unit/mL injection 28 Units by SubCUTAneous route nightly. 1 Vial 0    insulin lispro (HUMALOG) 100 unit/mL injection 3-15 Units by SubCUTAneous route Before breakfast, lunch, and dinner. 1 Vial 0    rivaroxaban (XARELTO) 15 mg (42)- 20 mg (9) DsPk Take 20 mg by mouth daily. Take one 15 mg tablet twice a day with food for the first 21 days. Then, take one 20 mg tablet once a day with food for 9 days.  naloxone (NARCAN) 4 mg/actuation nasal spray Use 1 spray intranasally, then discard. Repeat with new spray every 2 min as needed for opioid overdose symptoms, alternating nostrils. 1 Each 1    dexAMETHasone (DECADRON) 4 mg tablet Take 4mg by mouth twice a day the day before chemotherapy and the day after chemotherapy 30 Tab 0    lidocaine-prilocaine (EMLA) topical cream Apply  to affected area as needed for Pain (Apply to skin 30-60 minutes before mediport access). 30 g 0    nut.tx.gluc.intol,lac-free,soy (GLUCERNA ADVANCE) liqd Take 237 mL by mouth three (3) times daily. 90 Bottle 5    TRUE METRIX GLUCOSE METER misc USE AS DIRECTED  0    omeprazole (PRILOSEC) 20 mg capsule TAKE 1 CAPSULE BY MOUTH ONCE DAILY  3    QUEtiapine (SEROQUEL) 50 mg tablet Take 50 mg by mouth nightly.  lidocaine (LIDODERM) 5 % Apply patch to the affected area for 12 hours a day and remove for 12 hours a day. 5 Each 0       No Known Allergies    Review of Systems  As per HPI    Objective: There were no vitals taken for this visit.     Physical Exam:   General appearance - alert, well appearing, and in no distress  Mental status - alert, oriented to person, place, and time  EYE-ONOFRE, EOMI  ENT-ENT exam normal, no neck nodes or sinus tenderness  Mouth - mucous membranes moist, pharynx normal without lesions  Neck - supple, no significant adenopathy   Chest - clear to auscultation, no wheezes, rales or rhonchi, symmetric air entry   Heart - normal rate and regular rhythm   Abdomen - soft, nontender, nondistended, no masses or organomegaly  Lymph- no adenopathy palpable  Ext-no pedal edema noted  Skin-Warm and dry. Neuro -alert, oriented, normal speech, no focal findings or movement disorder noted      Diagnostic Imaging     No results found for this or any previous visit. Results for orders placed during the hospital encounter of 12/18/19   XR KNEE RT MIN 4 V    Narrative EXAM: XR KNEE RT MIN 4 V    CLINICAL INDICATION/HISTORY: Knee pain    > Additional: Right knee pain    COMPARISON: None. > Reference Exam: None. TECHNIQUE: 4 views right knee obtained.    _______________    FINDINGS:    No evidence of acute fracture or dislocation. There appears to be combination of  bipartite patella as well as adjacent well-corticated ossifications along  lateral aspect of patella, possible areas of heterotopic bone formation. There  is suggestion of small to moderate joint effusion. Diffuse chondrocalcinosis. Severe osteoarthritic changes with joint space narrowing patellofemoral joint. Mild spurring along the medial lateral joint compartments without significant  joint space narrowing.    _______________      Impression IMPRESSION:    1. No acute osseous findings. 2. 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 3. Diffuse chondrocalcinosis. 4. Small to moderate joint effusion.        Results for orders placed during the hospital encounter of 04/24/20   CT ABD PELV W CONT    Narrative EXAM: CT of the abdomen and pelvis    INDICATION: Epigastric abdominal pain, history of pancreatic carcinoma    COMPARISON: PET/CT from 11/29/2019, MRI abdomen from 11/3/2019, CT abdomen  pelvis from 11/3/2019    TECHNIQUE: Axial CT imaging of the abdomen and pelvis was performed with  intravenous contrast. Multiplanar reformats were generated. One or more dose  reduction techniques were used on this CT: automated exposure control,  adjustment of the mAs and/or kVp according to patient size, and iterative  reconstruction techniques. The specific techniques used on this CT exam have  been documented in the patient's electronic medical record. Digital Imaging and  Communications in Medicine (DICOM) format image data are available to  nonaffiliated external healthcare facilities or entities on a secure, media  free, reciprocally searchable basis with patient authorization for at least a  12-month period after this study. _______________    FINDINGS:    LOWER CHEST: No acute or suspicious pulmonary parenchymal process    LIVER, BILIARY: Stable to minimally decreased innumerable hepatic parenchymal  hypoattenuating masses involving both lobes liver. For example:      > Superior liver, 2.7 cm lesion (axial 23) junction segment 4/8 previously 3  cm.     > Left lobe segment 4 (axial 37) 2.7 cm lesion, previously 2.7 cm. .     > Right lobe segment 7/4 lesion measuring 1.2 cm (image 40), previously 1.7. Stable mild intrahepatic and extra hepatic biliary duct dilatation, with the  common bile duct measuring 9 mm; unchanged to prior exam. Small volume  pericholecystic ascites, present on prior exam.    PANCREAS: Ill-defined infiltrative mass at the junction of the pancreatic body  and tail, decrease size to prior CT scan measuring approximately 3.4 x 2.3 cm  (axial 48), previously 6.5 x 2.8 cm. Similar peripancreatic stranding with  downstream duct dilatation and atrophy of the tail.     SPLEEN: Mild persistent splenomegaly with unchanged bandlike low-density at the  posterior splenic body. Unchanged thrombosed appearance of the portions of the  splenic vein    ADRENALS: Normal.    KIDNEYS, URETERS, BLADDER: Symmetric enhancing bilateral kidneys without  hydronephrosis, perinephric fluid or induration. No hydroureter. Decompressed  bladder    GASTROINTESTINAL TRACT: No bowel dilation or wall thickening. Small hiatal  hernia. LYMPH NODES: Persistent enlarged peripancreatic/portacaval lymph nodes measuring  2.3 x 1.5 cm (axial 46), previously 2.9 x 2.1. Stable subcentimeter short axis  upper abdominal aortocaval lymph node. PELVIC ORGANS: Unremarkable. VASCULATURE: Patent portal vein. Multiple mesenteric collaterals in keeping with  splenic vein thrombosis. Stable abdominal aorta    OSSEOUS: No acute or aggressive osseous abnormalities identified. OTHER: None.    _______________      Impression IMPRESSION:    1. No CT findings of an acute intra-abdominal or intrapelvic obstructive or  inflammatory process. 2. Decreased size of the ill-defined infiltrative mass at the junction of the  pancreatic body and tail, as described above. 3. Innumerable hepatic metastatic lesions, stable to mildly decreased size to  the preceding CT scan of the abdomen/pelvis from 2/12/2020.            Lab Results  Lab Results   Component Value Date/Time    WBC 5.1 04/24/2020 04:20 PM    HGB 11.5 (L) 04/24/2020 04:20 PM    HCT 32.9 (L) 04/24/2020 04:20 PM    PLATELET 69 (L) 92/62/5306 04:20 PM    MCV 95.6 04/24/2020 04:20 PM       Lab Results   Component Value Date/Time    Sodium 137 04/24/2020 04:20 PM    Potassium 3.4 (L) 04/24/2020 04:20 PM    Chloride 106 04/24/2020 04:20 PM    CO2 26 04/24/2020 04:20 PM    Anion gap 5 04/24/2020 04:20 PM    Glucose 164 (H) 04/24/2020 04:20 PM    BUN 14 04/24/2020 04:20 PM    Creatinine 0.93 04/24/2020 04:20 PM    BUN/Creatinine ratio 15 04/24/2020 04:20 PM    GFR est AA >60 04/24/2020 04:20 PM    GFR est non-AA >60 04/24/2020 04:20 PM Calcium 9.2 04/24/2020 04:20 PM    AST (SGOT) 24 04/24/2020 04:20 PM    Alk. phosphatase 92 04/24/2020 04:20 PM    Protein, total 8.4 (H) 04/24/2020 04:20 PM    Albumin 3.6 04/24/2020 04:20 PM    Globulin 4.8 (H) 04/24/2020 04:20 PM    A-G Ratio 0.8 04/24/2020 04:20 PM    ALT (SGPT) 18 04/24/2020 04:20 PM       Assessment/Plan:  62 y. o. male  1. Pancreatic adenocarcinoma ([facility] [address]  I had a long discussion with Mr. Lavern Norris regarding his newly diagnosed metastatic pancreatic cancer.  I told him that in the first-line setting, palliative chemotherapy have been shown to improve 1 year survival by 73% compared to supportive care alone. Patient tolerasted well C6, clinically much better. Gaining weight. *UG T1 A1  heterozygous  *Brain MRI patient could not do MRI due to claustrophobia. *On 12/18/2019, CA-19-9 was 85 from 112 on 11/19/2019. *On 4/13/20, =80 (0-35). *on 4/24/20, WBC=5.1, H/h 11.5/32.9, Platelets=69,000. BUN=14, Creatinine=0.93. Lipase 57. Day 1: Oxaliplatin 85mg/m2 IV + irinotecan 180mg/m2 IV + leucovorin 400mg/m2 IV, followed by a 5-FU bolus of 400mg/m2 and a 46-hour continuous 5-FU infusion of 2,400mg/m2. Repeat cycle every 2 weeks until disease progression.     Reviewed CT CAP done on 2/12/20 which showed improved or satble disease. Patient tolerated well cycle 6 FOLFIRINOX so far with minimal side effects. Clinically doing very well. Awaiting for insurance to approve avatrombopag. *Recheck CBC today and CA-19-9. *Recheck labs before next cycle. *Check monthly CA-19-9  *Needs palliative care team f/u asap  Hold chemotherapy today and delay by one week. Recheck CBC before.     2. Liver metastases ([facility] [address]  Stable as of CT on 2/12/20     3. Epigastric pain  *Patient having more pain. I will stop oxycodoen which makes vomit and switch him to hydromorphone 2mg Q 4 hours prn, Continue fentanyl patch 50 mcg Q72 hrs as before.        4. Pulmonary embolism without acute cor pulmonale, unspecified chronicity, unspecified pulmonary embolism type (HCC)  Continue Xarelto     5. Thrombocytopenia ([address]  Multifactorial from chemotherapy, and hypersplenism. Platelets are 11,098 today. Give avatrombopag for chemotherapy induced thrombocytopenia     6. Insulin dependent diabetes:Blood sugars much better controlled now. 7. Constipation: Resolved. Has lactulose if needed    8.  Chemotherapy induced: Gave Zydis     RTC 2 weeks            Edilberto Ramon MD

## 2020-04-27 ENCOUNTER — OFFICE VISIT (OUTPATIENT)
Dept: ONCOLOGY | Age: 55
End: 2020-04-27

## 2020-04-27 ENCOUNTER — TELEPHONE (OUTPATIENT)
Dept: ONCOLOGY | Age: 55
End: 2020-04-27

## 2020-04-27 ENCOUNTER — NURSE NAVIGATOR (OUTPATIENT)
Dept: OTHER | Age: 55
End: 2020-04-27

## 2020-04-27 ENCOUNTER — HOSPITAL ENCOUNTER (OUTPATIENT)
Dept: INFUSION THERAPY | Age: 55
Discharge: HOME | End: 2020-04-27
Payer: MEDICAID

## 2020-04-27 VITALS — DIASTOLIC BLOOD PRESSURE: 72 MMHG | SYSTOLIC BLOOD PRESSURE: 106 MMHG | OXYGEN SATURATION: 100 %

## 2020-04-27 VITALS
HEIGHT: 72 IN | WEIGHT: 203 LBS | SYSTOLIC BLOOD PRESSURE: 106 MMHG | TEMPERATURE: 97.6 F | OXYGEN SATURATION: 100 % | RESPIRATION RATE: 16 BRPM | DIASTOLIC BLOOD PRESSURE: 72 MMHG | BODY MASS INDEX: 27.5 KG/M2 | HEART RATE: 58 BPM

## 2020-04-27 DIAGNOSIS — I26.99 PULMONARY EMBOLISM WITHOUT ACUTE COR PULMONALE, UNSPECIFIED CHRONICITY, UNSPECIFIED PULMONARY EMBOLISM TYPE (HCC): ICD-10-CM

## 2020-04-27 DIAGNOSIS — C25.9 PANCREATIC ADENOCARCINOMA (HCC): Primary | ICD-10-CM

## 2020-04-27 DIAGNOSIS — C78.7 LIVER METASTASES (HCC): ICD-10-CM

## 2020-04-27 DIAGNOSIS — G89.3 CANCER ASSOCIATED PAIN: ICD-10-CM

## 2020-04-27 DIAGNOSIS — R11.2 CINV (CHEMOTHERAPY-INDUCED NAUSEA AND VOMITING): ICD-10-CM

## 2020-04-27 DIAGNOSIS — T45.1X5A CINV (CHEMOTHERAPY-INDUCED NAUSEA AND VOMITING): ICD-10-CM

## 2020-04-27 DIAGNOSIS — C78.7 LIVER METASTASIS (HCC): ICD-10-CM

## 2020-04-27 DIAGNOSIS — R11.0 CHEMOTHERAPY-INDUCED NAUSEA: ICD-10-CM

## 2020-04-27 DIAGNOSIS — K86.89 PANCREATIC MASS: Primary | ICD-10-CM

## 2020-04-27 DIAGNOSIS — D69.6 THROMBOCYTOPENIA (HCC): ICD-10-CM

## 2020-04-27 DIAGNOSIS — T45.1X5A CHEMOTHERAPY-INDUCED NAUSEA: ICD-10-CM

## 2020-04-27 DIAGNOSIS — R10.13 EPIGASTRIC PAIN: ICD-10-CM

## 2020-04-27 LAB
BASO+EOS+MONOS # BLD AUTO: 0.5 K/UL (ref 0–2.3)
BASO+EOS+MONOS NFR BLD AUTO: 15 % (ref 0.1–17)
DIFFERENTIAL METHOD BLD: ABNORMAL
ERYTHROCYTE [DISTWIDTH] IN BLOOD BY AUTOMATED COUNT: 16.5 % (ref 11.5–14.5)
HCT VFR BLD AUTO: 31.8 % (ref 36–48)
HGB BLD-MCNC: 10.9 G/DL (ref 12–16)
LYMPHOCYTES # BLD: 1.7 K/UL (ref 1.1–5.9)
LYMPHOCYTES NFR BLD: 47 % (ref 14–44)
MCH RBC QN AUTO: 34.1 PG (ref 25–35)
MCHC RBC AUTO-ENTMCNC: 34.3 G/DL (ref 31–37)
MCV RBC AUTO: 99.4 FL (ref 78–102)
NEUTS SEG # BLD: 1.4 K/UL (ref 1.8–9.5)
NEUTS SEG NFR BLD: 38 % (ref 40–70)
PLATELET # BLD AUTO: 53 K/UL (ref 140–440)
RBC # BLD AUTO: 3.2 M/UL (ref 4.1–5.1)
WBC # BLD AUTO: 3.6 K/UL (ref 4.5–13)

## 2020-04-27 PROCEDURE — 36591 DRAW BLOOD OFF VENOUS DEVICE: CPT

## 2020-04-27 PROCEDURE — 85025 COMPLETE CBC W/AUTO DIFF WBC: CPT

## 2020-04-27 PROCEDURE — 74011250636 HC RX REV CODE- 250/636

## 2020-04-27 PROCEDURE — 77030012965 HC NDL HUBR BBMI -A

## 2020-04-27 RX ORDER — ATROPINE SULFATE 0.4 MG/ML
0.4 INJECTION, SOLUTION ENDOTRACHEAL; INTRAMEDULLARY; INTRAMUSCULAR; INTRAVENOUS; SUBCUTANEOUS ONCE
Status: CANCELLED | OUTPATIENT
Start: 2020-05-04

## 2020-04-27 RX ORDER — HYDROCORTISONE SODIUM SUCCINATE 100 MG/2ML
100 INJECTION, POWDER, FOR SOLUTION INTRAMUSCULAR; INTRAVENOUS AS NEEDED
Status: CANCELLED | OUTPATIENT
Start: 2020-05-04

## 2020-04-27 RX ORDER — HYDROMORPHONE HYDROCHLORIDE 2 MG/1
2 TABLET ORAL
Qty: 120 TAB | Refills: 0 | Status: SHIPPED | OUTPATIENT
Start: 2020-04-27 | End: 2020-06-04

## 2020-04-27 RX ORDER — PALONOSETRON 0.05 MG/ML
0.25 INJECTION, SOLUTION INTRAVENOUS ONCE
Status: CANCELLED | OUTPATIENT
Start: 2020-05-04

## 2020-04-27 RX ORDER — DIPHENHYDRAMINE HYDROCHLORIDE 50 MG/ML
50 INJECTION, SOLUTION INTRAMUSCULAR; INTRAVENOUS AS NEEDED
Status: CANCELLED
Start: 2020-05-04

## 2020-04-27 RX ORDER — FLUOROURACIL 50 MG/ML
900 INJECTION, SOLUTION INTRAVENOUS ONCE
Status: CANCELLED
Start: 2020-05-04 | End: 2020-05-04

## 2020-04-27 RX ORDER — HEPARIN 100 UNIT/ML
SYRINGE INTRAVENOUS
Status: COMPLETED
Start: 2020-04-27 | End: 2020-04-27

## 2020-04-27 RX ORDER — EPINEPHRINE 1 MG/ML
0.3 INJECTION, SOLUTION, CONCENTRATE INTRAVENOUS AS NEEDED
Status: CANCELLED | OUTPATIENT
Start: 2020-05-04

## 2020-04-27 RX ORDER — ATROPINE SULFATE 0.4 MG/ML
0.4 INJECTION, SOLUTION ENDOTRACHEAL; INTRAMEDULLARY; INTRAMUSCULAR; INTRAVENOUS; SUBCUTANEOUS
Status: CANCELLED | OUTPATIENT
Start: 2020-05-04

## 2020-04-27 RX ORDER — ONDANSETRON 2 MG/ML
8 INJECTION INTRAMUSCULAR; INTRAVENOUS AS NEEDED
Status: CANCELLED | OUTPATIENT
Start: 2020-05-04

## 2020-04-27 RX ORDER — ACETAMINOPHEN 325 MG/1
650 TABLET ORAL AS NEEDED
Status: CANCELLED
Start: 2020-05-04

## 2020-04-27 RX ORDER — HEPARIN 100 UNIT/ML
300-500 SYRINGE INTRAVENOUS AS NEEDED
Status: CANCELLED | OUTPATIENT
Start: 2020-05-04

## 2020-04-27 RX ORDER — DEXTROSE MONOHYDRATE 50 MG/ML
25 INJECTION, SOLUTION INTRAVENOUS CONTINUOUS
Status: CANCELLED
Start: 2020-05-04

## 2020-04-27 RX ORDER — OLANZAPINE 5 MG/1
5 TABLET, ORALLY DISINTEGRATING ORAL 2 TIMES DAILY
Qty: 60 TAB | Refills: 3 | Status: SHIPPED | OUTPATIENT
Start: 2020-04-27 | End: 2020-05-27

## 2020-04-27 RX ORDER — SODIUM CHLORIDE 9 MG/ML
10 INJECTION INTRAMUSCULAR; INTRAVENOUS; SUBCUTANEOUS AS NEEDED
Status: CANCELLED | OUTPATIENT
Start: 2020-05-04

## 2020-04-27 RX ORDER — ALBUTEROL SULFATE 0.83 MG/ML
2.5 SOLUTION RESPIRATORY (INHALATION) AS NEEDED
Status: CANCELLED
Start: 2020-05-04

## 2020-04-27 RX ORDER — FENTANYL 50 UG/1
1 PATCH TRANSDERMAL
Qty: 10 PATCH | Refills: 0 | Status: SHIPPED | OUTPATIENT
Start: 2020-04-27 | End: 2020-05-25

## 2020-04-27 RX ORDER — SODIUM CHLORIDE 0.9 % (FLUSH) 0.9 %
10 SYRINGE (ML) INJECTION AS NEEDED
Status: DISPENSED | OUTPATIENT
Start: 2020-04-27 | End: 2020-04-27

## 2020-04-27 RX ORDER — HEPARIN 100 UNIT/ML
300-500 SYRINGE INTRAVENOUS AS NEEDED
Status: ACTIVE | OUTPATIENT
Start: 2020-04-27 | End: 2020-04-27

## 2020-04-27 RX ORDER — SODIUM CHLORIDE 0.9 % (FLUSH) 0.9 %
10 SYRINGE (ML) INJECTION AS NEEDED
Status: CANCELLED | OUTPATIENT
Start: 2020-05-04

## 2020-04-27 RX ADMIN — Medication: at 09:57

## 2020-04-27 RX ADMIN — Medication 10 ML: at 09:50

## 2020-04-27 NOTE — TELEPHONE ENCOUNTER
Spoke with Deepti Tate at [place] [address] regarding patient's Doptelet rx. Deepti Tate states they received the prescription on 3/2/20 and shipped the prescription to the [facility] pharmacy located at [address] [place] [address]. Stephen Costa 31 on 3/3/20.

## 2020-04-27 NOTE — PROGRESS NOTES
[facility] Progress Note    Date: 2020    Name: Sola Méndez              MRN: [id number]              : 1965    Chemotherapy Cycle: 9; Day 1 (HELD)       Pt to [facility], ambulatory, at [address] [place] [address]. Mr. Severo Hugger was assessed and education was provided. Mr. Becerra Heading vitals were reviewed. Visit Vitals  /72 (BP 1 Location: Left arm, BP Patient Position: Sitting)   SpO2 100%       Right anterior chest mediport accessed with 20 g 1 inch emmanuel needle without blood return noted. Patient c/o pain at site and port was deaccessed. Second attempt to access mediport was made successfully by Ricci Cuellar RN. Port was then flushed easily and had brisk blood return. Blood drawn off and sent for CBC w/diff per written orders after 10 ml waste. Patient to see Dr. Sera Underwood prior to chemo infusion. Lab results were obtained and reviewed. Recent Results (from the past 12 hour(s))   CBC WITH 3 PART DIFF    Collection Time: 20  8:45 AM   Result Value Ref Range    WBC 3.6 (L) 4.5 - 13.0 K/uL    RBC 3.20 (L) 4.10 - 5.10 M/uL    HGB 10.9 (L) 12.0 - 16.0 g/dL    HCT 31.8 (L) 36 - 48 %    MCV 99.4 78 - 102 FL    MCH 34.1 25.0 - 35.0 PG    MCHC 34.3 31 - 37 g/dL    RDW 16.5 (H) 11.5 - 14.5 %    PLATELET 53 (L) 532 - 440 K/uL    NEUTROPHILS 38 (L) 40 - 70 %    MIXED CELLS 15 0.1 - 17 %    LYMPHOCYTES 47 (H) 14 - 44 %    ABS. NEUTROPHILS 1.4 (L) 1.8 - 9.5 K/UL    ABS. MIXED CELLS 0.5 0.0 - 2.3 K/uL    ABS. LYMPHOCYTES 1.7 1.1 - 5.9 K/UL    DF AUTOMATED       Per MD orders, Cycle 9 Day 1 to be held today and patient is to return to clinic in 1 week. Patient verbalized understanding of this information provided. Mediport flushed with NS 20 mL and Heparin 500 units/5 mL then de-accessed. No irritation or bleeding noted. Bandaid applied to site. Patient armband removed and shredded. Mr. Severo Hugger was discharged from [place] [id number] in stable condition at 1000.  He is to return on 2020 at 0800 for his next lab appointment.     Edmar Snow RN  April 27, 2020  4:47 PM

## 2020-04-27 NOTE — Clinical Note
Please delay chemo by one week. Recheck labs 1-2 days prior to make sure that thrombocytopenia resolved.

## 2020-04-27 NOTE — PROGRESS NOTES
Pamela Lenz is a 62 y.o. male presenting today for a follow-up appointment. Patient is ambulatory with no assistive device(s), is accompanied by  self, reports of generalized pain all over 9/10 and complaints of nausea. Chief Complaint   Patient presents with    Pancreatic Cancer     f/u     COVID-19 Screening Questions 4/27/2020 4/24/2020 3/23/2020 2/19/2020 2/17/2020 2/12/2020 1/15/2020   Have you been in contact with someone who was sick? - - - No / Unsure No / Unsure No / Unsure No / Unsure   Have you traveled internationally in the last month? No No No No No No No     Visit Vitals  /72 (BP 1 Location: Left arm, BP Patient Position: Sitting)   Pulse (!) 58   Temp 97.6 °F (36.4 °C) (Oral)   Resp 16   Ht 6' (1.829 m)   SpO2 100%   BMI 27.57 kg/m²     Current Outpatient Medications   Medication Sig    oxyCODONE IR (ROXICODONE) 10 mg tab immediate release tablet take 1 tablet by mouth every 4 hours if needed for pain up to 30 DAYS    OLANZapine (ZyPREXA zydis) 5 mg disintegrating tablet Take 1 Tab by mouth two (2) times a day.  dronabinoL (MARINOL) 5 mg capsule Take 1 Cap by mouth two (2) times a day. Max Daily Amount: 10 mg.    fentaNYL (DURAGESIC) 50 mcg/hr PATCH 1 Patch by TransDERmal route every seventy-two (72) hours for 30 days. Max Daily Amount: 1 Patch.  True Metrix Glucose Test Strip strip     avatrombopag 20 mg tab Take 20 mg by mouth daily.  ondansetron (ZOFRAN ODT) 4 mg disintegrating tablet Take 1 Tab by mouth every eight (8) hours as needed for Nausea for up to 20 doses.  L-Mfolate-B6 Phos-Methyl-B12 (METANX) 3-35-2 mg tab tab Take 1 Tab by mouth two (2) times a day. Indications: peripheral neuropathy    lactulose (CHRONULAC) 10 gram/15 mL solution Take 15 mL by mouth three (3) times daily.  naloxone (NARCAN) 4 mg/actuation nasal spray Use 1 spray intranasally, then discard. Repeat with new spray every 2 min as needed for opioid overdose symptoms, alternating nostrils. Indications: opioid overdose    insulin glargine (LANTUS U-100 INSULIN) 100 unit/mL injection 28 Units by SubCUTAneous route nightly.  insulin lispro (HUMALOG) 100 unit/mL injection 3-15 Units by SubCUTAneous route Before breakfast, lunch, and dinner.  rivaroxaban (XARELTO) 15 mg (42)- 20 mg (9) DsPk Take 20 mg by mouth daily. Take one 15 mg tablet twice a day with food for the first 21 days. Then, take one 20 mg tablet once a day with food for 9 days.  naloxone (NARCAN) 4 mg/actuation nasal spray Use 1 spray intranasally, then discard. Repeat with new spray every 2 min as needed for opioid overdose symptoms, alternating nostrils.  dexAMETHasone (DECADRON) 4 mg tablet Take 4mg by mouth twice a day the day before chemotherapy and the day after chemotherapy    lidocaine-prilocaine (EMLA) topical cream Apply  to affected area as needed for Pain (Apply to skin 30-60 minutes before mediport access).  nut.tx.gluc.intol,lac-free,soy (GLUCERNA ADVANCE) liqd Take 237 mL by mouth three (3) times daily.  TRUE METRIX GLUCOSE METER misc USE AS DIRECTED    omeprazole (PRILOSEC) 20 mg capsule TAKE 1 CAPSULE BY MOUTH ONCE DAILY    QUEtiapine (SEROQUEL) 50 mg tablet Take 50 mg by mouth nightly.  lidocaine (LIDODERM) 5 % Apply patch to the affected area for 12 hours a day and remove for 12 hours a day. No current facility-administered medications for this visit. Fall Risk Assessment, last 12 mths 2/17/2020   Able to walk? Yes   Fall in past 12 months? No     3 most recent PHQ Screens 4/27/2020   Little interest or pleasure in doing things Not at all   Feeling down, depressed, irritable, or hopeless Not at all   Total Score PHQ 2 0     Abuse Screening Questionnaire 2/17/2020   Do you ever feel afraid of your partner? N   Are you in a relationship with someone who physically or mentally threatens you? N   Is it safe for you to go home?  Y     Health Maintenance Due   Topic Date Due    Pneumococcal 0-64 years (1 of 3 - PCV13) 07/07/1969    Foot Exam Q1  07/07/1973    MICROALBUMIN Q1  07/07/1973    Eye Exam Retinal or Dilated  07/07/1973    Lipid Screen  07/07/1973    DTaP/Tdap/Td series (1 - Tdap) 07/07/1984    Shingrix Vaccine Age 50> (1 of 2) 07/07/2013    FOBT Q1Y Age 50-75  07/07/2013    A1C test (Diabetic or Prediabetic)  02/25/2020       Medications no longer taking/discontinued:  none    Patient currently taking Antiplatelet therapy?  no    1. Have you been to the ER, urgent care clinic since your last visit? Hospitalized since your last visit?  no     2. Have you seen or consulted any other health care providers outside of the [address] [place] [address] since your last visit? Include any pap smears or colon screening.   no

## 2020-04-28 ENCOUNTER — DOCUMENTATION ONLY (OUTPATIENT)
Dept: ONCOLOGY | Age: 55
End: 2020-04-28

## 2020-04-28 NOTE — PROGRESS NOTES
Prior authorization request for HYDROmorphone (DILAUDID) 2 mg tablet submitted to insurance today via [employer] (Key: [id number] - PA Case ID: [id number] - Rx #: [id number]).

## 2020-04-29 ENCOUNTER — APPOINTMENT (OUTPATIENT)
Dept: INFUSION THERAPY | Age: 55
End: 2020-04-29
Payer: MEDICAID

## 2020-05-01 ENCOUNTER — HOSPITAL ENCOUNTER (OUTPATIENT)
Dept: INFUSION THERAPY | Age: 55
Discharge: HOME | End: 2020-05-01
Payer: MEDICAID

## 2020-05-01 VITALS
WEIGHT: 203.1 LBS | OXYGEN SATURATION: 97 % | BODY MASS INDEX: 27.51 KG/M2 | TEMPERATURE: 98 F | HEIGHT: 72 IN | HEART RATE: 111 BPM | DIASTOLIC BLOOD PRESSURE: 59 MMHG | SYSTOLIC BLOOD PRESSURE: 88 MMHG

## 2020-05-01 LAB
ALBUMIN SERPL-MCNC: 3.5 G/DL (ref 3.4–5)
ALBUMIN/GLOB SERPL: 0.7 {RATIO} (ref 0.8–1.7)
ALP SERPL-CCNC: 105 U/L (ref 45–117)
ALT SERPL-CCNC: 14 U/L (ref 16–61)
ANION GAP SERPL CALC-SCNC: 6 MMOL/L (ref 3–18)
AST SERPL-CCNC: 27 U/L (ref 10–38)
BASO+EOS+MONOS # BLD AUTO: 0.5 K/UL (ref 0–2.3)
BASO+EOS+MONOS NFR BLD AUTO: 14 % (ref 0.1–17)
BILIRUB SERPL-MCNC: 0.3 MG/DL (ref 0.2–1)
BUN SERPL-MCNC: 12 MG/DL (ref 7–18)
BUN/CREAT SERPL: 12 (ref 12–20)
CALCIUM SERPL-MCNC: 9.2 MG/DL (ref 8.5–10.1)
CHLORIDE SERPL-SCNC: 104 MMOL/L (ref 100–111)
CO2 SERPL-SCNC: 25 MMOL/L (ref 21–32)
CREAT SERPL-MCNC: 1.01 MG/DL (ref 0.6–1.3)
DIFFERENTIAL METHOD BLD: ABNORMAL
ERYTHROCYTE [DISTWIDTH] IN BLOOD BY AUTOMATED COUNT: 16.1 % (ref 11.5–14.5)
GLOBULIN SER CALC-MCNC: 4.8 G/DL (ref 2–4)
GLUCOSE SERPL-MCNC: 221 MG/DL (ref 74–99)
HCT VFR BLD AUTO: 34.9 % (ref 36–48)
HGB BLD-MCNC: 12 G/DL (ref 12–16)
LYMPHOCYTES # BLD: 1.6 K/UL (ref 1.1–5.9)
LYMPHOCYTES NFR BLD: 41 % (ref 14–44)
MCH RBC QN AUTO: 34 PG (ref 25–35)
MCHC RBC AUTO-ENTMCNC: 34.4 G/DL (ref 31–37)
MCV RBC AUTO: 98.9 FL (ref 78–102)
NEUTS SEG # BLD: 1.7 K/UL (ref 1.8–9.5)
NEUTS SEG NFR BLD: 44 % (ref 40–70)
PLATELET # BLD AUTO: 106 K/UL (ref 140–440)
POTASSIUM SERPL-SCNC: 4 MMOL/L (ref 3.5–5.5)
PROT SERPL-MCNC: 8.3 G/DL (ref 6.4–8.2)
RBC # BLD AUTO: 3.53 M/UL (ref 4.1–5.1)
SODIUM SERPL-SCNC: 135 MMOL/L (ref 136–145)
WBC # BLD AUTO: 3.8 K/UL (ref 4.5–13)

## 2020-05-01 PROCEDURE — 80053 COMPREHEN METABOLIC PANEL: CPT

## 2020-05-01 PROCEDURE — 36415 COLL VENOUS BLD VENIPUNCTURE: CPT

## 2020-05-01 PROCEDURE — 85025 COMPLETE CBC W/AUTO DIFF WBC: CPT

## 2020-05-01 NOTE — PROGRESS NOTES
[facility] Progress Note    Date: May 1, 2020    Name: Kamila Phillips    MRN: [id number]         : 1965    Peripheral Lab Draw      Mr. Shady Chaidez to [facility], ambulatory at 1018 accompanied by self. Pt was assessed and education was provided. Mr. Joaquina Cardozo vitals were reviewed and patient was observed for 5 minutes prior to treatment. Visit Vitals  BP (!) 88/59 (BP 1 Location: Left arm, BP Patient Position: Sitting)   Pulse (!) 111   Temp 98 °F (36.7 °C)   Ht 6' (1.829 m)   Wt 92.1 kg (203 lb 1.6 oz)   SpO2 97%   BMI 27.55 kg/m²     Recent Results (from the past 12 hour(s))   CBC WITH 3 PART DIFF    Collection Time: 20 10:27 AM   Result Value Ref Range    WBC 3.8 (L) 4.5 - 13.0 K/uL    RBC 3.53 (L) 4.10 - 5.10 M/uL    HGB 12.0 12.0 - 16.0 g/dL    HCT 34.9 (L) 36 - 48 %    MCV 98.9 78 - 102 FL    MCH 34.0 25.0 - 35.0 PG    MCHC 34.4 31 - 37 g/dL    RDW 16.1 (H) 11.5 - 14.5 %    PLATELET 278 (L) 447 - 440 K/uL    NEUTROPHILS 44 40 - 70 %    MIXED CELLS 14 0.1 - 17 %    LYMPHOCYTES 41 14 - 44 %    ABS. NEUTROPHILS 1.7 (L) 1.8 - 9.5 K/UL    ABS. MIXED CELLS 0.5 0.0 - 2.3 K/uL    ABS. LYMPHOCYTES 1.6 1.1 - 5.9 K/UL    DF AUTOMATED         Blood obtained peripherally from left arm x 1 attempt with butterfly needle and sent to lab for Cbc w/diff and Cmp per written orders. No bleeding or hematoma noted at site. Gauze and coban applied. Mr. Shady Chaidez tolerated the phlebotomy, and had no complaints. Patient armband removed and shredded. Mr. Shady Chaidez was discharged from [place] [address] in stable condition at 1028.      Kailee Cervantes Phlebotomist PCT  May 1, 2020  10:46 AM

## 2020-05-04 ENCOUNTER — HOSPITAL ENCOUNTER (OUTPATIENT)
Dept: INFUSION THERAPY | Age: 55
Discharge: HOME | End: 2020-05-04
Payer: MEDICAID

## 2020-05-04 VITALS
SYSTOLIC BLOOD PRESSURE: 111 MMHG | RESPIRATION RATE: 18 BRPM | TEMPERATURE: 97.1 F | DIASTOLIC BLOOD PRESSURE: 75 MMHG | HEART RATE: 69 BPM | OXYGEN SATURATION: 96 %

## 2020-05-04 DIAGNOSIS — C78.7 LIVER METASTASIS (HCC): ICD-10-CM

## 2020-05-04 DIAGNOSIS — K86.89 PANCREATIC MASS: Primary | ICD-10-CM

## 2020-05-04 PROCEDURE — 96411 CHEMO IV PUSH ADDL DRUG: CPT

## 2020-05-04 PROCEDURE — 96413 CHEMO IV INFUSION 1 HR: CPT

## 2020-05-04 PROCEDURE — 96367 TX/PROPH/DG ADDL SEQ IV INF: CPT

## 2020-05-04 PROCEDURE — 96417 CHEMO IV INFUS EACH ADDL SEQ: CPT

## 2020-05-04 PROCEDURE — 74011000258 HC RX REV CODE- 258: Performed by: INTERNAL MEDICINE

## 2020-05-04 PROCEDURE — 74011250636 HC RX REV CODE- 250/636: Performed by: INTERNAL MEDICINE

## 2020-05-04 PROCEDURE — 96415 CHEMO IV INFUSION ADDL HR: CPT

## 2020-05-04 PROCEDURE — 96366 THER/PROPH/DIAG IV INF ADDON: CPT

## 2020-05-04 PROCEDURE — 77030012965 HC NDL HUBR BBMI -A

## 2020-05-04 PROCEDURE — 96368 THER/DIAG CONCURRENT INF: CPT

## 2020-05-04 PROCEDURE — 96375 TX/PRO/DX INJ NEW DRUG ADDON: CPT

## 2020-05-04 PROCEDURE — 96416 CHEMO PROLONG INFUSE W/PUMP: CPT

## 2020-05-04 RX ORDER — PALONOSETRON 0.05 MG/ML
0.25 INJECTION, SOLUTION INTRAVENOUS ONCE
Status: COMPLETED | OUTPATIENT
Start: 2020-05-04 | End: 2020-05-04

## 2020-05-04 RX ORDER — FLUOROURACIL 50 MG/ML
900 INJECTION, SOLUTION INTRAVENOUS ONCE
Status: COMPLETED | OUTPATIENT
Start: 2020-05-04 | End: 2020-05-04

## 2020-05-04 RX ORDER — DEXTROSE MONOHYDRATE 50 MG/ML
25 INJECTION, SOLUTION INTRAVENOUS CONTINUOUS
Status: DISPENSED | OUTPATIENT
Start: 2020-05-04 | End: 2020-05-04

## 2020-05-04 RX ORDER — ATROPINE SULFATE 1 MG/ML
0.4 INJECTION, SOLUTION INTRAVENOUS ONCE
Status: COMPLETED | OUTPATIENT
Start: 2020-05-04 | End: 2020-05-04

## 2020-05-04 RX ORDER — SODIUM CHLORIDE 0.9 % (FLUSH) 0.9 %
10 SYRINGE (ML) INJECTION AS NEEDED
Status: DISPENSED | OUTPATIENT
Start: 2020-05-04 | End: 2020-05-04

## 2020-05-04 RX ADMIN — LEUCOVORIN CALCIUM 900 MG: 200 INJECTION, POWDER, LYOPHILIZED, FOR SUSPENSION INTRAMUSCULAR; INTRAVENOUS at 12:13

## 2020-05-04 RX ADMIN — FLUOROURACIL 5300 MG: 50 INJECTION, SOLUTION INTRAVENOUS at 13:57

## 2020-05-04 RX ADMIN — PALONOSETRON 0.25 MG: 0.05 INJECTION, SOLUTION INTRAVENOUS at 09:03

## 2020-05-04 RX ADMIN — OXALIPLATIN 190 MG: 5 INJECTION, SOLUTION INTRAVENOUS at 10:04

## 2020-05-04 RX ADMIN — IRINOTECAN HYDROCHLORIDE 400 MG: 20 INJECTION, SOLUTION INTRAVENOUS at 12:13

## 2020-05-04 RX ADMIN — FOSAPREPITANT 150 MG: 150 INJECTION, POWDER, LYOPHILIZED, FOR SOLUTION INTRAVENOUS at 09:04

## 2020-05-04 RX ADMIN — Medication 10 ML: at 08:45

## 2020-05-04 RX ADMIN — DEXTROSE 25 ML/HR: 5 SOLUTION INTRAVENOUS at 08:52

## 2020-05-04 RX ADMIN — FLUOROURACIL 900 MG: 50 INJECTION, SOLUTION INTRAVENOUS at 13:52

## 2020-05-04 RX ADMIN — ATROPINE SULFATE 0.4 MG: 1 INJECTION, SOLUTION INTRAMUSCULAR; INTRAVENOUS; SUBCUTANEOUS at 00:04

## 2020-05-04 NOTE — PROGRESS NOTES
[facility] Progress Note    Date: May 4, 2020    Name: iTffany Heredia              MRN: [id number]              : 1965    Chemotherapy Cycle: C9D1 Folfirinox       Pt to [facility], ambulatory, at 0830 accompanied by self. Mr. Bruce Mo was assessed and education was provided. Mr. Akbar Obregon vitals were reviewed. Visit Vitals  /75 (BP 1 Location: Left arm, BP Patient Position: Sitting)   Pulse 69   Temp 97.1 °F (36.2 °C)   Resp 18   SpO2 96%       Right chest mediport accessed with 20 g 1 inch emmanuel needle. Port flushed easily and had brisk blood return. D5 initiated @ kvo. Lab results were obtained and reviewed 20. No results found for this or any previous visit (from the past 12 hour(s)). Lab results within ordered parameters to give chemo today. ANC = 1.7, PLT = 106. Chemo dosages verified with today's BSA and found to be within 10% of ordered dosages. Pre-medications (Emend 150mg IVPB, Aloxi 0.25mg, and atropine0.4mg IVP) were administered as ordered and chemotherapy was initiated after blood return from port re-verified. Reviewed expected side effects of premeds with patient. Oxaliplatin 190mg was infused at  156 ml/hr over 2hrs. VS stable at end of infusion and pt denied complaints. Line flushed with D5 and blood return from port re-verified. Irinotecan 400mg was infused at  380 ml/hr over 90 minutes. VS stable at end of infusion and pt denied complaints. Line flushed with D5 and blood return from port re-verified. Leucovorin 900mg was infused at  213 ml/hr over 90 minutes. VS stable at end of infusion and pt denied complaints. Line flushed with D5 and blood return from port re-verified. Fluorouracil 900mg was given IVP over 5minutes. VS stable at end of infusion and pt denied complaints. Fluorouracil 5300mg was infused at 5.4mL per hr over 46hrs via cadd pump. Mr. Bruce Mo tolerated infusion, and had no complaints at this time.     Patient armband removed and shredded. Mr. Jamey Torres was discharged from [place] [address] in stable condition at 26. He is to return on 5/6/20 at 1500 for his next appointment.     [facility]  May 4, 2020  9:25 AM

## 2020-05-06 ENCOUNTER — HOSPITAL ENCOUNTER (OUTPATIENT)
Dept: INFUSION THERAPY | Age: 55
Discharge: HOME | End: 2020-05-06
Payer: MEDICAID

## 2020-05-06 ENCOUNTER — TELEPHONE (OUTPATIENT)
Dept: ONCOLOGY | Age: 55
End: 2020-05-06

## 2020-05-06 VITALS
DIASTOLIC BLOOD PRESSURE: 67 MMHG | RESPIRATION RATE: 18 BRPM | SYSTOLIC BLOOD PRESSURE: 104 MMHG | TEMPERATURE: 99 F | OXYGEN SATURATION: 100 % | HEART RATE: 69 BPM

## 2020-05-06 PROCEDURE — 74011250636 HC RX REV CODE- 250/636: Performed by: INTERNAL MEDICINE

## 2020-05-06 PROCEDURE — 96523 IRRIG DRUG DELIVERY DEVICE: CPT

## 2020-05-06 RX ORDER — HEPARIN 100 UNIT/ML
500 SYRINGE INTRAVENOUS ONCE
Status: COMPLETED | OUTPATIENT
Start: 2020-05-06 | End: 2020-05-06

## 2020-05-06 RX ORDER — SODIUM CHLORIDE 0.9 % (FLUSH) 0.9 %
10-40 SYRINGE (ML) INJECTION AS NEEDED
Status: DISCONTINUED | OUTPATIENT
Start: 2020-05-06 | End: 2020-05-10 | Stop reason: HOSPADM

## 2020-05-06 RX ADMIN — Medication 10 ML: at 15:55

## 2020-05-06 RX ADMIN — Medication 500 UNITS: at 15:55

## 2020-05-06 NOTE — TELEPHONE ENCOUNTER
Left message on voicemail to have patients sister call our office to reschedule Mr Maria Smart appointment from 5/7/2020 to 5/18/2020

## 2020-05-06 NOTE — PROGRESS NOTES
[facility] Progress Note    Date: May 6, 2020    Name: Fredy Gabriel    MRN: [id number]         : 1965     D/C cadd pump      Mr. Olmos arrived to [facility] at [phone]. Mr. Germaine Steiner was assessed and education was provided. Mr. Vivian Chandler vitals were reviewed. Visit Vitals  /67 (BP 1 Location: Left arm, BP Patient Position: Sitting)   Pulse 69   Temp 99 °F (37.2 °C)   Resp 18   SpO2 100%       Patient's right upper chest port already accessed from previous visit. CADD pump infusion complete. Disconnected from patient's port. Blood return verified. 20 ml of NS and 500 units of heparin then de-accessed. Band aid applied. Mr. Germaine Steiner tolerated infusion without complaints. Mr. Germaine Steiner was discharged from [place] [address] in stable condition at 1600. He is to return on 05/15/2020 at 0815 for his next appointment.     Isreal Caballero RN  May 6, 2020

## 2020-05-07 ENCOUNTER — DOCUMENTATION ONLY (OUTPATIENT)
Dept: ONCOLOGY | Age: 55
End: 2020-05-07

## 2020-05-07 ENCOUNTER — TELEPHONE (OUTPATIENT)
Dept: ONCOLOGY | Age: 55
End: 2020-05-07

## 2020-05-07 ENCOUNTER — APPOINTMENT (OUTPATIENT)
Dept: INFUSION THERAPY | Age: 55
End: 2020-05-07
Payer: MEDICAID

## 2020-05-07 DIAGNOSIS — I26.99 PULMONARY EMBOLISM WITHOUT ACUTE COR PULMONALE, UNSPECIFIED CHRONICITY, UNSPECIFIED PULMONARY EMBOLISM TYPE (HCC): Primary | ICD-10-CM

## 2020-05-07 NOTE — PROGRESS NOTES
Talked to patient today. I told him that I refilled his Xarelto and the new pain medicine should be available in his pharmacy, so, he needs to call the pharmacy. He said he is doing well. He denies bleeding or bruising. He also said he is able to eat, he has no nausea and vomiting.

## 2020-05-07 NOTE — PROGRESS NOTES
Ms. Edwardfaithterrell Lawalistair - patient contact was called today, no answer. Message left that the Xarelto was refilled today and the new pain medication should be available in the pharmacy. We are still following up on the doptelet. If she has questions, she can call [phone].

## 2020-05-07 NOTE — TELEPHONE ENCOUNTER
Received call from [facility] nurse) with [facility]; requesting to speak with nurse regarding patient's medications (Xarelto and Doptelet).   Please f/u [phone]

## 2020-05-07 NOTE — TELEPHONE ENCOUNTER
Talked to [facility] from [facility] about patient's medications - Doptelet, Xarelto and the new pain medication. She said the patient only has 1 week supply of the doptelet, patient needs refill on Xarelto, and the new pain medication was not sent to the pharmacy. I told her I will follow up on these medications.

## 2020-05-11 ENCOUNTER — APPOINTMENT (OUTPATIENT)
Dept: INFUSION THERAPY | Age: 55
End: 2020-05-11
Payer: MEDICAID

## 2020-05-11 RX ORDER — DEXTROSE MONOHYDRATE 50 MG/ML
25 INJECTION, SOLUTION INTRAVENOUS CONTINUOUS
Status: CANCELLED
Start: 2020-05-18

## 2020-05-11 RX ORDER — SODIUM CHLORIDE 9 MG/ML
10 INJECTION INTRAMUSCULAR; INTRAVENOUS; SUBCUTANEOUS AS NEEDED
Status: CANCELLED | OUTPATIENT
Start: 2020-05-28

## 2020-05-11 RX ORDER — HYDROCORTISONE SODIUM SUCCINATE 100 MG/2ML
100 INJECTION, POWDER, FOR SOLUTION INTRAMUSCULAR; INTRAVENOUS AS NEEDED
Status: CANCELLED | OUTPATIENT
Start: 2020-05-18

## 2020-05-11 RX ORDER — HEPARIN 100 UNIT/ML
300-500 SYRINGE INTRAVENOUS AS NEEDED
Status: CANCELLED
Start: 2020-05-18

## 2020-05-11 RX ORDER — SODIUM CHLORIDE 0.9 % (FLUSH) 0.9 %
10 SYRINGE (ML) INJECTION AS NEEDED
Status: CANCELLED
Start: 2020-05-28

## 2020-05-11 RX ORDER — DIPHENHYDRAMINE HYDROCHLORIDE 50 MG/ML
50 INJECTION, SOLUTION INTRAMUSCULAR; INTRAVENOUS AS NEEDED
Status: CANCELLED
Start: 2020-05-18

## 2020-05-11 RX ORDER — FLUOROURACIL 50 MG/ML
900 INJECTION, SOLUTION INTRAVENOUS ONCE
Status: CANCELLED
Start: 2020-05-18 | End: 2020-05-18

## 2020-05-11 RX ORDER — SODIUM CHLORIDE 0.9 % (FLUSH) 0.9 %
10 SYRINGE (ML) INJECTION AS NEEDED
Status: CANCELLED
Start: 2020-05-18

## 2020-05-11 RX ORDER — ATROPINE SULFATE 0.4 MG/ML
0.4 INJECTION, SOLUTION ENDOTRACHEAL; INTRAMEDULLARY; INTRAMUSCULAR; INTRAVENOUS; SUBCUTANEOUS
Status: CANCELLED | OUTPATIENT
Start: 2020-05-18

## 2020-05-11 RX ORDER — EPINEPHRINE 1 MG/ML
0.3 INJECTION, SOLUTION, CONCENTRATE INTRAVENOUS AS NEEDED
Status: CANCELLED | OUTPATIENT
Start: 2020-05-18

## 2020-05-11 RX ORDER — SODIUM CHLORIDE 9 MG/ML
10 INJECTION INTRAMUSCULAR; INTRAVENOUS; SUBCUTANEOUS AS NEEDED
Status: CANCELLED | OUTPATIENT
Start: 2020-05-18

## 2020-05-11 RX ORDER — ATROPINE SULFATE 0.4 MG/ML
0.4 INJECTION, SOLUTION ENDOTRACHEAL; INTRAMEDULLARY; INTRAMUSCULAR; INTRAVENOUS; SUBCUTANEOUS ONCE
Status: CANCELLED | OUTPATIENT
Start: 2020-05-18

## 2020-05-11 RX ORDER — PALONOSETRON 0.05 MG/ML
0.25 INJECTION, SOLUTION INTRAVENOUS ONCE
Status: CANCELLED | OUTPATIENT
Start: 2020-05-18

## 2020-05-11 RX ORDER — ALBUTEROL SULFATE 0.83 MG/ML
2.5 SOLUTION RESPIRATORY (INHALATION) AS NEEDED
Status: CANCELLED
Start: 2020-05-18

## 2020-05-11 RX ORDER — ONDANSETRON 2 MG/ML
8 INJECTION INTRAMUSCULAR; INTRAVENOUS AS NEEDED
Status: CANCELLED | OUTPATIENT
Start: 2020-05-18

## 2020-05-11 RX ORDER — HEPARIN 100 UNIT/ML
300-500 SYRINGE INTRAVENOUS AS NEEDED
Status: CANCELLED
Start: 2020-05-28

## 2020-05-11 RX ORDER — ACETAMINOPHEN 325 MG/1
650 TABLET ORAL AS NEEDED
Status: CANCELLED
Start: 2020-05-18

## 2020-05-13 ENCOUNTER — APPOINTMENT (OUTPATIENT)
Dept: INFUSION THERAPY | Age: 55
End: 2020-05-13
Payer: MEDICAID

## 2020-05-15 ENCOUNTER — HOSPITAL ENCOUNTER (OUTPATIENT)
Dept: INFUSION THERAPY | Age: 55
Discharge: HOME | End: 2020-05-15
Payer: MEDICAID

## 2020-05-15 VITALS
DIASTOLIC BLOOD PRESSURE: 75 MMHG | TEMPERATURE: 98.3 F | BODY MASS INDEX: 27.59 KG/M2 | HEART RATE: 64 BPM | SYSTOLIC BLOOD PRESSURE: 116 MMHG | WEIGHT: 203.7 LBS | HEIGHT: 72 IN | OXYGEN SATURATION: 100 %

## 2020-05-15 LAB
BASO+EOS+MONOS # BLD AUTO: 0.5 K/UL (ref 0–2.3)
BASO+EOS+MONOS NFR BLD AUTO: 13 % (ref 0.1–17)
DIFFERENTIAL METHOD BLD: ABNORMAL
ERYTHROCYTE [DISTWIDTH] IN BLOOD BY AUTOMATED COUNT: 14.7 % (ref 11.5–14.5)
HCT VFR BLD AUTO: 31.9 % (ref 36–48)
HGB BLD-MCNC: 11.3 G/DL (ref 12–16)
LYMPHOCYTES # BLD: 1.6 K/UL (ref 1.1–5.9)
LYMPHOCYTES NFR BLD: 44 % (ref 14–44)
MCH RBC QN AUTO: 34.8 PG (ref 25–35)
MCHC RBC AUTO-ENTMCNC: 35.4 G/DL (ref 31–37)
MCV RBC AUTO: 98.2 FL (ref 78–102)
NEUTS SEG # BLD: 1.6 K/UL (ref 1.8–9.5)
NEUTS SEG NFR BLD: 43 % (ref 40–70)
PLATELET # BLD AUTO: 49 K/UL (ref 135–420)
RBC # BLD AUTO: 3.25 M/UL (ref 4.1–5.1)
WBC # BLD AUTO: 3.7 K/UL (ref 4.5–13)

## 2020-05-15 PROCEDURE — 85025 COMPLETE CBC W/AUTO DIFF WBC: CPT

## 2020-05-15 PROCEDURE — 36415 COLL VENOUS BLD VENIPUNCTURE: CPT

## 2020-05-15 PROCEDURE — 85049 AUTOMATED PLATELET COUNT: CPT

## 2020-05-15 NOTE — PROGRESS NOTES
[facility] Progress Note    Date: May 15, 2020    Name: Caro Morales    MRN: [id number]         : 1965    Peripheral Lab Draw      Mr. Janet Pearson to [facility], ambulatory at 0820 accompanied by self. Pt was assessed and education was provided. Mr. Sabina Petersen vitals were reviewed and patient was observed for 5 minutes prior to treatment. Visit Vitals  /75 (BP 1 Location: Left arm, BP Patient Position: Sitting)   Pulse 64   Temp 98.3 °F (36.8 °C)   Ht 6' (1.829 m)   Wt 92.4 kg (203 lb 11.2 oz)   SpO2 100%   BMI 27.63 kg/m²     Recent Results (from the past 12 hour(s))   CBC WITH 3 PART DIFF    Collection Time: 05/15/20  8:30 AM   Result Value Ref Range    WBC 3.7 (L) 4.5 - 13.0 K/uL    RBC 3.25 (L) 4.10 - 5.10 M/uL    HGB 11.3 (L) 12.0 - 16.0 g/dL    HCT 31.9 (L) 36 - 48 %    MCV 98.2 78 - 102 FL    MCH 34.8 25.0 - 35.0 PG    MCHC 35.4 31 - 37 g/dL    RDW 14.7 (H) 11.5 - 14.5 %    NEUTROPHILS 43 40 - 70 %    MIXED CELLS 13 0.1 - 17 %    LYMPHOCYTES 44 14 - 44 %    ABS. NEUTROPHILS 1.6 (L) 1.8 - 9.5 K/UL    ABS. MIXED CELLS 0.5 0.0 - 2.3 K/uL    ABS. LYMPHOCYTES 1.6 1.1 - 5.9 K/UL    DF AUTOMATED       Recent Results (from the past 12 hour(s))   CBC WITH 3 PART DIFF    Collection Time: 05/15/20  8:30 AM   Result Value Ref Range    WBC 3.7 (L) 4.5 - 13.0 K/uL    RBC 3.25 (L) 4.10 - 5.10 M/uL    HGB 11.3 (L) 12.0 - 16.0 g/dL    HCT 31.9 (L) 36 - 48 %    MCV 98.2 78 - 102 FL    MCH 34.8 25.0 - 35.0 PG    MCHC 35.4 31 - 37 g/dL    RDW 14.7 (H) 11.5 - 14.5 %    NEUTROPHILS 43 40 - 70 %    MIXED CELLS 13 0.1 - 17 %    LYMPHOCYTES 44 14 - 44 %    ABS. NEUTROPHILS 1.6 (L) 1.8 - 9.5 K/UL    ABS. MIXED CELLS 0.5 0.0 - 2.3 K/uL    ABS.  LYMPHOCYTES 1.6 1.1 - 5.9 K/UL    DF AUTOMATED     PLATELET COUNT    Collection Time: 05/15/20  8:30 AM   Result Value Ref Range    PLATELET 49 (L) 423 - 420 K/uL       Blood obtained peripherally from left arm x 1 attempt with butterfly needle and sent to lab for Cbc w/diff per written orders. No bleeding or hematoma noted at site. Gauze and coban applied. Mr. Bobbi Chamberlain tolerated the phlebotomy, and had no complaints. Patient armband removed and shredded. Mr. Bobbi Chamberlain was discharged from [place] [address] in stable condition at 0830.      [facility] Phlebotomist PCT  May 15, 2020  11:44 AM

## 2020-05-18 ENCOUNTER — TELEPHONE (OUTPATIENT)
Dept: ONCOLOGY | Age: 55
End: 2020-05-18

## 2020-05-18 ENCOUNTER — HOSPITAL ENCOUNTER (OUTPATIENT)
Dept: INFUSION THERAPY | Age: 55
Discharge: HOME | End: 2020-05-18
Payer: MEDICAID

## 2020-05-18 VITALS
RESPIRATION RATE: 18 BRPM | DIASTOLIC BLOOD PRESSURE: 70 MMHG | SYSTOLIC BLOOD PRESSURE: 108 MMHG | WEIGHT: 208.06 LBS | OXYGEN SATURATION: 94 % | HEIGHT: 72 IN | TEMPERATURE: 97.5 F | HEART RATE: 60 BPM | BODY MASS INDEX: 28.18 KG/M2

## 2020-05-18 LAB
BASO+EOS+MONOS # BLD AUTO: 0.5 K/UL (ref 0–2.3)
BASO+EOS+MONOS NFR BLD AUTO: 14 % (ref 0.1–17)
DIFFERENTIAL METHOD BLD: ABNORMAL
ERYTHROCYTE [DISTWIDTH] IN BLOOD BY AUTOMATED COUNT: 15.2 % (ref 11.5–14.5)
HCT VFR BLD AUTO: 32.6 % (ref 36–48)
HGB BLD-MCNC: 11.2 G/DL (ref 12–16)
LYMPHOCYTES # BLD: 1.5 K/UL (ref 1.1–5.9)
LYMPHOCYTES NFR BLD: 44 % (ref 14–44)
MCH RBC QN AUTO: 34 PG (ref 25–35)
MCHC RBC AUTO-ENTMCNC: 34.4 G/DL (ref 31–37)
MCV RBC AUTO: 99.1 FL (ref 78–102)
NEUTS SEG # BLD: 1.5 K/UL (ref 1.8–9.5)
NEUTS SEG NFR BLD: 41 % (ref 40–70)
PLATELET # BLD AUTO: 49 K/UL (ref 140–440)
RBC # BLD AUTO: 3.29 M/UL (ref 4.1–5.1)
WBC # BLD AUTO: 3.5 K/UL (ref 4.5–13)

## 2020-05-18 PROCEDURE — 99211 OFF/OP EST MAY X REQ PHY/QHP: CPT

## 2020-05-18 PROCEDURE — 85025 COMPLETE CBC W/AUTO DIFF WBC: CPT

## 2020-05-18 PROCEDURE — 74011250636 HC RX REV CODE- 250/636: Performed by: INTERNAL MEDICINE

## 2020-05-18 PROCEDURE — 77030012965 HC NDL HUBR BBMI -A

## 2020-05-18 PROCEDURE — 86301 IMMUNOASSAY TUMOR CA 19-9: CPT

## 2020-05-18 PROCEDURE — 36591 DRAW BLOOD OFF VENOUS DEVICE: CPT

## 2020-05-18 RX ORDER — HEPARIN 100 UNIT/ML
SYRINGE INTRAVENOUS
Status: DISPENSED
Start: 2020-05-18 | End: 2020-05-18

## 2020-05-18 RX ORDER — SODIUM CHLORIDE 0.9 % (FLUSH) 0.9 %
10-40 SYRINGE (ML) INJECTION AS NEEDED
Status: DISCONTINUED | OUTPATIENT
Start: 2020-05-18 | End: 2020-05-22 | Stop reason: HOSPADM

## 2020-05-18 RX ORDER — HEPARIN 100 UNIT/ML
500 SYRINGE INTRAVENOUS ONCE
Status: COMPLETED | OUTPATIENT
Start: 2020-05-18 | End: 2020-05-18

## 2020-05-18 RX ADMIN — Medication 10 ML: at 08:56

## 2020-05-18 RX ADMIN — Medication 20 ML: at 08:30

## 2020-05-18 RX ADMIN — HEPARIN 500 UNITS: 100 SYRINGE at 08:56

## 2020-05-19 ENCOUNTER — TELEPHONE (OUTPATIENT)
Dept: ONCOLOGY | Age: 55
End: 2020-05-19

## 2020-05-19 LAB — CANCER AG19-9 SERPL-ACNC: 28 U/ML (ref 0–35)

## 2020-05-19 NOTE — TELEPHONE ENCOUNTER
Patient requesting Doptelet order be sent to [facility] specialty pharmacy which needs an authorization

## 2020-05-20 ENCOUNTER — APPOINTMENT (OUTPATIENT)
Dept: INFUSION THERAPY | Age: 55
End: 2020-05-20
Payer: MEDICAID

## 2020-05-20 NOTE — TELEPHONE ENCOUNTER
Left vm for Radha, advised her that patient's insurance unfortunately only covered 15 days worth of Doptelet in a 30 day period so he will not be able to refill the prescription until the end of the 30 days. Provided our office phone # and requested a return call if she has any further questions/concerns.

## 2020-05-21 ENCOUNTER — APPOINTMENT (OUTPATIENT)
Dept: CT IMAGING | Age: 55
End: 2020-05-21
Attending: EMERGENCY MEDICINE
Payer: MEDICAID

## 2020-05-21 ENCOUNTER — HOSPITAL ENCOUNTER (EMERGENCY)
Age: 55
Discharge: HOME | End: 2020-05-21
Attending: EMERGENCY MEDICINE
Payer: MEDICAID

## 2020-05-21 VITALS
DIASTOLIC BLOOD PRESSURE: 67 MMHG | SYSTOLIC BLOOD PRESSURE: 96 MMHG | OXYGEN SATURATION: 93 % | RESPIRATION RATE: 18 BRPM | TEMPERATURE: 98.7 F | HEART RATE: 80 BPM

## 2020-05-21 DIAGNOSIS — Z85.07 HISTORY OF PANCREATIC CANCER: ICD-10-CM

## 2020-05-21 DIAGNOSIS — R10.12 ABDOMINAL PAIN, LUQ (LEFT UPPER QUADRANT): Primary | ICD-10-CM

## 2020-05-21 LAB
ALBUMIN SERPL-MCNC: 3.4 G/DL (ref 3.4–5)
ALBUMIN/GLOB SERPL: 0.7 {RATIO} (ref 0.8–1.7)
ALP SERPL-CCNC: 104 U/L (ref 45–117)
ALT SERPL-CCNC: 15 U/L (ref 16–61)
ANION GAP SERPL CALC-SCNC: 4 MMOL/L (ref 3–18)
AST SERPL-CCNC: 24 U/L (ref 10–38)
BASOPHILS # BLD: 0 K/UL (ref 0–0.1)
BASOPHILS NFR BLD: 0 % (ref 0–2)
BILIRUB SERPL-MCNC: 0.4 MG/DL (ref 0.2–1)
BUN SERPL-MCNC: 14 MG/DL (ref 7–18)
BUN/CREAT SERPL: 15 (ref 12–20)
CALCIUM SERPL-MCNC: 9.1 MG/DL (ref 8.5–10.1)
CHLORIDE SERPL-SCNC: 105 MMOL/L (ref 100–111)
CO2 SERPL-SCNC: 26 MMOL/L (ref 21–32)
CREAT SERPL-MCNC: 0.95 MG/DL (ref 0.6–1.3)
DIFFERENTIAL METHOD BLD: ABNORMAL
EOSINOPHIL # BLD: 0.1 K/UL (ref 0–0.4)
EOSINOPHIL NFR BLD: 3 % (ref 0–5)
ERYTHROCYTE [DISTWIDTH] IN BLOOD BY AUTOMATED COUNT: 15.2 % (ref 11.6–14.5)
GLOBULIN SER CALC-MCNC: 4.9 G/DL (ref 2–4)
GLUCOSE SERPL-MCNC: 222 MG/DL (ref 74–99)
HCT VFR BLD AUTO: 33.4 % (ref 36–48)
HGB BLD-MCNC: 11.5 G/DL (ref 13–16)
LIPASE SERPL-CCNC: 71 U/L (ref 73–393)
LYMPHOCYTES # BLD: 1.4 K/UL (ref 0.9–3.6)
LYMPHOCYTES NFR BLD: 37 % (ref 21–52)
MCH RBC QN AUTO: 34 PG (ref 24–34)
MCHC RBC AUTO-ENTMCNC: 34.4 G/DL (ref 31–37)
MCV RBC AUTO: 98.8 FL (ref 74–97)
MONOCYTES # BLD: 0.5 K/UL (ref 0.05–1.2)
MONOCYTES NFR BLD: 14 % (ref 3–10)
NEUTS SEG # BLD: 1.8 K/UL (ref 1.8–8)
NEUTS SEG NFR BLD: 46 % (ref 40–73)
PLATELET # BLD AUTO: 83 K/UL (ref 135–420)
PMV BLD AUTO: 11.8 FL (ref 9.2–11.8)
POTASSIUM SERPL-SCNC: 4.2 MMOL/L (ref 3.5–5.5)
PROT SERPL-MCNC: 8.3 G/DL (ref 6.4–8.2)
RBC # BLD AUTO: 3.38 M/UL (ref 4.7–5.5)
SODIUM SERPL-SCNC: 135 MMOL/L (ref 136–145)
WBC # BLD AUTO: 3.9 K/UL (ref 4.6–13.2)

## 2020-05-21 PROCEDURE — 74011250636 HC RX REV CODE- 250/636: Performed by: EMERGENCY MEDICINE

## 2020-05-21 PROCEDURE — 96374 THER/PROPH/DIAG INJ IV PUSH: CPT

## 2020-05-21 PROCEDURE — 96375 TX/PRO/DX INJ NEW DRUG ADDON: CPT

## 2020-05-21 PROCEDURE — 80053 COMPREHEN METABOLIC PANEL: CPT

## 2020-05-21 PROCEDURE — 83690 ASSAY OF LIPASE: CPT

## 2020-05-21 PROCEDURE — 85025 COMPLETE CBC W/AUTO DIFF WBC: CPT

## 2020-05-21 PROCEDURE — 99284 EMERGENCY DEPT VISIT MOD MDM: CPT

## 2020-05-21 PROCEDURE — 74011636320 HC RX REV CODE- 636/320: Performed by: EMERGENCY MEDICINE

## 2020-05-21 PROCEDURE — 74177 CT ABD & PELVIS W/CONTRAST: CPT

## 2020-05-21 RX ORDER — ONDANSETRON 2 MG/ML
4 INJECTION INTRAMUSCULAR; INTRAVENOUS
Status: COMPLETED | OUTPATIENT
Start: 2020-05-21 | End: 2020-05-21

## 2020-05-21 RX ORDER — HYDROMORPHONE HYDROCHLORIDE 1 MG/ML
0.5 INJECTION, SOLUTION INTRAMUSCULAR; INTRAVENOUS; SUBCUTANEOUS ONCE
Status: COMPLETED | OUTPATIENT
Start: 2020-05-21 | End: 2020-05-21

## 2020-05-21 RX ORDER — HYDROMORPHONE HYDROCHLORIDE 1 MG/ML
1 INJECTION, SOLUTION INTRAMUSCULAR; INTRAVENOUS; SUBCUTANEOUS ONCE
Status: COMPLETED | OUTPATIENT
Start: 2020-05-21 | End: 2020-05-21

## 2020-05-21 RX ADMIN — SODIUM CHLORIDE 500 ML: 900 INJECTION, SOLUTION INTRAVENOUS at 14:37

## 2020-05-21 RX ADMIN — IOPAMIDOL 100 ML: 612 INJECTION, SOLUTION INTRAVENOUS at 13:21

## 2020-05-21 RX ADMIN — HYDROMORPHONE HYDROCHLORIDE 0.5 MG: 1 INJECTION, SOLUTION INTRAMUSCULAR; INTRAVENOUS; SUBCUTANEOUS at 12:47

## 2020-05-21 RX ADMIN — SODIUM CHLORIDE 1000 ML: 900 INJECTION, SOLUTION INTRAVENOUS at 12:47

## 2020-05-21 RX ADMIN — HYDROMORPHONE HYDROCHLORIDE 1 MG: 1 INJECTION, SOLUTION INTRAMUSCULAR; INTRAVENOUS; SUBCUTANEOUS at 14:37

## 2020-05-21 RX ADMIN — ONDANSETRON 4 MG: 2 INJECTION INTRAMUSCULAR; INTRAVENOUS at 14:37

## 2020-05-21 NOTE — DISCHARGE INSTRUCTIONS

## 2020-05-21 NOTE — ED TRIAGE NOTES
Patient co left side abd pain and vomiting since this morning. Patient diagnosed with pancreatic cancer. Took dilaudid and oxycodone this morning with no relief. Last chemo treatment was last week.

## 2020-05-21 NOTE — ED NOTES
History: Patient was signed out at 2 PM to reevaluate for pain. Patient's triage note with RICHI level 3  Patient presented for left-sided abdominal pain vomiting  Was diagnosed pancreatic cancer  Took his pain medication was not improving  Patient's last chemo was 1 week or  Patient was signed out to reevaluate after pain medication  CT scans were nonspecific and there was no further management to be done in the emergency department per Dr. Taryn Sharma. Vitals:  Patient Vitals for the past 12 hrs:   Temp Pulse Resp BP SpO2   05/21/20 1500    94/63 95 %   05/21/20 1445    96/60 94 %   05/21/20 1419    105/64 97 %   05/21/20 1400    104/62 97 %   05/21/20 1303     94 %   05/21/20 1300    96/64 93 %   05/21/20 1245    90/61 93 %   05/21/20 1243     94 %   05/21/20 1228    98/67    05/21/20 1152    (!) 167/149    05/21/20 1149  80      05/21/20 1147 98.7 °F (37.1 °C)  18  95 %       Medications ordered:   Medications   sodium chloride 0.9 % bolus infusion 1,000 mL (0 mL IntraVENous IV Completed 5/21/20 1437)   HYDROmorphone (DILAUDID) syringe 0.5 mg (0.5 mg IntraVENous Given 5/21/20 1247)   iopamidoL (ISOVUE 300) 61 % contrast injection 100 mL (100 mL IntraVENous Given 5/21/20 1321)   HYDROmorphone (DILAUDID) injection 1 mg (1 mg IntraVENous Given 5/21/20 1437)   ondansetron (ZOFRAN) injection 4 mg (4 mg IntraVENous Given 5/21/20 1437)   sodium chloride 0.9 % bolus infusion 500 mL (500 mL IntraVENous New Bag 5/21/20 1437)         Progress notes, Consult notes or Re-evaluation:   Patient stable in the emergency department labs reviewed no acute finding  Awaiting improvement from patient's pain as per the handoff by Dr. Taryn Sharma.     Diagnostic Study Results     Labs -     Recent Results (from the past 12 hour(s))   CBC WITH AUTOMATED DIFF    Collection Time: 05/21/20 12:40 PM   Result Value Ref Range    WBC 3.9 (L) 4.6 - 13.2 K/uL    RBC 3.38 (L) 4.70 - 5.50 M/uL    HGB 11.5 (L) 13.0 - 16.0 g/dL    HCT 33.4 (L) 36.0 - 48.0 %    MCV 98.8 (H) 74.0 - 97.0 FL    MCH 34.0 24.0 - 34.0 PG    MCHC 34.4 31.0 - 37.0 g/dL    RDW 15.2 (H) 11.6 - 14.5 %    PLATELET 83 (L) 876 - 420 K/uL    MPV 11.8 9.2 - 11.8 FL    NEUTROPHILS 46 40 - 73 %    LYMPHOCYTES 37 21 - 52 %    MONOCYTES 14 (H) 3 - 10 %    EOSINOPHILS 3 0 - 5 %    BASOPHILS 0 0 - 2 %    ABS. NEUTROPHILS 1.8 1.8 - 8.0 K/UL    ABS. LYMPHOCYTES 1.4 0.9 - 3.6 K/UL    ABS. MONOCYTES 0.5 0.05 - 1.2 K/UL    ABS. EOSINOPHILS 0.1 0.0 - 0.4 K/UL    ABS. BASOPHILS 0.0 0.0 - 0.1 K/UL    DF AUTOMATED     METABOLIC PANEL, COMPREHENSIVE    Collection Time: 05/21/20 12:40 PM   Result Value Ref Range    Sodium 135 (L) 136 - 145 mmol/L    Potassium 4.2 3.5 - 5.5 mmol/L    Chloride 105 100 - 111 mmol/L    CO2 26 21 - 32 mmol/L    Anion gap 4 3.0 - 18 mmol/L    Glucose 222 (H) 74 - 99 mg/dL    BUN 14 7.0 - 18 MG/DL    Creatinine 0.95 0.6 - 1.3 MG/DL    BUN/Creatinine ratio 15 12 - 20      GFR est AA >60 >60 ml/min/1.73m2    GFR est non-AA >60 >60 ml/min/1.73m2    Calcium 9.1 8.5 - 10.1 MG/DL    Bilirubin, total 0.4 0.2 - 1.0 MG/DL    ALT (SGPT) 15 (L) 16 - 61 U/L    AST (SGOT) 24 10 - 38 U/L    Alk. phosphatase 104 45 - 117 U/L    Protein, total 8.3 (H) 6.4 - 8.2 g/dL    Albumin 3.4 3.4 - 5.0 g/dL    Globulin 4.9 (H) 2.0 - 4.0 g/dL    A-G Ratio 0.7 (L) 0.8 - 1.7     LIPASE    Collection Time: 05/21/20 12:40 PM   Result Value Ref Range    Lipase 71 (L) 73 - 393 U/L       Radiologic Studies -   CT ABD PELV W CONT   Final Result   IMPRESSION:      1. Multiple hepatic metastases, all of which appears slightly smaller in size   compared to most recent CT of 4/24/2020. Peripancreatic lymphadenopathy also   appears slightly decreased in the interval.   2. Primary pancreatic mass appears essentially unchanged.  Indeterminant to what   extent peripancreatic inflammatory stranding represents a superimposed acute   pancreatitis versus inflammatory stigmata related to pancreatic cancer. CT Results  (Last 48 hours)               05/21/20 1327  CT ABD PELV W CONT Final result    Impression:  IMPRESSION:       1. Multiple hepatic metastases, all of which appears slightly smaller in size   compared to most recent CT of 4/24/2020. Peripancreatic lymphadenopathy also   appears slightly decreased in the interval.   2. Primary pancreatic mass appears essentially unchanged. Indeterminant to what   extent peripancreatic inflammatory stranding represents a superimposed acute   pancreatitis versus inflammatory stigmata related to pancreatic cancer. Narrative:  EXAM: CT ABD PELV W CONT       CLINICAL INDICATION/HISTORY: Left-sided abdominal pain and vomiting since   earlier this morning, history of pancreatic cancer       COMPARISON: 4/24/2020 and 2/12/2020       TECHNIQUE:   CT of the abdomen and pelvis following intravenous contrast   administration. Coronal and sagittal reformats were generated and reviewed. One or more dose reduction techniques were used on this CT: automated exposure   control, adjustment of the mAs and/or kVp according to patient size, and   iterative reconstruction techniques. The specific techniques used on this CT   exam have been documented in the patient's electronic medical record. Digital   Imaging and Communications in Medicine (DICOM) format image data are available   to nonaffiliated external healthcare facilities or entities on a secure, media   free, reciprocally searchable basis with patient authorization for at least a   12-month period after this study. _______________       FINDINGS:       LOWER THORAX: Moderate left and right basilar dependent atelectasis. No acute   pulmonary infiltrate. No pleural effusion. No global cardiomegaly or   pericardial effusion. LIVER AND BILIARY:  Several heterogeneous, hypodense masses are again seen   within the liver. Representative lesion within the anterior right hepatic lobe   (image 37) appears slightly smaller in size and prior study, currently measuring   2.1 cm, previously 2.7 cm on CT of 4/24/2020. The more superior right hepatic   lobe lesion (image 20) also appears slightly smaller in size, currently   measuring 2.1 cm, previously 2.7 cm. Lateral right hepatic lobe lesion (image   35) also appears slightly smaller in size, currently 1.0 cm, previously 1.2 cm. No new or interval progression of hepatic metastases. Mild mostly central   intrahepatic biliary dilatation with unchanged mild prominence of the common   bile duct. Minimal pericholecystic fluid. SPLEEN:  Unchanged mild splenomegaly with focal cleft towards the posterior   superior margin. PANCREAS:  Poorly defined infiltrative mass is again seen along the anterior   aspect of the pancreatic body/tail junction with a few central cystic   components. There is also subtle adjacent peripancreatic inflammatory stranding   around the pancreatic tail. Downstream pancreatic mild duct dilatation and   pancreatic tail atrophy also appear unchanged. ADRENALS:  Normal.       KIDNEYS:  Normal.       LYMPH NODES:  Borderline enlarged peripancreatic and portacaval lymph nodes,   largest measuring 1.2 cm short axis, previously 1.5 cm. GI TRACT:  Small hiatal hernia. Normal caliber small and large bowel loops. No   morphology of bowel obstruction. No bowel wall thickening. Normal appendix. PELVIC ORGANS:  Bladder is normal in appearance. No acute abnormality. VASCULATURE:  Portal vein remains adequately patent. 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 Normal caliber lower thoracic and   abdominal aorta with mild atherosclerotic vascular calcification. OTHER:   No ascites or free intraperitoneal air. OSSEOUS STRUCTURES:  No acute osseous abnormality.   Mild multilevel degenerative   disk disease and facet arthropathy. _______________               CXR Results  (Last 48 hours)    None        3:55 PM  Patient reevaluate  No complaints states he is improved on his pain he will talk to Dr. Fiazan Murguia and will follow-up with him. He has a scheduled chemotherapy appoint with him for next week on Monday. Patient states if any change or worsen to return to the emergency department otherwise he is very comfortable to discharge. All of his questions have been answered and he is ambulating without any difficulty in the emergency department. Positive p.o. intake in the ER as well. No vomiting. Discharge     Clinical Impression:   1. Abdominal pain, LUQ (left upper quadrant)    2.  History of pancreatic cancer        Disposition:  Home      Follow-up Information     Follow up With Specialties Details Why [address] [facility] EMERGENCY DEPT Emergency Medicine  If symptoms worsen [address] [place] [address]    Kina Abraham MD Hematology and Oncology Schedule an appointment as soon as possible for a visit in 1 week  [address] [facility]  [address]  [address] [place] [address] [phone]

## 2020-05-22 ENCOUNTER — PATIENT OUTREACH (OUTPATIENT)
Dept: INTERNAL MEDICINE CLINIC | Age: 55
End: 2020-05-22

## 2020-05-22 ENCOUNTER — HOSPITAL ENCOUNTER (OUTPATIENT)
Dept: INFUSION THERAPY | Age: 55
Discharge: HOME | End: 2020-05-22
Payer: MEDICAID

## 2020-05-22 VITALS
WEIGHT: 208 LBS | OXYGEN SATURATION: 96 % | DIASTOLIC BLOOD PRESSURE: 66 MMHG | HEART RATE: 55 BPM | BODY MASS INDEX: 28.17 KG/M2 | HEIGHT: 72 IN | SYSTOLIC BLOOD PRESSURE: 102 MMHG | TEMPERATURE: 98.8 F

## 2020-05-22 LAB
ALBUMIN SERPL-MCNC: 3.2 G/DL (ref 3.4–5)
ALBUMIN/GLOB SERPL: 0.7 {RATIO} (ref 0.8–1.7)
ALP SERPL-CCNC: 101 U/L (ref 45–117)
ALT SERPL-CCNC: 17 U/L (ref 16–61)
ANION GAP SERPL CALC-SCNC: 8 MMOL/L (ref 3–18)
AST SERPL-CCNC: 31 U/L (ref 10–38)
BASO+EOS+MONOS # BLD AUTO: 0.4 K/UL (ref 0–2.3)
BASO+EOS+MONOS NFR BLD AUTO: 12 % (ref 0.1–17)
BILIRUB SERPL-MCNC: 0.4 MG/DL (ref 0.2–1)
BUN SERPL-MCNC: 16 MG/DL (ref 7–18)
BUN/CREAT SERPL: 16 (ref 12–20)
CALCIUM SERPL-MCNC: 8.8 MG/DL (ref 8.5–10.1)
CHLORIDE SERPL-SCNC: 104 MMOL/L (ref 100–111)
CO2 SERPL-SCNC: 24 MMOL/L (ref 21–32)
CREAT SERPL-MCNC: 1 MG/DL (ref 0.6–1.3)
DIFFERENTIAL METHOD BLD: ABNORMAL
ERYTHROCYTE [DISTWIDTH] IN BLOOD BY AUTOMATED COUNT: 15.4 % (ref 11.5–14.5)
GLOBULIN SER CALC-MCNC: 4.8 G/DL (ref 2–4)
GLUCOSE SERPL-MCNC: 202 MG/DL (ref 74–99)
HCT VFR BLD AUTO: 34.1 % (ref 36–48)
HGB BLD-MCNC: 11.6 G/DL (ref 12–16)
LYMPHOCYTES # BLD: 1.8 K/UL (ref 1.1–5.9)
LYMPHOCYTES NFR BLD: 51 % (ref 14–44)
MCH RBC QN AUTO: 34.2 PG (ref 25–35)
MCHC RBC AUTO-ENTMCNC: 34 G/DL (ref 31–37)
MCV RBC AUTO: 100.6 FL (ref 78–102)
NEUTS SEG # BLD: 1.3 K/UL (ref 1.8–9.5)
NEUTS SEG NFR BLD: 37 % (ref 40–70)
PLATELET # BLD AUTO: 70 K/UL (ref 140–440)
POTASSIUM SERPL-SCNC: 4.5 MMOL/L (ref 3.5–5.5)
PROT SERPL-MCNC: 8 G/DL (ref 6.4–8.2)
RBC # BLD AUTO: 3.39 M/UL (ref 4.1–5.1)
SODIUM SERPL-SCNC: 136 MMOL/L (ref 136–145)
WBC # BLD AUTO: 3.5 K/UL (ref 4.5–13)

## 2020-05-22 PROCEDURE — 80053 COMPREHEN METABOLIC PANEL: CPT

## 2020-05-22 PROCEDURE — 85025 COMPLETE CBC W/AUTO DIFF WBC: CPT

## 2020-05-22 PROCEDURE — 36415 COLL VENOUS BLD VENIPUNCTURE: CPT

## 2020-05-22 RX ORDER — ATROPINE SULFATE 0.4 MG/ML
0.4 INJECTION, SOLUTION ENDOTRACHEAL; INTRAMEDULLARY; INTRAMUSCULAR; INTRAVENOUS; SUBCUTANEOUS
Status: CANCELLED | OUTPATIENT
Start: 2020-05-26

## 2020-05-22 RX ORDER — DIPHENHYDRAMINE HYDROCHLORIDE 50 MG/ML
50 INJECTION, SOLUTION INTRAMUSCULAR; INTRAVENOUS AS NEEDED
Status: CANCELLED
Start: 2020-05-26

## 2020-05-22 RX ORDER — EPINEPHRINE 1 MG/ML
0.3 INJECTION, SOLUTION, CONCENTRATE INTRAVENOUS AS NEEDED
Status: CANCELLED | OUTPATIENT
Start: 2020-05-26

## 2020-05-22 RX ORDER — SODIUM CHLORIDE 9 MG/ML
10 INJECTION INTRAMUSCULAR; INTRAVENOUS; SUBCUTANEOUS AS NEEDED
Status: CANCELLED | OUTPATIENT
Start: 2020-05-26

## 2020-05-22 RX ORDER — HYDROCORTISONE SODIUM SUCCINATE 100 MG/2ML
100 INJECTION, POWDER, FOR SOLUTION INTRAMUSCULAR; INTRAVENOUS AS NEEDED
Status: CANCELLED | OUTPATIENT
Start: 2020-05-26

## 2020-05-22 RX ORDER — HEPARIN 100 UNIT/ML
300-500 SYRINGE INTRAVENOUS AS NEEDED
Status: CANCELLED
Start: 2020-05-26

## 2020-05-22 RX ORDER — ONDANSETRON 2 MG/ML
8 INJECTION INTRAMUSCULAR; INTRAVENOUS AS NEEDED
Status: CANCELLED | OUTPATIENT
Start: 2020-05-26

## 2020-05-22 RX ORDER — ACETAMINOPHEN 325 MG/1
650 TABLET ORAL AS NEEDED
Status: CANCELLED
Start: 2020-05-26

## 2020-05-22 RX ORDER — ALBUTEROL SULFATE 0.83 MG/ML
2.5 SOLUTION RESPIRATORY (INHALATION) AS NEEDED
Status: CANCELLED
Start: 2020-05-26

## 2020-05-22 NOTE — PROGRESS NOTES
[facility] Progress Note    Date: May 22, 2020    Name: Ashlee Bryan    MRN: [id number]         : 1965    Peripheral Lab Draw      Mr. Cresencio Rosenthal to [facility], ambulatory at 0815 accompanied by self. Pt was assessed and education was provided. Mr. Siddharth Centeno vitals were reviewed and patient was observed for 5 minutes prior to treatment. Visit Vitals  /66 (BP 1 Location: Left arm, BP Patient Position: Sitting)   Pulse (!) 55   Temp 98.8 °F (37.1 °C)   Ht 6' (1.829 m)   Wt 94.3 kg (208 lb)   SpO2 96%   BMI 28.21 kg/m²     Recent Results (from the past 12 hour(s))   CBC WITH 3 PART DIFF    Collection Time: 20  8:25 AM   Result Value Ref Range    WBC 3.5 (L) 4.5 - 13.0 K/uL    RBC 3.39 (L) 4.10 - 5.10 M/uL    HGB 11.6 (L) 12.0 - 16.0 g/dL    HCT 34.1 (L) 36 - 48 %    .6 78 - 102 FL    MCH 34.2 25.0 - 35.0 PG    MCHC 34.0 31 - 37 g/dL    RDW 15.4 (H) 11.5 - 14.5 %    PLATELET 70 (L) 417 - 440 K/uL    NEUTROPHILS 37 (L) 40 - 70 %    MIXED CELLS 12 0.1 - 17 %    LYMPHOCYTES 51 (H) 14 - 44 %    ABS. NEUTROPHILS 1.3 (L) 1.8 - 9.5 K/UL    ABS. MIXED CELLS 0.4 0.0 - 2.3 K/uL    ABS. LYMPHOCYTES 1.8 1.1 - 5.9 K/UL    DF AUTOMATED         Blood obtained peripherally from left arm x 1 attempt with butterfly needle and sent to lab for Cbc w/diff and Cmp per written orders. No bleeding or hematoma noted at site. Gauze and coban applied. Mr. Cresencio Rosenthal tolerated the phlebotomy, and had no complaints. Patient armband removed and shredded. Mr. Cresencio Rosenthal was discharged from [place] [address] in stable condition at [address] [place] [address].      Anderson Crump Phlebotomist PCT  May 22, 2020  8:33 AM

## 2020-05-22 NOTE — PROGRESS NOTES
Spoke with sister-in-law who nghia's pt's medical care,       Patient contacted regarding COVID-19  risk. Care Transition Nurse/ Ambulatory Care Manager contacted the family by telephone to perform post discharge assessment. Verified name and  with family as identifiers. Provided introduction to self, and explanation of the CTN/ACM role, and reason for call due to risk factors for infection and/or exposure to COVID-19. Symptoms reviewed with family who verbalized the following symptoms: no new symptoms and no worsening symptoms. Due to no new or worsening symptoms encounter was not routed to provider for escalation. Patient has following risk factors of: immunocompromised and diabetes. CTN/ACM reviewed discharge instructions, medical action plan and red flags such as increased shortness of breath, increasing fever and signs of decompensation with family who verbalized understanding. Discussed exposure protocols and quarantine with CDC Guidelines What to do if you are sick with coronavirus disease 2019.  Family was given an opportunity for questions and concerns. The family agrees to contact the [facility] exposure line [phone], [facility] department [facility]  ([phone]) and PCP office for questions related to their healthcare. CTN/ACM provided contact information for future needs. Reviewed and educated family on any new and changed medications related to discharge diagnosis. Patient/family/caregiver given information for [employer] and agrees to enroll no    Patient's preferred e-mail:  Not at this time  Patient's preferred phone number:     Based on Loop alert triggers, patient will be contacted by nurse care manager for worsening symptoms. Plan for follow-up call in 5-7 days based on severity of symptoms and risk factors.

## 2020-05-23 DIAGNOSIS — C25.9 PANCREATIC ADENOCARCINOMA (HCC): ICD-10-CM

## 2020-05-23 DIAGNOSIS — G89.3 CANCER ASSOCIATED PAIN: ICD-10-CM

## 2020-05-23 DIAGNOSIS — C78.7 LIVER METASTASES (HCC): ICD-10-CM

## 2020-05-25 RX ORDER — FENTANYL 50 UG/1
PATCH TRANSDERMAL
Qty: 10 PATCH | Refills: 0 | Status: SHIPPED | OUTPATIENT
Start: 2020-05-25 | End: 2020-06-23

## 2020-05-25 RX ORDER — LIDOCAINE AND PRILOCAINE 25; 25 MG/G; MG/G
CREAM TOPICAL
Qty: 30 G | Refills: 0 | Status: SHIPPED | OUTPATIENT
Start: 2020-05-25

## 2020-05-26 ENCOUNTER — HOSPITAL ENCOUNTER (OUTPATIENT)
Dept: INFUSION THERAPY | Age: 55
Discharge: HOME | End: 2020-05-26
Payer: MEDICAID

## 2020-05-26 VITALS
SYSTOLIC BLOOD PRESSURE: 108 MMHG | TEMPERATURE: 97.6 F | RESPIRATION RATE: 20 BRPM | DIASTOLIC BLOOD PRESSURE: 69 MMHG | HEART RATE: 70 BPM | OXYGEN SATURATION: 94 %

## 2020-05-26 DIAGNOSIS — C78.7 LIVER METASTASIS (HCC): ICD-10-CM

## 2020-05-26 DIAGNOSIS — K86.89 PANCREATIC MASS: Primary | ICD-10-CM

## 2020-05-26 LAB
BASO+EOS+MONOS # BLD AUTO: 0.5 K/UL (ref 0–2.3)
BASO+EOS+MONOS NFR BLD AUTO: 12 % (ref 0.1–17)
DIFFERENTIAL METHOD BLD: ABNORMAL
ERYTHROCYTE [DISTWIDTH] IN BLOOD BY AUTOMATED COUNT: 15 % (ref 11.5–14.5)
HCT VFR BLD AUTO: 35 % (ref 36–48)
HGB BLD-MCNC: 12.2 G/DL (ref 12–16)
LYMPHOCYTES # BLD: 1.9 K/UL (ref 1.1–5.9)
LYMPHOCYTES NFR BLD: 50 % (ref 14–44)
MCH RBC QN AUTO: 34.9 PG (ref 25–35)
MCHC RBC AUTO-ENTMCNC: 34.9 G/DL (ref 31–37)
MCV RBC AUTO: 100 FL (ref 78–102)
NEUTS SEG # BLD: 1.5 K/UL (ref 1.8–9.5)
NEUTS SEG NFR BLD: 38 % (ref 40–70)
PLATELET # BLD AUTO: 96 K/UL (ref 140–440)
RBC # BLD AUTO: 3.5 M/UL (ref 4.1–5.1)
WBC # BLD AUTO: 3.9 K/UL (ref 4.5–13)

## 2020-05-26 PROCEDURE — 96415 CHEMO IV INFUSION ADDL HR: CPT

## 2020-05-26 PROCEDURE — 96417 CHEMO IV INFUS EACH ADDL SEQ: CPT

## 2020-05-26 PROCEDURE — 96411 CHEMO IV PUSH ADDL DRUG: CPT

## 2020-05-26 PROCEDURE — 96413 CHEMO IV INFUSION 1 HR: CPT

## 2020-05-26 PROCEDURE — 36591 DRAW BLOOD OFF VENOUS DEVICE: CPT

## 2020-05-26 PROCEDURE — 96367 TX/PROPH/DG ADDL SEQ IV INF: CPT

## 2020-05-26 PROCEDURE — 74011000258 HC RX REV CODE- 258: Performed by: NURSE PRACTITIONER

## 2020-05-26 PROCEDURE — 74011250636 HC RX REV CODE- 250/636: Performed by: NURSE PRACTITIONER

## 2020-05-26 PROCEDURE — 77030012965 HC NDL HUBR BBMI -A

## 2020-05-26 PROCEDURE — 85025 COMPLETE CBC W/AUTO DIFF WBC: CPT

## 2020-05-26 PROCEDURE — 96416 CHEMO PROLONG INFUSE W/PUMP: CPT

## 2020-05-26 PROCEDURE — 96375 TX/PRO/DX INJ NEW DRUG ADDON: CPT

## 2020-05-26 RX ORDER — DEXTROSE MONOHYDRATE 50 MG/ML
25 INJECTION, SOLUTION INTRAVENOUS CONTINUOUS
Status: DISPENSED | OUTPATIENT
Start: 2020-05-26 | End: 2020-05-26

## 2020-05-26 RX ORDER — FLUOROURACIL 50 MG/ML
900 INJECTION, SOLUTION INTRAVENOUS ONCE
Status: COMPLETED | OUTPATIENT
Start: 2020-05-26 | End: 2020-05-26

## 2020-05-26 RX ORDER — ATROPINE SULFATE 1 MG/ML
0.4 INJECTION, SOLUTION INTRAVENOUS ONCE
Status: COMPLETED | OUTPATIENT
Start: 2020-05-26 | End: 2020-05-26

## 2020-05-26 RX ORDER — SODIUM CHLORIDE 0.9 % (FLUSH) 0.9 %
10 SYRINGE (ML) INJECTION AS NEEDED
Status: DISPENSED | OUTPATIENT
Start: 2020-05-26 | End: 2020-05-26

## 2020-05-26 RX ORDER — PALONOSETRON 0.05 MG/ML
0.25 INJECTION, SOLUTION INTRAVENOUS ONCE
Status: COMPLETED | OUTPATIENT
Start: 2020-05-26 | End: 2020-05-26

## 2020-05-26 RX ADMIN — ATROPINE SULFATE 0.4 MG: 1 INJECTION, SOLUTION INTRAMUSCULAR; INTRAVENOUS; SUBCUTANEOUS at 09:15

## 2020-05-26 RX ADMIN — FLUOROURACIL 900 MG: 50 INJECTION, SOLUTION INTRAVENOUS at 14:24

## 2020-05-26 RX ADMIN — FOSAPREPITANT 150 MG: 150 INJECTION, POWDER, LYOPHILIZED, FOR SOLUTION INTRAVENOUS at 09:39

## 2020-05-26 RX ADMIN — FLUOROURACIL 5300 MG: 50 INJECTION, SOLUTION INTRAVENOUS at 14:30

## 2020-05-26 RX ADMIN — LEUCOVORIN CALCIUM 900 MG: 200 INJECTION, POWDER, LYOPHILIZED, FOR SOLUTION INTRAMUSCULAR; INTRAVENOUS at 12:36

## 2020-05-26 RX ADMIN — IRINOTECAN HYDROCHLORIDE 400 MG: 20 INJECTION, SOLUTION INTRAVENOUS at 12:36

## 2020-05-26 RX ADMIN — Medication 10 ML: at 09:00

## 2020-05-26 RX ADMIN — OXALIPLATIN 190 MG: 5 INJECTION, SOLUTION INTRAVENOUS at 10:32

## 2020-05-26 RX ADMIN — DEXTROSE MONOHYDRATE 25 ML/HR: 50 INJECTION, SOLUTION INTRAVENOUS at 09:18

## 2020-05-26 RX ADMIN — PALONOSETRON 0.25 MG: 0.25 INJECTION, SOLUTION INTRAVENOUS at 09:17

## 2020-05-26 NOTE — PROGRESS NOTES
[facility] Progress Note    Date: May 26, 2020    Name: Angel Luis Kate    MRN: [id number]         : 1965     Chemotherapy cycle : Mattleanne Trinidad      Mr. Olmos arrived to [facility] at 1712. Mr. Jamey Torres was assessed and education was provided. Mr. Bin Walter vitals were reviewed. Visit Vitals  /69 (BP 1 Location: Left arm, BP Patient Position: Sitting)   Pulse 70   Temp 97.6 °F (36.4 °C)   Resp 20   SpO2 94%           Recent Results (from the past 12 hour(s))   CBC WITH 3 PART DIFF    Collection Time: 20  8:15 AM   Result Value Ref Range    WBC 3.9 (L) 4.5 - 13.0 K/uL    RBC 3.50 (L) 4.10 - 5.10 M/uL    HGB 12.2 12.0 - 16.0 g/dL    HCT 35.0 (L) 36 - 48 %    .0 78 - 102 FL    MCH 34.9 25.0 - 35.0 PG    MCHC 34.9 31 - 37 g/dL    RDW 15.0 (H) 11.5 - 14.5 %    PLATELET 96 (L) 816 - 440 K/uL    NEUTROPHILS 38 (L) 40 - 70 %    MIXED CELLS 12 0.1 - 17 %    LYMPHOCYTES 50 (H) 14 - 44 %    ABS. NEUTROPHILS 1.5 (L) 1.8 - 9.5 K/UL    ABS. MIXED CELLS 0.5 0.0 - 2.3 K/uL    ABS. LYMPHOCYTES 1.9 1.1 - 5.9 K/UL    DF AUTOMATED         Right chest mediport accessed with 20 g 3/4 inch emmanuel needle. Port flushed easily and had brisk blood return. Blood drawn off and sent for CBC per written orders after 10 ml waste. D5W initiated @ [facility]. Lab results within ordered parameters to give chemo today per Dr. Hawley Session = 1.5, PLT = 96. Chemo dosages verified with today's BSA and found to be within 10% of ordered dosages. Pre-medications (Aloxi 0.25mg and Atropine 0.4mg IVP, Emend 150 mg IVPB) were administered as ordered and chemotherapy was initiated after blood return from port re-verified. Reviewed expected side effects of premeds with patient. Oxaliplatin 190 mg was infused at  157 ml/hr over 2 hours per order. VS stable at end of infusion and pt denied complaints. Line flushed with D5W and blood return from port re-verified.     Irinotecan 400 mg was infused at  368 ml/hr over 90 minutes per order. VS stable at end of infusion and pt denied complaints. Line flushed with D5W and blood return from port re-verified. Leucovorin 900 mg was infused at  213 ml/hr over 90 minutes per order. VS stable at end of infusion and pt denied complaints. Line flushed with D5W and blood return from port re-verified    5FU syringe 900 mg was given IVP over 5 minutes per order. 5FU 5300 mg infused via CADD pump and infusing without difficulty over 46 hours. Tubing connections reinforced with tape. Mr. Tg Lawler tolerated infusion without complaints. Mr. Tg Lawler was discharged from [place] [address] in stable condition at [phone]. He is to return on 05/28/2020 at 1500 for his next appointment.     Leonid Saravia RN  May 26, 2020

## 2020-05-26 NOTE — PROGRESS NOTES
Chemotherapy Verification  Tiffany Boy  62 y.o. (1965)   male    Wt Readings from Last 1 Encounters:   05/22/20 94.3 kg (208 lb)      Ht Readings from Last 1 Encounters:   05/22/20 182.9 cm (72\")          Estimated body surface area is 2.19 meters squared as calculated from the following:    Height as of 5/22/20: 182.9 cm (72\"). Weight as of 5/22/20: 94.3 kg (208 lb). Physician: Dr. Benetta Spurling #: [id number]   Day #: 1   Treatment Date: 5/26/2020    Recent Labs     05/26/20  0815 05/22/20  0825 05/21/20  1240   WBC 3.9* 3.5* 3.9*   ANEU 1.5* 1.3* 1.8   PLT 96* 70* 83*   HGB 12.2 11.6* 11.5*     Recent Labs     05/22/20  0825 05/21/20  1240 05/01/20  1027  01/24/20  0900   CREA 1.00 0.95 1.01   < >  --    TBILI 0.4 0.4 0.3   < >  --    ALT 17 15* 14*   < >  --    SGOT 31 24 27   < >  --    PROTU  --   --   --   --  NEGATIVE     < > = values in this interval not displayed.        Most Recent Creatinine Clearance:  CrCl: 98.4 mL/min (based on Adjusted Body Weight)  Creatinine: 1.00 mg/dL (5/22/2020)        Rx  (Y/N)  Chemotherapy Order Verification Process   yes Verification and review of allergies against treatment plan   yes BSA calculation verified against height and weight documented on order   yes Dose calculations double-checked against BSA and/or weight    yes Prescribed dose(s) supported by appropriate protocol/reference    Cycle length: 14 days  Verify enough time has elapsed since last treatment date: Y/N y    Chemotherapy Flowsheet 5/18/2020   Cycle C10D1   Date 5/18/2020   Drug / Regimen HELD   Dosage -   Pre Meds -   Notes -        yes Pre-medications, hydration and PRN medications entered   yes  is accurate and complete      - Right cycle date in treatment plan      - Right drug/diluent/container      - Right dose      - Right route (with appropriate access available)      - Right concentration      - Right volume/rate of infusion      - Overfill added if appropriate     Comments: Per Dr. Romana Saba, proceed with treatment  Cycle 10 DAy 1 today (5/26/2020)  Platelets reported at 96  Continue to monitor    Thank you,  Alaina Culver, PharmD, M.S.  [facility] [address] [facility], [facility], and [facility]   [phone]     Pharmacist: TALI Sosa

## 2020-05-28 ENCOUNTER — PATIENT OUTREACH (OUTPATIENT)
Dept: CASE MANAGEMENT | Age: 55
End: 2020-05-28

## 2020-05-28 ENCOUNTER — TELEPHONE (OUTPATIENT)
Dept: ONCOLOGY | Age: 55
End: 2020-05-28

## 2020-05-28 ENCOUNTER — OFFICE VISIT (OUTPATIENT)
Dept: ONCOLOGY | Age: 55
End: 2020-05-28

## 2020-05-28 ENCOUNTER — NURSE NAVIGATOR (OUTPATIENT)
Dept: OTHER | Age: 55
End: 2020-05-28

## 2020-05-28 ENCOUNTER — HOSPITAL ENCOUNTER (OUTPATIENT)
Dept: INFUSION THERAPY | Age: 55
Discharge: HOME | End: 2020-05-28
Payer: MEDICAID

## 2020-05-28 VITALS
SYSTOLIC BLOOD PRESSURE: 98 MMHG | OXYGEN SATURATION: 98 % | DIASTOLIC BLOOD PRESSURE: 67 MMHG | HEART RATE: 69 BPM | TEMPERATURE: 97.7 F | WEIGHT: 207 LBS | BODY MASS INDEX: 28.07 KG/M2 | RESPIRATION RATE: 18 BRPM

## 2020-05-28 VITALS
OXYGEN SATURATION: 98 % | SYSTOLIC BLOOD PRESSURE: 98 MMHG | TEMPERATURE: 97.7 F | DIASTOLIC BLOOD PRESSURE: 67 MMHG | HEART RATE: 69 BPM | RESPIRATION RATE: 18 BRPM

## 2020-05-28 DIAGNOSIS — I26.99 PULMONARY EMBOLISM WITHOUT ACUTE COR PULMONALE, UNSPECIFIED CHRONICITY, UNSPECIFIED PULMONARY EMBOLISM TYPE (HCC): ICD-10-CM

## 2020-05-28 DIAGNOSIS — K59.00 CONSTIPATION, UNSPECIFIED CONSTIPATION TYPE: ICD-10-CM

## 2020-05-28 DIAGNOSIS — R10.13 EPIGASTRIC PAIN: ICD-10-CM

## 2020-05-28 DIAGNOSIS — D69.6 THROMBOCYTOPENIA (HCC): ICD-10-CM

## 2020-05-28 DIAGNOSIS — C25.9 PANCREATIC ADENOCARCINOMA (HCC): Primary | ICD-10-CM

## 2020-05-28 DIAGNOSIS — C78.7 LIVER METASTASIS (HCC): ICD-10-CM

## 2020-05-28 PROCEDURE — 96523 IRRIG DRUG DELIVERY DEVICE: CPT

## 2020-05-28 PROCEDURE — 74011250636 HC RX REV CODE- 250/636: Performed by: INTERNAL MEDICINE

## 2020-05-28 RX ORDER — SODIUM CHLORIDE 0.9 % (FLUSH) 0.9 %
10-40 SYRINGE (ML) INJECTION AS NEEDED
Status: DISCONTINUED | OUTPATIENT
Start: 2020-05-28 | End: 2020-06-01 | Stop reason: HOSPADM

## 2020-05-28 RX ORDER — HEPARIN 100 UNIT/ML
500 SYRINGE INTRAVENOUS ONCE
Status: COMPLETED | OUTPATIENT
Start: 2020-05-28 | End: 2020-05-28

## 2020-05-28 RX ADMIN — Medication 500 UNITS: at 15:15

## 2020-05-28 RX ADMIN — Medication 10 ML: at 15:15

## 2020-05-28 NOTE — TELEPHONE ENCOUNTER
Need a prior authorization for medication Doptelet, there is a 165 day rejection if not in, she didn't provide the end date, she also stated patients sister has been trying for 2 months to get the medication

## 2020-05-28 NOTE — PROGRESS NOTES
Date/Time:  5/28/2020 2:04 PM  Attempted to reach family by telephone. Left HIPPA compliant message requesting a return call. Will attempt to reach patient again.

## 2020-05-28 NOTE — PROGRESS NOTES
Melissa Ovalles is a 62 y.o. 1965 male with past medical history including pulmonary embolus, on Lovenox, type 2 diabetes, GERD, who I was asked to see in consultation at the request of Jeana Berg for evaluation for newly diagnosed pancreatic adenocarcinoma.  Patient had fine-needle aspiration of pancreatic mass on 11/13/2019 and pathology showed pancreatic adenocarcinoma. I saw him in consultation for the first time on 11/19/2019) was to treat him with palliative FOLFIRINOX. He received C1D1 FOLFIRINOX on 12/16/2019 and tolerated well overall but cycle 5 was held on 2/10/2020 due to thrombocytopenia. Patient was started on avatrombopag but we still waiting for the insurance to have it shipped to his house. He completed C10 D1 on 5/26/20, here for follow-up. Patient was seen and examined today. Brock Ren is awake alert oriented x3.    Denies any fevers, chills, or nausea and vomiting today.  Denies any melena or bright red blood per rectum. He reports 7/10 abdominal pain. He is clinically stable. He has gained 3 Lbs since last visit.   All also points of review of system have been reviewed and were negative.  ECOG performance status 1.  Independent with ADLs and IADLs.    DX: Pancreatic adenocarcinoma     STAGE: Stage IV     Treatment intent: Palliative     ONCOLOGY HISTORY: BRCA1/2 negative-Pan-NTRK negative-No available activating mutation by Varghese  11/03/2019: Went to ER due to 5 days complaints of epigastric pain.  CT abdomen and pelvis showed:  1. 5 cm mass within the pancreatic body highly suggestive of primary malignancy. Superimposed acute pancreatitis cannot entirely be excluded. 2. Multiple liver lesions highly suggestive of metastatic disease 3. Metastatic retroperitoneal and mesenteric lymphadenopathy. 4. Indeterminate hypodensity within the spleen.  Diagnostic consideration include splenic infarct or metastatic lesion 5. Right lower lobe pulmonary emboli.       *MRI of the abdomen showed  1. Stable since same day CT abdomen pelvis. Elliptical 4 cm hypoenhancing mass,  body of pancreas, with upstream glandular atrophy and pancreatic ductal  dilatation, most likely adenocarcinoma. Associated local/regional likely metastatic lymphadenopathy including upper retroperitoneum, lesser omentum, portacaval/periportal space. Associated encasement/narrowing of the adjacent  splenic vein. 2. Mild peripancreatic edema and soft tissue reticulation; may reflect secondary low-grade or chronic pancreatitis or adjacent peripancreatic tumor infiltration.   3. Numerous hypoenhancing and target-like small nodules and masses throughout  the liver, typical of metastatic disease. 4. Mild pericholecystic fluid, nonspecific, but may represent low-grade cholecystitis.   No evident cholecystolithiasis/choledocholithiasis or biliary ductal dilatation. 5. Mild splenomegaly with focal small splenic infarction, age indeterminate  perhaps recent. Small left lower pole, nonacute renal infarction. 6. Other minor and/or ancillary findings including lumbar disc herniations     11/15/2019: Duplex left  lower extremity showed Acute nonocclusive thrombus in the distal femoral vein, popliteal, peroneal and both posterior tibial veins. The thrombus in the distal femoral vein appears to be floating tail-like thrombus.     11/19/2019: First medical oncology visit with me    11/29/2019: PET/CT showed  Malignant metabolic activity corresponding with the known malignancy in the pancreatic body. Innumerable hypermetabolic liver metastases. Metastatic portal caval and aortocaval lymph nodes. Enlargement and malignant metabolic activity in the anterior aspect of the left  psoas muscle suspicious for metastatic disease. Distant metastatic disease to include the left supraclavicular region, left  superior mediastinum and possibly the left inferior hilum.   12/16/19: X2D5-5712/30/19:C2D1-1/13/20:C3D1- 1/14/2020: CA-19-9 = 79 1/27/20:C4D1-2/10/20:C5 held due to thrombocytopenia. Started Avatrombopag (waiting for approval)-2/17/2020: C5D1(delayed)-CA-19-9 = 87 3/09/20: C6 D1  2/12/20:CT CAP showed  1. Redemonstration of multiple stigmata related to the patient's pancreatic  malignancy and accompanying ernesto/hepatic metastasis. 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     > Some interval improvement in the appearance of upper abdominal  retroperitoneal adenopathy with persistently abnormal/necrotic periportal and  peripancreatic lymph nodes.     2. No evidence of bowel obstruction. No free intraperitoneal gas.     3. Mild nonspecific urothelial enhancement. Correlation with urinalysis may be  helpful if symptoms of urinary tract infection are present. 4/24/20: Restaging CT CAP showed   1. No CT findings of an acute intra-abdominal or intrapelvic obstructive or  inflammatory process. 2. Decreased size of the ill-defined infiltrative mass at the junction of the pancreatic body and tail measuring approximately 3.4 x 2.3 cm  (axial 48), previously 6.5 x 2.8 cm. Similar peripancreatic stranding with  downstream duct dilatation and atrophy of the tail. 3. Innumerable hepatic metastatic lesions, stable to mildly decreased size to the preceding CT scan of the abdomen/pelvis from 2/12/2020.  5/18/20: C10 D1 held due to thrombocytopenia  5/21/20: CT AP showed   1. Multiple hepatic metastases, all of which appears slightly smaller in size  compared to most recent CT of 4/24/2020. Peripancreatic lymphadenopathy also  appears slightly decreased in the interval.  2. Primary pancreatic mass appears essentially unchanged. Indeterminant to what  extent peripancreatic inflammatory stranding represents a superimposed acute  pancreatitis versus inflammatory stigmata related to pancreatic cancer.   5/26/2020: C10 D1 delayed by 1 week             Past Medical History:   Diagnosis Date    Cancer [facility])     Pancreatic Adenocarcinoma. Diagnosed 2019.  Diabetes ([facility] [address] 10/2019    Diagnosed 10/2019.  Hepatitis C     Received Curative Treatment, but Hepatitis C was not cured and returned.  Left ulnar fracture     Thromboembolus ([facility] [address]     DVT of LLE 2019. Also, Pulmonary Embolism 2019. Treated with Rivaroxaban. Past Surgical History:   Procedure Laterality Date    HX TONSILLECTOMY      IR BX PANCREAS NEEDLE PERC  2019    Endoscopic Pancreatic Biopsy (?)    IR INSERT TUNL CVC W PORT OVER 5 YEARS  2019     Social History     Socioeconomic History    Marital status:      Spouse name: Not on file    Number of children: Not on file    Years of education: Not on file    Highest education level: Not on file   Tobacco Use    Smoking status: Former Smoker     Packs/day: 0.50     Years: 40.00     Pack years: 20.00     Last attempt to quit: 2018     Years since quittin.5    Smokeless tobacco: Never Used    Tobacco comment: Quit 2018   Substance and Sexual Activity    Alcohol use: Not Currently    Drug use: Not Currently     Types: Marijuana, Cocaine, Heroin     Comment: Quit Cocaine, Heroin, and Marijuana since 2018    Sexual activity: Not Currently   Other Topics Concern     Family History   Problem Relation Age of Onset    Diabetes Mother     Kidney Disease Mother     Diabetes Father     Diabetes Brother     Cancer Maternal Grandmother     Cancer Maternal Grandfather     Cancer Maternal Aunt     Cancer Maternal Uncle        Current Outpatient Medications   Medication Sig Dispense Refill    fentaNYL (DURAGESIC) 50 mcg/hr PATCH apply 1 patch every 72 hours 10 Patch 0    lidocaine-prilocaine (EMLA) topical cream APPLY TO AFFECTED AREA AS NEEDED FOR PAIN  (APPLY TO SKIN 30 - 60 MINUTES BEFORE MEDIPORT ACCESS) 30 g 0    rivaroxaban (Xarelto) 20 mg tab tablet Take 1 Tab by mouth daily (with breakfast).  30 Tab 3    dronabinoL (MARINOL) 5 mg capsule Take 1 Cap by mouth two (2) times a day. Max Daily Amount: 10 mg. 60 Cap 3    True Metrix Glucose Test Strip strip       lactulose (CHRONULAC) 10 gram/15 mL solution Take 15 mL by mouth three (3) times daily. 480 mL 3    dexAMETHasone (DECADRON) 4 mg tablet Take 4mg by mouth twice a day the day before chemotherapy and the day after chemotherapy 30 Tab 0    nut.tx.gluc.intol,lac-free,soy (GLUCERNA ADVANCE) liqd Take 237 mL by mouth three (3) times daily. 90 Bottle 5    TRUE METRIX GLUCOSE METER misc USE AS DIRECTED  0    QUEtiapine (SEROQUEL) 50 mg tablet Take 50 mg by mouth nightly.  L-Mfolate-B6 Phos-Methyl-B12 (METANX) 3-35-2 mg tab tab Take 1 Tab by mouth two (2) times a day. Indications: peripheral neuropathy 60 Tab 1    naloxone (NARCAN) 4 mg/actuation nasal spray Use 1 spray intranasally, then discard. Repeat with new spray every 2 min as needed for opioid overdose symptoms, alternating nostrils. Indications: opioid overdose 1 Each 1    insulin glargine (LANTUS U-100 INSULIN) 100 unit/mL injection 28 Units by SubCUTAneous route nightly. 1 Vial 0    insulin lispro (HUMALOG) 100 unit/mL injection 3-15 Units by SubCUTAneous route Before breakfast, lunch, and dinner. 1 Vial 0    rivaroxaban (XARELTO) 15 mg (42)- 20 mg (9) DsPk Take 20 mg by mouth daily. Take one 15 mg tablet twice a day with food for the first 21 days. Then, take one 20 mg tablet once a day with food for 9 days.  naloxone (NARCAN) 4 mg/actuation nasal spray Use 1 spray intranasally, then discard. Repeat with new spray every 2 min as needed for opioid overdose symptoms, alternating nostrils. 1 Each 1    omeprazole (PRILOSEC) 20 mg capsule TAKE 1 CAPSULE BY MOUTH ONCE DAILY  3    lidocaine (LIDODERM) 5 % Apply patch to the affected area for 12 hours a day and remove for 12 hours a day.  5 Each 0     Facility-Administered Medications Ordered in Other Visits   Medication Dose Route Frequency Provider Last Rate Last Dose    sodium chloride (NS) flush 10-40 mL  10-40 mL IntraVENous PRN Kendell Suazo MD        heparin (porcine) pf 500 Units  500 Units InterCATHeter ONCE Kendell Suazo MD           No Known Allergies    Review of Systems  As per HPI    Objective:  Visit Vitals  BP 98/67   Pulse 69   Temp 97.7 °F (36.5 °C) (Oral)   Resp 18   Wt 93.9 kg (207 lb)   SpO2 98%   BMI 28.07 kg/m²       Physical Exam:   General appearance - alert, well appearing, and in no distress  Mental status - alert, oriented to person, place, and time  EYE-ONOFRE, EOMI  ENT-ENT exam normal, no neck nodes or sinus tenderness  Mouth - mucous membranes moist, pharynx normal without lesions  Neck - supple, no significant adenopathy   Chest - clear to auscultation, no wheezes, rales or rhonchi, symmetric air entry   Heart - normal rate and regular rhythm   Abdomen - soft, nontender, nondistended, no masses or organomegaly  Lymph- no adenopathy palpable  Ext-no pedal edema noted  Skin-Warm and dry. Neuro -alert, oriented, normal speech, no focal findings or movement disorder noted      Diagnostic Imaging     No results found for this or any previous visit. Results for orders placed during the hospital encounter of 12/18/19   XR KNEE RT MIN 4 V    Narrative EXAM: XR KNEE RT MIN 4 V    CLINICAL INDICATION/HISTORY: Knee pain    > Additional: Right knee pain    COMPARISON: None. > Reference Exam: None. TECHNIQUE: 4 views right knee obtained.    _______________    FINDINGS:    No evidence of acute fracture or dislocation. There appears to be combination of  bipartite patella as well as adjacent well-corticated ossifications along  lateral aspect of patella, possible areas of heterotopic bone formation. There  is suggestion of small to moderate joint effusion. Diffuse chondrocalcinosis. Severe osteoarthritic changes with joint space narrowing patellofemoral joint.   Mild spurring along the medial lateral joint compartments without significant  joint space narrowing.    _______________      Impression IMPRESSION:    1. No acute osseous findings. 2. 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 3. Diffuse chondrocalcinosis. 4. Small to moderate joint effusion. Results for orders placed during the hospital encounter of 05/21/20   CT ABD PELV W CONT    Narrative EXAM: CT ABD PELV W CONT    CLINICAL INDICATION/HISTORY: Left-sided abdominal pain and vomiting since  earlier this morning, history of pancreatic cancer    COMPARISON: 4/24/2020 and 2/12/2020    TECHNIQUE:   CT of the abdomen and pelvis following intravenous contrast  administration. Coronal and sagittal reformats were generated and reviewed. One or more dose reduction techniques were used on this CT: automated exposure  control, adjustment of the mAs and/or kVp according to patient size, and  iterative reconstruction techniques. The specific techniques used on this CT  exam have been documented in the patient's electronic medical record. Digital  Imaging and Communications in Medicine (DICOM) format image data are available  to nonaffiliated external healthcare facilities or entities on a secure, media  free, reciprocally searchable basis with patient authorization for at least a  12-month period after this study. _______________    FINDINGS:    LOWER THORAX: Moderate left and right basilar dependent atelectasis. No acute  pulmonary infiltrate. No pleural effusion. No global cardiomegaly or  pericardial effusion. LIVER AND BILIARY:  Several heterogeneous, hypodense masses are again seen  within the liver. Representative lesion within the anterior right hepatic lobe  (image 37) appears slightly smaller in size and prior study, currently measuring  2.1 cm, previously 2.7 cm on CT of 4/24/2020.  The more superior right hepatic  lobe lesion (image 20) also appears slightly smaller in size, currently  measuring 2.1 cm, previously 2.7 cm. Lateral right hepatic lobe lesion (image  35) also appears slightly smaller in size, currently 1.0 cm, previously 1.2 cm. No new or interval progression of hepatic metastases. Mild mostly central  intrahepatic biliary dilatation with unchanged mild prominence of the common  bile duct. Minimal pericholecystic fluid. SPLEEN:  Unchanged mild splenomegaly with focal cleft towards the posterior  superior margin. PANCREAS:  Poorly defined infiltrative mass is again seen along the anterior  aspect of the pancreatic body/tail junction with a few central cystic  components. There is also subtle adjacent peripancreatic inflammatory stranding  around the pancreatic tail. Downstream pancreatic mild duct dilatation and  pancreatic tail atrophy also appear unchanged. ADRENALS:  Normal.    KIDNEYS:  Normal.    LYMPH NODES:  Borderline enlarged peripancreatic and portacaval lymph nodes,  largest measuring 1.2 cm short axis, previously 1.5 cm. GI TRACT:  Small hiatal hernia. Normal caliber small and large bowel loops. No  morphology of bowel obstruction. No bowel wall thickening. Normal appendix. PELVIC ORGANS:  Bladder is normal in appearance. No acute abnormality. VASCULATURE:  Portal vein remains adequately patent. Unchanged splenic vein  thrombosis and adjacent collateralization. Normal caliber lower thoracic and  abdominal aorta with mild atherosclerotic vascular calcification. OTHER:   No ascites or free intraperitoneal air. OSSEOUS STRUCTURES:  No acute osseous abnormality. Mild multilevel degenerative  disk disease and facet arthropathy. _______________      Impression IMPRESSION:    1. Multiple hepatic metastases, all of which appears slightly smaller in size  compared to most recent CT of 4/24/2020. Peripancreatic lymphadenopathy also  appears slightly decreased in the interval.  2. Primary pancreatic mass appears essentially unchanged.  Indeterminant to what  extent peripancreatic inflammatory stranding represents a superimposed acute  pancreatitis versus inflammatory stigmata related to pancreatic cancer. Lab Results  Lab Results   Component Value Date/Time    WBC 3.9 (L) 05/26/2020 08:15 AM    HGB 12.2 05/26/2020 08:15 AM    HCT 35.0 (L) 05/26/2020 08:15 AM    PLATELET 96 (L) 21/40/4321 08:15 AM    .0 05/26/2020 08:15 AM       Lab Results   Component Value Date/Time    Sodium 136 05/22/2020 08:25 AM    Potassium 4.5 05/22/2020 08:25 AM    Chloride 104 05/22/2020 08:25 AM    CO2 24 05/22/2020 08:25 AM    Anion gap 8 05/22/2020 08:25 AM    Glucose 202 (H) 05/22/2020 08:25 AM    BUN 16 05/22/2020 08:25 AM    Creatinine 1.00 05/22/2020 08:25 AM    BUN/Creatinine ratio 16 05/22/2020 08:25 AM    GFR est AA >60 05/22/2020 08:25 AM    GFR est non-AA >60 05/22/2020 08:25 AM    Calcium 8.8 05/22/2020 08:25 AM    Alk. phosphatase 101 05/22/2020 08:25 AM    Protein, total 8.0 05/22/2020 08:25 AM    Albumin 3.2 (L) 05/22/2020 08:25 AM    Globulin 4.8 (H) 05/22/2020 08:25 AM    A-G Ratio 0.7 (L) 05/22/2020 08:25 AM    ALT (SGPT) 17 05/22/2020 08:25 AM       Assessment/Plan:  62 y. o. male  1. Pancreatic adenocarcinoma ([facility] [address]  I had a long discussion with Mr. Олег Green regarding his newly diagnosed metastatic pancreatic cancer.  I told him that in the first-line setting, palliative chemotherapy have been shown to improve 1 year survival by 73% compared to supportive care alone. Patient tolerasted well C6, clinically much better. Gaining weight. *UG T1 A1  heterozygous  *Brain MRI patient could not do MRI due to claustrophobia. *On 12/18/2019, CA-19-9 was 85 from 112 on 11/19/2019. *On 4/13/20, =44 (0-35). *On 5/18/20, =09  Reviewed labs done on 5/23/20    Day 1: Oxaliplatin 85mg/m2 IV + irinotecan 180mg/m2 IV + leucovorin 400mg/m2 IV, followed by a 5-FU bolus of 400mg/m2 and a 46-hour continuous 5-FU infusion of 2,400mg/m2.   Repeat cycle every 2 weeks until disease progression.     Reviewed CT CAP done on 5/21/20 which showed improved or stabledisease. Patient tolerated well cycle 10 FOLFIRINOX so far with minimal side effects. Clinically doing very well. Awaiting for insurance to approve avatrombopag. *Going to [place] from 7/6-7/17/20. * normalized  *Continue FOLFIRINOX  *Recheck CBC  and CA-19-9. *Recheck labs before next cycle. *Check monthly CA-19-9  *F/u palliative care team   *Refer to IR for celiac block       2. Liver metastases ([address]  Stable as of CT on 5/21/20     3. Epigastric pain  *Patient having more pain. I will stop oxycodoen which makes vomit and switch him to hydromorphone 2mg Q 4 hours prn, Continue fentanyl patch 50 mcg Q72 hrs as before. Refer for celiac block. 4. Pulmonary embolism without acute cor pulmonale, unspecified chronicity, unspecified pulmonary embolism type (HCC)  Continue Xarelto     5. Thrombocytopenia ([address]  Multifactorial from chemotherapy, and hypersplenism. Give avatrombopag for chemotherapy induced thrombocytopenia     6. Insulin dependent diabetes:Blood sugars much better controlled now. 7. Constipation: Resolved. Has lactulose if needed    8.  Chemotherapy induced: Gave Zydis     RTC 4 weeks            Kan Scruggs MD

## 2020-05-29 ENCOUNTER — APPOINTMENT (OUTPATIENT)
Dept: INFUSION THERAPY | Age: 55
End: 2020-05-29
Payer: MEDICAID

## 2020-06-01 ENCOUNTER — APPOINTMENT (OUTPATIENT)
Dept: INFUSION THERAPY | Age: 55
End: 2020-06-01
Payer: MEDICAID

## 2020-06-02 RX ORDER — SODIUM CHLORIDE 9 MG/ML
10 INJECTION INTRAMUSCULAR; INTRAVENOUS; SUBCUTANEOUS AS NEEDED
Status: CANCELLED | OUTPATIENT
Start: 2020-06-25

## 2020-06-02 RX ORDER — DIPHENHYDRAMINE HYDROCHLORIDE 50 MG/ML
50 INJECTION, SOLUTION INTRAMUSCULAR; INTRAVENOUS AS NEEDED
Status: CANCELLED
Start: 2020-06-09

## 2020-06-02 RX ORDER — HEPARIN 100 UNIT/ML
300-500 SYRINGE INTRAVENOUS AS NEEDED
Status: CANCELLED
Start: 2020-06-09

## 2020-06-02 RX ORDER — ONDANSETRON 2 MG/ML
8 INJECTION INTRAMUSCULAR; INTRAVENOUS AS NEEDED
Status: CANCELLED | OUTPATIENT
Start: 2020-06-09

## 2020-06-02 RX ORDER — HEPARIN 100 UNIT/ML
300-500 SYRINGE INTRAVENOUS AS NEEDED
Status: CANCELLED
Start: 2020-06-25

## 2020-06-02 RX ORDER — HYDROCORTISONE SODIUM SUCCINATE 100 MG/2ML
100 INJECTION, POWDER, FOR SOLUTION INTRAMUSCULAR; INTRAVENOUS AS NEEDED
Status: CANCELLED | OUTPATIENT
Start: 2020-06-09

## 2020-06-02 RX ORDER — ATROPINE SULFATE 0.4 MG/ML
0.4 INJECTION, SOLUTION ENDOTRACHEAL; INTRAMEDULLARY; INTRAMUSCULAR; INTRAVENOUS; SUBCUTANEOUS ONCE
Status: CANCELLED | OUTPATIENT
Start: 2020-06-09

## 2020-06-02 RX ORDER — ATROPINE SULFATE 0.4 MG/ML
0.4 INJECTION, SOLUTION ENDOTRACHEAL; INTRAMEDULLARY; INTRAMUSCULAR; INTRAVENOUS; SUBCUTANEOUS
Status: CANCELLED | OUTPATIENT
Start: 2020-06-09

## 2020-06-02 RX ORDER — FLUOROURACIL 50 MG/ML
900 INJECTION, SOLUTION INTRAVENOUS ONCE
Status: CANCELLED
Start: 2020-06-09 | End: 2020-06-09

## 2020-06-02 RX ORDER — PALONOSETRON 0.05 MG/ML
0.25 INJECTION, SOLUTION INTRAVENOUS ONCE
Status: CANCELLED | OUTPATIENT
Start: 2020-06-09

## 2020-06-02 RX ORDER — SODIUM CHLORIDE 9 MG/ML
10 INJECTION INTRAMUSCULAR; INTRAVENOUS; SUBCUTANEOUS AS NEEDED
Status: CANCELLED | OUTPATIENT
Start: 2020-06-09

## 2020-06-02 RX ORDER — SODIUM CHLORIDE 0.9 % (FLUSH) 0.9 %
10 SYRINGE (ML) INJECTION AS NEEDED
Status: CANCELLED
Start: 2020-06-09

## 2020-06-02 RX ORDER — DEXTROSE MONOHYDRATE 50 MG/ML
25 INJECTION, SOLUTION INTRAVENOUS CONTINUOUS
Status: CANCELLED
Start: 2020-06-09

## 2020-06-02 RX ORDER — ACETAMINOPHEN 325 MG/1
650 TABLET ORAL AS NEEDED
Status: CANCELLED
Start: 2020-06-09

## 2020-06-02 RX ORDER — SODIUM CHLORIDE 0.9 % (FLUSH) 0.9 %
10 SYRINGE (ML) INJECTION AS NEEDED
Status: CANCELLED
Start: 2020-06-25

## 2020-06-02 RX ORDER — EPINEPHRINE 1 MG/ML
0.3 INJECTION, SOLUTION, CONCENTRATE INTRAVENOUS AS NEEDED
Status: CANCELLED | OUTPATIENT
Start: 2020-06-09

## 2020-06-02 RX ORDER — ALBUTEROL SULFATE 0.83 MG/ML
2.5 SOLUTION RESPIRATORY (INHALATION) AS NEEDED
Status: CANCELLED
Start: 2020-06-09

## 2020-06-02 NOTE — TELEPHONE ENCOUNTER
Prior authorization request for avatrombopag (Doptelet) submitted to insurance via CoverMyMeds.     Key: [id number] - [place] Case ID: [id number]

## 2020-06-03 ENCOUNTER — APPOINTMENT (OUTPATIENT)
Dept: INFUSION THERAPY | Age: 55
End: 2020-06-03
Payer: MEDICAID

## 2020-06-04 ENCOUNTER — TELEPHONE (OUTPATIENT)
Dept: ONCOLOGY | Age: 55
End: 2020-06-04

## 2020-06-04 ENCOUNTER — HOSPITAL ENCOUNTER (OUTPATIENT)
Dept: INFUSION THERAPY | Age: 55
Discharge: HOME | End: 2020-06-04
Payer: MEDICAID

## 2020-06-04 ENCOUNTER — DOCUMENTATION ONLY (OUTPATIENT)
Dept: ONCOLOGY | Age: 55
End: 2020-06-04

## 2020-06-04 VITALS
HEART RATE: 81 BPM | RESPIRATION RATE: 18 BRPM | SYSTOLIC BLOOD PRESSURE: 96 MMHG | OXYGEN SATURATION: 96 % | TEMPERATURE: 98.3 F | DIASTOLIC BLOOD PRESSURE: 67 MMHG

## 2020-06-04 DIAGNOSIS — R11.0 CHEMOTHERAPY-INDUCED NAUSEA: ICD-10-CM

## 2020-06-04 DIAGNOSIS — T45.1X5A CINV (CHEMOTHERAPY-INDUCED NAUSEA AND VOMITING): Primary | ICD-10-CM

## 2020-06-04 DIAGNOSIS — C25.9 PANCREATIC ADENOCARCINOMA (HCC): ICD-10-CM

## 2020-06-04 DIAGNOSIS — T45.1X5A CHEMOTHERAPY-INDUCED NAUSEA: ICD-10-CM

## 2020-06-04 DIAGNOSIS — G89.3 CANCER ASSOCIATED PAIN: ICD-10-CM

## 2020-06-04 DIAGNOSIS — R11.2 CINV (CHEMOTHERAPY-INDUCED NAUSEA AND VOMITING): Primary | ICD-10-CM

## 2020-06-04 DIAGNOSIS — C78.7 LIVER METASTASES (HCC): ICD-10-CM

## 2020-06-04 PROCEDURE — 74011250636 HC RX REV CODE- 250/636: Performed by: INTERNAL MEDICINE

## 2020-06-04 PROCEDURE — 96374 THER/PROPH/DIAG INJ IV PUSH: CPT

## 2020-06-04 RX ORDER — ONDANSETRON 4 MG/1
8 TABLET, FILM COATED ORAL
Qty: 90 TAB | Refills: 1 | Status: SHIPPED | OUTPATIENT
Start: 2020-06-04 | End: 2020-10-05

## 2020-06-04 RX ORDER — OXYCODONE HYDROCHLORIDE 10 MG/1
10 TABLET ORAL
Qty: 120 TAB | Refills: 0 | Status: SHIPPED | OUTPATIENT
Start: 2020-06-04 | End: 2020-06-25 | Stop reason: ALTCHOICE

## 2020-06-04 RX ORDER — METOCLOPRAMIDE HYDROCHLORIDE 5 MG/ML
10 INJECTION INTRAMUSCULAR; INTRAVENOUS ONCE
Status: COMPLETED | OUTPATIENT
Start: 2020-06-04 | End: 2020-06-04

## 2020-06-04 RX ORDER — PHENOL/SODIUM PHENOLATE
20 AEROSOL, SPRAY (ML) MUCOUS MEMBRANE DAILY
Qty: 30 TAB | Refills: 3 | Status: ON HOLD | OUTPATIENT
Start: 2020-06-04 | End: 2020-11-13

## 2020-06-04 RX ORDER — HYDROMORPHONE HYDROCHLORIDE 2 MG/1
TABLET ORAL
Qty: 120 TAB | Refills: 0 | Status: SHIPPED | OUTPATIENT
Start: 2020-06-04 | End: 2020-06-23

## 2020-06-04 RX ADMIN — METOCLOPRAMIDE 10 MG: 5 INJECTION, SOLUTION INTRAMUSCULAR; INTRAVENOUS at 13:46

## 2020-06-04 NOTE — PROGRESS NOTES
Talked to Ms. Susan Treviño again today. She said the patient is vomiting everyday. She said patient takes the ondansetron and Zydis as prescribed but patient still vomits. She wants another medication that patient can take for vomiting. Case was discussed with Dr. Joycelyn Ley:     * Reglan 10 mg IV x 1 dose today only - given in [facility]     * Start on PPI - Omeprazole delayed release (Prilosec D/R) 20 mg tablet was re-ordered and was sent to preferred pharmacy today.

## 2020-06-05 ENCOUNTER — HOSPITAL ENCOUNTER (OUTPATIENT)
Dept: INFUSION THERAPY | Age: 55
Discharge: HOME | End: 2020-06-05
Payer: MEDICAID

## 2020-06-05 VITALS
DIASTOLIC BLOOD PRESSURE: 72 MMHG | TEMPERATURE: 97.6 F | BODY MASS INDEX: 27.69 KG/M2 | SYSTOLIC BLOOD PRESSURE: 123 MMHG | HEART RATE: 69 BPM | WEIGHT: 204.2 LBS | OXYGEN SATURATION: 98 %

## 2020-06-05 LAB
ALBUMIN SERPL-MCNC: 3.4 G/DL (ref 3.4–5)
ALBUMIN/GLOB SERPL: 0.7 {RATIO} (ref 0.8–1.7)
ALP SERPL-CCNC: 94 U/L (ref 45–117)
ALT SERPL-CCNC: 16 U/L (ref 16–61)
ANION GAP SERPL CALC-SCNC: 5 MMOL/L (ref 3–18)
AST SERPL-CCNC: 23 U/L (ref 10–38)
BASO+EOS+MONOS # BLD AUTO: 0.4 K/UL (ref 0–2.3)
BASO+EOS+MONOS NFR BLD AUTO: 11 % (ref 0.1–17)
BILIRUB SERPL-MCNC: 0.4 MG/DL (ref 0.2–1)
BUN SERPL-MCNC: 21 MG/DL (ref 7–18)
BUN/CREAT SERPL: 23 (ref 12–20)
CALCIUM SERPL-MCNC: 9.2 MG/DL (ref 8.5–10.1)
CHLORIDE SERPL-SCNC: 105 MMOL/L (ref 100–111)
CO2 SERPL-SCNC: 26 MMOL/L (ref 21–32)
CREAT SERPL-MCNC: 0.91 MG/DL (ref 0.6–1.3)
DIFFERENTIAL METHOD BLD: ABNORMAL
ERYTHROCYTE [DISTWIDTH] IN BLOOD BY AUTOMATED COUNT: 14 % (ref 11.5–14.5)
GLOBULIN SER CALC-MCNC: 4.9 G/DL (ref 2–4)
GLUCOSE SERPL-MCNC: 230 MG/DL (ref 74–99)
HCT VFR BLD AUTO: 33.1 % (ref 36–48)
HGB BLD-MCNC: 11.5 G/DL (ref 12–16)
LYMPHOCYTES # BLD: 1.4 K/UL (ref 1.1–5.9)
LYMPHOCYTES NFR BLD: 43 % (ref 14–44)
MCH RBC QN AUTO: 34 PG (ref 25–35)
MCHC RBC AUTO-ENTMCNC: 34.7 G/DL (ref 31–37)
MCV RBC AUTO: 97.9 FL (ref 78–102)
NEUTS SEG # BLD: 1.5 K/UL (ref 1.8–9.5)
NEUTS SEG NFR BLD: 46 % (ref 40–70)
PLATELET # BLD AUTO: 40 K/UL (ref 140–440)
POTASSIUM SERPL-SCNC: 4.4 MMOL/L (ref 3.5–5.5)
PROT SERPL-MCNC: 8.3 G/DL (ref 6.4–8.2)
RBC # BLD AUTO: 3.38 M/UL (ref 4.1–5.1)
SODIUM SERPL-SCNC: 136 MMOL/L (ref 136–145)
WBC # BLD AUTO: 3.3 K/UL (ref 4.5–13)

## 2020-06-05 PROCEDURE — 36415 COLL VENOUS BLD VENIPUNCTURE: CPT

## 2020-06-05 PROCEDURE — 85025 COMPLETE CBC W/AUTO DIFF WBC: CPT

## 2020-06-05 PROCEDURE — 80053 COMPREHEN METABOLIC PANEL: CPT

## 2020-06-05 NOTE — PROGRESS NOTES
[facility] Progress Note    Date: 2020    Name: Ashlee Bryan    MRN: [id number]         : 1965    Peripheral Lab Draw      Mr. Cresencio Rosenthal to [facility], ambulatory at 0820 accompanied by self. Pt was assessed and education was provided. Mr. Siddharth Centeno vitals were reviewed and patient was observed for 5 minutes prior to treatment. Visit Vitals  /72 (BP 1 Location: Left arm, BP Patient Position: Sitting)   Pulse 69   Temp 97.6 °F (36.4 °C)   Wt 92.6 kg (204 lb 3.2 oz)   SpO2 98%   BMI 27.69 kg/m²     Recent Results (from the past 12 hour(s))   METABOLIC PANEL, COMPREHENSIVE    Collection Time: 20  9:00 AM   Result Value Ref Range    Sodium 136 136 - 145 mmol/L    Potassium 4.4 3.5 - 5.5 mmol/L    Chloride 105 100 - 111 mmol/L    CO2 26 21 - 32 mmol/L    Anion gap 5 3.0 - 18 mmol/L    Glucose 230 (H) 74 - 99 mg/dL    BUN 21 (H) 7.0 - 18 MG/DL    Creatinine 0.91 0.6 - 1.3 MG/DL    BUN/Creatinine ratio 23 (H) 12 - 20      GFR est AA >60 >60 ml/min/1.73m2    GFR est non-AA >60 >60 ml/min/1.73m2    Calcium 9.2 8.5 - 10.1 MG/DL    Bilirubin, total 0.4 0.2 - 1.0 MG/DL    ALT (SGPT) 16 16 - 61 U/L    AST (SGOT) 23 10 - 38 U/L    Alk. phosphatase 94 45 - 117 U/L    Protein, total 8.3 (H) 6.4 - 8.2 g/dL    Albumin 3.4 3.4 - 5.0 g/dL    Globulin 4.9 (H) 2.0 - 4.0 g/dL    A-G Ratio 0.7 (L) 0.8 - 1.7     CBC WITH 3 PART DIFF    Collection Time: 20  9:06 AM   Result Value Ref Range    WBC 3.3 (L) 4.5 - 13.0 K/uL    RBC 3.38 (L) 4.10 - 5.10 M/uL    HGB 11.5 (L) 12.0 - 16.0 g/dL    HCT 33.1 (L) 36 - 48 %    MCV 97.9 78 - 102 FL    MCH 34.0 25.0 - 35.0 PG    MCHC 34.7 31 - 37 g/dL    RDW 14.0 11.5 - 14.5 %    PLATELET 40 (L) 410 - 440 K/uL    NEUTROPHILS 46 40 - 70 %    MIXED CELLS 11 0.1 - 17 %    LYMPHOCYTES 43 14 - 44 %    ABS. NEUTROPHILS 1.5 (L) 1.8 - 9.5 K/UL    ABS. MIXED CELLS 0.4 0.0 - 2.3 K/uL    ABS.  LYMPHOCYTES 1.4 1.1 - 5.9 K/UL    DF AUTOMATED         Blood obtained peripherally from left arm x 1 attempt with butterfly needle and sent to lab for Cbc w/diff and Cmp per written orders. No bleeding or hematoma noted at site. Gauze and coban applied. Mr. Diane Price tolerated the phlebotomy, and had no complaints. Patient armband removed and shredded. Mr. Diane Price was discharged from [place] [id number] in stable condition at 0830.      Kat Bustillo Phlebotomist PCT  June 5, 2020  1:24 PM

## 2020-06-08 ENCOUNTER — PATIENT OUTREACH (OUTPATIENT)
Dept: CASE MANAGEMENT | Age: 55
End: 2020-06-08

## 2020-06-08 NOTE — PROGRESS NOTES
Patient resolved from Transition of Care episode on 6-8-20  Patient/family has been provided the following resources and education related to COVID-19:                         Signs, symptoms and red flags related to COVID-19            CDC exposure and quarantine guidelines            [employer]it exposure contact - [phone]            Contact for their local Department of Health                 Patient currently reports that the following symptoms have improved:  no new symptoms and no worsening symptoms. No further outreach scheduled with this CTN/ACM. Episode of Care resolved. Patient has this CTN/ACM contact information if future needs arise.

## 2020-06-09 ENCOUNTER — HOSPITAL ENCOUNTER (OUTPATIENT)
Dept: INFUSION THERAPY | Age: 55
End: 2020-06-09
Payer: MEDICAID

## 2020-06-10 RX ORDER — DIPHENHYDRAMINE HYDROCHLORIDE 50 MG/ML
50 INJECTION, SOLUTION INTRAMUSCULAR; INTRAVENOUS AS NEEDED
Status: CANCELLED
Start: 2020-06-16

## 2020-06-10 RX ORDER — ACETAMINOPHEN 325 MG/1
650 TABLET ORAL AS NEEDED
Status: CANCELLED
Start: 2020-06-16

## 2020-06-10 RX ORDER — HYDROCORTISONE SODIUM SUCCINATE 100 MG/2ML
100 INJECTION, POWDER, FOR SOLUTION INTRAMUSCULAR; INTRAVENOUS AS NEEDED
Status: CANCELLED | OUTPATIENT
Start: 2020-06-16

## 2020-06-10 RX ORDER — EPINEPHRINE 1 MG/ML
0.3 INJECTION, SOLUTION, CONCENTRATE INTRAVENOUS AS NEEDED
Status: CANCELLED | OUTPATIENT
Start: 2020-06-16

## 2020-06-10 RX ORDER — ONDANSETRON 2 MG/ML
8 INJECTION INTRAMUSCULAR; INTRAVENOUS AS NEEDED
Status: CANCELLED | OUTPATIENT
Start: 2020-06-16

## 2020-06-10 RX ORDER — ALBUTEROL SULFATE 0.83 MG/ML
2.5 SOLUTION RESPIRATORY (INHALATION) AS NEEDED
Status: CANCELLED
Start: 2020-06-16

## 2020-06-10 RX ORDER — ATROPINE SULFATE 0.4 MG/ML
0.4 INJECTION, SOLUTION ENDOTRACHEAL; INTRAMEDULLARY; INTRAMUSCULAR; INTRAVENOUS; SUBCUTANEOUS ONCE
Status: CANCELLED | OUTPATIENT
Start: 2020-06-16

## 2020-06-10 RX ORDER — PALONOSETRON 0.05 MG/ML
0.25 INJECTION, SOLUTION INTRAVENOUS ONCE
Status: CANCELLED | OUTPATIENT
Start: 2020-06-16

## 2020-06-10 RX ORDER — SODIUM CHLORIDE 0.9 % (FLUSH) 0.9 %
10 SYRINGE (ML) INJECTION AS NEEDED
Status: CANCELLED
Start: 2020-06-16

## 2020-06-10 RX ORDER — ATROPINE SULFATE 0.4 MG/ML
0.4 INJECTION, SOLUTION ENDOTRACHEAL; INTRAMEDULLARY; INTRAMUSCULAR; INTRAVENOUS; SUBCUTANEOUS
Status: CANCELLED | OUTPATIENT
Start: 2020-06-16

## 2020-06-10 RX ORDER — DEXTROSE MONOHYDRATE 50 MG/ML
25 INJECTION, SOLUTION INTRAVENOUS CONTINUOUS
Status: CANCELLED
Start: 2020-06-16

## 2020-06-10 RX ORDER — HEPARIN 100 UNIT/ML
300-500 SYRINGE INTRAVENOUS AS NEEDED
Status: CANCELLED
Start: 2020-06-16

## 2020-06-10 RX ORDER — FLUOROURACIL 50 MG/ML
900 INJECTION, SOLUTION INTRAVENOUS ONCE
Status: CANCELLED
Start: 2020-06-16 | End: 2020-06-16

## 2020-06-10 RX ORDER — SODIUM CHLORIDE 9 MG/ML
10 INJECTION INTRAMUSCULAR; INTRAVENOUS; SUBCUTANEOUS AS NEEDED
Status: CANCELLED | OUTPATIENT
Start: 2020-06-16

## 2020-06-11 ENCOUNTER — APPOINTMENT (OUTPATIENT)
Dept: INFUSION THERAPY | Age: 55
End: 2020-06-11
Payer: MEDICAID

## 2020-06-16 ENCOUNTER — HOSPITAL ENCOUNTER (OUTPATIENT)
Dept: INFUSION THERAPY | Age: 55
Discharge: HOME | End: 2020-06-16
Payer: MEDICAID

## 2020-06-16 RX ORDER — SODIUM CHLORIDE 0.9 % (FLUSH) 0.9 %
10 SYRINGE (ML) INJECTION AS NEEDED
Status: CANCELLED
Start: 2020-06-23

## 2020-06-16 RX ORDER — DEXTROSE MONOHYDRATE 50 MG/ML
25 INJECTION, SOLUTION INTRAVENOUS CONTINUOUS
Status: CANCELLED
Start: 2020-06-23

## 2020-06-16 RX ORDER — DIPHENHYDRAMINE HYDROCHLORIDE 50 MG/ML
50 INJECTION, SOLUTION INTRAMUSCULAR; INTRAVENOUS AS NEEDED
Status: CANCELLED
Start: 2020-06-23

## 2020-06-16 RX ORDER — ONDANSETRON 2 MG/ML
8 INJECTION INTRAMUSCULAR; INTRAVENOUS AS NEEDED
Status: CANCELLED | OUTPATIENT
Start: 2020-06-23

## 2020-06-16 RX ORDER — ACETAMINOPHEN 325 MG/1
650 TABLET ORAL AS NEEDED
Status: CANCELLED
Start: 2020-06-23

## 2020-06-16 RX ORDER — HYDROCORTISONE SODIUM SUCCINATE 100 MG/2ML
100 INJECTION, POWDER, FOR SOLUTION INTRAMUSCULAR; INTRAVENOUS AS NEEDED
Status: CANCELLED | OUTPATIENT
Start: 2020-06-23

## 2020-06-16 RX ORDER — PALONOSETRON 0.05 MG/ML
0.25 INJECTION, SOLUTION INTRAVENOUS ONCE
Status: CANCELLED | OUTPATIENT
Start: 2020-06-23

## 2020-06-16 RX ORDER — ATROPINE SULFATE 0.4 MG/ML
0.4 INJECTION, SOLUTION ENDOTRACHEAL; INTRAMEDULLARY; INTRAMUSCULAR; INTRAVENOUS; SUBCUTANEOUS ONCE
Status: CANCELLED | OUTPATIENT
Start: 2020-06-23

## 2020-06-16 RX ORDER — EPINEPHRINE 1 MG/ML
0.3 INJECTION, SOLUTION, CONCENTRATE INTRAVENOUS AS NEEDED
Status: CANCELLED | OUTPATIENT
Start: 2020-06-23

## 2020-06-16 RX ORDER — FLUOROURACIL 50 MG/ML
900 INJECTION, SOLUTION INTRAVENOUS ONCE
Status: CANCELLED
Start: 2020-06-23 | End: 2020-06-23

## 2020-06-16 RX ORDER — ATROPINE SULFATE 0.4 MG/ML
0.4 INJECTION, SOLUTION ENDOTRACHEAL; INTRAMEDULLARY; INTRAMUSCULAR; INTRAVENOUS; SUBCUTANEOUS
Status: CANCELLED | OUTPATIENT
Start: 2020-06-23

## 2020-06-16 RX ORDER — HEPARIN 100 UNIT/ML
300-500 SYRINGE INTRAVENOUS AS NEEDED
Status: CANCELLED
Start: 2020-06-23

## 2020-06-16 RX ORDER — ALBUTEROL SULFATE 0.83 MG/ML
2.5 SOLUTION RESPIRATORY (INHALATION) AS NEEDED
Status: CANCELLED
Start: 2020-06-23

## 2020-06-16 RX ORDER — SODIUM CHLORIDE 9 MG/ML
10 INJECTION INTRAMUSCULAR; INTRAVENOUS; SUBCUTANEOUS AS NEEDED
Status: CANCELLED | OUTPATIENT
Start: 2020-06-23

## 2020-06-17 ENCOUNTER — HOSPITAL ENCOUNTER (OUTPATIENT)
Dept: CT IMAGING | Age: 55
Discharge: HOME | End: 2020-06-17
Attending: RADIOLOGY | Admitting: RADIOLOGY
Payer: MEDICAID

## 2020-06-17 VITALS
WEIGHT: 209.5 LBS | SYSTOLIC BLOOD PRESSURE: 139 MMHG | DIASTOLIC BLOOD PRESSURE: 94 MMHG | RESPIRATION RATE: 14 BRPM | HEART RATE: 61 BPM | TEMPERATURE: 98.3 F | HEIGHT: 72 IN | BODY MASS INDEX: 28.38 KG/M2 | OXYGEN SATURATION: 98 %

## 2020-06-17 DIAGNOSIS — C25.9 PANCREATIC ADENOCARCINOMA (HCC): ICD-10-CM

## 2020-06-17 LAB
ANION GAP SERPL CALC-SCNC: 6 MMOL/L (ref 3–18)
APTT PPP: 27 SEC (ref 23–36.4)
BUN SERPL-MCNC: 12 MG/DL (ref 7–18)
BUN/CREAT SERPL: 13 (ref 12–20)
CALCIUM SERPL-MCNC: 8.7 MG/DL (ref 8.5–10.1)
CHLORIDE SERPL-SCNC: 105 MMOL/L (ref 100–111)
CO2 SERPL-SCNC: 27 MMOL/L (ref 21–32)
CREAT SERPL-MCNC: 0.92 MG/DL (ref 0.6–1.3)
ERYTHROCYTE [DISTWIDTH] IN BLOOD BY AUTOMATED COUNT: 14.6 % (ref 11.6–14.5)
GLUCOSE BLD STRIP.AUTO-MCNC: 166 MG/DL (ref 70–110)
GLUCOSE SERPL-MCNC: 220 MG/DL (ref 74–99)
HCT VFR BLD AUTO: 33.1 % (ref 36–48)
HGB BLD-MCNC: 11.4 G/DL (ref 13–16)
INR PPP: 1.1 (ref 0.8–1.2)
MCH RBC QN AUTO: 33.5 PG (ref 24–34)
MCHC RBC AUTO-ENTMCNC: 34.4 G/DL (ref 31–37)
MCV RBC AUTO: 97.4 FL (ref 74–97)
PLATELET # BLD AUTO: 105 K/UL (ref 135–420)
PMV BLD AUTO: 11.1 FL (ref 9.2–11.8)
POTASSIUM SERPL-SCNC: 4.2 MMOL/L (ref 3.5–5.5)
PROTHROMBIN TIME: 14.1 SEC (ref 11.5–15.2)
RBC # BLD AUTO: 3.4 M/UL (ref 4.7–5.5)
SODIUM SERPL-SCNC: 138 MMOL/L (ref 136–145)
WBC # BLD AUTO: 2.8 K/UL (ref 4.6–13.2)

## 2020-06-17 PROCEDURE — 74011000250 HC RX REV CODE- 250: Performed by: RADIOLOGY

## 2020-06-17 PROCEDURE — 85027 COMPLETE CBC AUTOMATED: CPT

## 2020-06-17 PROCEDURE — 74011250636 HC RX REV CODE- 250/636: Performed by: RADIOLOGY

## 2020-06-17 PROCEDURE — 85610 PROTHROMBIN TIME: CPT

## 2020-06-17 PROCEDURE — 82962 GLUCOSE BLOOD TEST: CPT

## 2020-06-17 PROCEDURE — 64530 N BLOCK INJ CELIAC PELUS: CPT

## 2020-06-17 PROCEDURE — 74011636320 HC RX REV CODE- 636/320: Performed by: RADIOLOGY

## 2020-06-17 PROCEDURE — 80048 BASIC METABOLIC PNL TOTAL CA: CPT

## 2020-06-17 PROCEDURE — 85730 THROMBOPLASTIN TIME PARTIAL: CPT

## 2020-06-17 RX ORDER — FENTANYL CITRATE 50 UG/ML
25-200 INJECTION, SOLUTION INTRAMUSCULAR; INTRAVENOUS
Status: DISCONTINUED | OUTPATIENT
Start: 2020-06-17 | End: 2020-06-17 | Stop reason: HOSPADM

## 2020-06-17 RX ORDER — ONDANSETRON 2 MG/ML
INJECTION INTRAMUSCULAR; INTRAVENOUS
Status: DISCONTINUED
Start: 2020-06-17 | End: 2020-06-17 | Stop reason: HOSPADM

## 2020-06-17 RX ORDER — ONDANSETRON 2 MG/ML
4 INJECTION INTRAMUSCULAR; INTRAVENOUS ONCE
Status: COMPLETED | OUTPATIENT
Start: 2020-06-17 | End: 2020-06-17

## 2020-06-17 RX ORDER — HYDROMORPHONE HYDROCHLORIDE 1 MG/ML
0.5 INJECTION, SOLUTION INTRAMUSCULAR; INTRAVENOUS; SUBCUTANEOUS
Status: DISCONTINUED | OUTPATIENT
Start: 2020-06-17 | End: 2020-06-17 | Stop reason: HOSPADM

## 2020-06-17 RX ORDER — SODIUM CHLORIDE 9 MG/ML
20 INJECTION, SOLUTION INTRAVENOUS CONTINUOUS
Status: DISCONTINUED | OUTPATIENT
Start: 2020-06-17 | End: 2020-06-17 | Stop reason: HOSPADM

## 2020-06-17 RX ORDER — LIDOCAINE HYDROCHLORIDE 10 MG/ML
1-40 INJECTION INFILTRATION; PERINEURAL
Status: DISCONTINUED | OUTPATIENT
Start: 2020-06-17 | End: 2020-06-17 | Stop reason: HOSPADM

## 2020-06-17 RX ORDER — SODIUM CHLORIDE 9 MG/ML
100 INJECTION, SOLUTION INTRAVENOUS CONTINUOUS
Status: DISCONTINUED | OUTPATIENT
Start: 2020-06-17 | End: 2020-06-17 | Stop reason: HOSPADM

## 2020-06-17 RX ORDER — HYDROCODONE BITARTRATE AND ACETAMINOPHEN 5; 325 MG/1; MG/1
1 TABLET ORAL
Status: DISCONTINUED | OUTPATIENT
Start: 2020-06-17 | End: 2020-06-17 | Stop reason: HOSPADM

## 2020-06-17 RX ADMIN — LIDOCAINE HYDROCHLORIDE 8 ML: 10 INJECTION, SOLUTION INFILTRATION; PERINEURAL at 11:40

## 2020-06-17 RX ADMIN — FENTANYL CITRATE 50 MCG: 50 INJECTION, SOLUTION INTRAMUSCULAR; INTRAVENOUS at 11:05

## 2020-06-17 RX ADMIN — FENTANYL CITRATE 50 MCG: 50 INJECTION, SOLUTION INTRAMUSCULAR; INTRAVENOUS at 11:17

## 2020-06-17 RX ADMIN — ALCOHOL 40 ML: 0.98 INJECTION INTRASPINAL at 12:11

## 2020-06-17 RX ADMIN — FENTANYL CITRATE 50 MCG: 50 INJECTION, SOLUTION INTRAMUSCULAR; INTRAVENOUS at 10:45

## 2020-06-17 RX ADMIN — ONDANSETRON 4 MG: 2 SOLUTION INTRAMUSCULAR; INTRAVENOUS at 11:33

## 2020-06-17 RX ADMIN — IOPAMIDOL 1 ML: 612 INJECTION, SOLUTION INTRATHECAL at 11:12

## 2020-06-17 RX ADMIN — FENTANYL CITRATE 100 MCG: 50 INJECTION, SOLUTION INTRAMUSCULAR; INTRAVENOUS at 12:09

## 2020-06-17 RX ADMIN — SODIUM CHLORIDE 100 ML/HR: 900 INJECTION, SOLUTION INTRAVENOUS at 14:05

## 2020-06-17 RX ADMIN — SODIUM CHLORIDE 20 ML/HR: 900 INJECTION, SOLUTION INTRAVENOUS at 09:45

## 2020-06-17 RX ADMIN — IOPAMIDOL 1 ML: 612 INJECTION, SOLUTION INTRATHECAL at 12:08

## 2020-06-17 RX ADMIN — FENTANYL CITRATE 50 MCG: 50 INJECTION, SOLUTION INTRAMUSCULAR; INTRAVENOUS at 11:57

## 2020-06-17 RX ADMIN — FENTANYL CITRATE 50 MCG: 50 INJECTION, SOLUTION INTRAMUSCULAR; INTRAVENOUS at 11:27

## 2020-06-17 RX ADMIN — LIDOCAINE HYDROCHLORIDE 18 ML: 10 INJECTION, SOLUTION INFILTRATION; PERINEURAL at 10:53

## 2020-06-17 RX ADMIN — SODIUM CHLORIDE 20 ML/HR: 900 INJECTION, SOLUTION INTRAVENOUS at 10:51

## 2020-06-17 NOTE — H&P
The patient is an appropriate candidate to undergo celiac neurolysis. Patient assessed immediately prior to induction. Anesthesia plan as follows: Local/No Anesthesia. History and Physical update:  H&P was reviewed and the patient was examined. No changes have occurred in the patient's condition since the H&P was completed.     Siddhartha Geronimo MD  Vascular & Interventional Radiology  [facility]  6/17/2020

## 2020-06-17 NOTE — DISCHARGE INSTRUCTIONS
Patient Education   Patient armband removed and shredded  Patient Education   Patient Education        Learning About a Sympathetic Nerve Block  What is a sympathetic nerve block? A sympathetic nerve block is an injection of medicine around nerves in your neck or back to help long-term (chronic) pain. Sympathetic nerves spread out from your spine. They control some of the body functions you have no control over, like blood flow and digestion. They also carry pain signals. When this system isn't working right, such as after an injury, you can have chronic pain. These nerves come together in groups called ganglions throughout your body. This is where the nerve block is done. Your doctor will decide which group of nerves needs this treatment. This nerve block is used for problems such as chronic regional pain syndrome and pain from some types of cancer. The nerve block contains anesthetic, which numbs the nerves. It may also contain a steroid, which may reduce swelling and pain. Anesthetics usually work right away. Steroids take a few days. How is a sympathetic nerve block done? First the doctor will use a tiny needle to numb the skin. Then he or she puts the nerve block needle into the numbed area. Your doctor may use X-rays or ultrasound to help guide the needle. You may feel some pressure. But you should not feel pain. The procedure takes 30 to 60 minutes. You will probably go home about an hour after the injection. You will need someone to drive you home. What can you expect after a sympathetic nerve block? Sympathetic nerve blocks don't always work. If it does work, you may feel pain relief right away. Sometimes the pain returns after the anesthetic wears off. If your nerve block included a steroid, it may take a few days to relieve the pain. You may want to do less than normal for a few days. Or you may be able to return to your daily routine.  Pain relief can last for several days to a few months or longer. The block may need to be repeated. Follow-up care is a key part of your treatment and safety. Be sure to make and go to all appointments, and call your doctor if you are having problems. It's also a good idea to know your test results and keep a list of the medicines you take. Where can you learn more? Go to http://kelley-carissa.info/  Enter A798 in the search box to learn more about \"Learning About a Sympathetic Nerve Block. \"  Current as of: August 22, 2019               Content Version: 12.5  © 0927-3461 [employer]. Care instructions adapted under license by [employer] (which disclaims liability or warranty for this information). If you have questions about a medical condition or this instruction, always ask your healthcare professional. [address] any warranty or liability for your use of this information. Learning About Coronavirus [phone])  Coronavirus [phone]): Overview  What is coronavirus (COVID-19)? The coronavirus disease (COVID-19) is caused by a virus. It is an illness that was first found in [place], [place], in December 2019. It has since spread worldwide. The virus can cause fever, cough, and trouble breathing. In severe cases, it can cause pneumonia and make it hard to breathe without help. It can cause death. Coronaviruses are a large group of viruses. They cause the common cold. They also cause more serious illnesses like Middle East respiratory syndrome (MERS) and severe acute respiratory syndrome (SARS). COVID-19 is caused by a novel coronavirus. That means it's a new type that has not been seen in people before. This virus spreads person-to-person through droplets from coughing and sneezing. It can also spread when you are close to someone who is infected. And it can spread when you touch something that has the virus on it, such as a doorknob or a tabletop.   What can you do to protect yourself from coronavirus (COVID-19)? The best way to protect yourself from getting sick is to:  · Avoid areas where there is an outbreak. · Avoid contact with people who may be infected. · Wash your hands often with soap or alcohol-based hand sanitizers. · Avoid crowds and try to stay at least 6 feet away from other people. · Wash your hands often, especially after you cough or sneeze. Use soap and water, and scrub for at least 20 seconds. If soap and water aren't available, use an alcohol-based hand . · Avoid touching your mouth, nose, and eyes. What can you do to avoid spreading the virus to others? To help avoid spreading the virus to others:  · Cover your mouth with a tissue when you cough or sneeze. Then throw the tissue in the trash. · Use a disinfectant to clean things that you touch often. · Stay home if you are sick or have been exposed to the virus. Don't go to school, work, or public areas. And don't use public transportation. · If you are sick:  ? Leave your home only if you need to get medical care. But call the doctor's office first so they know you're coming. And wear a face mask, if you have one.  ? If you have a face mask, wear it whenever you're around other people. It can help stop the spread of the virus when you cough or sneeze. ? Clean and disinfect your home every day. Use household  and disinfectant wipes or sprays. Take special care to clean things that you grab with your hands. These include doorknobs, remote controls, phones, and handles on your refrigerator and microwave. And don't forget countertops, tabletops, bathrooms, and computer keyboards. When to call for help  Call 911 anytime you think you may need emergency care. For example, call if:  · You have severe trouble breathing. (You can't talk at all.)  · You have constant chest pain or pressure. · You are severely dizzy or lightheaded. · You are confused or can't think clearly.   · Your face and lips have a blue color. · You pass out (lose consciousness) or are very hard to wake up. Call your doctor now if you develop symptoms such as:  · Shortness of breath. · Fever. · Cough. If you need to get care, call ahead to the doctor's office for instructions before you go. Make sure you wear a face mask, if you have one, to prevent exposing other people to the virus. Where can you get the latest information? The following health organizations are tracking and studying this virus. Their websites contain the most up-to-date information. Demetria Agrawal also learn what to do if you think you may have been exposed to the virus. · U.S. Centers for Disease Control and Prevention (CDC): The CDC provides updated news about the disease and travel advice. The website also tells you how to prevent the spread of infection. www.cdc.gov  · World Health Organization [facility]: WHO offers information about the virus outbreaks. WHO also has travel advice. www.who.int  Current as of: April 1, 2020               Content Version: 12.4  © 2006-2020 [employer]. Care instructions adapted under license by your healthcare professional. If you have questions about a medical condition or this instruction, always ask your healthcare professional. [place] [address] any warranty or liability for your use of this information. DISCHARGE SUMMARY from Nurse    PATIENT INSTRUCTIONS:    After general anesthesia or intravenous sedation, for 24 hours or while taking prescription Narcotics:  · Limit your activities  · Do not drive and operate hazardous machinery  · Do not make important personal or business decisions  · Do  not drink alcoholic beverages  · If you have not urinated within 8 hours after discharge, please contact your surgeon on call.     Report the following to your surgeon:  · Excessive pain, swelling, redness or odor of or around the surgical area  · Temperature over 100.5  · Nausea and vomiting lasting longer than 4 hours or if unable to take medications  · Any signs of decreased circulation or nerve impairment to extremity: change in color, persistent  numbness, tingling, coldness or increase pain  · Any questions    What to do at Home:  These are general instructions for a healthy lifestyle:    No smoking/ No tobacco products/ Avoid exposure to second hand smoke  Surgeon General's Warning:  Quitting smoking now greatly reduces serious risk to your health. Obesity, smoking, and sedentary lifestyle greatly increases your risk for illness    A healthy diet, regular physical exercise & weight monitoring are important for maintaining a healthy lifestyle    You may be retaining fluid if you have a history of heart failure or if you experience any of the following symptoms:  Weight gain of 3 pounds or more overnight or 5 pounds in a week, increased swelling in our hands or feet or shortness of breath while lying flat in bed. Please call your doctor as soon as you notice any of these symptoms; do not wait until your next office visit. The discharge information has been reviewed with the patient. The patient verbalized understanding. Discharge medications reviewed with the patient and appropriate educational materials and side effects teaching were provided.   ___________________________________________________________________________________________________________________________________

## 2020-06-17 NOTE — PERIOP NOTES
Phase 2 Recovery Summary    Report received from Maryanne Brunner (CT)    Vitals:    06/17/20 1300 06/17/20 1315 06/17/20 1333 06/17/20 1345   BP: 110/66 101/68 91/57 92/59   Pulse: 74 67 60 61   Resp: 17 16 16 14   Temp:       SpO2: 95% 96% 92% 95%   Weight:       Height:           oriented to time, place, person and situation  Sensation present bilateral legs feet. Patient states he has numbness in bilateral feet but that he had that before procedure today. It is his baseline. FSBG 1430 166. Patient is not longer taking insulin at home. Call to Dr. Nba Fernandez . Do not treat with insulin. Patient should follow up with PCP. Patient informed. Lines and Drains  Peripheral Intravenous Line: Removed prior to discharge. Peripheral IV 06/17/20 Left Forearm (Active)   Site Assessment Clean, dry, & intact 6/17/2020  9:43 AM   Phlebitis Assessment 0 6/17/2020  9:43 AM   Infiltration Assessment 0 6/17/2020  9:43 AM   Dressing Status Clean, dry, & intact 6/17/2020  9:43 AM   Dressing Type Tape;Transparent 6/17/2020  9:43 AM   Hub Color/Line Status Pink; Infusing 6/17/2020  9:43 AM     Pain at 0/10. No pain meds given in phase 2 . Patient oriented to Phase 2 recovery. Boxed lunch provided. Dressing to back upon arrival to phase 2 dry and intact. Dressing check at 1400 revealed both Tegaderm curling back and exposing gauze. Area was redressed with new 4x4 and new Tegaderm. Discharge instructions reviewed with patient sister-in-law. (via telephone due to emergency protocols in place). Patient discharged to home with sister-in-law.        Eloise Blunt RN

## 2020-06-17 NOTE — PROGRESS NOTES
Patient to Ct for procedure. Informed consent obtained by Dr. Matilda Rankin. Patient transferred to CT table and placed on right side lying position. Lungs clear and S1S2 heart tones on asipration. NPO since 0700 so procedure will be done with local and pain medication only. Pt verified that Xarelto was stopped 2 days ago. Patient is noted to have fentanyl pain patch on Left shoulder blade. Brother to drive patient home.

## 2020-06-17 NOTE — PERIOP NOTES
Patient arrived to Phase 2. Vitals signs monitoring initiated. Will monitor for 3 hrs. Report  received from [address] [place] [address] Patient oriented to Person , Place, Time, Situation. Patient denies complaints of nausea and dizziness. Patient tolerated PO fluids. Patient ambulated from stretcher to chair with minimal assistance. IV removed. Patient dressed with home clothes. Reviewed discharge instructions. 1300- Point of contact  Radha (Sister in law) given verbal instructions via phone due to COVID procedures. Patient transported to vehicle via wheel chair.

## 2020-06-17 NOTE — PROGRESS NOTES
TRANSFER - OUT REPORT:    Verbal report given to Chandan Cook RN(name) on Marcus Joe  being transferred to [facility] (unit) for routine progression of care       Report consisted of patients Situation, Background, Assessment and   Recommendations(SBAR). Information from the following report(s) SBAR, Procedure Summary and MAR was reviewed with the receiving nurse. Lines:   Venous Access Device Mediport 11/25/19 Upper chest (subclavicular area, right (Active)       Peripheral IV 06/17/20 Left Forearm (Active)   Site Assessment Clean, dry, & intact 6/17/2020  9:43 AM   Phlebitis Assessment 0 6/17/2020  9:43 AM   Infiltration Assessment 0 6/17/2020  9:43 AM   Dressing Status Clean, dry, & intact 6/17/2020  9:43 AM   Dressing Type Tape;Transparent 6/17/2020  9:43 AM   Hub Color/Line Status Pink; Infusing 6/17/2020  9:43 AM        Opportunity for questions and clarification was provided. Patient transported with:   Tech          Lower extremity neuro check: patient able to move both lower extremities, wiggle toes, does have some numbness in his feet but he states\" my feet were numb before this. \"

## 2020-06-18 ENCOUNTER — APPOINTMENT (OUTPATIENT)
Dept: INFUSION THERAPY | Age: 55
End: 2020-06-18
Payer: MEDICAID

## 2020-06-19 NOTE — ED PROVIDER NOTES
HPI     54M with pancreatic cancer presents with 9/10 LUQ pain onset 0900 this AM unrelieved by home dilaudid and oxycodone associated with n/v. Last chemo 3 weeks ago. Managed by Dr. Gustavo Mccord. No n/v/d. Last BM this morning. Past Medical History:   Diagnosis Date    Cancer [facility])     Pancreatic Adenocarcinoma. Diagnosed 2019.  Diabetes ([facility] 10/2019    Diagnosed 10/2019. Patient denies     Hepatitis C     Received Curative Treatment, but Hepatitis C was not cured and returned.  Left ulnar fracture     Thromboembolus ([facility]     DVT of LLE 2019. Also, Pulmonary Embolism 2019. Treated with Rivaroxaban.        Past Surgical History:   Procedure Laterality Date    HX TONSILLECTOMY      IR BX PANCREAS NEEDLE PERC  2019    Endoscopic Pancreatic Biopsy (?)    IR INSERT TUNL CVC W PORT OVER 5 YEARS  2019         Family History:   Problem Relation Age of Onset    Diabetes Mother     Kidney Disease Mother     Diabetes Father     Diabetes Brother     Cancer Maternal Grandmother     Cancer Maternal Grandfather     Cancer Maternal Aunt     Cancer Maternal Uncle        Social History     Socioeconomic History    Marital status:      Spouse name: Not on file    Number of children: Not on file    Years of education: Not on file    Highest education level: Not on file   Occupational History    Not on file   Social Needs    Financial resource strain: Not on file    Food insecurity     Worry: Not on file     Inability: Not on file    Transportation needs     Medical: Not on file     Non-medical: Not on file   Tobacco Use    Smoking status: Former Smoker     Packs/day: 0.50     Years: 40.00     Pack years: 20.00     Last attempt to quit: 2018     Years since quittin.6    Smokeless tobacco: Never Used    Tobacco comment: Quit 2018   Substance and Sexual Activity    Alcohol use: Not Currently    Drug use: Not Currently     Comment: Quit Cocaine, Heroin, and Marijuana since 11/2018    Sexual activity: Not Currently   Lifestyle    Physical activity     Days per week: Not on file     Minutes per session: Not on file    Stress: Not on file   Relationships    Social connections     Talks on phone: Not on file     Gets together: Not on file     Attends Mu-ism service: Not on file     Active member of club or organization: Not on file     Attends meetings of clubs or organizations: Not on file     Relationship status: Not on file    Intimate partner violence     Fear of current or ex partner: Not on file     Emotionally abused: Not on file     Physically abused: Not on file     Forced sexual activity: Not on file   Other Topics Concern     Service Not Asked    Blood Transfusions Not Asked    Caffeine Concern Not Asked    Occupational Exposure Not Asked   [employer] Hazards Not Asked    Sleep Concern Not Asked    Stress Concern Not Asked    Weight Concern Not Asked    Special Diet Not Asked    Back Care Not Asked    Exercise Not Asked    Bike Helmet Not Asked   [address] Not Asked    Self-Exams Not Asked   Social History Narrative    Not on file         ALLERGIES: Patient has no known allergies. Review of Systems   Constitutional: Negative for chills and fever. HENT: Negative for ear pain and sore throat. Eyes: Negative for pain and visual disturbance. Respiratory: Negative for cough and shortness of breath. Cardiovascular: Negative for chest pain and palpitations. Gastrointestinal: Positive for abdominal pain and nausea. Negative for diarrhea and vomiting. Genitourinary: Negative for flank pain. Musculoskeletal: Negative for back pain and neck pain. Neurological: Negative for syncope and headaches. Psychiatric/Behavioral: Negative for agitation. The patient is not nervous/anxious.         Vitals:    05/21/20 1419 05/21/20 1445 05/21/20 1500 05/21/20 1515   BP: 105/64 96/60 94/63 96/67   Pulse:       Resp:       Temp:       SpO2: 97% 94% 95% 93%            Physical Exam  Vitals signs and nursing note reviewed. Constitutional:       General: He is not in acute distress. Appearance: Normal appearance. He is well-developed. He is not ill-appearing, toxic-appearing or diaphoretic. HENT:      Head: Normocephalic and atraumatic. Mouth/Throat:      Mouth: Mucous membranes are moist.   Eyes:      General: No scleral icterus. Extraocular Movements: Extraocular movements intact. Conjunctiva/sclera: Conjunctivae normal.      Pupils: Pupils are equal, round, and reactive to light. Neck:      Musculoskeletal: Normal range of motion and neck supple. Trachea: No tracheal deviation. Cardiovascular:      Rate and Rhythm: Normal rate and regular rhythm. Pulses: Normal pulses. Heart sounds: No murmur. Pulmonary:      Effort: Pulmonary effort is normal. No respiratory distress. Breath sounds: Normal breath sounds. Abdominal:      Palpations: Abdomen is soft. Tenderness: There is abdominal tenderness (LUQ). There is guarding. Musculoskeletal: Normal range of motion. General: No deformity. Right lower leg: No edema. Left lower leg: No edema. Skin:     General: Skin is warm and dry. Capillary Refill: Capillary refill takes less than 2 seconds. Findings: No rash. Neurological:      General: No focal deficit present. Mental Status: He is alert and oriented to person, place, and time. Mental status is at baseline. Cranial Nerves: No cranial nerve deficit. Sensory: No sensory deficit. Motor: No weakness.       Comments: No gross neuro deficit   Psychiatric:         Mood and Affect: Mood normal.          Labs Reviewed   CBC WITH AUTOMATED DIFF - Abnormal; Notable for the following components:       Result Value    WBC 3.9 (*)     RBC 3.38 (*)     HGB 11.5 (*)     HCT 33.4 (*)     MCV 98.8 (*)     RDW 15.2 (*)     PLATELET 83 (*)     MONOCYTES 14 (*)     All other components within normal limits   METABOLIC PANEL, COMPREHENSIVE - Abnormal; Notable for the following components:    Sodium 135 (*)     Glucose 222 (*)     ALT (SGPT) 15 (*)     Protein, total 8.3 (*)     Globulin 4.9 (*)     A-G Ratio 0.7 (*)     All other components within normal limits   LIPASE - Abnormal; Notable for the following components:    Lipase 71 (*)     All other components within normal limits     CT ABD PELV W CONT   Final Result   IMPRESSION:      1. Multiple hepatic metastases, all of which appears slightly smaller in size   compared to most recent CT of 4/24/2020. Peripancreatic lymphadenopathy also   appears slightly decreased in the interval.   2. Primary pancreatic mass appears essentially unchanged. Indeterminant to what   extent peripancreatic inflammatory stranding represents a superimposed acute   pancreatitis versus inflammatory stigmata related to pancreatic cancer. Medications   sodium chloride 0.9 % bolus infusion 1,000 mL (0 mL IntraVENous IV Completed 5/21/20 1437)   HYDROmorphone (DILAUDID) syringe 0.5 mg (0.5 mg IntraVENous Given 5/21/20 1247)   iopamidoL (ISOVUE 300) 61 % contrast injection 100 mL (100 mL IntraVENous Given 5/21/20 1321)   HYDROmorphone (DILAUDID) injection 1 mg (1 mg IntraVENous Given 5/21/20 1437)   ondansetron (ZOFRAN) injection 4 mg (4 mg IntraVENous Given 5/21/20 1437)   sodium chloride 0.9 % bolus infusion 500 mL (0 mL IntraVENous IV Completed 5/21/20 1604)       MDM  ED Course as of Jun 19 0708   Th May 21, 2020   1221 54M with pancreatic cancer presents with LUQ pain onset 0900 this AM unrelieved by home dilaudid. Last chemo 3 weeks ago. Managed by Dr. Joycelyn Ley. No n/v/d. Last BM this morning. On exam tenderness with light palpation of LUQ, sight guarding, no rebound.   Plan for pain control, labs and re-eval.     [KG]      ED Course User Index  [KG] Sha Ramos R, DO     Imaging with no significant findings, but with some peripancreatic inflammatory stranding. Hepatic mets improved. Lipase wnl, doubt pancreatitis. CBC findings consistent with chemo. Glucose at baseline hyperglycemia. Pt continues to c/o pain on re-eval although somewhat improved. No vomiting in ED. Resting comfortably on multiple re-evaluations. Goal for pain control and successful PO challenge. Procedures    ICD-10-CM ICD-9-CM    1. Abdominal pain, LUQ (left upper quadrant) R10.12 789.02    2. History of pancreatic cancer Z85.07 V10.09      DIspo: Signed out to Dr. Jayde Gotti pending re-eval after pain medication.

## 2020-06-22 ENCOUNTER — APPOINTMENT (OUTPATIENT)
Dept: INFUSION THERAPY | Age: 55
End: 2020-06-22
Payer: MEDICAID

## 2020-06-22 ENCOUNTER — HOSPITAL ENCOUNTER (OUTPATIENT)
Dept: INFUSION THERAPY | Age: 55
Discharge: HOME | End: 2020-06-22
Payer: MEDICAID

## 2020-06-22 VITALS
TEMPERATURE: 97.8 F | HEIGHT: 72 IN | WEIGHT: 204.8 LBS | OXYGEN SATURATION: 98 % | BODY MASS INDEX: 27.74 KG/M2 | DIASTOLIC BLOOD PRESSURE: 69 MMHG | SYSTOLIC BLOOD PRESSURE: 113 MMHG | HEART RATE: 67 BPM

## 2020-06-22 LAB
ALBUMIN SERPL-MCNC: 3.4 G/DL (ref 3.4–5)
ALBUMIN/GLOB SERPL: 0.7 {RATIO} (ref 0.8–1.7)
ALP SERPL-CCNC: 99 U/L (ref 45–117)
ALT SERPL-CCNC: 18 U/L (ref 16–61)
ANION GAP SERPL CALC-SCNC: 6 MMOL/L (ref 3–18)
AST SERPL-CCNC: 23 U/L (ref 10–38)
BASO+EOS+MONOS # BLD AUTO: 0.8 K/UL (ref 0–2.3)
BASO+EOS+MONOS NFR BLD AUTO: 13 % (ref 0.1–17)
BILIRUB SERPL-MCNC: 0.6 MG/DL (ref 0.2–1)
BUN SERPL-MCNC: 12 MG/DL (ref 7–18)
BUN/CREAT SERPL: 14 (ref 12–20)
CALCIUM SERPL-MCNC: 9.3 MG/DL (ref 8.5–10.1)
CHLORIDE SERPL-SCNC: 104 MMOL/L (ref 100–111)
CO2 SERPL-SCNC: 26 MMOL/L (ref 21–32)
CREAT SERPL-MCNC: 0.86 MG/DL (ref 0.6–1.3)
DIFFERENTIAL METHOD BLD: ABNORMAL
ERYTHROCYTE [DISTWIDTH] IN BLOOD BY AUTOMATED COUNT: 14.2 % (ref 11.5–14.5)
GLOBULIN SER CALC-MCNC: 5 G/DL (ref 2–4)
GLUCOSE SERPL-MCNC: 126 MG/DL (ref 74–99)
HCT VFR BLD AUTO: 36.9 % (ref 36–48)
HGB BLD-MCNC: 12.6 G/DL (ref 12–16)
LYMPHOCYTES # BLD: 1.6 K/UL (ref 1.1–5.9)
LYMPHOCYTES NFR BLD: 24 % (ref 14–44)
MCH RBC QN AUTO: 33.4 PG (ref 25–35)
MCHC RBC AUTO-ENTMCNC: 34.1 G/DL (ref 31–37)
MCV RBC AUTO: 97.9 FL (ref 78–102)
NEUTS SEG # BLD: 4.2 K/UL (ref 1.8–9.5)
NEUTS SEG NFR BLD: 63 % (ref 40–70)
PLATELET # BLD AUTO: 135 K/UL (ref 140–440)
POTASSIUM SERPL-SCNC: 4.1 MMOL/L (ref 3.5–5.5)
PROT SERPL-MCNC: 8.4 G/DL (ref 6.4–8.2)
RBC # BLD AUTO: 3.77 M/UL (ref 4.1–5.1)
SODIUM SERPL-SCNC: 136 MMOL/L (ref 136–145)
WBC # BLD AUTO: 6.6 K/UL (ref 4.5–13)

## 2020-06-22 PROCEDURE — 85025 COMPLETE CBC W/AUTO DIFF WBC: CPT

## 2020-06-22 PROCEDURE — 80053 COMPREHEN METABOLIC PANEL: CPT

## 2020-06-22 PROCEDURE — 36415 COLL VENOUS BLD VENIPUNCTURE: CPT

## 2020-06-22 NOTE — PROGRESS NOTES
[facility] Progress Note    Date: 2020    Name: Cal Abernathy    MRN: [id number]         : 1965    Peripheral Lab Draw      Mr. Merlin Sample to [facility], ambulatory at 0808 accompanied by self. Pt was assessed and education was provided. Mr. Tom Baxter vitals were reviewed and patient was observed for 5 minutes prior to treatment. Visit Vitals  /69 (BP 1 Location: Left arm, BP Patient Position: Sitting)   Pulse 67   Temp 97.8 °F (36.6 °C)   Ht 6' (1.829 m)   Wt 92.9 kg (204 lb 12.8 oz)   SpO2 98%   BMI 27.78 kg/m²     Recent Results (from the past 12 hour(s))   CBC WITH 3 PART DIFF    Collection Time: 20  8:18 AM   Result Value Ref Range    WBC 6.6 4.5 - 13.0 K/uL    RBC 3.77 (L) 4.10 - 5.10 M/uL    HGB 12.6 12.0 - 16.0 g/dL    HCT 36.9 36 - 48 %    MCV 97.9 78 - 102 FL    MCH 33.4 25.0 - 35.0 PG    MCHC 34.1 31 - 37 g/dL    RDW 14.2 11.5 - 14.5 %    PLATELET 629 (L) 609 - 440 K/uL    NEUTROPHILS 63 40 - 70 %    MIXED CELLS 13 0.1 - 17 %    LYMPHOCYTES 24 14 - 44 %    ABS. NEUTROPHILS 4.2 1.8 - 9.5 K/UL    ABS. MIXED CELLS 0.8 0.0 - 2.3 K/uL    ABS. LYMPHOCYTES 1.6 1.1 - 5.9 K/UL    DF AUTOMATED         Blood obtained peripherally from left arm x 1 attempt with butterfly needle and sent to lab for Cbc w/diff and Cmp per written orders. No bleeding or hematoma noted at site. Gauze and coban applied. Mr. Merlin Sample tolerated the phlebotomy, and had no complaints. Patient armband removed and shredded. Mr. Merlin Sample was discharged from [place] [address] in stable condition at [facility].      Sarai Weiner Phlebotomist PCT  2020  8:47 AM

## 2020-06-23 ENCOUNTER — HOSPITAL ENCOUNTER (OUTPATIENT)
Dept: INFUSION THERAPY | Age: 55
Discharge: HOME | End: 2020-06-23
Payer: MEDICAID

## 2020-06-23 ENCOUNTER — APPOINTMENT (OUTPATIENT)
Dept: INFUSION THERAPY | Age: 55
End: 2020-06-23
Payer: MEDICAID

## 2020-06-23 VITALS
SYSTOLIC BLOOD PRESSURE: 124 MMHG | OXYGEN SATURATION: 97 % | TEMPERATURE: 97.1 F | HEART RATE: 79 BPM | DIASTOLIC BLOOD PRESSURE: 77 MMHG | RESPIRATION RATE: 18 BRPM

## 2020-06-23 DIAGNOSIS — C25.9 PANCREATIC ADENOCARCINOMA (HCC): ICD-10-CM

## 2020-06-23 DIAGNOSIS — K86.89 PANCREATIC MASS: Primary | ICD-10-CM

## 2020-06-23 DIAGNOSIS — C78.7 LIVER METASTASES (HCC): ICD-10-CM

## 2020-06-23 DIAGNOSIS — C78.7 LIVER METASTASIS (HCC): ICD-10-CM

## 2020-06-23 DIAGNOSIS — G89.3 CANCER ASSOCIATED PAIN: ICD-10-CM

## 2020-06-23 PROCEDURE — 77030012965 HC NDL HUBR BBMI -A

## 2020-06-23 PROCEDURE — 96413 CHEMO IV INFUSION 1 HR: CPT

## 2020-06-23 PROCEDURE — 74011000258 HC RX REV CODE- 258: Performed by: INTERNAL MEDICINE

## 2020-06-23 PROCEDURE — 74011250636 HC RX REV CODE- 250/636: Performed by: INTERNAL MEDICINE

## 2020-06-23 PROCEDURE — 96415 CHEMO IV INFUSION ADDL HR: CPT

## 2020-06-23 PROCEDURE — 96416 CHEMO PROLONG INFUSE W/PUMP: CPT

## 2020-06-23 PROCEDURE — 96368 THER/DIAG CONCURRENT INF: CPT

## 2020-06-23 PROCEDURE — 96367 TX/PROPH/DG ADDL SEQ IV INF: CPT

## 2020-06-23 PROCEDURE — 96411 CHEMO IV PUSH ADDL DRUG: CPT

## 2020-06-23 PROCEDURE — 96417 CHEMO IV INFUS EACH ADDL SEQ: CPT

## 2020-06-23 PROCEDURE — 96375 TX/PRO/DX INJ NEW DRUG ADDON: CPT

## 2020-06-23 RX ORDER — FLUOROURACIL 50 MG/ML
900 INJECTION, SOLUTION INTRAVENOUS ONCE
Status: COMPLETED | OUTPATIENT
Start: 2020-06-23 | End: 2020-06-23

## 2020-06-23 RX ORDER — HYDROMORPHONE HYDROCHLORIDE 2 MG/1
TABLET ORAL
Qty: 120 TAB | Refills: 0 | Status: SHIPPED | OUTPATIENT
Start: 2020-06-23 | End: 2020-07-23

## 2020-06-23 RX ORDER — DEXTROSE MONOHYDRATE 50 MG/ML
25 INJECTION, SOLUTION INTRAVENOUS CONTINUOUS
Status: DISPENSED | OUTPATIENT
Start: 2020-06-23 | End: 2020-06-23

## 2020-06-23 RX ORDER — FENTANYL 50 UG/1
PATCH TRANSDERMAL
Qty: 10 PATCH | Refills: 0 | Status: SHIPPED | OUTPATIENT
Start: 2020-06-23 | End: 2020-07-23

## 2020-06-23 RX ORDER — ATROPINE SULFATE 1 MG/ML
0.4 INJECTION, SOLUTION INTRAVENOUS ONCE
Status: COMPLETED | OUTPATIENT
Start: 2020-06-23 | End: 2020-06-23

## 2020-06-23 RX ORDER — PALONOSETRON 0.05 MG/ML
0.25 INJECTION, SOLUTION INTRAVENOUS ONCE
Status: COMPLETED | OUTPATIENT
Start: 2020-06-23 | End: 2020-06-23

## 2020-06-23 RX ADMIN — PALONOSETRON 0.25 MG: 0.25 INJECTION, SOLUTION INTRAVENOUS at 08:54

## 2020-06-23 RX ADMIN — FOSAPREPITANT 150 MG: 150 INJECTION, POWDER, LYOPHILIZED, FOR SOLUTION INTRAVENOUS at 09:03

## 2020-06-23 RX ADMIN — ATROPINE SULFATE 0.4 MG: 1 INJECTION, SOLUTION INTRAMUSCULAR; INTRAVENOUS; SUBCUTANEOUS at 08:56

## 2020-06-23 RX ADMIN — IRINOTECAN HYDROCHLORIDE 400 MG: 20 INJECTION, SOLUTION INTRAVENOUS at 12:10

## 2020-06-23 RX ADMIN — FLUOROURACIL 5300 MG: 50 INJECTION, SOLUTION INTRAVENOUS at 14:10

## 2020-06-23 RX ADMIN — FLUOROURACIL 900 MG: 50 INJECTION, SOLUTION INTRAVENOUS at 14:05

## 2020-06-23 RX ADMIN — OXALIPLATIN 190 MG: 5 INJECTION, SOLUTION INTRAVENOUS at 10:03

## 2020-06-23 RX ADMIN — LEUCOVORIN CALCIUM 900 MG: 200 INJECTION, POWDER, LYOPHILIZED, FOR SUSPENSION INTRAMUSCULAR; INTRAVENOUS at 12:10

## 2020-06-23 RX ADMIN — DEXTROSE 25 ML/HR: 5 SOLUTION INTRAVENOUS at 08:45

## 2020-06-23 NOTE — PROGRESS NOTES
[facility] Progress Note    Date: 2020    Name: Jose Maria Alcazar              MRN: [id number]              : 1965    Chemotherapy Cycle:11; Day 1    FOLFIRINOX Infusion       Pt to [facility], ambulatory, at [address] [place] [address]. Mr. Julia Gan was assessed and education was provided. Patient denied pain/discomfort and stated that he felt \"good\". No changes in medication regimen or health condition were noted. Mr. Malik Baker vitals were reviewed. Visit Vitals  /77 (BP 1 Location: Left arm, BP Patient Position: Sitting)   Pulse 79   Temp 97.1 °F (36.2 °C)   Resp 18   SpO2 97%       Right anterior chest mediport accessed with 20 g 1 inch non-coring access needle, using sterile technique and central line dressing kit. Port flushed easily and had brisk blood return. Site without evidence of complication or patient complaint. D5W initiated via port-a-cath IV line @ 25 mL/hr. Lab results obtained on 2020 were reviewed and are within ordered parameters to give chemo today. ANC = 4.2, PLT = 135. Chemo dosages verified with today's BSA ( 2.17) and found to be within 10% of ordered dosages. Premedications (EMEND 150 mg IVPB; ALOXI 0.25 mg IVP; Atropine 0.4mg IVP) administered as ordered. Oxaliplatin (ELOXATIN) 190 mg IV was infused over 2 hours, as ordered, via port-a-cath IV line. VS stable at end of infusion and pt denied complaints. Blood return from port was re-verified and line flushed with D5W. Irinotecan (CAMPTOSAR) 400 mg  IV was infused over 90 minutes, as ordered. Leucovorin (WELLCOVORIN) 900 mg IV was infused over 90 minutes as ordered. VS stable at end of infusion and pt denied complaints. Line flushed with D5W and blood return from port re-verified. Mr. Julia Gan tolerated infusion, and had no complaints at this time. Fluorouracil (ADRUCIL) 900 mg was administered via chemo syringe and port-a-cath as ordered.      CADD pump was loaded with (Fluorouracil 5300 mg) cassette and attached to port-a-cath. All connections and clamps were secured and wrapped with tape. Patient armband removed and shredded. Mr. Daniela Gardner was discharged from [place] [address] in stable condition at [address] [address] [facility]. He is to return on 06/25/2020 at 1530 for d/c of CADD pump.      Cassidy Villarreal RN  June 23, 2020  9:38 AM

## 2020-06-25 ENCOUNTER — OFFICE VISIT (OUTPATIENT)
Dept: ONCOLOGY | Age: 55
End: 2020-06-25

## 2020-06-25 ENCOUNTER — NURSE NAVIGATOR (OUTPATIENT)
Dept: OTHER | Age: 55
End: 2020-06-25

## 2020-06-25 ENCOUNTER — HOSPITAL ENCOUNTER (OUTPATIENT)
Dept: INFUSION THERAPY | Age: 55
Discharge: HOME | End: 2020-06-25
Payer: MEDICAID

## 2020-06-25 ENCOUNTER — APPOINTMENT (OUTPATIENT)
Dept: INFUSION THERAPY | Age: 55
End: 2020-06-25
Payer: MEDICAID

## 2020-06-25 VITALS
HEIGHT: 72 IN | HEART RATE: 81 BPM | TEMPERATURE: 97.9 F | BODY MASS INDEX: 27.09 KG/M2 | RESPIRATION RATE: 20 BRPM | WEIGHT: 200 LBS | SYSTOLIC BLOOD PRESSURE: 114 MMHG | DIASTOLIC BLOOD PRESSURE: 75 MMHG | OXYGEN SATURATION: 97 %

## 2020-06-25 DIAGNOSIS — C25.9 MALIGNANT NEOPLASM OF PANCREAS, UNSPECIFIED LOCATION OF MALIGNANCY (HCC): Primary | ICD-10-CM

## 2020-06-25 DIAGNOSIS — C78.7 LIVER METASTASIS (HCC): ICD-10-CM

## 2020-06-25 DIAGNOSIS — R10.13 EPIGASTRIC PAIN: ICD-10-CM

## 2020-06-25 DIAGNOSIS — K86.89 PANCREATIC MASS: ICD-10-CM

## 2020-06-25 PROCEDURE — 96523 IRRIG DRUG DELIVERY DEVICE: CPT

## 2020-06-25 PROCEDURE — 74011250636 HC RX REV CODE- 250/636

## 2020-06-25 PROCEDURE — 86301 IMMUNOASSAY TUMOR CA 19-9: CPT

## 2020-06-25 PROCEDURE — 36415 COLL VENOUS BLD VENIPUNCTURE: CPT

## 2020-06-25 RX ORDER — HYDROMORPHONE HYDROCHLORIDE 2 MG/1
2 TABLET ORAL
Qty: 240 TAB | Refills: 0 | Status: SHIPPED | OUTPATIENT
Start: 2020-06-25 | End: 2020-07-09

## 2020-06-25 RX ORDER — SODIUM CHLORIDE 0.9 % (FLUSH) 0.9 %
10 SYRINGE (ML) INJECTION AS NEEDED
Status: DISCONTINUED | OUTPATIENT
Start: 2020-06-25 | End: 2020-06-26 | Stop reason: HOSPADM

## 2020-06-25 RX ORDER — HEPARIN 100 UNIT/ML
300-500 SYRINGE INTRAVENOUS AS NEEDED
Status: DISCONTINUED | OUTPATIENT
Start: 2020-06-25 | End: 2020-06-26 | Stop reason: HOSPADM

## 2020-06-25 RX ORDER — SODIUM CHLORIDE 9 MG/ML
10 INJECTION INTRAMUSCULAR; INTRAVENOUS; SUBCUTANEOUS AS NEEDED
Status: DISCONTINUED | OUTPATIENT
Start: 2020-06-25 | End: 2020-06-26 | Stop reason: HOSPADM

## 2020-06-25 RX ORDER — HEPARIN 100 UNIT/ML
SYRINGE INTRAVENOUS
Status: COMPLETED
Start: 2020-06-25 | End: 2020-06-25

## 2020-06-25 RX ADMIN — HEPARIN 500 UNITS: 100 SYRINGE at 16:07

## 2020-06-25 RX ADMIN — Medication 10 ML: at 16:04

## 2020-06-25 NOTE — PROGRESS NOTES
[facility] Progress Note    Date: 2020    Name: Sarah Laboy    MRN: [id number]         : 1965      Mr. Olmos arrived to [facility] at 1600. Mr. Emanuel Mercado was assessed and education was provided. Mr. Trinity Leavitt vitals were reviewed. Patient was seen today by Dr. Roman Lara and CA 19-9 was ordered. Patient's right upper chest port already accessed from 2020 visit. CADD pump infusion completed at 1400, per patient. CADD pump disconnected from patient's port. Blood return verified. Port flushed with 20 mL NS, followed by instillation of Heparin 500 units/5mL. Access needle was then removed intact and bandaid applied to port site. Patient denied complaints. Labs drawn via venipuncture of median vein to right lower forearm x 1 attempt. Site without evidence of complication. Mr. Emanuel Mercado tolerated procedure without complaints. Mr. Emanuel Mercado was discharged from [place] [address] in stable condition at 1615. He is to return on 2020 at 0800 for his next appointment.     Corry Crawford RN  2020

## 2020-06-25 NOTE — NURSE NAVIGATOR
Saw pt in clinic with Dr. Adolfo Rust c/o cancer related pain, referred to pain management. Pain meds adjusted, labs today. RTC in 1 month, all questions answered.

## 2020-06-25 NOTE — PROGRESS NOTES
Any Caruso is a 62 y.o. 1965 male with past medical history including pulmonary embolus, on Lovenox, type 2 diabetes, GERD, who I was asked to see in consultation at the request of Imelda Berg for evaluation for newly diagnosed pancreatic adenocarcinoma.  Patient had fine-needle aspiration of pancreatic mass on 11/13/2019 and pathology showed pancreatic adenocarcinoma. I saw him in consultation for the first time on 11/19/2019) was to treat him with palliative FOLFIRINOX. He received C1D1 FOLFIRINOX on 12/16/2019 and tolerated well overall but cycle 5 was held on 2/10/2020 due to thrombocytopenia. Patient was started on avatrombopag but we still waiting for the insurance to have it shipped to his house. He completed C11 D1 on 6/23/20, here for follow-up. Patient was seen and examined today. Markie Mccarty is awake alert oriented x3.    Denies any fevers, chills, or nausea and vomiting today.  Denies any melena or bright red blood per rectum. He still reports 7/10 abdominal pain. Also has nausea. He is clinically stable.  All also points of review of system have been reviewed and were negative.  ECOG performance status 1.  Independent with ADLs and IADLs.    DX: Pancreatic adenocarcinoma     STAGE: Stage IV     Treatment intent: Palliative     ONCOLOGY HISTORY: BRCA1/2 negative-Pan-NTRK negative-No available activating mutation by Varghese  11/03/2019: Went to ER due to 5 days complaints of epigastric pain.  CT abdomen and pelvis showed:  1. 5 cm mass within the pancreatic body highly suggestive of primary malignancy. Superimposed acute pancreatitis cannot entirely be excluded. 2. Multiple liver lesions highly suggestive of metastatic disease 3. Metastatic retroperitoneal and mesenteric lymphadenopathy. 4. Indeterminate hypodensity within the spleen. Diagnostic consideration include splenic infarct or metastatic lesion 5. Right lower lobe pulmonary emboli.       *MRI of the abdomen showed  1.  Stable since same day CT abdomen pelvis. Elliptical 4 cm hypoenhancing mass,  body of pancreas, with upstream glandular atrophy and pancreatic ductal  dilatation, most likely adenocarcinoma. Associated local/regional likely metastatic lymphadenopathy including upper retroperitoneum, lesser omentum, portacaval/periportal space. Associated encasement/narrowing of the adjacent  splenic vein. 2. Mild peripancreatic edema and soft tissue reticulation; may reflect secondary low-grade or chronic pancreatitis or adjacent peripancreatic tumor infiltration.   3. Numerous hypoenhancing and target-like small nodules and masses throughout  the liver, typical of metastatic disease. 4. Mild pericholecystic fluid, nonspecific, but may represent low-grade cholecystitis.   No evident cholecystolithiasis/choledocholithiasis or biliary ductal dilatation. 5. Mild splenomegaly with focal small splenic infarction, age indeterminate  perhaps recent. Small left lower pole, nonacute renal infarction. 6. Other minor and/or ancillary findings including lumbar disc herniations     11/15/2019: Duplex left  lower extremity showed Acute nonocclusive thrombus in the distal femoral vein, popliteal, peroneal and both posterior tibial veins. The thrombus in the distal femoral vein appears to be floating tail-like thrombus.     11/19/2019: First medical oncology visit with me    11/29/2019: PET/CT showed  Malignant metabolic activity corresponding with the known malignancy in the pancreatic body. Innumerable hypermetabolic liver metastases. Metastatic portal caval and aortocaval lymph nodes. Enlargement and malignant metabolic activity in the anterior aspect of the left  psoas muscle suspicious for metastatic disease. Distant metastatic disease to include the left supraclavicular region, left  superior mediastinum and possibly the left inferior hilum.   12/16/19: [id number]/30/19:C2D1-1/13/20:C3D1- 1/14/2020: CA-19-9 = 79 1/27/20:C4D1-2/10/20:C5 held due to thrombocytopenia. Started Avatrombopag (waiting for approval)-2/17/2020: C5D1(delayed)-CA-19-9 = 87 3/09/20: C6 D1  2/12/20:CT CAP showed  1. Redemonstration of multiple stigmata related to the patient's pancreatic  malignancy and accompanying ernesto/hepatic metastasis. 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     > Some interval improvement in the appearance of upper abdominal  retroperitoneal adenopathy with persistently abnormal/necrotic periportal and  peripancreatic lymph nodes.     2. No evidence of bowel obstruction. No free intraperitoneal gas.     3. Mild nonspecific urothelial enhancement. Correlation with urinalysis may be  helpful if symptoms of urinary tract infection are present. 4/24/20: Restaging CT CAP showed   1. No CT findings of an acute intra-abdominal or intrapelvic obstructive or  inflammatory process. 2. Decreased size of the ill-defined infiltrative mass at the junction of the pancreatic body and tail measuring approximately 3.4 x 2.3 cm  (axial 48), previously 6.5 x 2.8 cm. Similar peripancreatic stranding with  downstream duct dilatation and atrophy of the tail. 3. Innumerable hepatic metastatic lesions, stable to mildly decreased size to the preceding CT scan of the abdomen/pelvis from 2/12/2020.  5/18/20: C10 D1 held due to thrombocytopenia  5/21/20: CT AP showed   1. Multiple hepatic metastases, all of which appears slightly smaller in size  compared to most recent CT of 4/24/2020. Peripancreatic lymphadenopathy also  appears slightly decreased in the interval.  2. Primary pancreatic mass appears essentially unchanged. Indeterminant to what  extent peripancreatic inflammatory stranding represents a superimposed acute  pancreatitis versus inflammatory stigmata related to pancreatic cancer.   5/26/2020: C10 D1 delayed by 1 week  6/17/20: Celiac block             Past Medical History:   Diagnosis Date    Cancer [facility])     Pancreatic Adenocarcinoma. Diagnosed 2019.  Diabetes ([facility] [address] 10/2019    Diagnosed 10/2019. Patient denies     Hepatitis C     Received Curative Treatment, but Hepatitis C was not cured and returned.  Left ulnar fracture     Thromboembolus ([facility] [address]     DVT of LLE 2019. Also, Pulmonary Embolism 2019. Treated with Rivaroxaban. Past Surgical History:   Procedure Laterality Date    HX TONSILLECTOMY      IR BX PANCREAS NEEDLE PERC  2019    Endoscopic Pancreatic Biopsy (?)    IR INSERT TUNL CVC W PORT OVER 5 YEARS  2019     Social History     Socioeconomic History    Marital status:      Spouse name: Not on file    Number of children: Not on file    Years of education: Not on file    Highest education level: Not on file   Tobacco Use    Smoking status: Former Smoker     Packs/day: 0.50     Years: 40.00     Pack years: 20.00     Last attempt to quit: 2018     Years since quittin.6    Smokeless tobacco: Never Used    Tobacco comment: Quit 2018   Substance and Sexual Activity    Alcohol use: Not Currently    Drug use: Not Currently     Comment: Quit Cocaine, Heroin, and Marijuana since 2018    Sexual activity: Not Currently   Other Topics Concern     Family History   Problem Relation Age of Onset    Diabetes Mother     Kidney Disease Mother     Diabetes Father     Diabetes Brother     Cancer Maternal Grandmother     Cancer Maternal Grandfather     Cancer Maternal Aunt     Cancer Maternal Uncle        Current Outpatient Medications   Medication Sig Dispense Refill    fentaNYL (DURAGESIC) 50 mcg/hr PATCH apply 1 patch every 72 hours 10 Patch 0    HYDROmorphone (DILAUDID) 2 mg tablet take 1 tablet by mouth every 4 hours if needed for pain 120 Tab 0    oxyCODONE IR (ROXICODONE) 10 mg tab immediate release tablet Take 1 Tab by mouth every four (4) hours as needed for Pain for up to 30 days.  Max Daily Amount: 60 mg. 120 Tab 0    ondansetron hcl (ZOFRAN) 4 mg tablet Take 2 Tabs by mouth every eight (8) hours as needed for Nausea or Vomiting for up to 30 days. 90 Tab 1    Omeprazole delayed release (PRILOSEC D/R) 20 mg tablet Take 1 Tab by mouth daily. 30 Tab 3    lidocaine-prilocaine (EMLA) topical cream APPLY TO AFFECTED AREA AS NEEDED FOR PAIN  (APPLY TO SKIN 30 - 60 MINUTES BEFORE MEDIPORT ACCESS) 30 g 0    rivaroxaban (Xarelto) 20 mg tab tablet Take 1 Tab by mouth daily (with breakfast). 30 Tab 3    dronabinoL (MARINOL) 5 mg capsule Take 1 Cap by mouth two (2) times a day. Max Daily Amount: 10 mg. 60 Cap 3    True Metrix Glucose Test Strip strip       L-Mfolate-B6 Phos-Methyl-B12 (METANX) 3-35-2 mg tab tab Take 1 Tab by mouth two (2) times a day. Indications: peripheral neuropathy 60 Tab 1    lactulose (CHRONULAC) 10 gram/15 mL solution Take 15 mL by mouth three (3) times daily. 480 mL 3    naloxone (NARCAN) 4 mg/actuation nasal spray Use 1 spray intranasally, then discard. Repeat with new spray every 2 min as needed for opioid overdose symptoms, alternating nostrils. Indications: opioid overdose 1 Each 1    naloxone (NARCAN) 4 mg/actuation nasal spray Use 1 spray intranasally, then discard. Repeat with new spray every 2 min as needed for opioid overdose symptoms, alternating nostrils. 1 Each 1    dexAMETHasone (DECADRON) 4 mg tablet Take 4mg by mouth twice a day the day before chemotherapy and the day after chemotherapy 30 Tab 0    nut.tx.gluc.intol,lac-free,soy (GLUCERNA ADVANCE) liqd Take 237 mL by mouth three (3) times daily. 90 Bottle 5    TRUE METRIX GLUCOSE METER misc USE AS DIRECTED  0    omeprazole (PRILOSEC) 20 mg capsule TAKE 1 CAPSULE BY MOUTH ONCE DAILY  3    QUEtiapine (SEROQUEL) 50 mg tablet Take 50 mg by mouth nightly.  lidocaine (LIDODERM) 5 % Apply patch to the affected area for 12 hours a day and remove for 12 hours a day. 5 Each 0    insulin glargine (LANTUS U-100 INSULIN) 100 unit/mL injection 28 Units by SubCUTAneous route nightly.  1 Vial 0    insulin lispro (HUMALOG) 100 unit/mL injection 3-15 Units by SubCUTAneous route Before breakfast, lunch, and dinner. 1 Vial 0    rivaroxaban (XARELTO) 15 mg (42)- 20 mg (9) DsPk Take 20 mg by mouth daily. Take one 15 mg tablet twice a day with food for the first 21 days. Then, take one 20 mg tablet once a day with food for 9 days. Facility-Administered Medications Ordered in Other Visits   Medication Dose Route Frequency Provider Last Rate Last Dose    PHARMACY INFORMATION NOTE  1 Each Other Rx Dosing/Monitoring Winifred Cannon MD           No Known Allergies    Review of Systems  As per HPI    Objective:  Visit Vitals  /75 (BP 1 Location: Left arm, BP Patient Position: Sitting)   Pulse 81   Temp 97.9 °F (36.6 °C) (Oral)   Resp 20   Ht 6' (1.829 m)   Wt 90.7 kg (200 lb)   SpO2 97%   BMI 27.12 kg/m²       Physical Exam:   General appearance - alert, well appearing, and in no distress  Mental status - alert, oriented to person, place, and time  EYE-ONOFRE, EOMI  ENT-ENT exam normal, no neck nodes or sinus tenderness  Mouth - mucous membranes moist, pharynx normal without lesions  Neck - supple, no significant adenopathy   Chest - clear to auscultation, no wheezes, rales or rhonchi, symmetric air entry   Heart - normal rate and regular rhythm   Abdomen - soft, nontender, nondistended, no masses or organomegaly  Lymph- no adenopathy palpable  Ext-no pedal edema noted  Skin-Warm and dry. Neuro -alert, oriented, normal speech, no focal findings or movement disorder noted      Diagnostic Imaging     No results found for this or any previous visit. Results for orders placed during the hospital encounter of 12/18/19   XR KNEE RT MIN 4 V    Narrative EXAM: XR KNEE RT MIN 4 V    CLINICAL INDICATION/HISTORY: Knee pain    > Additional: Right knee pain    COMPARISON: None. > Reference Exam: None.     TECHNIQUE: 4 views right knee obtained.    _______________    FINDINGS:    No evidence of acute fracture or dislocation. There appears to be combination of  bipartite patella as well as adjacent well-corticated ossifications along  lateral aspect of patella, possible areas of heterotopic bone formation. There  is suggestion of small to moderate joint effusion. Diffuse chondrocalcinosis. Severe osteoarthritic changes with joint space narrowing patellofemoral joint. Mild spurring along the medial lateral joint compartments without significant  joint space narrowing.    _______________      Impression IMPRESSION:    1. No acute osseous findings. 2. 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 3. Diffuse chondrocalcinosis. 4. Small to moderate joint effusion. Results for orders placed during the hospital encounter of 06/17/20   CT GUIDED CELIAC BLOCK    Narrative EXAM:  CT-guided celiac plexus neurolysis    COMPARISON: CT 5/21/2020. CLINICAL HISTORY: History of metastatic pancreatic cancer. Request made for  celiac plexus neural lysis for symptomatic pain control poorly controlled by  conservative measures. GUIDANCE: CT guidance was used to position (and confirm the position of) the  needle. Image(s) saved in PACS: CT    One or more dose reduction techniques were used on this CT: automated exposure  control, adjustment of the mAs and/or kVp according to patient size, and  iterative reconstruction techniques. The specific techniques used on this CT  exam have been documented in the patient's electronic medical record. Digital  Imaging and Communications in Medicine (DICOM) format image data are available  to nonaffiliated external healthcare facilities or entities on a secure, media  free, reciprocally searchable basis with patient authorization for at least a  12-month period after this study. TECHNIQUE:    Informed consent was obtained.  Procedure including risks, benefits and  alternatives explained and understood by the patient. Risks including but not  limited to injury to adjacent organs, bowel, blood vessels or nerves. Procedural  time out was performed. Initial scanning performed for localizing in the prone  position demonstrated to much of the lung base in the field-of-view for this  positioning. Therefore was decided to perform the procedure in the decubitus  position for each side. For the left-sided celiac plexus neural lysis the patient was positioned left  lateral decubitus. Site was selected in the mid back based on CT localization. Sterile prep and drape. 1% lidocaine for local anesthesia. Long spinal needle  advanced via left paraspinal approach toward the region of the left celiac  plexus. Dilute contrast and lidocaine and saline mixture was then injected into  this space to confirm contrast opacification of the antecrural space. Follow-up  imaging demonstrated satisfactory opacification of this region. Aspiration also  performed to confirm no blood return. Once this was verified, approximately 20  cc of 98% EtOH injected without significant resistance. Follow-up imaging  performed demonstrating streaky lucencies in the same space compatible with  ethanol injection. The needle was then flushed with approximately 2 cc of saline  and removed. For the right-sided celiac plexus neural lysis the patient was positioned right  lateral decubitus. Site was selected in the mid back based on CT localization. Sterile prep and drape. 1% lidocaine for local anesthesia. Long spinal needle  advanced via right paraspinal approach toward the region of the right celiac  plexus. Dilute contrast and lidocaine and saline mixture was then injected into  this space to confirm contrast opacification of the antecrural space. Follow-up  imaging demonstrated satisfactory opacification of this region. Aspiration also  performed to confirm no blood return.  Once this was verified, approximately 20  cc of 98% EtOH injected without significant resistance. Follow-up imaging  performed demonstrating streaky lucencies in the same space compatible with  ethanol injection. The needle was then flushed with approximately 2 cc of saline  and removed. Patient tolerated procedure well without immediate complication. Site bandaged. Impression IMPRESSION: Successful CT-guided celiac plexus nerve lysis procedure via a  bilateral paraspinal approach as described. Lab Results  Lab Results   Component Value Date/Time    WBC 6.6 06/22/2020 08:18 AM    HGB 12.6 06/22/2020 08:18 AM    HCT 36.9 06/22/2020 08:18 AM    PLATELET 535 (L) 36/58/0227 08:18 AM    MCV 97.9 06/22/2020 08:18 AM       Lab Results   Component Value Date/Time    Sodium 136 06/22/2020 08:18 AM    Potassium 4.1 06/22/2020 08:18 AM    Chloride 104 06/22/2020 08:18 AM    CO2 26 06/22/2020 08:18 AM    Anion gap 6 06/22/2020 08:18 AM    Glucose 126 (H) 06/22/2020 08:18 AM    BUN 12 06/22/2020 08:18 AM    Creatinine 0.86 06/22/2020 08:18 AM    BUN/Creatinine ratio 14 06/22/2020 08:18 AM    GFR est AA >60 06/22/2020 08:18 AM    GFR est non-AA >60 06/22/2020 08:18 AM    Calcium 9.3 06/22/2020 08:18 AM    Alk. phosphatase 99 06/22/2020 08:18 AM    Protein, total 8.4 (H) 06/22/2020 08:18 AM    Albumin 3.4 06/22/2020 08:18 AM    Globulin 5.0 (H) 06/22/2020 08:18 AM    A-G Ratio 0.7 (L) 06/22/2020 08:18 AM    ALT (SGPT) 18 06/22/2020 08:18 AM       Assessment/Plan:  62 y. o. male  1. Pancreatic adenocarcinoma ([address]  I had a long discussion with Mr. Tiffani Strauss regarding his newly diagnosed metastatic pancreatic cancer.  I told him that in the first-line setting, palliative chemotherapy have been shown to improve 1 year survival by 73% compared to supportive care alone. Patient tolerated well C11, clinically much better. Gaining weight. *UG T1 A1  heterozygous  *Brain MRI patient could not do MRI due to claustrophobia. *On 12/18/2019, CA-19-9 was 85 from 112 on 11/19/2019.   *On 4/13/20, =06 (0-35). *On 5/18/20, =94  Reviewed labs done on 5/23/20    Day 1: Oxaliplatin 85mg/m2 IV + irinotecan 180mg/m2 IV + leucovorin 400mg/m2 IV, followed by a 5-FU bolus of 400mg/m2 and a 46-hour continuous 5-FU infusion of 2,400mg/m2. Repeat cycle every 2 weeks until disease progression.     Reviewed CT CAP done on 5/21/20 which showed improved or stabledisease. Patient tolerated well cycle 11 FOLFIRINOX so far with minimal side effects. Clinically doing very well. Awaiting for insurance to approve avatrombopag. Labs on 6/22/2020 showed WBC 6.6, H&H 12.6/36.9, platelet 399. BUN 12, creatinine 0.86. *Going to CA from 7/6-7/17/20. * normalized  *Continue FOLFIRINOX  *Recheck CBC  and CA-19-9. *Recheck labs before next cycle. *Check monthly CA-19-9  *F/u palliative care team   *Celiac block done on 6/17/20       2. Liver metastases ([address]  Stable as of CT on 5/21/20     3. Epigastric pain  *Patient having more pain. I will stop oxycodone which makes vomit and switch him to hydromorphone 2mg Q 4 hours prn changed to every 3 hours today 6/25/20, Continue fentanyl patch 50 mcg Q72 hrs as before. S/p celiac block. I told him that his pain is likley from opiates and high tolerance due to previous h/o opiate abuse. Refer to pain management. Has h/o opiates abuse. 4. Pulmonary embolism without acute cor pulmonale, unspecified chronicity, unspecified pulmonary embolism type (HCC)  Continue Xarelto     5. Thrombocytopenia ([address]  Multifactorial from chemotherapy, and hypersplenism. Give avatrombopag for chemotherapy induced thrombocytopenia     6. Insulin dependent diabetes:Blood sugars much better controlled now. 7. Constipation: Resolved. Has lactulose if needed    8.  Chemotherapy induced: Gave Zydis     RTC 4 weeks            Nabila Bridges MD

## 2020-06-25 NOTE — PROGRESS NOTES
Identified pt with two pt identifiers(name and ). Reviewed record in preparation for visit and have obtained necessary documentation. Chief Complaint   Patient presents with    Pancreatic Cancer        Health Maintenance Due   Topic    Pneumococcal 0-64 years (1 of 3 - PCV13)    Foot Exam Q1     MICROALBUMIN Q1     Eye Exam Retinal or Dilated     Lipid Screen     DTaP/Tdap/Td series (1 - Tdap)    Shingrix Vaccine Age 50> (1 of 2)    FOBT Q1Y Age 54-65     A1C test (Diabetic or Prediabetic)        Coordination of Care Questionnaire:  :   1) Have you been to an emergency room, urgent care, or hospitalized since your last visit? If yes, where when, and reason for visit? no       2. Have seen or consulted any other health care provider since your last visit? If yes, where when, and reason for visit? NO      3) Do you have an Advanced Directive/ Living Will in place? NO  If yes, do we have a copy on file NO  If no, would you like information NO      Learning Assessment 12/3/2019   PRIMARY LEARNER Patient   PRIMARY LANGUAGE ENGLISH   LEARNER PREFERENCE PRIMARY VIDEOS   ANSWERED BY patient   RELATIONSHIP SELF        3 most recent PHQ Screens 2020   Little interest or pleasure in doing things Not at all   Feeling down, depressed, irritable, or hopeless Not at all   Total Score PHQ 2 0        Abuse Screening Questionnaire 2020   Do you ever feel afraid of your partner? N   Are you in a relationship with someone who physically or mentally threatens you? N   Is it safe for you to go home? Y        Fall Risk Assessment, last 12 mths 2020   Able to walk? Yes   Fall in past 12 months?  No

## 2020-06-27 LAB — CANCER AG19-9 SERPL-ACNC: 19 U/ML (ref 0–35)

## 2020-06-29 ENCOUNTER — APPOINTMENT (OUTPATIENT)
Dept: INFUSION THERAPY | Age: 55
End: 2020-06-29
Payer: MEDICAID

## 2020-06-29 RX ORDER — ACETAMINOPHEN 325 MG/1
650 TABLET ORAL AS NEEDED
Status: CANCELLED
Start: 2020-07-07

## 2020-06-29 RX ORDER — ATROPINE SULFATE 0.4 MG/ML
0.4 INJECTION, SOLUTION ENDOTRACHEAL; INTRAMEDULLARY; INTRAMUSCULAR; INTRAVENOUS; SUBCUTANEOUS
Status: CANCELLED | OUTPATIENT
Start: 2020-07-07

## 2020-06-29 RX ORDER — SODIUM CHLORIDE 0.9 % (FLUSH) 0.9 %
10 SYRINGE (ML) INJECTION AS NEEDED
Status: CANCELLED
Start: 2020-07-09

## 2020-06-29 RX ORDER — EPINEPHRINE 1 MG/ML
0.3 INJECTION, SOLUTION, CONCENTRATE INTRAVENOUS AS NEEDED
Status: CANCELLED | OUTPATIENT
Start: 2020-07-07

## 2020-06-29 RX ORDER — DIPHENHYDRAMINE HYDROCHLORIDE 50 MG/ML
50 INJECTION, SOLUTION INTRAMUSCULAR; INTRAVENOUS AS NEEDED
Status: CANCELLED
Start: 2020-07-07

## 2020-06-29 RX ORDER — HEPARIN 100 UNIT/ML
300-500 SYRINGE INTRAVENOUS AS NEEDED
Status: CANCELLED
Start: 2020-07-07

## 2020-06-29 RX ORDER — HEPARIN 100 UNIT/ML
300-500 SYRINGE INTRAVENOUS AS NEEDED
Status: CANCELLED
Start: 2020-07-09

## 2020-06-29 RX ORDER — HYDROCORTISONE SODIUM SUCCINATE 100 MG/2ML
100 INJECTION, POWDER, FOR SOLUTION INTRAMUSCULAR; INTRAVENOUS AS NEEDED
Status: CANCELLED | OUTPATIENT
Start: 2020-07-07

## 2020-06-29 RX ORDER — SODIUM CHLORIDE 0.9 % (FLUSH) 0.9 %
10 SYRINGE (ML) INJECTION AS NEEDED
Status: CANCELLED
Start: 2020-07-07

## 2020-06-29 RX ORDER — SODIUM CHLORIDE 9 MG/ML
10 INJECTION INTRAMUSCULAR; INTRAVENOUS; SUBCUTANEOUS AS NEEDED
Status: CANCELLED | OUTPATIENT
Start: 2020-07-09

## 2020-06-29 RX ORDER — ALBUTEROL SULFATE 0.83 MG/ML
2.5 SOLUTION RESPIRATORY (INHALATION) AS NEEDED
Status: CANCELLED
Start: 2020-07-07

## 2020-06-29 RX ORDER — SODIUM CHLORIDE 9 MG/ML
10 INJECTION INTRAMUSCULAR; INTRAVENOUS; SUBCUTANEOUS AS NEEDED
Status: CANCELLED | OUTPATIENT
Start: 2020-07-07

## 2020-06-29 RX ORDER — ONDANSETRON 2 MG/ML
8 INJECTION INTRAMUSCULAR; INTRAVENOUS AS NEEDED
Status: CANCELLED | OUTPATIENT
Start: 2020-07-07

## 2020-06-29 RX ORDER — FLUOROURACIL 50 MG/ML
900 INJECTION, SOLUTION INTRAVENOUS ONCE
Status: CANCELLED
Start: 2020-07-07 | End: 2020-07-07

## 2020-06-30 ENCOUNTER — APPOINTMENT (OUTPATIENT)
Dept: INFUSION THERAPY | Age: 55
End: 2020-06-30
Payer: MEDICAID

## 2020-07-02 ENCOUNTER — APPOINTMENT (OUTPATIENT)
Dept: INFUSION THERAPY | Age: 55
End: 2020-07-02
Payer: MEDICAID

## 2020-07-06 ENCOUNTER — HOSPITAL ENCOUNTER (OUTPATIENT)
Dept: INFUSION THERAPY | Age: 55
Discharge: HOME | End: 2020-07-06
Payer: MEDICAID

## 2020-07-06 VITALS
SYSTOLIC BLOOD PRESSURE: 113 MMHG | BODY MASS INDEX: 27.48 KG/M2 | HEIGHT: 72 IN | OXYGEN SATURATION: 96 % | HEART RATE: 58 BPM | DIASTOLIC BLOOD PRESSURE: 74 MMHG | WEIGHT: 202.9 LBS | TEMPERATURE: 97.5 F | RESPIRATION RATE: 18 BRPM

## 2020-07-06 LAB
ALBUMIN SERPL-MCNC: 3.2 G/DL (ref 3.4–5)
ALBUMIN/GLOB SERPL: 0.7 {RATIO} (ref 0.8–1.7)
ALP SERPL-CCNC: 106 U/L (ref 45–117)
ALT SERPL-CCNC: 16 U/L (ref 16–61)
ANION GAP SERPL CALC-SCNC: 4 MMOL/L (ref 3–18)
AST SERPL-CCNC: 27 U/L (ref 10–38)
BASO+EOS+MONOS # BLD AUTO: 0.5 K/UL (ref 0–2.3)
BASO+EOS+MONOS NFR BLD AUTO: 14 % (ref 0.1–17)
BILIRUB SERPL-MCNC: 0.5 MG/DL (ref 0.2–1)
BUN SERPL-MCNC: 10 MG/DL (ref 7–18)
BUN/CREAT SERPL: 10 (ref 12–20)
CALCIUM SERPL-MCNC: 9 MG/DL (ref 8.5–10.1)
CHLORIDE SERPL-SCNC: 104 MMOL/L (ref 100–111)
CO2 SERPL-SCNC: 29 MMOL/L (ref 21–32)
CREAT SERPL-MCNC: 0.96 MG/DL (ref 0.6–1.3)
DIFFERENTIAL METHOD BLD: ABNORMAL
ERYTHROCYTE [DISTWIDTH] IN BLOOD BY AUTOMATED COUNT: 14 % (ref 11.5–14.5)
GLOBULIN SER CALC-MCNC: 4.8 G/DL (ref 2–4)
GLUCOSE SERPL-MCNC: 113 MG/DL (ref 74–99)
HCT VFR BLD AUTO: 33.2 % (ref 36–48)
HGB BLD-MCNC: 11.6 G/DL (ref 12–16)
LYMPHOCYTES # BLD: 1.2 K/UL (ref 1.1–5.9)
LYMPHOCYTES NFR BLD: 36 % (ref 14–44)
MCH RBC QN AUTO: 33.3 PG (ref 25–35)
MCHC RBC AUTO-ENTMCNC: 34.9 G/DL (ref 31–37)
MCV RBC AUTO: 95.4 FL (ref 78–102)
NEUTS SEG # BLD: 1.6 K/UL (ref 1.8–9.5)
NEUTS SEG NFR BLD: 50 % (ref 40–70)
PLATELET # BLD AUTO: 58 K/UL (ref 140–440)
POTASSIUM SERPL-SCNC: 3.2 MMOL/L (ref 3.5–5.5)
PROT SERPL-MCNC: 8 G/DL (ref 6.4–8.2)
RBC # BLD AUTO: 3.48 M/UL (ref 4.1–5.1)
SODIUM SERPL-SCNC: 137 MMOL/L (ref 136–145)
WBC # BLD AUTO: 3.3 K/UL (ref 4.5–13)

## 2020-07-06 PROCEDURE — 80053 COMPREHEN METABOLIC PANEL: CPT

## 2020-07-06 PROCEDURE — 85025 COMPLETE CBC W/AUTO DIFF WBC: CPT

## 2020-07-06 PROCEDURE — 36415 COLL VENOUS BLD VENIPUNCTURE: CPT

## 2020-07-06 NOTE — PROGRESS NOTES
[facility]    Date: 2020    Name: Sheryl Bosworth    MRN: [id number]         : 1965    Peripheral Lab Draw      Mr. Lissette Jordan to [facility], ambulatory at [facility] accompanied by self. Pt was assessed and education was provided. Mr. Irma Sheppard vitals were reviewed and patient was observed for 5 minutes prior to treatment. Visit Vitals  /74 (BP 1 Location: Left arm, BP Patient Position: Sitting)   Pulse (!) 58   Temp 97.5 °F (36.4 °C)   Resp 18   Ht 6' (1.829 m)   Wt 92 kg (202 lb 14.4 oz)   SpO2 96%   BMI 27.52 kg/m²     Recent Results (from the past 12 hour(s))   CBC WITH 3 PART DIFF    Collection Time: 20  8:30 AM   Result Value Ref Range    WBC 3.3 (L) 4.5 - 13.0 K/uL    RBC 3.48 (L) 4.10 - 5.10 M/uL    HGB 11.6 (L) 12.0 - 16.0 g/dL    HCT 33.2 (L) 36 - 48 %    MCV 95.4 78 - 102 FL    MCH 33.3 25.0 - 35.0 PG    MCHC 34.9 31 - 37 g/dL    RDW 14.0 11.5 - 14.5 %    PLATELET 58 (L) 761 - 440 K/uL    NEUTROPHILS 50 40 - 70 %    MIXED CELLS 14 0.1 - 17 %    LYMPHOCYTES 36 14 - 44 %    ABS. NEUTROPHILS 1.6 (L) 1.8 - 9.5 K/UL    ABS. MIXED CELLS 0.5 0.0 - 2.3 K/uL    ABS. LYMPHOCYTES 1.2 1.1 - 5.9 K/UL    DF AUTOMATED     METABOLIC PANEL, COMPREHENSIVE    Collection Time: 20  8:30 AM   Result Value Ref Range    Sodium 137 136 - 145 mmol/L    Potassium 3.2 (L) 3.5 - 5.5 mmol/L    Chloride 104 100 - 111 mmol/L    CO2 29 21 - 32 mmol/L    Anion gap 4 3.0 - 18 mmol/L    Glucose 113 (H) 74 - 99 mg/dL    BUN 10 7.0 - 18 MG/DL    Creatinine 0.96 0.6 - 1.3 MG/DL    BUN/Creatinine ratio 10 (L) 12 - 20      GFR est AA >60 >60 ml/min/1.73m2    GFR est non-AA >60 >60 ml/min/1.73m2    Calcium 9.0 8.5 - 10.1 MG/DL    Bilirubin, total 0.5 0.2 - 1.0 MG/DL    ALT (SGPT) 16 16 - 61 U/L    AST (SGOT) 27 10 - 38 U/L    Alk.  phosphatase 106 45 - 117 U/L    Protein, total 8.0 6.4 - 8.2 g/dL    Albumin 3.2 (L) 3.4 - 5.0 g/dL    Globulin 4.8 (H) 2.0 - 4.0 g/dL    A-G Ratio 0.7 (L) 0.8 - 1.7         Blood obtained peripherally from left arm x 1 attempt with butterfly needle and sent to lab for Cbc w/diff and Cmp per written orders. No bleeding or hematoma noted at site. Gauze and coban applied. Mr. Khai Nava tolerated the phlebotomy, and had no complaints. Patient armband removed and shredded. Mr. Khai Nava was discharged from [place] [address] in stable condition at 6742.      Corinne Moeller, RN  July 6, 2020

## 2020-07-07 ENCOUNTER — HOSPITAL ENCOUNTER (OUTPATIENT)
Dept: INFUSION THERAPY | Age: 55
Discharge: HOME | End: 2020-07-07
Payer: MEDICAID

## 2020-07-07 VITALS
HEART RATE: 44 BPM | TEMPERATURE: 97.7 F | DIASTOLIC BLOOD PRESSURE: 74 MMHG | RESPIRATION RATE: 16 BRPM | SYSTOLIC BLOOD PRESSURE: 128 MMHG | OXYGEN SATURATION: 100 %

## 2020-07-07 DIAGNOSIS — C78.7 LIVER METASTASIS (HCC): ICD-10-CM

## 2020-07-07 DIAGNOSIS — K86.89 PANCREATIC MASS: Primary | ICD-10-CM

## 2020-07-07 LAB
BASO+EOS+MONOS # BLD AUTO: 0.5 K/UL (ref 0–2.3)
BASO+EOS+MONOS NFR BLD AUTO: 14 % (ref 0.1–17)
DIFFERENTIAL METHOD BLD: ABNORMAL
ERYTHROCYTE [DISTWIDTH] IN BLOOD BY AUTOMATED COUNT: 13.9 % (ref 11.5–14.5)
HCT VFR BLD AUTO: 30.8 % (ref 36–48)
HGB BLD-MCNC: 10.8 G/DL (ref 12–16)
LYMPHOCYTES # BLD: 0.9 K/UL (ref 1.1–5.9)
LYMPHOCYTES NFR BLD: 26 % (ref 14–44)
MCH RBC QN AUTO: 33.6 PG (ref 25–35)
MCHC RBC AUTO-ENTMCNC: 35.1 G/DL (ref 31–37)
MCV RBC AUTO: 96 FL (ref 78–102)
NEUTS SEG # BLD: 2 K/UL (ref 1.8–9.5)
NEUTS SEG NFR BLD: 60 % (ref 40–70)
PLATELET # BLD AUTO: 65 K/UL (ref 140–440)
RBC # BLD AUTO: 3.21 M/UL (ref 4.1–5.1)
WBC # BLD AUTO: 3.4 K/UL (ref 4.5–13)

## 2020-07-07 PROCEDURE — 74011250636 HC RX REV CODE- 250/636: Performed by: INTERNAL MEDICINE

## 2020-07-07 PROCEDURE — 96415 CHEMO IV INFUSION ADDL HR: CPT

## 2020-07-07 PROCEDURE — 96417 CHEMO IV INFUS EACH ADDL SEQ: CPT

## 2020-07-07 PROCEDURE — 85025 COMPLETE CBC W/AUTO DIFF WBC: CPT

## 2020-07-07 PROCEDURE — 96375 TX/PRO/DX INJ NEW DRUG ADDON: CPT

## 2020-07-07 PROCEDURE — 77030012965 HC NDL HUBR BBMI -A

## 2020-07-07 PROCEDURE — 96413 CHEMO IV INFUSION 1 HR: CPT

## 2020-07-07 PROCEDURE — 96367 TX/PROPH/DG ADDL SEQ IV INF: CPT

## 2020-07-07 PROCEDURE — 96416 CHEMO PROLONG INFUSE W/PUMP: CPT

## 2020-07-07 PROCEDURE — 74011000258 HC RX REV CODE- 258: Performed by: INTERNAL MEDICINE

## 2020-07-07 PROCEDURE — 74011250637 HC RX REV CODE- 250/637: Performed by: INTERNAL MEDICINE

## 2020-07-07 RX ORDER — POTASSIUM CHLORIDE 20 MEQ/1
40 TABLET, EXTENDED RELEASE ORAL ONCE
Status: COMPLETED | OUTPATIENT
Start: 2020-07-07 | End: 2020-07-07

## 2020-07-07 RX ORDER — PALONOSETRON 0.05 MG/ML
0.25 INJECTION, SOLUTION INTRAVENOUS ONCE
Status: COMPLETED | OUTPATIENT
Start: 2020-07-07 | End: 2020-07-07

## 2020-07-07 RX ORDER — DEXTROSE MONOHYDRATE 50 MG/ML
25 INJECTION, SOLUTION INTRAVENOUS CONTINUOUS
Status: DISPENSED | OUTPATIENT
Start: 2020-07-07 | End: 2020-07-07

## 2020-07-07 RX ORDER — ATROPINE SULFATE 1 MG/ML
0.4 INJECTION, SOLUTION INTRAVENOUS ONCE
Status: COMPLETED | OUTPATIENT
Start: 2020-07-07 | End: 2020-07-07

## 2020-07-07 RX ADMIN — FOSAPREPITANT 150 MG: 150 INJECTION, POWDER, LYOPHILIZED, FOR SOLUTION INTRAVENOUS at 11:08

## 2020-07-07 RX ADMIN — FLUOROURACIL 5300 MG: 50 INJECTION, SOLUTION INTRAVENOUS at 16:30

## 2020-07-07 RX ADMIN — POTASSIUM CHLORIDE 40 MEQ: 1500 TABLET, EXTENDED RELEASE ORAL at 11:14

## 2020-07-07 RX ADMIN — ATROPINE SULFATE 0.4 MG: 1 INJECTION, SOLUTION INTRAMUSCULAR; INTRAVENOUS; SUBCUTANEOUS at 11:34

## 2020-07-07 RX ADMIN — LEUCOVORIN CALCIUM 900 MG: 200 INJECTION, POWDER, LYOPHILIZED, FOR SUSPENSION INTRAMUSCULAR; INTRAVENOUS at 14:39

## 2020-07-07 RX ADMIN — OXALIPLATIN 190 MG: 5 INJECTION, SOLUTION INTRAVENOUS at 12:17

## 2020-07-07 RX ADMIN — DEXTROSE 25 ML/HR: 5 SOLUTION INTRAVENOUS at 11:00

## 2020-07-07 RX ADMIN — PALONOSETRON 0.25 MG: 0.25 INJECTION, SOLUTION INTRAVENOUS at 11:32

## 2020-07-07 RX ADMIN — IRINOTECAN HYDROCHLORIDE 400 MG: 20 INJECTION, SOLUTION INTRAVENOUS at 14:39

## 2020-07-07 NOTE — PROGRESS NOTES
[address] [facility] Progress Note    Date: 2020    Name: Sarah Laboy              MRN: [id number]              : 1965    Chemotherapy Cycle:12; Day 1    FOLFIRINOX Infusion       Pt to [facility], ambulatory, at 0900. Mr. Emanuel Mercado was assessed and education was provided. Patient denied pain/discomfort and stated that he felt \"good\". No changes in medication regimen or health condition were noted. Mr. Trinity Leavitt vitals were reviewed. Visit Vitals  /74 (BP 1 Location: Left arm, BP Patient Position: At rest;Sitting)   Pulse (!) 44   Temp 97.7 °F (36.5 °C)   Resp 16   SpO2 100%       Right anterior chest mediport accessed with 20 g 1 inch non-coring access needle, using sterile technique and central line dressing kit. Port flushed easily and had brisk blood return. Site without evidence of complication or patient complaint. D5W initiated via port-a-cath IV line @ 25 mL/hr. Lab results obtained on 2020 were reviewed and are within ordered parameters to give chemo today. ANC = 2.0, PLT = 65. Dr. Roman Lara aware. Serum Potassium = 3.3 . Dr. Roman Lara made aware and order noted to administer KCL 40 meq p.o while in [facility], today. Fluorouracil bolus held as ordered for platelets outside of parameter. KCL 20 mEq x 2 tabs given p.o as ordered. Chemo dosages verified with today's BSA ( 2.16) and found to be within 10% of ordered dosages. Premedications (EMEND 150 mg IVPB; ALOXI 0.25 mg IVP; Atropine 0.4mg IVP) administered as ordered. Oxaliplatin (ELOXATIN) 190 mg IV was infused over 2 hours, as ordered, via port-a-cath IV line. VS stable at end of infusion and pt denied complaints. Blood return from port was re-verified and line flushed with D5W. Irinotecan (CAMPTOSAR) 400 mg  IV was infused over 90 minutes, as ordered. Leucovorin (WELLCOVORIN) 900 mg IV was infused over 90 minutes as ordered. VS stable at end of infusion and pt denied complaints.  Line flushed with D5W and blood return from port re-verified. Mr. Daniela Gardner tolerated infusion, and had no complaints at this time. CADD pump was loaded with (Fluorouracil 5300 mg) cassette and attached to port-a-cath. All connections and clamps were secured and wrapped with tape. Patient armband removed and shredded. Mr. Daniela Gardner was discharged from [place] [address] in stable condition at 1640. He is to return on 07/09/2020 at 1500 for d/c of CADD pump.      Cassidy Villarreal RN  July 7, 2020  9:38 AM

## 2020-07-09 ENCOUNTER — HOSPITAL ENCOUNTER (OUTPATIENT)
Dept: INFUSION THERAPY | Age: 55
Discharge: HOME | End: 2020-07-09
Payer: MEDICAID

## 2020-07-09 VITALS
DIASTOLIC BLOOD PRESSURE: 72 MMHG | HEART RATE: 59 BPM | SYSTOLIC BLOOD PRESSURE: 115 MMHG | OXYGEN SATURATION: 98 % | TEMPERATURE: 97.3 F | RESPIRATION RATE: 16 BRPM

## 2020-07-09 DIAGNOSIS — K86.89 PANCREATIC MASS: Primary | ICD-10-CM

## 2020-07-09 DIAGNOSIS — C78.7 LIVER METASTASIS (HCC): ICD-10-CM

## 2020-07-09 PROCEDURE — 74011250636 HC RX REV CODE- 250/636: Performed by: INTERNAL MEDICINE

## 2020-07-09 PROCEDURE — 74011000250 HC RX REV CODE- 250: Performed by: INTERNAL MEDICINE

## 2020-07-09 PROCEDURE — 96523 IRRIG DRUG DELIVERY DEVICE: CPT

## 2020-07-09 RX ORDER — HEPARIN 100 UNIT/ML
300-500 SYRINGE INTRAVENOUS AS NEEDED
Status: DISCONTINUED | OUTPATIENT
Start: 2020-07-09 | End: 2020-07-10 | Stop reason: HOSPADM

## 2020-07-09 RX ORDER — SODIUM CHLORIDE 9 MG/ML
10 INJECTION INTRAMUSCULAR; INTRAVENOUS; SUBCUTANEOUS AS NEEDED
Status: DISCONTINUED | OUTPATIENT
Start: 2020-07-09 | End: 2020-07-10 | Stop reason: HOSPADM

## 2020-07-09 RX ADMIN — SODIUM CHLORIDE 10 ML: 9 INJECTION INTRAMUSCULAR; INTRAVENOUS; SUBCUTANEOUS at 15:43

## 2020-07-09 RX ADMIN — HEPARIN 500 UNITS: 100 SYRINGE at 15:43

## 2020-07-15 ENCOUNTER — TELEPHONE (OUTPATIENT)
Dept: ONCOLOGY | Age: 55
End: 2020-07-15

## 2020-07-15 DIAGNOSIS — T45.1X5A CHEMOTHERAPY-INDUCED NAUSEA: ICD-10-CM

## 2020-07-15 DIAGNOSIS — R11.0 CHEMOTHERAPY-INDUCED NAUSEA: ICD-10-CM

## 2020-07-15 RX ORDER — ONDANSETRON 4 MG/1
8 TABLET, FILM COATED ORAL
Qty: 90 TAB | Refills: 1 | Status: CANCELLED | OUTPATIENT
Start: 2020-07-15 | End: 2020-08-14

## 2020-07-15 NOTE — TELEPHONE ENCOUNTER
Received call from Natalia Foster, patient's sister in law stating that patient is currently in [place] visiting with family and they forgot to pack his Zofran. They would like a partial refill sent to [address], [address].      ondansetron hcl (ZOFRAN) 4 mg tablet [[id number]]  ENDED     Order Details   Dose: 8 mg  Route: Oral  Frequency: EVERY 8 HOURS AS NEEDED for Nausea or Vomiting    Dispense Quantity: 90 Tab  Refills: 1  Fills remaining: --             Sig: Take 2 Tabs by mouth every eight (8) hours as needed for Nausea or Vomiting for up to 30 days.

## 2020-07-16 DIAGNOSIS — R11.0 CHEMOTHERAPY-INDUCED NAUSEA: Primary | ICD-10-CM

## 2020-07-16 DIAGNOSIS — T45.1X5A CHEMOTHERAPY-INDUCED NAUSEA: Primary | ICD-10-CM

## 2020-07-16 RX ORDER — ONDANSETRON 8 MG/1
8 TABLET, ORALLY DISINTEGRATING ORAL
Qty: 90 TAB | Refills: 1 | Status: SHIPPED | OUTPATIENT
Start: 2020-07-16 | End: 2020-08-15

## 2020-07-20 ENCOUNTER — HOSPITAL ENCOUNTER (OUTPATIENT)
Dept: INFUSION THERAPY | Age: 55
Discharge: HOME | End: 2020-07-20
Payer: MEDICAID

## 2020-07-20 PROCEDURE — 86301 IMMUNOASSAY TUMOR CA 19-9: CPT

## 2020-07-20 PROCEDURE — 36415 COLL VENOUS BLD VENIPUNCTURE: CPT

## 2020-07-20 NOTE — PROGRESS NOTES
[address] [facility] Progress Note    Date: 2020    Name: Mark Lara    MRN: [id number]         : 1965    Peripheral Lab Draw      Mr. Kieran Alcantar to [facility], ambulatory at 1115 accompanied by self. Pt was assessed and education was provided. Blood obtained peripherally from right arm x 1 attempt with butterfly needle and sent to lab for CA 19/9 per written orders. No bleeding or hematoma noted at site. Gauze and coban applied. Mr. Kieran Alcantar tolerated the phlebotomy, and had no complaints. Patient armband removed and shredded. Mr. Kieran Alcantar was discharged from [place] [address] in stable condition at 1125.      Silver Lovett Phlebotomist PCT  2020  2:55 PM  pr

## 2020-07-22 LAB — CANCER AG19-9 SERPL-ACNC: 15 U/ML (ref 0–35)

## 2020-07-29 ENCOUNTER — HOSPITAL ENCOUNTER (EMERGENCY)
Age: 55
Discharge: HOME | End: 2020-07-29
Attending: EMERGENCY MEDICINE
Payer: MEDICAID

## 2020-07-29 ENCOUNTER — APPOINTMENT (OUTPATIENT)
Dept: GENERAL RADIOLOGY | Age: 55
End: 2020-07-29
Attending: PHYSICIAN ASSISTANT
Payer: MEDICAID

## 2020-07-29 VITALS
OXYGEN SATURATION: 96 % | DIASTOLIC BLOOD PRESSURE: 67 MMHG | RESPIRATION RATE: 16 BRPM | SYSTOLIC BLOOD PRESSURE: 92 MMHG | HEART RATE: 97 BPM | WEIGHT: 202 LBS | HEIGHT: 72 IN | TEMPERATURE: 99.4 F | BODY MASS INDEX: 27.36 KG/M2

## 2020-07-29 DIAGNOSIS — M25.561 ACUTE PAIN OF RIGHT KNEE: Primary | ICD-10-CM

## 2020-07-29 PROCEDURE — 99283 EMERGENCY DEPT VISIT LOW MDM: CPT

## 2020-07-29 PROCEDURE — 73564 X-RAY EXAM KNEE 4 OR MORE: CPT

## 2020-07-29 PROCEDURE — 74011250637 HC RX REV CODE- 250/637: Performed by: PHYSICIAN ASSISTANT

## 2020-07-29 RX ORDER — HYDROMORPHONE HYDROCHLORIDE 2 MG/1
2 TABLET ORAL ONCE
Status: COMPLETED | OUTPATIENT
Start: 2020-07-29 | End: 2020-07-29

## 2020-07-29 RX ADMIN — HYDROMORPHONE HYDROCHLORIDE 2 MG: 2 TABLET ORAL at 23:04

## 2020-07-30 NOTE — DISCHARGE INSTRUCTIONS
Patient Education        Knee Pain or Injury: Care Instructions  Your Care Instructions     Injuries are a common cause of knee problems. Sudden (acute) injuries may be caused by a direct blow to the knee. They can also be caused by abnormal twisting, bending, or falling on the knee. Pain, bruising, or swelling may be severe, and may start within minutes of the injury. Overuse is another cause of knee pain. Other causes are climbing stairs, kneeling, and other activities that use the knee. Everyday wear and tear, especially as you get older, also can cause knee pain. Rest, along with home treatment, often relieves pain and allows your knee to heal. If you have a serious knee injury, you may need tests and treatment. Follow-up care is a key part of your treatment and safety. Be sure to make and go to all appointments, and call your doctor if you are having problems. It's also a good idea to know your test results and keep a list of the medicines you take. How can you care for yourself at home? · Be safe with medicines. Read and follow all instructions on the label. ? If the doctor gave you a prescription medicine for pain, take it as prescribed. ? If you are not taking a prescription pain medicine, ask your doctor if you can take an over-the-counter medicine. · Rest and protect your knee. Take a break from any activity that may cause pain. · Put ice or a cold pack on your knee for 10 to 20 minutes at a time. Put a thin cloth between the ice and your skin. · Prop up a sore knee on a pillow when you ice it or anytime you sit or lie down for the next 3 days. Try to keep it above the level of your heart. This will help reduce swelling. · If your knee is not swollen, you can put moist heat, a heating pad, or a warm cloth on your knee. · If your doctor recommends an elastic bandage, sleeve, or other type of support for your knee, wear it as directed.   · Follow your doctor's instructions about how much weight you can put on your leg. Use a cane, crutches, or a walker as instructed. · Follow your doctor's instructions about activity during your healing process. If you can do mild exercise, slowly increase your activity. · Reach and stay at a healthy weight. Extra weight can strain the joints, especially the knees and hips, and make the pain worse. Losing even a few pounds may help. When should you call for help? [id number] anytime you think you may need emergency care. For example, call if:  · You have symptoms of a blood clot in your lung (called a pulmonary embolism). These may include:  ? Sudden chest pain. ? Trouble breathing. ? Coughing up blood. Call your doctor now or seek immediate medical care if:  · You have severe or increasing pain. · Your leg or foot turns cold or changes color. · You cannot stand or put weight on your knee. · Your knee looks twisted or bent out of shape. · You cannot move your knee. · You have signs of infection, such as:  ? Increased pain, swelling, warmth, or redness. ? Red streaks leading from the knee. ? Pus draining from a place on your knee. ? A fever. · You have signs of a blood clot in your leg (called a deep vein thrombosis), such as:  ? Pain in your calf, back of the knee, thigh, or groin. ? Redness and swelling in your leg or groin. Watch closely for changes in your health, and be sure to contact your doctor if:  · You have tingling, weakness, or numbness in your knee. · You have any new symptoms, such as swelling. · You have bruises from a knee injury that last longer than 2 weeks. · You do not get better as expected. Where can you learn more? Go to http://www.gray.com/  Enter K195 in the search box to learn more about \"Knee Pain or Injury: Care Instructions. \"  Current as of: June 26, 2019               Content Version: 12.5  © 0997-5513 Healthwise, Incorporated.    Care instructions adapted under license by [employer] (which disclaims liability or warranty for this information). If you have questions about a medical condition or this instruction, always ask your healthcare professional. [address] any warranty or liability for your use of this information. Patient Education        Joint Pain: Care Instructions  Your Care Instructions     Many people have small aches and pains from overuse or injury to muscles and joints. Joint injuries often happen during sports or recreation, work tasks, or projects around the home. An overuse injury can happen when you put too much stress on a joint or when you do an activity that stresses the joint over and over, such as using the computer or rowing a boat. You can take action at home to help your muscles and joints get better. You should feel better in 1 to 2 weeks, but it can take 3 months or more to heal completely. Follow-up care is a key part of your treatment and safety. Be sure to make and go to all appointments, and call your doctor if you are having problems. It's also a good idea to know your test results and keep a list of the medicines you take. How can you care for yourself at home? · Do not put weight on the injured joint for at least a day or two. · For the first day or two after an injury, do not take hot showers or baths, and do not use hot packs. The heat could make swelling worse. · Put ice or a cold pack on the sore joint for 10 to 20 minutes at a time. Try to do this every 1 to 2 hours for the next 3 days (when you are awake) or until the swelling goes down. Put a thin cloth between the ice and your skin. · Wrap the injury in an elastic bandage. Do not wrap it too tightly because this can cause more swelling. · Prop up the sore joint on a pillow when you ice it or anytime you sit or lie down during the next 3 days. Try to keep it above the level of your heart. This will help reduce swelling.   · Take an over-the-counter pain medicine, such as acetaminophen (Tylenol), ibuprofen (Advil, Motrin), or naproxen (Aleve). Read and follow all instructions on the label. · After 1 or 2 days of rest, begin moving the joint gently. While the joint is still healing, you can begin to exercise using activities that do not strain or hurt the painful joint. When should you call for help? Call your doctor now or seek immediate medical care if:  · You have signs of infection, such as:  ? Increased pain, swelling, warmth, and redness. ? Red streaks leading from the joint. ? A fever. Watch closely for changes in your health, and be sure to contact your doctor if:  · Your movement or symptoms are not getting better after 1 to 2 weeks of home treatment. Where can you learn more? Go to http://www.gray.com/  Enter P205 in the search box to learn more about \"Joint Pain: Care Instructions. \"  Current as of: March 2, 2020               Content Version: 12.5  © 2006-2020 Healthwise, Incorporated. Care instructions adapted under license by [employer] (which disclaims liability or warranty for this information). If you have questions about a medical condition or this instruction, always ask your healthcare professional. [place] [address] any warranty or liability for your use of this information.

## 2020-07-30 NOTE — ED NOTES
I have reviewed discharge instructions with the patient. The patient verbalized understanding. VSS. Patient in NAD upon leaving ER.

## 2020-07-30 NOTE — ED TRIAGE NOTES
r knee pain, onset today, denies recent injury/trauma, took two pain pills today, does not know the name, states the oncologist prescribes them.

## 2020-07-30 NOTE — ED PROVIDER NOTES
EMERGENCY DEPARTMENT HISTORY AND PHYSICAL EXAM    10:54 PM      Date: 7/29/2020  Patient Name: Jose Rodriguez    History of Presenting Illness     No chief complaint on file. History Provided By: Patient    Additional History (Context): Jose Rodriguez is a 60 y.o. male with PMHx as noted below who presents with complaints of atraumatic knee pain which occurred approximately 2 PM today. Patient states that the pain began suddenly and since then he has had difficulty walking, states that the pain is in the excruciating. As noted above, he denies any trauma or injury. Denies any fevers or chills. Denies any other symptoms at this time. PCP: Jewel Islas MD    Current Facility-Administered Medications   Medication Dose Route Frequency Provider Last Rate Last Dose    HYDROmorphone (DILAUDID) tablet 2 mg  2 mg Oral ONCE Judith Akbar PA-C         Current Outpatient Medications   Medication Sig Dispense Refill    ondansetron (ZOFRAN ODT) 8 mg disintegrating tablet Take 1 Tab by mouth every eight (8) hours as needed for Nausea or Vomiting for up to 30 days. 90 Tab 1    Omeprazole delayed release (PRILOSEC D/R) 20 mg tablet Take 1 Tab by mouth daily. 30 Tab 3    lidocaine-prilocaine (EMLA) topical cream APPLY TO AFFECTED AREA AS NEEDED FOR PAIN  (APPLY TO SKIN 30 - 60 MINUTES BEFORE MEDIPORT ACCESS) 30 g 0    rivaroxaban (Xarelto) 20 mg tab tablet Take 1 Tab by mouth daily (with breakfast). 30 Tab 3    dronabinoL (MARINOL) 5 mg capsule Take 1 Cap by mouth two (2) times a day. Max Daily Amount: 10 mg. 60 Cap 3    True Metrix Glucose Test Strip strip       L-Mfolate-B6 Phos-Methyl-B12 (METANX) 3-35-2 mg tab tab Take 1 Tab by mouth two (2) times a day. Indications: peripheral neuropathy 60 Tab 1    lactulose (CHRONULAC) 10 gram/15 mL solution Take 15 mL by mouth three (3) times daily. 480 mL 3    naloxone (NARCAN) 4 mg/actuation nasal spray Use 1 spray intranasally, then discard.  Repeat with new spray every 2 min as needed for opioid overdose symptoms, alternating nostrils. Indications: opioid overdose 1 Each 1    insulin glargine (LANTUS U-100 INSULIN) 100 unit/mL injection 28 Units by SubCUTAneous route nightly. 1 Vial 0    insulin lispro (HUMALOG) 100 unit/mL injection 3-15 Units by SubCUTAneous route Before breakfast, lunch, and dinner. 1 Vial 0    rivaroxaban (XARELTO) 15 mg (42)- 20 mg (9) DsPk Take 20 mg by mouth daily. Take one 15 mg tablet twice a day with food for the first 21 days. Then, take one 20 mg tablet once a day with food for 9 days.  naloxone (NARCAN) 4 mg/actuation nasal spray Use 1 spray intranasally, then discard. Repeat with new spray every 2 min as needed for opioid overdose symptoms, alternating nostrils. 1 Each 1    dexAMETHasone (DECADRON) 4 mg tablet Take 4mg by mouth twice a day the day before chemotherapy and the day after chemotherapy 30 Tab 0    nut.tx.gluc.intol,lac-free,soy (GLUCERNA ADVANCE) liqd Take 237 mL by mouth three (3) times daily. 90 Bottle 5    TRUE METRIX GLUCOSE METER misc USE AS DIRECTED  0    omeprazole (PRILOSEC) 20 mg capsule TAKE 1 CAPSULE BY MOUTH ONCE DAILY  3    QUEtiapine (SEROQUEL) 50 mg tablet Take 50 mg by mouth nightly.  lidocaine (LIDODERM) 5 % Apply patch to the affected area for 12 hours a day and remove for 12 hours a day. 5 Each 0       Past History     Past Medical History:  Past Medical History:   Diagnosis Date    Cancer [facility])     Pancreatic Adenocarcinoma. Diagnosed 11/2019.  Diabetes ([facility] [address] 10/2019    Diagnosed 10/2019. Patient denies     Hepatitis C     Received Curative Treatment, but Hepatitis C was not cured and returned.  Left ulnar fracture     Thromboembolus ([facility] [address]     DVT of LLE 11/2019. Also, Pulmonary Embolism 11/2019. Treated with Rivaroxaban.        Past Surgical History:  Past Surgical History:   Procedure Laterality Date    HX TONSILLECTOMY      IR BX PANCREAS NEEDLE PERC  11/2019    Endoscopic Pancreatic Biopsy (?)    IR INSERT TUNL CVC W PORT OVER 5 YEARS  2019       Family History:  Family History   Problem Relation Age of Onset    Diabetes Mother     Kidney Disease Mother     Diabetes Father     Diabetes Brother     Cancer Maternal Grandmother     Cancer Maternal Grandfather     Cancer Maternal Aunt     Cancer Maternal Uncle        Social History:  Social History     Tobacco Use    Smoking status: Former Smoker     Packs/day: 0.50     Years: 40.00     Pack years: 20.00     Last attempt to quit: 2018     Years since quittin.7    Smokeless tobacco: Never Used    Tobacco comment: Quit 2018   Substance Use Topics    Alcohol use: Not Currently    Drug use: Not Currently     Comment: Quit Cocaine, Heroin, and Marijuana since 2018       Allergies:  No Known Allergies      Review of Systems       Review of Systems   Constitutional: Negative. HENT: Negative. Respiratory: Negative. Cardiovascular: Negative. Gastrointestinal: Negative. Genitourinary: Negative. Musculoskeletal: Positive for arthralgias. Skin: Negative. Neurological: Negative. All other systems reviewed and are negative. Physical Exam     Visit Vitals  BP 92/67 (BP 1 Location: Right arm, BP Patient Position: At rest;Sitting)   Pulse 97   Temp 99.4 °F (37.4 °C)   Resp 16   Ht 6' (1.829 m)   Wt 91.6 kg (202 lb)   SpO2 96%   BMI 27.40 kg/m²         Physical Exam  Vitals signs reviewed. Constitutional:       General: He is not in acute distress. Appearance: He is not toxic-appearing or diaphoretic. Comments: Chronically ill-appearing male. HENT:      Head: Normocephalic and atraumatic. Right Ear: External ear normal.      Left Ear: External ear normal.      Nose: Nose normal.      Mouth/Throat:      Mouth: Mucous membranes are moist.      Pharynx: Oropharynx is clear. Eyes:      Extraocular Movements: Extraocular movements intact.    Neck:      Musculoskeletal: Neck supple. Cardiovascular:      Rate and Rhythm: Normal rate and regular rhythm. Pulses: Normal pulses. Heart sounds: Normal heart sounds. Pulmonary:      Effort: Pulmonary effort is normal.      Breath sounds: Normal breath sounds. No wheezing, rhonchi or rales. Musculoskeletal:         General: Tenderness present. No deformity or signs of injury. Right lower leg: No edema. Left lower leg: No edema. Comments: Tenderness palpation of the right patella. There is no crepitus or step-off of the right knee. Mild tenderness palpation of the medial lateral joint lines. Patient has full range of motion of the right ankle, full range of motion right knee, full range of motion right hip. Skin:     General: Skin is warm and dry. Capillary Refill: Capillary refill takes less than 2 seconds. Findings: No erythema. Neurological:      General: No focal deficit present. Mental Status: He is alert and oriented to person, place, and time. Cranial Nerves: No cranial nerve deficit. Diagnostic Study Results     Labs -  No results found for this or any previous visit (from the past 12 hour(s)). Radiologic Studies -   XR KNEE RT MIN 4 V    (Results Pending)         Medical Decision Making   I am the first provider for this patient. I reviewed the vital signs, available nursing notes, past medical history, past surgical history, family history and social history. Vital Signs-Reviewed the patient's vital signs. Records Reviewed: Nursing Notes and Old Medical Records (Time of Review: 10:54 PM)    ED Course: Progress Notes, Reevaluation, and Consults:  10:54 PM met with patient, reviewed history, performed physical exam.  Physical exam as noted above. Radiographs obtained and reviewed in the emergency department show no obvious fracture or malalignment, but do demonstrate significant osteoarthritis.   Patient will be referred to orthopedics for follow-up. Provider Notes (Medical Decision Making):   20-year-old male seen in the emergency department for complaints of atraumatic knee pain. Physical exam as noted above. Radiographs are unremarkable for acute fracture or malalignment. Patient will be given information for orthopedic follow-up. He was advised to follow-up with his primary care provider for reassessment. He was advised to return to the ED if symptoms worsen, or as needed. Diagnosis     Clinical Impression:   1. Acute pain of right knee        Disposition: home     Follow-up Information     Follow up With Specialties Details Why [address] PROVIDER  Call in 1 day For follow up regarding ER visit. [address] Specialists  Call in 1 day For follow up regarding ER visit. [address]    [facility] EMERGENCY DEPT Emergency Medicine  Immediately if symptoms worsen, As needed. [address]           Patient's Medications   Start Taking    No medications on file   Continue Taking    DEXAMETHASONE (DECADRON) 4 MG TABLET    Take 4mg by mouth twice a day the day before chemotherapy and the day after chemotherapy    DRONABINOL (MARINOL) 5 MG CAPSULE    Take 1 Cap by mouth two (2) times a day. Max Daily Amount: 10 mg. INSULIN GLARGINE (LANTUS U-100 INSULIN) 100 UNIT/ML INJECTION    28 Units by SubCUTAneous route nightly. INSULIN LISPRO (HUMALOG) 100 UNIT/ML INJECTION    3-15 Units by SubCUTAneous route Before breakfast, lunch, and dinner. L-MFOLATE-B6 PHOS-METHYL-B12 (METANX) 3-35-2 MG TAB TAB    Take 1 Tab by mouth two (2) times a day. Indications: peripheral neuropathy    LACTULOSE (CHRONULAC) 10 GRAM/15 ML SOLUTION    Take 15 mL by mouth three (3) times daily. LIDOCAINE (LIDODERM) 5 %    Apply patch to the affected area for 12 hours a day and remove for 12 hours a day.     LIDOCAINE-PRILOCAINE (EMLA) TOPICAL CREAM    APPLY TO AFFECTED AREA AS NEEDED FOR PAIN  (APPLY TO SKIN 30 - 60 MINUTES BEFORE MEDIPORT ACCESS)    NALOXONE (NARCAN) 4 MG/ACTUATION NASAL SPRAY    Use 1 spray intranasally, then discard. Repeat with new spray every 2 min as needed for opioid overdose symptoms, alternating nostrils. NALOXONE (NARCAN) 4 MG/ACTUATION NASAL SPRAY    Use 1 spray intranasally, then discard. Repeat with new spray every 2 min as needed for opioid overdose symptoms, alternating nostrils. Indications: opioid overdose    NUT. TX.GLUC. INTOL,LAC-FREE,SOY (GLUCERNA ADVANCE) LIQD    Take 237 mL by mouth three (3) times daily. OMEPRAZOLE (PRILOSEC) 20 MG CAPSULE    TAKE 1 CAPSULE BY MOUTH ONCE DAILY    OMEPRAZOLE DELAYED RELEASE (PRILOSEC D/R) 20 MG TABLET    Take 1 Tab by mouth daily. ONDANSETRON (ZOFRAN ODT) 8 MG DISINTEGRATING TABLET    Take 1 Tab by mouth every eight (8) hours as needed for Nausea or Vomiting for up to 30 days. QUETIAPINE (SEROQUEL) 50 MG TABLET    Take 50 mg by mouth nightly. RIVAROXABAN (XARELTO) 15 MG (42)- 20 MG (9) DSPK    Take 20 mg by mouth daily. Take one 15 mg tablet twice a day with food for the first 21 days. Then, take one 20 mg tablet once a day with food for 9 days. RIVAROXABAN (XARELTO) 20 MG TAB TABLET    Take 1 Tab by mouth daily (with breakfast). TRUE METRIX GLUCOSE METER MISC    USE AS DIRECTED    TRUE METRIX GLUCOSE TEST STRIP STRIP       These Medications have changed    No medications on file   Stop Taking    No medications on file     Aimee Hewitt PA-C    Dictation disclaimer:  Please note that this dictation was completed with [employer], the [employer] voice recognition software. Quite often unanticipated grammatical, syntax, homophones, and other interpretive errors are inadvertently transcribed by the computer software. Please disregard these errors. Please excuse any errors that have escaped final proofreading.

## 2020-07-31 ENCOUNTER — TELEPHONE (OUTPATIENT)
Dept: ONCOLOGY | Age: 55
End: 2020-07-31

## 2020-07-31 DIAGNOSIS — G89.3 CANCER ASSOCIATED PAIN: ICD-10-CM

## 2020-07-31 DIAGNOSIS — C78.7 LIVER METASTASES (HCC): ICD-10-CM

## 2020-07-31 DIAGNOSIS — C25.9 PANCREATIC ADENOCARCINOMA (HCC): ICD-10-CM

## 2020-07-31 RX ORDER — FENTANYL 50 UG/1
PATCH TRANSDERMAL
Qty: 10 PATCH | Refills: 0 | Status: SHIPPED | OUTPATIENT
Start: 2020-07-31 | End: 2020-08-30

## 2020-07-31 NOTE — TELEPHONE ENCOUNTER
Patient's sister stated patient has taken a turn for the worst, he is unable to walk or do take care of his self, he went to the [facility] ER Wednesday for knee pain (both are swollen), he is taking the pain medication for his knees, she stated the home health nurse stopped coming 2 weeks ago.  She states it getting hard for her, she is asking for directions as far as hospice

## 2020-07-31 NOTE — TELEPHONE ENCOUNTER
Spoke with Radha regarding patient Lauren Soto. She states since the patient went to the ER on Wednesday he has had increased swelling of BLE and severe pain (he is not able to walk). She states they called their PCP who is \"sending him to a specialist\" but they do not have an appt yet. I advised her to call PCP to verify who he has been referred to and call them directly to schedule an appt. She also states insurance won't cover any more home health visits. Her main concern is that she does not know \"if he should be on hospice or not, he can't tell us what's happening and we haven't been able to come to his visits\". I advised her that we can schedule a virtual visit next week so that she can be present and that the patient should go to the ER for further evaluation. She verbalized understanding and was transferred to [facility] to schedule appt for next week.

## 2020-08-05 DIAGNOSIS — C25.9 PANCREATIC ADENOCARCINOMA (HCC): ICD-10-CM

## 2020-08-05 DIAGNOSIS — C78.7 LIVER METASTASES (HCC): ICD-10-CM

## 2020-08-05 DIAGNOSIS — D69.6 THROMBOCYTOPENIA (HCC): ICD-10-CM

## 2020-08-06 RX ORDER — AVATROMBOPAG MALEATE 20 MG/1
TABLET, FILM COATED ORAL
Qty: 30 TAB | Refills: 2 | Status: SHIPPED | OUTPATIENT
Start: 2020-08-06 | End: 2020-10-20

## 2020-08-19 ENCOUNTER — NURSE NAVIGATOR (OUTPATIENT)
Dept: OTHER | Age: 55
End: 2020-08-19

## 2020-08-19 ENCOUNTER — VIRTUAL VISIT (OUTPATIENT)
Dept: ONCOLOGY | Age: 55
End: 2020-08-19

## 2020-08-19 DIAGNOSIS — C25.9 PANCREATIC ADENOCARCINOMA (HCC): Primary | ICD-10-CM

## 2020-08-19 DIAGNOSIS — C78.7 LIVER METASTASIS (HCC): ICD-10-CM

## 2020-08-19 DIAGNOSIS — I26.99 PULMONARY EMBOLISM WITHOUT ACUTE COR PULMONALE, UNSPECIFIED CHRONICITY, UNSPECIFIED PULMONARY EMBOLISM TYPE (HCC): ICD-10-CM

## 2020-08-19 DIAGNOSIS — K86.89 PANCREATIC MASS: ICD-10-CM

## 2020-08-19 NOTE — PROGRESS NOTES
Shanell Holcomb is a 60 y.o. male presenting today for a follow-up appointment via Telehealth. Identified patient using two identifiers (full name and ). Patient denies any complaints. His sister in law Rossy Willson is on the phone as well. Chief Complaint   Patient presents with    Pancreatic Cancer       Current Outpatient Medications   Medication Sig    Doptelet, 30 tab pack, 20 mg tab TAKE 2 TABLETS BY MOUTH DAILY FOR 15 DAYS    Omeprazole delayed release (PRILOSEC D/R) 20 mg tablet Take 1 Tab by mouth daily.  rivaroxaban (Xarelto) 20 mg tab tablet Take 1 Tab by mouth daily (with breakfast).  dronabinoL (MARINOL) 5 mg capsule Take 1 Cap by mouth two (2) times a day. Max Daily Amount: 10 mg.    True Metrix Glucose Test Strip strip     TRUE METRIX GLUCOSE METER misc USE AS DIRECTED    fentaNYL (DURAGESIC) 50 mcg/hr PATCH apply 1 patch every 72 hours    lidocaine-prilocaine (EMLA) topical cream APPLY TO AFFECTED AREA AS NEEDED FOR PAIN  (APPLY TO SKIN 30 - 60 MINUTES BEFORE MEDIPORT ACCESS)    L-Mfolate-B6 Phos-Methyl-B12 (METANX) 3-35-2 mg tab tab Take 1 Tab by mouth two (2) times a day. Indications: peripheral neuropathy    lactulose (CHRONULAC) 10 gram/15 mL solution Take 15 mL by mouth three (3) times daily.  naloxone (NARCAN) 4 mg/actuation nasal spray Use 1 spray intranasally, then discard. Repeat with new spray every 2 min as needed for opioid overdose symptoms, alternating nostrils. Indications: opioid overdose    insulin glargine (LANTUS U-100 INSULIN) 100 unit/mL injection 28 Units by SubCUTAneous route nightly.  insulin lispro (HUMALOG) 100 unit/mL injection 3-15 Units by SubCUTAneous route Before breakfast, lunch, and dinner.  rivaroxaban (XARELTO) 15 mg (42)- 20 mg (9) DsPk Take 20 mg by mouth daily. Take one 15 mg tablet twice a day with food for the first 21 days. Then, take one 20 mg tablet once a day with food for 9 days.     naloxone (NARCAN) 4 mg/actuation nasal spray Use 1 spray intranasally, then discard. Repeat with new spray every 2 min as needed for opioid overdose symptoms, alternating nostrils.  dexAMETHasone (DECADRON) 4 mg tablet Take 4mg by mouth twice a day the day before chemotherapy and the day after chemotherapy    nut.tx.gluc.intol,lac-free,soy (GLUCERNA ADVANCE) liqd Take 237 mL by mouth three (3) times daily.  omeprazole (PRILOSEC) 20 mg capsule TAKE 1 CAPSULE BY MOUTH ONCE DAILY    QUEtiapine (SEROQUEL) 50 mg tablet Take 50 mg by mouth nightly.  lidocaine (LIDODERM) 5 % Apply patch to the affected area for 12 hours a day and remove for 12 hours a day. No current facility-administered medications for this visit. Fall Risk Assessment, last 12 mths 2/17/2020   Able to walk? Yes   Fall in past 12 months? No       3 most recent PHQ Screens 6/25/2020   Little interest or pleasure in doing things Not at all   Feeling down, depressed, irritable, or hopeless Not at all   Total Score PHQ 2 0       Abuse Screening Questionnaire 2/17/2020   Do you ever feel afraid of your partner? N   Are you in a relationship with someone who physically or mentally threatens you? N   Is it safe for you to go home? Y       1. Have you been to the ER, urgent care clinic since your last visit? Hospitalized since your last visit? Yes 7/29/20 [facility] d/t knee pain    2. Have you seen or consulted any other health care providers outside of the [address] [place] [address] since your last visit? Include any pap smears or colon screening.  No

## 2020-08-19 NOTE — PROGRESS NOTES
Grupo Monreal is a 60 y.o. male who was seen by synchronous (real-time) audio- technology on 8/19/2020 for Pancreatic Cancer        Assessment & Plan:     Diagnoses and all orders for this visit:    1. Pancreatic adenocarcinoma (HCC)  -     CT CHEST ABD PELV W CONT; Future    2. Liver metastasis (HCC)  -     CT CHEST ABD PELV W CONT; Future    3. Pancreatic mass  -     CT CHEST ABD PELV W CONT; Future      The complexity of medical decision making for this visit is moderate   Follow-up and Dispositions    · Return in about 4 weeks (around 9/16/2020). 1. Pancreatic adenocarcinoma ([address]  I had a long discussion with Mr. Donta Ochoa regarding his newly diagnosed metastatic pancreatic cancer.  I told him that in the first-line setting, palliative chemotherapy have been shown to improve 1 year survival by 73% compared to supportive care alone. Patient tolerated well C11, clinically much better. Gaining weight. *UG T1 A1  heterozygous  *Brain MRI patient could not do MRI due to claustrophobia. *On 12/18/2019, CA-19-9 was 85 from 112 on 11/19/2019. *On 4/13/20, =30 (0-35). *On 5/18/20, =71  Reviewed labs done on 5/23/20     Day 1: Oxaliplatin 85mg/m2 IV + irinotecan 180mg/m2 IV + leucovorin 400mg/m2 IV, followed by a 5-FU bolus of 400mg/m2 and a 46-hour continuous 5-FU infusion of 2,400mg/m2. Repeat cycle every 2 weeks until disease progression.     Reviewed CT CAP done on 5/21/20 which showed improved or stabledisease. Patient tolerated well cycle 12 FOLFIRINOX on 7/7/20. Clinically doing very well. *Going to CA from 7/6-7/17/20. * normalized  *Recheck CBC  and CA-19-9. *Check monthly CA-19-9  *F/u palliative care team   *Celiac block done on 6/17/20-No more abdominal pain. *Check restaging CT CAP then furteher plan based on restaging.          2. Liver metastases ([address]  Stable as of CT on 5/21/20-Recheck CT Cap.      3. Epigastric pain  *Patient having more pain.  I will stop oxycodone which makes vomit and switch him to hydromorphone 2mg Q 4 hours prn changed to every 3 hours today 6/25/20, Continue fentanyl patch 50 mcg Q72 hrs as before. S/p celiac block. I told him that his pain is likley from opiates and high tolerance due to previous h/o opiate abuse. Refer to pain management. Has h/o opiates abuse.     4. Pulmonary embolism without acute cor pulmonale, unspecified chronicity, unspecified pulmonary embolism type (HCC)  Continue Xarelto        6. Insulin dependent diabetes:Blood sugars much better controlled now.      7. Constipation: Resolved. Has lactulose if needed     8. Chemotherapy induced: Gave Zydis     RTC 4 weeks  I spent at least 15 minutes on this visit with this established patient. [id number]  Subjective:     Kiesha Bae is a 62 y.o. 1965 male with past medical history including pulmonary embolus, on Lovenox, type 2 diabetes, GERD, who I was asked to see in consultation at the request of Benjamin Berg for evaluation for newly diagnosed pancreatic adenocarcinoma.  Patient had fine-needle aspiration of pancreatic mass on 11/13/2019 and pathology showed pancreatic adenocarcinoma. I saw him in consultation for the first time on 11/19/2019) was to treat him with palliative FOLFIRINOX. He received C1D1 FOLFIRINOX on 12/16/2019 and tolerated well overall but cycle 5 was held on 2/10/2020 due to thrombocytopenia. Patient was started on avatrombopag but we still waiting for the insurance to have it shipped to his house. He completed C11 D1 on 6/23/20, here for follow-up.        Patient was seen and examined today. Natali Valencia is awake alert oriented x3.    Denies any fevers, chills, or nausea and vomiting today.  Denies any melena or bright red blood per rectum. Denies any abdominal pain. Says he is feeling great. Also has nausea. He is clinically stable.  All also points of review of system have been reviewed and were negative.   ECOG performance status 1.  Independent with ADLs and IADLs.     DX: Pancreatic adenocarcinoma     STAGE: Stage IV     Treatment intent: Palliative     ONCOLOGY HISTORY: BRCA1/2 negative-Pan-NTRK negative-No available activating mutation by [facility]  11/03/2019: Went to ER due to 5 days complaints of epigastric pain.  CT abdomen and pelvis showed:  1. 5 cm mass within the pancreatic body highly suggestive of primary malignancy. Superimposed acute pancreatitis cannot entirely be excluded. 2. Multiple liver lesions highly suggestive of metastatic disease 3. Metastatic retroperitoneal and mesenteric lymphadenopathy. 4. Indeterminate hypodensity within the spleen. Diagnostic consideration include splenic infarct or metastatic lesion 5. Right lower lobe pulmonary emboli.       *MRI of the abdomen showed  1. Stable since same day CT abdomen pelvis. Elliptical 4 cm hypoenhancing mass,  body of pancreas, with upstream glandular atrophy and pancreatic ductal  dilatation, most likely adenocarcinoma. Associated local/regional likely metastatic lymphadenopathy including upper retroperitoneum, lesser omentum, portacaval/periportal space. Associated encasement/narrowing of the adjacent  splenic vein. 2. Mild peripancreatic edema and soft tissue reticulation; may reflect secondary low-grade or chronic pancreatitis or adjacent peripancreatic tumor infiltration.   3. Numerous hypoenhancing and target-like small nodules and masses throughout  the liver, typical of metastatic disease. 4. Mild pericholecystic fluid, nonspecific, but may represent low-grade cholecystitis.   No evident cholecystolithiasis/choledocholithiasis or biliary ductal dilatation. 5. Mild splenomegaly with focal small splenic infarction, age indeterminate perhaps recent. Small left lower pole, nonacute renal infarction.  6. Other minor and/or ancillary findings including lumbar disc herniations     11/15/2019: Duplex left  lower extremity showed Acute nonocclusive thrombus in the distal femoral vein, popliteal, peroneal and both posterior tibial veins. The thrombus in the distal femoral vein appears to be floating tail-like thrombus.     11/19/2019: First medical oncology visit with me     11/29/2019: PET/CT showed  Malignant metabolic activity corresponding with the known malignancy in the pancreatic body. Innumerable hypermetabolic liver metastases. Metastatic portal caval and aortocaval lymph nodes. Enlargement and malignant metabolic activity in the anterior aspect of the left  psoas muscle suspicious for metastatic disease. Distant metastatic disease to include the left supraclavicular region, left  superior mediastinum and possibly the left inferior hilum. 12/16/19: [id number]/30/19:C2D1-1/13/20:C3D1- 1/14/2020: CA-19-9 = 79 1/27/20:C4D1-2/10/20:C5 held due to thrombocytopenia. Started Avatrombopag (waiting for approval)-2/17/2020: C5D1(delayed)-CA-19-9 = 87 3/09/20: C6 D1  2/12/20:CT CAP showed  1.  Redemonstration of multiple stigmata related to the patient's pancreatic  malignancy and accompanying ernesto/hepatic metastasis.   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     > Some interval improvement in the appearance of upper abdominal  retroperitoneal adenopathy with persistently abnormal/necrotic periportal and  peripancreatic lymph nodes.     2. No evidence of bowel obstruction. No free intraperitoneal gas.     3. Mild nonspecific urothelial enhancement. Correlation with urinalysis may be  helpful if symptoms of urinary tract infection are present.     4/24/20: Restaging CT CAP showed   1.  No CT findings of an acute intra-abdominal or intrapelvic obstructive or  inflammatory process. 2. Decreased size of the ill-defined infiltrative mass at the junction of the pancreatic body and tail measuring approximately 3.4 x 2.3 cm  (axial 48), previously 6.5 x 2.8 cm. Similar peripancreatic stranding with  downstream duct dilatation and atrophy of the tail.  3. Innumerable hepatic metastatic lesions, stable to mildly decreased size to the preceding CT scan of the abdomen/pelvis from 2/12/2020.  5/18/20: C10 D1 held due to thrombocytopenia  5/21/20: CT AP showed   1. Multiple hepatic metastases, all of which appears slightly smaller in size  compared to most recent CT of 4/24/2020. Peripancreatic lymphadenopathy also  appears slightly decreased in the interval.  2. Primary pancreatic mass appears essentially unchanged. Indeterminant to what  extent peripancreatic inflammatory stranding represents a superimposed acute  pancreatitis versus inflammatory stigmata related to pancreatic cancer. 5/26/2020: C10 D1 delayed by 1 week  6/17/20: Celiac block           Prior to Admission medications    Medication Sig Start Date End Date Taking? Authorizing Provider   Doptelet, 30 tab pack, 20 mg tab TAKE 2 TABLETS BY MOUTH DAILY FOR 15 DAYS 8/6/20  Yes Adrien Jones MD   Omeprazole delayed release (PRILOSEC D/R) 20 mg tablet Take 1 Tab by mouth daily. 6/4/20  Yes Nicky Gunderson NP   rivaroxaban (Xarelto) 20 mg tab tablet Take 1 Tab by mouth daily (with breakfast). 5/7/20  Yes Nicky Gunderson NP   dronabinoL (MARINOL) 5 mg capsule Take 1 Cap by mouth two (2) times a day. Max Daily Amount: 10 mg. 4/2/20  Yes Adrien Jones MD   True Metrix Glucose Test Strip strip  1/30/20  Yes Provider, Historical   TRUE METRIX GLUCOSE METER misc USE AS DIRECTED 11/4/19  Yes Provider, Historical   fentaNYL (DURAGESIC) 50 mcg/hr PATCH apply 1 patch every 72 hours 7/31/20 8/30/20  Erlin Barriga MD   lidocaine-prilocaine (EMLA) topical cream APPLY TO AFFECTED AREA AS NEEDED FOR PAIN  (APPLY TO SKIN 30 - 60 MINUTES BEFORE MEDIPORT ACCESS) 5/25/20   Adrien Jones MD   N-[id number]-N7 Phos-Methyl-B12 (METANX) 3-35-2 mg tab tab Take 1 Tab by mouth two (2) times a day.  Indications: peripheral neuropathy 1/15/20   Erlin Barriga MD   lactulose (CHRONULAC) 10 gram/15 mL solution Take 15 mL by mouth three (3) times daily. 12/18/19   Segun Chávez MD   naloxone [facility]) 4 mg/actuation nasal spray Use 1 spray intranasally, then discard. Repeat with new spray every 2 min as needed for opioid overdose symptoms, alternating nostrils. Indications: opioid overdose 12/10/19   Paige HOPKINS NP   insulin glargine (LANTUS U-100 INSULIN) 100 unit/mL injection 28 Units by SubCUTAneous route nightly. 11/27/19   Pratibha Funes PA-C   insulin lispro (HUMALOG) 100 unit/mL injection 3-15 Units by SubCUTAneous route Before breakfast, lunch, and dinner. 11/27/19   Key Ragsdale PA-C   rivaroxaban (XARELTO) 15 mg (42)- 20 mg (9) DsPk Take 20 mg by mouth daily. Take one 15 mg tablet twice a day with food for the first 21 days. Then, take one 20 mg tablet once a day with food for 9 days. Provider, Historical   naloxone (NARCAN) 4 mg/actuation nasal spray Use 1 spray intranasally, then discard. Repeat with new spray every 2 min as needed for opioid overdose symptoms, alternating nostrils. 11/19/19   Praveen Barriga MD   dexAMETHasone (DECADRON) 4 mg tablet Take 4mg by mouth twice a day the day before chemotherapy and the day after chemotherapy 11/19/19   Segun Chávez MD   nut.tx.gluc.intol,lac-free,soy (GLUCERNA ADVANCE) liqd Take 237 mL by mouth three (3) times daily. 11/19/19   Praveen Barriga MD   omeprazole (PRILOSEC) 20 mg capsule TAKE 1 CAPSULE BY MOUTH ONCE DAILY 11/4/19   Provider, Historical   QUEtiapine (SEROQUEL) 50 mg tablet Take 50 mg by mouth nightly. 8/3/19   Provider, Historical   lidocaine (LIDODERM) 5 % Apply patch to the affected area for 12 hours a day and remove for 12 hours a day.  3/22/19   Shannan Nieto MD     Patient Active Problem List   Diagnosis Code    Pancreatic mass K86.89    Liver metastasis ([address] C78.7    Abdominal pain R10.9    Pancreatic adenocarcinoma ([address] C25.9    Liver metastases ([address] C78.7    Epigastric pain R10.13    Pulmonary embolism without acute cor pulmonale ([address] I26.99    Thrombocytopenia (HCC) D69.6    Pancreatic cancer ([facility] C25.9    Diabetes mellitus, new onset ([facility] E11.9    Hepatitis C B19.20    Anemia D64.9    Insulin dependent diabetes mellitus [id number]    Constipation K59.00     Patient Active Problem List    Diagnosis Date Noted    Insulin dependent diabetes mellitus 12/18/2019    Constipation 12/18/2019    Pancreatic cancer ([facility] 11/25/2019    Diabetes mellitus, new onset ([facility] 11/25/2019    Hepatitis C 11/25/2019    Anemia 11/25/2019    Pancreatic adenocarcinoma ([facility] 11/19/2019    Liver metastases ([facility] 11/19/2019    Epigastric pain 11/19/2019    Pulmonary embolism without acute cor pulmonale (HCC) 11/19/2019    Thrombocytopenia ([facility] 11/19/2019    Pancreatic mass 11/08/2019    Liver metastasis ([facility] 11/08/2019    Abdominal pain 11/08/2019     Current Outpatient Medications   Medication Sig Dispense Refill    Doptelet, 30 tab pack, 20 mg tab TAKE 2 TABLETS BY MOUTH DAILY FOR 15 DAYS 30 Tab 2    Omeprazole delayed release (PRILOSEC D/R) 20 mg tablet Take 1 Tab by mouth daily. 30 Tab 3    rivaroxaban (Xarelto) 20 mg tab tablet Take 1 Tab by mouth daily (with breakfast). 30 Tab 3    dronabinoL (MARINOL) 5 mg capsule Take 1 Cap by mouth two (2) times a day. Max Daily Amount: 10 mg. 60 Cap 3    True Metrix Glucose Test Strip strip       TRUE METRIX GLUCOSE METER misc USE AS DIRECTED  0    fentaNYL (DURAGESIC) 50 mcg/hr PATCH apply 1 patch every 72 hours 10 Patch 0    lidocaine-prilocaine (EMLA) topical cream APPLY TO AFFECTED AREA AS NEEDED FOR PAIN  (APPLY TO SKIN 30 - 60 MINUTES BEFORE MEDIPORT ACCESS) 30 g 0    L-Mfolate-B6 Phos-Methyl-B12 (METANX) 3-35-2 mg tab tab Take 1 Tab by mouth two (2) times a day. Indications: peripheral neuropathy 60 Tab 1    lactulose (CHRONULAC) 10 gram/15 mL solution Take 15 mL by mouth three (3) times daily. 480 mL 3    naloxone (NARCAN) 4 mg/actuation nasal spray Use 1 spray intranasally, then discard. Repeat with new spray every 2 min as needed for opioid overdose symptoms, alternating nostrils. Indications: opioid overdose 1 Each 1    insulin glargine (LANTUS U-100 INSULIN) 100 unit/mL injection 28 Units by SubCUTAneous route nightly. 1 Vial 0    insulin lispro (HUMALOG) 100 unit/mL injection 3-15 Units by SubCUTAneous route Before breakfast, lunch, and dinner. 1 Vial 0    rivaroxaban (XARELTO) 15 mg (42)- 20 mg (9) DsPk Take 20 mg by mouth daily. Take one 15 mg tablet twice a day with food for the first 21 days. Then, take one 20 mg tablet once a day with food for 9 days.  naloxone (NARCAN) 4 mg/actuation nasal spray Use 1 spray intranasally, then discard. Repeat with new spray every 2 min as needed for opioid overdose symptoms, alternating nostrils. 1 Each 1    dexAMETHasone (DECADRON) 4 mg tablet Take 4mg by mouth twice a day the day before chemotherapy and the day after chemotherapy 30 Tab 0    nut.tx.gluc.intol,lac-free,soy (GLUCERNA ADVANCE) liqd Take 237 mL by mouth three (3) times daily. 90 Bottle 5    omeprazole (PRILOSEC) 20 mg capsule TAKE 1 CAPSULE BY MOUTH ONCE DAILY  3    QUEtiapine (SEROQUEL) 50 mg tablet Take 50 mg by mouth nightly.  lidocaine (LIDODERM) 5 % Apply patch to the affected area for 12 hours a day and remove for 12 hours a day. 5 Each 0     No Known Allergies  Past Medical History:   Diagnosis Date    Cancer [facility])     Pancreatic Adenocarcinoma. Diagnosed 11/2019.  Diabetes ([facility] 10/2019    Diagnosed 10/2019. Patient denies     Hepatitis C     Received Curative Treatment, but Hepatitis C was not cured and returned.  Left ulnar fracture     Thromboembolus ([facility]     DVT of LLE 11/2019. Also, Pulmonary Embolism 11/2019. Treated with Rivaroxaban.      Past Surgical History:   Procedure Laterality Date    HX TONSILLECTOMY      IR BX PANCREAS NEEDLE PERC  11/2019    Endoscopic Pancreatic Biopsy (?)    IR INSERT TUNL CVC W PORT OVER 5 YEARS  11/25/2019 Family History   Problem Relation Age of Onset    Diabetes Mother     Kidney Disease Mother     Diabetes Father     Diabetes Brother     Cancer Maternal Grandmother     Cancer Maternal Grandfather     Cancer Maternal Aunt     Cancer Maternal Uncle      Social History     Tobacco Use    Smoking status: Former Smoker     Packs/day: 0.50     Years: 40.00     Pack years: 20.00     Last attempt to quit: 2018     Years since quittin.8    Smokeless tobacco: Never Used    Tobacco comment: Quit 2018   Substance Use Topics    Alcohol use: Not Currently       ROS    Objective:   No flowsheet data found. General: alert, cooperative, no distress     Additional exam findings: We discussed the expected course, resolution and complications of the diagnosis(es) in detail. Medication risks, benefits, costs, interactions, and alternatives were discussed as indicated. I advised him to contact the office if his condition worsens, changes or fails to improve as anticipated. He expressed understanding with the diagnosis(es) and plan. Jamari Lai, who was evaluated through a patient-initiated, synchronous (real-time) audio- encounter, and/or his healthcare decision maker, is aware that it is a billable service, with coverage as determined by his insurance carrier. He provided verbal consent to proceed: Yes, and patient identification was verified. It was conducted pursuant to the emergency declaration under the [address] [place] [address] authority and the [employer] Act. A caregiver was present when appropriate. Ability to conduct physical exam was limited. I was at home. The patient was at home.       Kaiser Piña MD

## 2020-08-24 NOTE — NURSE NAVIGATOR
Follow-up after VV with Dr. Michael Villarreal, [address] [employer] CAP ordered 8/28/20. RTC in 4 weeks with labs prior to visit. All questions answered.

## 2020-08-28 ENCOUNTER — HOSPITAL ENCOUNTER (OUTPATIENT)
Dept: CT IMAGING | Age: 55
Discharge: HOME | End: 2020-08-28
Attending: INTERNAL MEDICINE
Payer: MEDICAID

## 2020-08-28 DIAGNOSIS — C78.7 LIVER METASTASIS (HCC): ICD-10-CM

## 2020-08-28 DIAGNOSIS — K86.89 PANCREATIC MASS: ICD-10-CM

## 2020-08-28 DIAGNOSIS — C25.9 PANCREATIC ADENOCARCINOMA (HCC): ICD-10-CM

## 2020-08-28 LAB — CREAT UR-MCNC: 0.8 MG/DL (ref 0.6–1.3)

## 2020-08-28 PROCEDURE — 74011000255 HC RX REV CODE- 255: Performed by: INTERNAL MEDICINE

## 2020-08-28 PROCEDURE — 74177 CT ABD & PELVIS W/CONTRAST: CPT

## 2020-08-28 PROCEDURE — 82565 ASSAY OF CREATININE: CPT

## 2020-08-28 PROCEDURE — 74011000636 HC RX REV CODE- 636: Performed by: INTERNAL MEDICINE

## 2020-08-28 RX ORDER — BARIUM SULFATE 20 MG/ML
900 SUSPENSION ORAL
Status: COMPLETED | OUTPATIENT
Start: 2020-08-28 | End: 2020-08-28

## 2020-08-28 RX ADMIN — BARIUM SULFATE 900 ML: 20 SUSPENSION ORAL at 10:28

## 2020-08-28 RX ADMIN — IOPAMIDOL 100 ML: 612 INJECTION, SOLUTION INTRAVENOUS at 10:28

## 2020-08-30 ENCOUNTER — HOSPITAL ENCOUNTER (EMERGENCY)
Age: 55
Discharge: HOME | End: 2020-08-30
Attending: EMERGENCY MEDICINE
Payer: MEDICAID

## 2020-08-30 ENCOUNTER — APPOINTMENT (OUTPATIENT)
Dept: GENERAL RADIOLOGY | Age: 55
End: 2020-08-30
Attending: PHYSICIAN ASSISTANT
Payer: MEDICAID

## 2020-08-30 VITALS
OXYGEN SATURATION: 100 % | HEIGHT: 72 IN | TEMPERATURE: 98.2 F | BODY MASS INDEX: 27.36 KG/M2 | DIASTOLIC BLOOD PRESSURE: 80 MMHG | WEIGHT: 202 LBS | HEART RATE: 86 BPM | RESPIRATION RATE: 16 BRPM | SYSTOLIC BLOOD PRESSURE: 115 MMHG

## 2020-08-30 DIAGNOSIS — M25.551 BILATERAL HIP PAIN: Primary | ICD-10-CM

## 2020-08-30 DIAGNOSIS — M25.552 BILATERAL HIP PAIN: Primary | ICD-10-CM

## 2020-08-30 PROCEDURE — 99282 EMERGENCY DEPT VISIT SF MDM: CPT

## 2020-08-30 PROCEDURE — 74011250637 HC RX REV CODE- 250/637: Performed by: PHYSICIAN ASSISTANT

## 2020-08-30 PROCEDURE — 73521 X-RAY EXAM HIPS BI 2 VIEWS: CPT

## 2020-08-30 RX ORDER — TRAMADOL HYDROCHLORIDE 50 MG/1
50 TABLET ORAL
Status: DISCONTINUED | OUTPATIENT
Start: 2020-08-30 | End: 2020-08-30

## 2020-08-30 RX ORDER — TRAMADOL HYDROCHLORIDE 50 MG/1
50 TABLET ORAL ONCE
Status: COMPLETED | OUTPATIENT
Start: 2020-08-30 | End: 2020-08-30

## 2020-08-30 RX ADMIN — TRAMADOL HYDROCHLORIDE 50 MG: 50 TABLET, FILM COATED ORAL at 19:43

## 2020-08-31 NOTE — ED NOTES
8:36 PM  08/30/20     Discharge instructions given to patient (name) with verbalization of understanding. Patient accompanied by self. Patient discharged with the following prescriptions none. Patient discharged to home (destination).       Trae López RN

## 2020-08-31 NOTE — ED PROVIDER NOTES
EMERGENCY DEPARTMENT HISTORY AND PHYSICAL EXAM    8:14 PM      Date: 8/30/2020  Patient Name: Romi Brasher    History of Presenting Illness     Chief Complaint   Patient presents with    Hip Pain       History Provided By: Patient    Chief Complaint: bilateral hip pain  Duration: 2 Weeks  Timing:  Acute  Location:   Quality: Aching  Severity: Severe  Modifying Factors: none  Associated Symptoms: denies any other associated signs or symptoms      Additional History (Context):Bairon Art is a 60 y.o. male with a pertinent history of DVT, diabetes, pancreatic adenocarcinoma, prior IV heroin abuse, and hepatitis C who presents to the emergency department for evaluation of atraumatic bilateral hip pain for the past 2 weeks. Patient states he knows he has arthritis, but is not sure if that is what is causing his pain. Patient reports numbness and tingling associated with prior chemotherapy, but no new numbness or tingling with this hip pain. No associated urinary symptoms or perianal numbness. No urinary or bowel incontinence. Patient reports he went out of town to [place] to attend a wake. He missed his last few treatments of chemotherapy for his pancreatic adenocarcinoma. Patient denies any associated fevers, chills, chest pain, shortness of breath, URI symptoms, cough, abdominal pain, rash, or any other concerns. No treatments prior to arrival.  Patient states he used to use IV heroin, but has been clean for 2 years. PCP:  Jewel Islas MD      Current Outpatient Medications   Medication Sig Dispense Refill    Doptelet, 30 tab pack, 20 mg tab TAKE 2 TABLETS BY MOUTH DAILY FOR 15 DAYS 30 Tab 2    fentaNYL (DURAGESIC) 50 mcg/hr PATCH apply 1 patch every 72 hours 10 Patch 0    Omeprazole delayed release (PRILOSEC D/R) 20 mg tablet Take 1 Tab by mouth daily.  30 Tab 3    lidocaine-prilocaine (EMLA) topical cream APPLY TO AFFECTED AREA AS NEEDED FOR PAIN  (APPLY TO SKIN 30 - 60 MINUTES BEFORE MEDIPORT ACCESS) 30 g 0    rivaroxaban (Xarelto) 20 mg tab tablet Take 1 Tab by mouth daily (with breakfast). 30 Tab 3    dronabinoL (MARINOL) 5 mg capsule Take 1 Cap by mouth two (2) times a day. Max Daily Amount: 10 mg. 60 Cap 3    True Metrix Glucose Test Strip strip       L-Mfolate-B6 Phos-Methyl-B12 (METANX) 3-35-2 mg tab tab Take 1 Tab by mouth two (2) times a day. Indications: peripheral neuropathy 60 Tab 1    lactulose (CHRONULAC) 10 gram/15 mL solution Take 15 mL by mouth three (3) times daily. 480 mL 3    naloxone (NARCAN) 4 mg/actuation nasal spray Use 1 spray intranasally, then discard. Repeat with new spray every 2 min as needed for opioid overdose symptoms, alternating nostrils. Indications: opioid overdose 1 Each 1    insulin glargine (LANTUS U-100 INSULIN) 100 unit/mL injection 28 Units by SubCUTAneous route nightly. 1 Vial 0    insulin lispro (HUMALOG) 100 unit/mL injection 3-15 Units by SubCUTAneous route Before breakfast, lunch, and dinner. 1 Vial 0    rivaroxaban (XARELTO) 15 mg (42)- 20 mg (9) DsPk Take 20 mg by mouth daily. Take one 15 mg tablet twice a day with food for the first 21 days. Then, take one 20 mg tablet once a day with food for 9 days.  naloxone (NARCAN) 4 mg/actuation nasal spray Use 1 spray intranasally, then discard. Repeat with new spray every 2 min as needed for opioid overdose symptoms, alternating nostrils. 1 Each 1    dexAMETHasone (DECADRON) 4 mg tablet Take 4mg by mouth twice a day the day before chemotherapy and the day after chemotherapy 30 Tab 0    nut.tx.gluc.intol,lac-free,soy (GLUCERNA ADVANCE) liqd Take 237 mL by mouth three (3) times daily. 90 Bottle 5    TRUE METRIX GLUCOSE METER misc USE AS DIRECTED  0    omeprazole (PRILOSEC) 20 mg capsule TAKE 1 CAPSULE BY MOUTH ONCE DAILY  3    QUEtiapine (SEROQUEL) 50 mg tablet Take 50 mg by mouth nightly.       lidocaine (LIDODERM) 5 % Apply patch to the affected area for 12 hours a day and remove for 12 hours a day. 5 Each 0       Past History     Past Medical History:  Past Medical History:   Diagnosis Date    Cancer [facility])     Pancreatic Adenocarcinoma. Diagnosed 2019.  Diabetes ([facility] [address] 10/2019    Diagnosed 10/2019. Patient denies     Hepatitis C     Received Curative Treatment, but Hepatitis C was not cured and returned.  Left ulnar fracture     Thromboembolus ([facility] [address]     DVT of LLE 2019. Also, Pulmonary Embolism 2019. Treated with Rivaroxaban. Past Surgical History:  Past Surgical History:   Procedure Laterality Date    HX TONSILLECTOMY      IR BX PANCREAS NEEDLE PERC  2019    Endoscopic Pancreatic Biopsy (?)    IR INSERT TUNL CVC W PORT OVER 5 YEARS  2019       Family History:  Family History   Problem Relation Age of Onset    Diabetes Mother     Kidney Disease Mother     Diabetes Father     Diabetes Brother     Cancer Maternal Grandmother     Cancer Maternal Grandfather     Cancer Maternal Aunt     Cancer Maternal Uncle        Social History:  Social History     Tobacco Use    Smoking status: Former Smoker     Packs/day: 0.50     Years: 40.00     Pack years: 20.00     Last attempt to quit: 2018     Years since quittin.8    Smokeless tobacco: Never Used    Tobacco comment: Quit 2018   Substance Use Topics    Alcohol use: Not Currently    Drug use: Not Currently     Comment: Quit Cocaine, Heroin, and Marijuana since 2018       Allergies:  No Known Allergies      Review of Systems       Review of Systems   Constitutional: Negative for chills and fever. HENT: Negative for congestion, rhinorrhea and sore throat. Respiratory: Negative for cough and shortness of breath. Cardiovascular: Negative for chest pain. Gastrointestinal: Negative for abdominal pain, blood in stool, constipation, diarrhea, nausea and vomiting. Genitourinary: Negative for dysuria, frequency and hematuria. Musculoskeletal: Positive for arthralgias.  Negative for back pain and myalgias. Skin: Negative for rash and wound. Neurological: Negative for dizziness and headaches. All other systems reviewed and are negative. Physical Exam     Visit Vitals  /80   Pulse 86   Temp 98.2 °F (36.8 °C)   Resp 16   Ht 6' (1.829 m)   Wt 91.6 kg (202 lb)   SpO2 100%   BMI 27.40 kg/m²       Physical Exam  Vitals signs and nursing note reviewed. Constitutional:       General: He is not in acute distress. Appearance: He is well-developed. He is not diaphoretic. HENT:      Head: Normocephalic and atraumatic. Eyes:      Conjunctiva/sclera: Conjunctivae normal.   Neck:      Musculoskeletal: Normal range of motion and neck supple. Cardiovascular:      Rate and Rhythm: Normal rate and regular rhythm. Heart sounds: Normal heart sounds. Pulmonary:      Effort: Pulmonary effort is normal. No respiratory distress. Breath sounds: Normal breath sounds. Chest:      Chest wall: No tenderness. Musculoskeletal: Normal range of motion. General: No deformity. Comments: Surgical scar noted to right lateral hip. Otherwise unremarkable exam of bilateral hips without tenderness, erythema, edema, ecchymosis, skin rash, or decreased range of motion. Patient is ambulatory and weightbearing without difficulty. Intact distal neurovascular status bilateral lower extremities. Skin:     General: Skin is warm and dry. Neurological:      Mental Status: He is alert and oriented to person, place, and time. Diagnostic Study Results     Labs -  No results found for this or any previous visit (from the past 12 hour(s)). Radiologic Studies -   No results found. Medical Decision Making   I am the first provider for this patient. I reviewed the vital signs, available nursing notes, past medical history, past surgical history, family history and social history. Vital Signs-Reviewed the patient's vital signs.     Pulse Oximetry Analysis -  100% on room air (Interpretation)    Records Reviewed: Nursing Notes and Old Medical Records (Time of Review: 8:14 PM)    ED Course: Progress Notes, Reevaluation, and Consults:    Provider Notes (Medical Decision Making):     differential diagnosis: Unlikely fracture, osteoarthritis, DJD, myalgia    Plan: Patient presents ambulatory in no significant distress with normal vitals. HPI consistent with osteoarthritis. Unremarkable physical exam without tenderness, reduced range of motion, reduced strength, or any other acute abnormalities. Treated here with tramadol. Patient requesting additional dose. Explained that this is not necessary. Advised to follow-up with PCP. Explained that I cannot provide him with a prescription for any narcotic. Diagnosis     Clinical Impression:   1. Bilateral hip pain        Disposition: DC Home    Follow-up Information     Follow up With Specialties Details Why Contact Gem Cruz  Call in 2 days  [address]    [facility] EMERGENCY DEPT Emergency Medicine Go to As needed, If symptoms worsen [address]  [phone]           Discharge Medication List as of 8/30/2020  8:14 PM      CONTINUE these medications which have NOT CHANGED    Details   Doptelet, 30 tab pack, 20 mg tab TAKE 2 TABLETS BY MOUTH DAILY FOR 15 DAYS, Normal, Disp-30 Tab,R-2      fentaNYL (DURAGESIC) 50 mcg/hr PATCH apply 1 patch every 72 hours, Normal, Disp-10 Patch,R-0      Omeprazole delayed release (PRILOSEC D/R) 20 mg tablet Take 1 Tab by mouth daily. , Normal, Disp-30 Tab, R-3      lidocaine-prilocaine (EMLA) topical cream APPLY TO AFFECTED AREA AS NEEDED FOR PAIN  (APPLY TO SKIN 30 - 60 MINUTES BEFORE MEDIPORT ACCESS), Normal, Disp-30 g, R-0      rivaroxaban (Xarelto) 20 mg tab tablet Take 1 Tab by mouth daily (with breakfast). , Normal, Disp-30 Tab, R-3Start after finishing starter pack      dronabinoL (MARINOL) 5 mg capsule Take 1 Cap by mouth two (2) times a day. Max Daily Amount: 10 mg., Normal, Disp-60 Cap, R-3      True Metrix Glucose Test Strip strip Historical Med, CLAUDINE      S-Bukwpms-U5 Phos-Methyl-B12 (METANX) 3-35-2 mg tab tab Take 1 Tab by mouth two (2) times a day. Indications: peripheral neuropathy, Normal, Disp-60 Tab, R-1      lactulose (CHRONULAC) 10 gram/15 mL solution Take 15 mL by mouth three (3) times daily. , Normal, Disp-480 mL, R-3      !! naloxone (NARCAN) 4 mg/actuation nasal spray Use 1 spray intranasally, then discard. Repeat with new spray every 2 min as needed for opioid overdose symptoms, alternating nostrils. Indications: opioid overdose, Print, Disp-1 Each, R-1      insulin glargine (LANTUS U-100 INSULIN) 100 unit/mL injection 28 Units by SubCUTAneous route nightly. , Print, Disp-1 Vial, R-0      insulin lispro (HUMALOG) 100 unit/mL injection 3-15 Units by SubCUTAneous route Before breakfast, lunch, and dinner., Print, Disp-1 Vial, R-0      rivaroxaban (XARELTO) 15 mg (42)- 20 mg (9) DsPk Take 20 mg by mouth daily. Take one 15 mg tablet twice a day with food for the first 21 days. Then, take one 20 mg tablet once a day with food for 9 days. , Historical Med      !! naloxone (NARCAN) 4 mg/actuation nasal spray Use 1 spray intranasally, then discard. Repeat with new spray every 2 min as needed for opioid overdose symptoms, alternating nostrils. , Normal, Disp-1 Each, R-1      dexAMETHasone (DECADRON) 4 mg tablet Take 4mg by mouth twice a day the day before chemotherapy and the day after chemotherapy, Normal, Disp-30 Tab, R-0      nut.tx.gluc.intol,lac-free,soy (GLUCERNA ADVANCE) liqd Take 237 mL by mouth three (3) times daily. , Normal, Disp-90 Bottle, R-5      TRUE METRIX GLUCOSE METER misc USE AS DIRECTED, Historical Med, R-0, CLAUDINE      omeprazole (PRILOSEC) 20 mg capsule TAKE 1 CAPSULE BY MOUTH ONCE DAILY, Historical Med, R-3      QUEtiapine (SEROQUEL) 50 mg tablet Take 50 mg by mouth nightly., Historical Med lidocaine (LIDODERM) 5 % Apply patch to the affected area for 12 hours a day and remove for 12 hours a day., Print, Disp-5 Each, R-0       !! - Potential duplicate medications found. Please discuss with provider.        _______________________________    This note was dictated utilizing voice recognition software which may lead to typographical errors. I apologize in advance if the situation occurs. If questions arise please do not hesitate to contact me or call our department.   Driss Winchester PA-C

## 2020-08-31 NOTE — DISCHARGE INSTRUCTIONS
Patient Education     Please return immediately to the Emergency Room for re-evaluation if you are not improving, develop any new symptoms, or develop worsening of current symptoms! If you have been prescribed a medication and are unable to take this medication for any reason, please return to the Emergency Department for further evaluation! If you have been referred for follow-up to a specialist, but are unable to follow-up and your symptoms are either not improving or are worsening, please return to the Emergency Department for further evaluation! Musculoskeletal Pain: Care Instructions  Your Care Instructions     Different problems with the bones, muscles, nerves, ligaments, and tendons in the body can cause pain. One or more areas of your body may ache or burn. Or they may feel tired, stiff, or sore. The medical term for this type of pain is musculoskeletal pain. It can have many different causes. Sometimes the pain is caused by an injury such as a strain or sprain. Or you might have pain from using one part of your body in the same way over and over again. This is called overuse. In some cases, the cause of the pain is another health problem such as arthritis or fibromyalgia. The doctor will examine you and ask you questions about your health to help find the cause of your pain. Blood tests or imaging tests like an X-ray may also be helpful. But sometimes doctors can't find a cause of the pain. Treatment depends on your symptoms and the cause of the pain, if known. The doctor has checked you carefully, but problems can develop later. If you notice any problems or new symptoms, get medical treatment right away. Follow-up care is a key part of your treatment and safety. Be sure to make and go to all appointments, and call your doctor if you are having problems. It's also a good idea to know your test results and keep a list of the medicines you take.   How can you care for yourself at home?  · Rest until you feel better. · Do not do anything that makes the pain worse. Return to exercise gradually if you feel better and your doctor says it's okay. · Be safe with medicines. Read and follow all instructions on the label. ? If the doctor gave you a prescription medicine for pain, take it as prescribed. ? If you are not taking a prescription pain medicine, ask your doctor if you can take an over-the-counter medicine. · Put ice or a cold pack on the area for 10 to 20 minutes at a time to ease pain. Put a thin cloth between the ice and your skin. When should you call for help? Call your doctor now or seek immediate medical care if:  · You have new pain, or your pain gets worse. · You have new symptoms such as a fever, a rash, or chills. Watch closely for changes in your health, and be sure to contact your doctor if:  · You do not get better as expected. Where can you learn more? Go to http://kelley-carissa.info/  Enter Q624 in the search box to learn more about \"Musculoskeletal Pain: Care Instructions. \"  Current as of: November 20, 2019               Content Version: 12.5  © 3687-3589 [employer]. Care instructions adapted under license by [employer] (which disclaims liability or warranty for this information). If you have questions about a medical condition or this instruction, always ask your healthcare professional. [address] any warranty or liability for your use of this information.

## 2020-09-10 ENCOUNTER — TELEPHONE (OUTPATIENT)
Dept: ONCOLOGY | Age: 55
End: 2020-09-10

## 2020-09-10 DIAGNOSIS — I26.99 PULMONARY EMBOLISM WITHOUT ACUTE COR PULMONALE, UNSPECIFIED CHRONICITY, UNSPECIFIED PULMONARY EMBOLISM TYPE (HCC): ICD-10-CM

## 2020-09-10 RX ORDER — HYDROMORPHONE HYDROCHLORIDE 2 MG/1
2 TABLET ORAL
Qty: 90 TAB | Refills: 0 | Status: SHIPPED | OUTPATIENT
Start: 2020-09-10 | End: 2020-09-16 | Stop reason: ALTCHOICE

## 2020-09-10 RX ORDER — FENTANYL 50 UG/1
1 PATCH TRANSDERMAL
Qty: 10 PATCH | Refills: 0 | Status: SHIPPED | OUTPATIENT
Start: 2020-09-10 | End: 2020-10-10

## 2020-09-10 NOTE — TELEPHONE ENCOUNTER
Spoke with patient's sister in law Radha. She states the patient \"wasn't having a problem with pain for the last month\", however he went on a trip at the end of July and has has severe hip and back pain ever since. She states he needs refills on his Dilaudid, Fentanyl, and Xarelto prescriptions, however she is concerned that his pain medications are not controlling his pain. She states he wakes up crying at night from the pain. Refills pended to Dr. Juan Rodriguez. Patient has a follow up appt scheduled for 9/16/20.

## 2020-09-10 NOTE — TELEPHONE ENCOUNTER
Patient is completely out of blood thinners and pain pills. Requesting refill and would also like to know about switching pain medications.  Please f/u

## 2020-09-15 ENCOUNTER — TELEPHONE (OUTPATIENT)
Dept: ONCOLOGY | Age: 55
End: 2020-09-15

## 2020-09-16 ENCOUNTER — NURSE NAVIGATOR (OUTPATIENT)
Dept: OTHER | Age: 55
End: 2020-09-16

## 2020-09-16 ENCOUNTER — HOSPITAL ENCOUNTER (OUTPATIENT)
Dept: INFUSION THERAPY | Age: 55
Discharge: HOME | End: 2020-09-16
Payer: MEDICAID

## 2020-09-16 ENCOUNTER — TELEPHONE (OUTPATIENT)
Dept: ONCOLOGY | Age: 55
End: 2020-09-16

## 2020-09-16 ENCOUNTER — OFFICE VISIT (OUTPATIENT)
Dept: ONCOLOGY | Age: 55
End: 2020-09-16

## 2020-09-16 VITALS
RESPIRATION RATE: 18 BRPM | SYSTOLIC BLOOD PRESSURE: 127 MMHG | DIASTOLIC BLOOD PRESSURE: 83 MMHG | TEMPERATURE: 98.7 F | HEART RATE: 69 BPM | OXYGEN SATURATION: 96 %

## 2020-09-16 VITALS
TEMPERATURE: 98.7 F | SYSTOLIC BLOOD PRESSURE: 127 MMHG | WEIGHT: 196.2 LBS | RESPIRATION RATE: 18 BRPM | DIASTOLIC BLOOD PRESSURE: 83 MMHG | HEART RATE: 69 BPM | OXYGEN SATURATION: 96 % | BODY MASS INDEX: 26.61 KG/M2

## 2020-09-16 DIAGNOSIS — G89.3 CANCER ASSOCIATED PAIN: ICD-10-CM

## 2020-09-16 DIAGNOSIS — E11.9 DIABETES MELLITUS, NEW ONSET (HCC): ICD-10-CM

## 2020-09-16 DIAGNOSIS — I26.99 PULMONARY EMBOLISM WITHOUT ACUTE COR PULMONALE, UNSPECIFIED CHRONICITY, UNSPECIFIED PULMONARY EMBOLISM TYPE (HCC): ICD-10-CM

## 2020-09-16 DIAGNOSIS — C25.9 MALIGNANT NEOPLASM OF PANCREAS, UNSPECIFIED LOCATION OF MALIGNANCY (HCC): ICD-10-CM

## 2020-09-16 DIAGNOSIS — C78.7 LIVER METASTASIS (HCC): ICD-10-CM

## 2020-09-16 DIAGNOSIS — C25.9 MALIGNANT NEOPLASM OF PANCREAS, UNSPECIFIED LOCATION OF MALIGNANCY (HCC): Primary | ICD-10-CM

## 2020-09-16 DIAGNOSIS — M25.551 RIGHT HIP PAIN: ICD-10-CM

## 2020-09-16 LAB
ALBUMIN SERPL-MCNC: 3.4 G/DL (ref 3.4–5)
ALBUMIN/GLOB SERPL: 0.8 {RATIO} (ref 0.8–1.7)
ALP SERPL-CCNC: 113 U/L (ref 45–117)
ALT SERPL-CCNC: 15 U/L (ref 16–61)
ANION GAP SERPL CALC-SCNC: 7 MMOL/L (ref 3–18)
AST SERPL-CCNC: 14 U/L (ref 10–38)
BASO+EOS+MONOS # BLD AUTO: 0.5 K/UL (ref 0–2.3)
BASO+EOS+MONOS NFR BLD AUTO: 5 % (ref 0.1–17)
BILIRUB SERPL-MCNC: 0.6 MG/DL (ref 0.2–1)
BUN SERPL-MCNC: 15 MG/DL (ref 7–18)
BUN/CREAT SERPL: 18 (ref 12–20)
CALCIUM SERPL-MCNC: 8.7 MG/DL (ref 8.5–10.1)
CHLORIDE SERPL-SCNC: 100 MMOL/L (ref 100–111)
CO2 SERPL-SCNC: 29 MMOL/L (ref 21–32)
CREAT SERPL-MCNC: 0.84 MG/DL (ref 0.6–1.3)
DIFFERENTIAL METHOD BLD: ABNORMAL
ERYTHROCYTE [DISTWIDTH] IN BLOOD BY AUTOMATED COUNT: 14.9 % (ref 11.5–14.5)
GLOBULIN SER CALC-MCNC: 4.2 G/DL (ref 2–4)
GLUCOSE SERPL-MCNC: 272 MG/DL (ref 74–99)
HCT VFR BLD AUTO: 37.2 % (ref 36–48)
HGB BLD-MCNC: 12.3 G/DL (ref 12–16)
LYMPHOCYTES # BLD: 1 K/UL (ref 1.1–5.9)
LYMPHOCYTES NFR BLD: 10 % (ref 14–44)
MCH RBC QN AUTO: 31 PG (ref 25–35)
MCHC RBC AUTO-ENTMCNC: 33.1 G/DL (ref 31–37)
MCV RBC AUTO: 93.7 FL (ref 78–102)
NEUTS SEG # BLD: 8.3 K/UL (ref 1.8–9.5)
NEUTS SEG NFR BLD: 84 % (ref 40–70)
PLATELET # BLD AUTO: 113 K/UL (ref 140–440)
POTASSIUM SERPL-SCNC: 3.5 MMOL/L (ref 3.5–5.5)
PROT SERPL-MCNC: 7.6 G/DL (ref 6.4–8.2)
RBC # BLD AUTO: 3.97 M/UL (ref 4.1–5.1)
SODIUM SERPL-SCNC: 136 MMOL/L (ref 136–145)
WBC # BLD AUTO: 9.8 K/UL (ref 4.5–13)

## 2020-09-16 PROCEDURE — 86301 IMMUNOASSAY TUMOR CA 19-9: CPT

## 2020-09-16 PROCEDURE — 85025 COMPLETE CBC W/AUTO DIFF WBC: CPT

## 2020-09-16 PROCEDURE — 36415 COLL VENOUS BLD VENIPUNCTURE: CPT

## 2020-09-16 PROCEDURE — 80053 COMPREHEN METABOLIC PANEL: CPT

## 2020-09-16 RX ORDER — HYDROMORPHONE HYDROCHLORIDE 4 MG/1
4 TABLET ORAL
Qty: 90 TAB | Refills: 0 | Status: SHIPPED | OUTPATIENT
Start: 2020-09-16 | End: 2020-09-30 | Stop reason: SDUPTHER

## 2020-09-16 NOTE — PROGRESS NOTES
[facility] Progress Note    Date: 2020    Name: Tiffany Heredia    MRN: [id number]         : 1965    Peripheral Lab Draw      Mr. Bruce Mo to [facility], ambulatory at 1035 accompanied by self. Pt was assessed and education was provided. Mr. Akbar Obregon vitals were reviewed and patient was observed for 5 minutes prior to treatment. Visit Vitals  /83   Pulse 69   Temp 98.7 °F (37.1 °C)   Resp 18   SpO2 96%       Blood obtained peripherally from LAC x 1 attempt with butterfly needle and sent to lab for CBC, CMP, CA  19-9 per written orders. No bleeding or hematoma noted at site. Guaze and coban applied. Mr. Bruce Mo tolerated the phlebotomy, and had no complaints. Patient armband removed and shredded. Mr. Bruce Mo was discharged from [place] [address] in stable condition at 1045. He is to return to [place] for follow up appointment.     Antoine Carmona RN  2020  10:57 AM

## 2020-09-16 NOTE — PROGRESS NOTES
[facility]  [id number] [place] [address], [address]  [facility]  Office Phone: [phone]  Fax: [phone]     HEMATOLOGY/ONCOLOGY PROGRESS NOTE        Reason for visit:  Pancreatic cancer  712  Subjective:     Lorena Sykes is a 62 y.o. 1965 male with past medical history including pulmonary embolus, on Lovenox, type 2 diabetes, GERD, who I was asked to see in consultation at the request of Rafael Berg for evaluation for newly diagnosed pancreatic adenocarcinoma.  Patient had fine-needle aspiration of pancreatic mass on 11/13/2019 and pathology showed pancreatic adenocarcinoma. I saw him in consultation for the first time on 11/19/2019) was to treat him with palliative FOLFIRINOX. He received C1D1 FOLFIRINOX on 12/16/2019 and tolerated well overall but cycle 5 was held on 2/10/2020 due to thrombocytopenia. Patient was started on avatrombopag but we still waiting for the insurance to have it shipped to his house. He completed C12 D1 on 7/7/20, here for follow-up.        Patient was seen and examined today. [facility] is awake alert oriented x3.    Denies any fevers, chills, or nausea and vomiting today.  Denies any melena or bright red blood per rectum. Denies any abdominal pain. Reports 10/10 pain in bilateral hip. Ambulating with cane. He is clinically stable.  All also points of review of system have been reviewed and were negative.  ECOG performance status 1.  Independent with ADLs and IADLs.    DX: Pancreatic adenocarcinoma     STAGE: Stage IV     Treatment intent: Palliative     ONCOLOGY HISTORY: BRCA1/2 negative-Pan-NTRK negative-No available activating mutation by Varghese  11/03/2019: Went to ER due to 5 days complaints of epigastric pain.  CT abdomen and pelvis showed:  1. 5 cm mass within the pancreatic body highly suggestive of primary malignancy. Superimposed acute pancreatitis cannot entirely be excluded.  2. Multiple liver lesions highly suggestive of metastatic disease 3. Metastatic retroperitoneal and mesenteric lymphadenopathy. 4. Indeterminate hypodensity within the spleen. Diagnostic consideration include splenic infarct or metastatic lesion 5. Right lower lobe pulmonary emboli.       *MRI of the abdomen showed  1. Stable since same day CT abdomen pelvis. Elliptical 4 cm hypoenhancing mass,  body of pancreas, with upstream glandular atrophy and pancreatic ductal  dilatation, most likely adenocarcinoma. Associated local/regional likely metastatic lymphadenopathy including upper retroperitoneum, lesser omentum, portacaval/periportal space. Associated encasement/narrowing of the adjacent  splenic vein. 2. Mild peripancreatic edema and soft tissue reticulation; may reflect secondary low-grade or chronic pancreatitis or adjacent peripancreatic tumor infiltration.   3. Numerous hypoenhancing and target-like small nodules and masses throughout  the liver, typical of metastatic disease. 4. Mild pericholecystic fluid, nonspecific, but may represent low-grade cholecystitis.   No evident cholecystolithiasis/choledocholithiasis or biliary ductal dilatation. 5. Mild splenomegaly with focal small splenic infarction, age indeterminate perhaps recent. Small left lower pole, nonacute renal infarction. 6. Other minor and/or ancillary findings including lumbar disc herniations     11/15/2019: Duplex left  lower extremity showed Acute nonocclusive thrombus in the distal femoral vein, popliteal, peroneal and both posterior tibial veins. The thrombus in the distal femoral vein appears to be floating tail-like thrombus.     11/19/2019: First medical oncology visit with me     11/29/2019: PET/CT showed  Malignant metabolic activity corresponding with the known malignancy in the pancreatic body. Innumerable hypermetabolic liver metastases. Metastatic portal caval and aortocaval lymph nodes.  Enlargement and malignant metabolic activity in the anterior aspect of the left  psoas muscle suspicious for metastatic disease. Distant metastatic disease to include the left supraclavicular region, left  superior mediastinum and possibly the left inferior hilum. 12/16/19: [id number]/30/19:C2D1-1/13/20:C3D1- 1/14/2020: CA-19-9 = 79 1/27/20:C4D1-2/10/20:C5 held due to thrombocytopenia. Started Avatrombopag (waiting for approval)-2/17/2020: C5D1(delayed)-CA-19-9 = 87 3/09/20: C6 D1  2/12/20:CT CAP showed  1.  Redemonstration of multiple stigmata related to the patient's pancreatic  malignancy and accompanying ernesto/hepatic metastasis.   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     > Some interval improvement in the appearance of upper abdominal  retroperitoneal adenopathy with persistently abnormal/necrotic periportal and  peripancreatic lymph nodes.     2. No evidence of bowel obstruction. No free intraperitoneal gas.     3. Mild nonspecific urothelial enhancement. Correlation with urinalysis may be  helpful if symptoms of urinary tract infection are present.     4/24/20: Restaging CT CAP showed   1.  No CT findings of an acute intra-abdominal or intrapelvic obstructive or  inflammatory process. 2. Decreased size of the ill-defined infiltrative mass at the junction of the pancreatic body and tail measuring approximately 3.4 x 2.3 cm  (axial 48), previously 6.5 x 2.8 cm. Similar peripancreatic stranding with  downstream duct dilatation and atrophy of the tail. 3. Innumerable hepatic metastatic lesions, stable to mildly decreased size to the preceding CT scan of the abdomen/pelvis from 2/12/2020.  5/18/20: C10 D1 held due to thrombocytopenia  5/21/20: CT AP showed   1. Multiple hepatic metastases, all of which appears slightly smaller in size  compared to most recent CT of 4/24/2020. Peripancreatic lymphadenopathy also  appears slightly decreased in the interval.  2. Primary pancreatic mass appears essentially unchanged.  Indeterminant to what  extent peripancreatic inflammatory stranding represents a superimposed acute  pancreatitis versus inflammatory stigmata related to pancreatic cancer. 2020: C10 D1 delayed by 1 week  20: Celiac block  20: Completed 12 cycles of FOLFIRINOX  20: XR hip showed  1. Erosive lesion in the anterior right superior iliac crest suggestive of  metastatic disease. 2. Bilateral hip osteoarthritis, mild on the left and moderate on the right. No  evidence for fracture  20: CT CAP showed  1. Pancreatic mass with vascular encasement appears increased size since prior Study. 2.  Increased inflammatory stranding around the distal pancreatic parenchyma may be reactive versus superimposed pancreatitis. 3.  Worsening metastatic disease evidenced by new and enlarging liver  metastases, enlarging lymphadenopathy, enlarging left psoas muscle mass, and new erosive osseous metastasis in the right iliac.        Past Medical History:   Diagnosis Date    Cancer [facility])     Pancreatic Adenocarcinoma. Diagnosed 2019.  Diabetes ([facility] 10/2019    Diagnosed 10/2019. Patient denies     Hepatitis C     Received Curative Treatment, but Hepatitis C was not cured and returned.  Left ulnar fracture     Thromboembolus ([address]     DVT of LLE 2019. Also, Pulmonary Embolism 2019. Treated with Rivaroxaban.      Past Surgical History:   Procedure Laterality Date    HX TONSILLECTOMY      IR BX PANCREAS NEEDLE PERC  2019    Endoscopic Pancreatic Biopsy (?)    IR INSERT TUNL CVC W PORT OVER 5 YEARS  2019     Social History     Socioeconomic History    Marital status:      Spouse name: Not on file    Number of children: Not on file    Years of education: Not on file    Highest education level: Not on file   Tobacco Use    Smoking status: Former Smoker     Packs/day: 0.50     Years: 40.00     Pack years: 20.00     Last attempt to quit: 2018     Years since quittin.8    Smokeless tobacco: Never Used    Tobacco comment: Quit 2018 Substance and Sexual Activity    Alcohol use: Not Currently    Drug use: Not Currently     Comment: Quit Cocaine, Heroin, and Marijuana since 11/2018    Sexual activity: Not Currently   Other Topics Concern     Family History   Problem Relation Age of Onset    Diabetes Mother     Kidney Disease Mother     Diabetes Father     Diabetes Brother     Cancer Maternal Grandmother     Cancer Maternal Grandfather     Cancer Maternal Aunt     Cancer Maternal Uncle        Current Outpatient Medications   Medication Sig Dispense Refill    rivaroxaban (Xarelto) 20 mg tab tablet Take 1 Tab by mouth daily (with breakfast). 30 Tab 3    fentaNYL (DURAGESIC) 50 mcg/hr PATCH 1 Patch by TransDERmal route every seventy-two (72) hours for 30 days. Max Daily Amount: 1 Patch. 10 Patch 0    HYDROmorphone (DILAUDID) 2 mg tablet Take 1 Tab by mouth every three (3) hours as needed for Pain for up to 30 days. Max Daily Amount: 16 mg. 90 Tab 0    Doptelet, 30 tab pack, 20 mg tab TAKE 2 TABLETS BY MOUTH DAILY FOR 15 DAYS 30 Tab 2    Omeprazole delayed release (PRILOSEC D/R) 20 mg tablet Take 1 Tab by mouth daily. 30 Tab 3    lidocaine-prilocaine (EMLA) topical cream APPLY TO AFFECTED AREA AS NEEDED FOR PAIN  (APPLY TO SKIN 30 - 60 MINUTES BEFORE MEDIPORT ACCESS) 30 g 0    dronabinoL (MARINOL) 5 mg capsule Take 1 Cap by mouth two (2) times a day. Max Daily Amount: 10 mg. 60 Cap 3    True Metrix Glucose Test Strip strip       L-Mfolate-B6 Phos-Methyl-B12 (METANX) 3-35-2 mg tab tab Take 1 Tab by mouth two (2) times a day. Indications: peripheral neuropathy 60 Tab 1    lactulose (CHRONULAC) 10 gram/15 mL solution Take 15 mL by mouth three (3) times daily. 480 mL 3    naloxone (NARCAN) 4 mg/actuation nasal spray Use 1 spray intranasally, then discard. Repeat with new spray every 2 min as needed for opioid overdose symptoms, alternating nostrils.   Indications: opioid overdose 1 Each 1    insulin glargine (LANTUS U-100 INSULIN) 100 unit/mL injection 28 Units by SubCUTAneous route nightly. 1 Vial 0    insulin lispro (HUMALOG) 100 unit/mL injection 3-15 Units by SubCUTAneous route Before breakfast, lunch, and dinner. 1 Vial 0    rivaroxaban (XARELTO) 15 mg (42)- 20 mg (9) DsPk Take 20 mg by mouth daily. Take one 15 mg tablet twice a day with food for the first 21 days. Then, take one 20 mg tablet once a day with food for 9 days.  naloxone (NARCAN) 4 mg/actuation nasal spray Use 1 spray intranasally, then discard. Repeat with new spray every 2 min as needed for opioid overdose symptoms, alternating nostrils. 1 Each 1    dexAMETHasone (DECADRON) 4 mg tablet Take 4mg by mouth twice a day the day before chemotherapy and the day after chemotherapy 30 Tab 0    nut.tx.gluc.intol,lac-free,soy (GLUCERNA ADVANCE) liqd Take 237 mL by mouth three (3) times daily. 90 Bottle 5    TRUE METRIX GLUCOSE METER misc USE AS DIRECTED  0    omeprazole (PRILOSEC) 20 mg capsule TAKE 1 CAPSULE BY MOUTH ONCE DAILY  3    QUEtiapine (SEROQUEL) 50 mg tablet Take 50 mg by mouth nightly.  lidocaine (LIDODERM) 5 % Apply patch to the affected area for 12 hours a day and remove for 12 hours a day.  5 Each 0       No Known Allergies    Review of Systems  As per HPI      Objective:  Visit Vitals  /83   Pulse 69   Temp 98.7 °F (37.1 °C) (Oral)   Resp 18   Wt 89 kg (196 lb 3.2 oz)   SpO2 96%   BMI 26.61 kg/m²         Physical Exam:   General appearance - alert, well appearing, and in no distress  Mental status - alert, oriented to person, place, and time  EYE-ONOFRE, EOMI  ENT-ENT exam normal, no neck nodes or sinus tenderness  Mouth - mucous membranes moist, pharynx normal without lesions  Neck - supple, no significant adenopathy   Chest - clear to auscultation, no wheezes, rales or rhonchi, symmetric air entry   Heart - normal rate and regular rhythm   Abdomen - soft, nontender, nondistended, no masses or organomegaly  Lymph- no adenopathy palpable  Ext-no pedal edema noted  Skin-Warm and dry. Neuro -alert, oriented, normal speech, no focal findings or movement disorder noted    Diagnostic Imaging     No results found for this or any previous visit. Results for orders placed during the hospital encounter of 08/30/20   XR HIPS BI W OR WO AP PELV    Narrative Bilateral hip exam    Indication: Pain. History of metastatic pancreatic cancer. Findings:    AP and lateral views of the right and left hip are submitted for evaluation. There is permeative, destructive lesion in the anterior superior right iliac  crest. There is no evidence of fracture or dislocation. There is bilateral hip  osteoarthritis with narrowing of the joint space, right more than left. Degenerative sclerosis and femoral acetabular osteophyte, right more the left. Osseous mineralization is normal radiographically. The femoral head are normal  in contour. Impression Impression:    1. Erosive lesion in the anterior right superior iliac crest suggestive of  metastatic disease. 2. Bilateral hip osteoarthritis, mild on the left and moderate on the right. No  evidence for fracture. Results for orders placed during the hospital encounter of 08/28/20   CT CHEST ABD PELV W CONT    Narrative EXAM: CT CHEST, ABDOMEN AND PELVIS     CLINICAL INDICATION/HISTORY: Pancreatic cancer with liver metastases, follow-up. COMPARISON: CT 5/21/2020. PET/CT 11/29/2019    TECHNIQUE: Axial CT imaging of the chest, abdomen, and pelvis was performed with  intravenous contrast. Multiplanar reformats were generated. One or more dose  reduction techniques were used on this CT: automated exposure control,  adjustment of the mAs and/or kVp according to patient size, and iterative  reconstruction techniques. The specific techniques used on this CT exam have  been documented in the patient's electronic medical record.  Digital Imaging and  Communications in Medicine (DICOM) format image data are available to  nonaffiliated external healthcare facilities or entities on a secure, media  free, reciprocally searchable basis with patient authorization for at least a  12-month period after this study. ________________    FINDINGS:    LINES/DEVICES:     >  Tunneled right internal jugular venous catheter tip terminates at the  cavoatrial junction. CHEST:    LUNGS: Mild predominantly upper lobe paraseptal emphysematous changes. Mild  dependent atelectasis. Reticular opacities along the ventral left upper lobe,  likely scarring. There are faint patchy ground glass opacities in the lingula. No suspicious mass. PLEURA: Normal.    AIRWAY: Normal.    MEDIASTINUM: Normal heart size. No pericardial effusion. Great vessels are  unremarkable. LYMPH NODES: Prominent right hilar and mediastinal lymph nodes, for reference:     > Precarinal 1.2 cm lymph node (axial image 44). > Right hilar 1 cm lymph node (axial image 51). CHEST WALL: Small amount of breast tissue present bilaterally. _______________    ABDOMEN/PELVIS:    LIVER: Numerous enhancing masses are seen throughout the liver, for reference:     > Left liver mass measures 1.9 x 1.9 cm (axial image 100), previously 1.2 x  0.9 cm.     > Inferior right liver mass measures 4.4 x 2.6 cm (axial image 110),  previously 3.3 x 1.8 cm. BILIARY: Small amount of pericholecystic fluid present. Gallbladder  unremarkable. No biliary dilation. SPLEEN: Enlarged measuring 15.8 cm craniocaudal. Heterogeneous enhancement. PANCREAS: Ill-defined pancreatic body mass measures 3.6 x 1.6 cm (axial image  117), previously 3.2 x 1.6 cm. This mass abuts the celiac artery, splenic  artery, and common hepatic artery. No definite involvement of the superior  mesenteric vein, portal vein, or SMA. Hazy ill-defined stranding/soft tissue is  seen around and extending to the SMA (axial image 120-124).  There is ductal  dilation distal to this mass measuring up to 1.0 cm with mild inflammatory  stranding along the distal pancreatic body and tail. No organized fluid  collection. ADRENALS: Normal.    KIDNEYS: Subcentimeter renal lesions are too small to accurately characterize by  CT but similar in appearance to prior study. Cortical scarring is seen in both  kidneys, otherwise symmetric renal enhancement. No perinephric stranding,  radiopaque stone or hydronephrosis. GI TRACT:  Mild wall thickening throughout the esophagus, which may due to  underdistention versus reflux esophagitis. Normal caliber small and large bowel  loops. No morphology of bowel obstruction. No bowel wall thickening. Normal  appendix. BLADDER: Normal.     PELVIC ORGANS: No acute abnormality. VASCULATURE: No arterial aneurysm. Chronic splenic vein thrombosis with varices  present. LYMPH NODES: A large centrally necrotic portacaval lymph nodes, for reference:     > Portal caval 1.3 cm lymph node (axial image 119), previously 1.1 cm.    OTHER: No free intraperitoneal air. No ascites. Asymmetrically enlarged left  psoas muscle with rim enhancing mass measuring 2.1 x 1.6 cm (axial image 142)    OSSEOUS STRUCTURES: New erosive lesion in the right iliac crest (axial image  187), likely metastatic. Diffuse degenerative changes notably in the lumbar  spine and both hips.    _______________      Impression IMPRESSION:    1. Pancreatic mass with vascular encasement appears increased size since prior  study. 2.  Increased inflammatory stranding around the distal pancreatic parenchyma may  be reactive versus superimposed pancreatitis. 3.  Worsening metastatic disease evidenced by new and enlarging liver  metastases, enlarging lymphadenopathy, enlarging left psoas muscle mass, and new  erosive osseous metastasis in the right iliac.        Lab Results  Lab Results   Component Value Date/Time    WBC 3.4 (L) 07/07/2020 09:14 AM    HGB 10.8 (L) 07/07/2020 09:14 AM    HCT 30.8 (L) 07/07/2020 09:14 AM    PLATELET 65 (L) 07/07/2020 09:14 AM    MCV 96.0 07/07/2020 09:14 AM       Lab Results   Component Value Date/Time    Sodium 137 07/06/2020 08:30 AM    Potassium 3.2 (L) 07/06/2020 08:30 AM    Chloride 104 07/06/2020 08:30 AM    CO2 29 07/06/2020 08:30 AM    Anion gap 4 07/06/2020 08:30 AM    Glucose 113 (H) 07/06/2020 08:30 AM    BUN 10 07/06/2020 08:30 AM    Creatinine 0.96 07/06/2020 08:30 AM    BUN/Creatinine ratio 10 (L) 07/06/2020 08:30 AM    GFR est AA >60 07/06/2020 08:30 AM    GFR est non-AA >60 07/06/2020 08:30 AM    Calcium 9.0 07/06/2020 08:30 AM    Alk. phosphatase 106 07/06/2020 08:30 AM    Protein, total 8.0 07/06/2020 08:30 AM    Albumin 3.2 (L) 07/06/2020 08:30 AM    Globulin 4.8 (H) 07/06/2020 08:30 AM    A-G Ratio 0.7 (L) 07/06/2020 08:30 AM    ALT (SGPT) 16 07/06/2020 08:30 AM     F/U with PCP for health maintenance  Assessment/Plan:    ICD-10-CM ICD-9-CM    1. Malignant neoplasm of pancreas, unspecified location of malignancy (HCC)  C25.9 157.9 CBC WITH AUTOMATED DIFF      METABOLIC PANEL, COMPREHENSIVE      CANCER AG 19-9     Orders Placed This Encounter    CBC WITH AUTOMATED DIFF     Standing Status:   Future     Standing Expiration Date:   4/28/2165    METABOLIC PANEL, COMPREHENSIVE     Standing Status:   Future     Standing Expiration Date:   9/17/2021   [address] [place] [address] CANCER AG 19-9     Standing Status:   Future     Standing Expiration Date:   9/17/2021   1. Pancreatic adenocarcinoma ([address]  I had a long discussion with Mr. Fransisca Oconnor regarding his newly diagnosed metastatic pancreatic cancer.  I told him that in the first-line setting, palliative chemotherapy have been shown to improve 1 year survival by 73% compared to supportive care alone. Patient tolerated well C11, clinically much better. Gaining weight. *UG T1 A1  heterozygous  *Brain MRI patient could not do MRI due to claustrophobia. *On 12/18/2019, CA-19-9 was 85 from 112 on 11/19/2019. *On 4/13/20, =71 (0-35).   *On 5/18/20, =17  * On 7/20/20, CA 19-9= 15  Reviewed labs done on 5/23/20     Day 1: Oxaliplatin 85mg/m2 IV + irinotecan 180mg/m2 IV + leucovorin 400mg/m2 IV, followed by a 5-FU bolus of 400mg/m2 and a 46-hour continuous 5-FU infusion of 2,400mg/m2. Repeat cycle every 2 weeks until disease progression.     Reviewed CT CAP done on 5/21/20 which showed improved or stabledisease. Patient tolerated well cycle 12 FOLFIRINOX on 7/7/20. Clinically doing very well. *Going to CA from 7/6-7/17/20. * normalized  *Recheck CBC  and CA-19-9. *Check monthly CA-19-9  *F/u palliative care team-Re-contact palliative for pain management   *Celiac block done on 6/17/20-No more abdominal pain. *Reviewed  restaging CT CAP which showed worsening disease  *Check labs today  *Resume FOLFIRINOX for 12 more cycles.      2. Liver metastases (HCC)  Reviewed CT CAP     3. Epigastric pain/Bilateral hip pain (Right>>>Left)  Epigastric pain resolved after celiac block but now bilateral hip pain. Increased dilaudid to 4mg Q 3hours  He is on 100 mcg fentanyl patch  Refer to  to radiate painful right hip  Refer to pain management. Has h/o opiates abuse.     4. Pulmonary embolism without acute cor pulmonale, unspecified chronicity, unspecified pulmonary embolism type (HCC)  Continue Xarelto      6. Insulin dependent diabetes:Blood sugars much better controlled now.      7. Constipation: Resolved. Has lactulose if needed     8. Chemotherapy induced: Continue  Zydis as needed     RTC 4 weeks  I spent at least 15 minutes on this visit with this established patient.    have labs drawn prior to ROV, call if any problems  There are no Patient Instructions on file for this visit.        Gutierrez Suazo MD

## 2020-09-16 NOTE — TELEPHONE ENCOUNTER
Melissa with [facility] and Palliative is requesting a call back to clarify if patient is needing home health services or palliative care, please f/u

## 2020-09-16 NOTE — PROGRESS NOTES
Hudson Arana is a 60 y.o. male presenting today for a follow-up appointment. Patient is ambulatory with a cane, is accompanied by his sister in law Radha, and reports 10/10 pain in bilateral hips and low back. Provider was advised. Chief Complaint   Patient presents with    Pancreatic Cancer       Visit Vitals  /83   Pulse 69   Temp 98.7 °F (37.1 °C) (Oral)   Resp 18   Wt 196 lb 3.2 oz (89 kg)   SpO2 96%   BMI 26.61 kg/m²       Current Outpatient Medications   Medication Sig    rivaroxaban (Xarelto) 20 mg tab tablet Take 1 Tab by mouth daily (with breakfast).  fentaNYL (DURAGESIC) 50 mcg/hr PATCH 1 Patch by TransDERmal route every seventy-two (72) hours for 30 days. Max Daily Amount: 1 Patch.  HYDROmorphone (DILAUDID) 2 mg tablet Take 1 Tab by mouth every three (3) hours as needed for Pain for up to 30 days. Max Daily Amount: 16 mg.    Doptelet, 30 tab pack, 20 mg tab TAKE 2 TABLETS BY MOUTH DAILY FOR 15 DAYS    Omeprazole delayed release (PRILOSEC D/R) 20 mg tablet Take 1 Tab by mouth daily.  lidocaine-prilocaine (EMLA) topical cream APPLY TO AFFECTED AREA AS NEEDED FOR PAIN  (APPLY TO SKIN 30 - 60 MINUTES BEFORE MEDIPORT ACCESS)    dronabinoL (MARINOL) 5 mg capsule Take 1 Cap by mouth two (2) times a day. Max Daily Amount: 10 mg.    True Metrix Glucose Test Strip strip     L-Mfolate-B6 Phos-Methyl-B12 (METANX) 3-35-2 mg tab tab Take 1 Tab by mouth two (2) times a day. Indications: peripheral neuropathy    lactulose (CHRONULAC) 10 gram/15 mL solution Take 15 mL by mouth three (3) times daily.  naloxone (NARCAN) 4 mg/actuation nasal spray Use 1 spray intranasally, then discard. Repeat with new spray every 2 min as needed for opioid overdose symptoms, alternating nostrils. Indications: opioid overdose    insulin glargine (LANTUS U-100 INSULIN) 100 unit/mL injection 28 Units by SubCUTAneous route nightly.     insulin lispro (HUMALOG) 100 unit/mL injection 3-15 Units by SubCUTAneous route Before breakfast, lunch, and dinner.  rivaroxaban (XARELTO) 15 mg (42)- 20 mg (9) DsPk Take 20 mg by mouth daily. Take one 15 mg tablet twice a day with food for the first 21 days. Then, take one 20 mg tablet once a day with food for 9 days.  naloxone (NARCAN) 4 mg/actuation nasal spray Use 1 spray intranasally, then discard. Repeat with new spray every 2 min as needed for opioid overdose symptoms, alternating nostrils.  dexAMETHasone (DECADRON) 4 mg tablet Take 4mg by mouth twice a day the day before chemotherapy and the day after chemotherapy    nut.tx.gluc.intol,lac-free,soy (GLUCERNA ADVANCE) liqd Take 237 mL by mouth three (3) times daily.  TRUE METRIX GLUCOSE METER misc USE AS DIRECTED    omeprazole (PRILOSEC) 20 mg capsule TAKE 1 CAPSULE BY MOUTH ONCE DAILY    QUEtiapine (SEROQUEL) 50 mg tablet Take 50 mg by mouth nightly.  lidocaine (LIDODERM) 5 % Apply patch to the affected area for 12 hours a day and remove for 12 hours a day. No current facility-administered medications for this visit. Fall Risk Assessment, last 12 mths 2/17/2020   Able to walk? Yes   Fall in past 12 months? No       3 most recent PHQ Screens 6/25/2020   Little interest or pleasure in doing things Not at all   Feeling down, depressed, irritable, or hopeless Not at all   Total Score PHQ 2 0       Abuse Screening Questionnaire 2/17/2020   Do you ever feel afraid of your partner? N   Are you in a relationship with someone who physically or mentally threatens you? N   Is it safe for you to go home? Y       1. Have you been to the ER, urgent care clinic since your last visit? Hospitalized since your last visit? Yes [facility] ED on 8/30/20 d/t hip pain    2. Have you seen or consulted any other health care providers outside of the [address] [place] [address] since your last visit? Include any pap smears or colon screening.  No

## 2020-09-17 LAB — CANCER AG19-9 SERPL-ACNC: 12 U/ML (ref 0–35)

## 2020-09-17 NOTE — NURSE NAVIGATOR
Saw pt in clinic with Dr. Liya Feliciano for f/u c/o bilateral hip pain. On pain regiment, pain meds adjusted. CT Scan reviewed, referred to radiation ASAP, pt to resume chemo therapy after radiation treatment. F/U on referral to pain management and palliative care. Labs today, RTC in 2 weeks. All questions answered.

## 2020-09-17 NOTE — TELEPHONE ENCOUNTER
Left vm requesting return call, number provided did not state Jaycee Burt on the voicemail so no patient information was provided.

## 2020-09-18 ENCOUNTER — TELEPHONE (OUTPATIENT)
Dept: ONCOLOGY | Age: 55
End: 2020-09-18

## 2020-09-18 NOTE — TELEPHONE ENCOUNTER
Patient is unable to get his medication due to him getting some last week 9/11/2020 per pharmacist, his sister is asking if he could get anything else to help with the pain.

## 2020-09-24 ENCOUNTER — HOSPITAL ENCOUNTER (OUTPATIENT)
Dept: RADIATION THERAPY | Age: 55
Discharge: HOME | End: 2020-09-24
Payer: MEDICAID

## 2020-09-24 PROCEDURE — 99211 OFF/OP EST MAY X REQ PHY/QHP: CPT

## 2020-09-24 PROCEDURE — 77470 SPECIAL RADIATION TREATMENT: CPT

## 2020-09-25 ENCOUNTER — HOSPITAL ENCOUNTER (OUTPATIENT)
Dept: RADIATION THERAPY | Age: 55
Discharge: HOME | End: 2020-09-25
Payer: MEDICAID

## 2020-09-25 ENCOUNTER — HOSPITAL ENCOUNTER (OUTPATIENT)
Dept: GENERAL RADIOLOGY | Age: 55
Discharge: HOME | End: 2020-09-25
Attending: ORTHOPAEDIC SURGERY
Payer: MEDICAID

## 2020-09-25 DIAGNOSIS — M16.0: ICD-10-CM

## 2020-09-25 PROCEDURE — 74011250636 HC RX REV CODE- 250/636: Performed by: ORTHOPAEDIC SURGERY

## 2020-09-25 PROCEDURE — 74011000636 HC RX REV CODE- 636: Performed by: ORTHOPAEDIC SURGERY

## 2020-09-25 PROCEDURE — 20610 DRAIN/INJ JOINT/BURSA W/O US: CPT

## 2020-09-25 PROCEDURE — 77300 RADIATION THERAPY DOSE PLAN: CPT

## 2020-09-25 PROCEDURE — 77334 RADIATION TREATMENT AID(S): CPT

## 2020-09-25 PROCEDURE — 74011000250 HC RX REV CODE- 250: Performed by: ORTHOPAEDIC SURGERY

## 2020-09-25 PROCEDURE — 77295 3-D RADIOTHERAPY PLAN: CPT

## 2020-09-25 PROCEDURE — 77002 NEEDLE LOCALIZATION BY XRAY: CPT

## 2020-09-25 RX ORDER — LIDOCAINE HYDROCHLORIDE 10 MG/ML
10 INJECTION, SOLUTION EPIDURAL; INFILTRATION; INTRACAUDAL; PERINEURAL
Status: COMPLETED | OUTPATIENT
Start: 2020-09-25 | End: 2020-09-25

## 2020-09-25 RX ORDER — TRIAMCINOLONE ACETONIDE 40 MG/ML
40 INJECTION, SUSPENSION INTRA-ARTICULAR; INTRAMUSCULAR
Status: COMPLETED | OUTPATIENT
Start: 2020-09-25 | End: 2020-09-25

## 2020-09-25 RX ORDER — TRIAMCINOLONE ACETONIDE 40 MG/ML
40 INJECTION, SUSPENSION INTRA-ARTICULAR; INTRAMUSCULAR
Status: DISCONTINUED | OUTPATIENT
Start: 2020-09-25 | End: 2020-09-25

## 2020-09-25 RX ORDER — LIDOCAINE HYDROCHLORIDE 10 MG/ML
5 INJECTION, SOLUTION EPIDURAL; INFILTRATION; INTRACAUDAL; PERINEURAL
Status: DISCONTINUED | OUTPATIENT
Start: 2020-09-25 | End: 2020-09-25

## 2020-09-25 RX ORDER — SODIUM CHLORIDE 9 MG/ML
3 INJECTION INTRAMUSCULAR; INTRAVENOUS; SUBCUTANEOUS
Status: DISCONTINUED | OUTPATIENT
Start: 2020-09-25 | End: 2020-09-25

## 2020-09-25 RX ORDER — BUPIVACAINE HYDROCHLORIDE 2.5 MG/ML
2.5 INJECTION, SOLUTION EPIDURAL; INFILTRATION; INTRACAUDAL
Status: COMPLETED | OUTPATIENT
Start: 2020-09-25 | End: 2020-09-25

## 2020-09-25 RX ADMIN — TRIAMCINOLONE ACETONIDE 160 MG: 40 INJECTION, SUSPENSION INTRA-ARTICULAR; INTRAMUSCULAR at 11:55

## 2020-09-25 RX ADMIN — IOHEXOL 2 ML: 240 INJECTION, SOLUTION INTRATHECAL; INTRAVASCULAR; INTRAVENOUS; ORAL at 11:53

## 2020-09-25 RX ADMIN — LIDOCAINE HYDROCHLORIDE 10 ML: 10 INJECTION, SOLUTION EPIDURAL; INFILTRATION; INTRACAUDAL; PERINEURAL at 11:54

## 2020-09-25 RX ADMIN — BUPIVACAINE HYDROCHLORIDE 6 MG: 2.5 INJECTION, SOLUTION EPIDURAL; INFILTRATION; INTRACAUDAL at 11:54

## 2020-09-28 ENCOUNTER — DOCUMENTATION ONLY (OUTPATIENT)
Age: 55
End: 2020-09-28

## 2020-09-28 ENCOUNTER — TELEPHONE (OUTPATIENT)
Age: 55
End: 2020-09-28

## 2020-09-28 ENCOUNTER — HOSPITAL ENCOUNTER (OUTPATIENT)
Dept: RADIATION THERAPY | Age: 55
Discharge: HOME | End: 2020-09-28
Payer: MEDICAID

## 2020-09-28 PROCEDURE — 77412 RADIATION TX DELIVERY LVL 3: CPT

## 2020-09-28 PROCEDURE — 77280 THER RAD SIMULAJ FIELD SMPL: CPT

## 2020-09-28 NOTE — PROGRESS NOTES
Received call from patient's caregiver Radha, (sister in law); stated patient's medication is not working; patient in constant pain and agony, not able to sleep. Patient has an appointment on Wednesday with Dr. Gary Hidalgo and would like to be referred to Hospice.

## 2020-09-28 NOTE — TELEPHONE ENCOUNTER
I attempted to call Ms Jadyn Gilbert today at 44, she did not answer, LVM to call back [phone].
Received call from patient's caregiver Radha, (sister in law); stated patient's medication is not working; patient in constant pain and agony, not able to sleep, requesting call back and would like to have patient admitted to hospice.
Head is atraumatic. Head shape is symmetrical.

## 2020-09-29 ENCOUNTER — HOSPITAL ENCOUNTER (OUTPATIENT)
Dept: RADIATION THERAPY | Age: 55
Discharge: HOME | End: 2020-09-29
Payer: MEDICAID

## 2020-09-29 PROCEDURE — 77412 RADIATION TX DELIVERY LVL 3: CPT

## 2020-09-30 ENCOUNTER — OFFICE VISIT (OUTPATIENT)
Age: 55
End: 2020-09-30
Payer: MEDICAID

## 2020-09-30 ENCOUNTER — TELEPHONE (OUTPATIENT)
Age: 55
End: 2020-09-30

## 2020-09-30 ENCOUNTER — HOSPITAL ENCOUNTER (OUTPATIENT)
Dept: RADIATION THERAPY | Age: 55
Discharge: HOME | End: 2020-09-30
Payer: MEDICAID

## 2020-09-30 ENCOUNTER — NURSE NAVIGATOR (OUTPATIENT)
Dept: OTHER | Age: 55
End: 2020-09-30

## 2020-09-30 VITALS
OXYGEN SATURATION: 97 % | DIASTOLIC BLOOD PRESSURE: 80 MMHG | RESPIRATION RATE: 18 BRPM | BODY MASS INDEX: 25.44 KG/M2 | SYSTOLIC BLOOD PRESSURE: 118 MMHG | HEART RATE: 85 BPM | WEIGHT: 187.6 LBS | TEMPERATURE: 98.3 F

## 2020-09-30 DIAGNOSIS — G89.3 CANCER ASSOCIATED PAIN: ICD-10-CM

## 2020-09-30 DIAGNOSIS — I26.99 PULMONARY EMBOLISM WITHOUT ACUTE COR PULMONALE, UNSPECIFIED CHRONICITY, UNSPECIFIED PULMONARY EMBOLISM TYPE (HCC): ICD-10-CM

## 2020-09-30 DIAGNOSIS — C25.9 MALIGNANT NEOPLASM OF PANCREAS, UNSPECIFIED LOCATION OF MALIGNANCY (HCC): ICD-10-CM

## 2020-09-30 PROCEDURE — 99214 OFFICE O/P EST MOD 30 MIN: CPT | Performed by: INTERNAL MEDICINE

## 2020-09-30 PROCEDURE — 77412 RADIATION TX DELIVERY LVL 3: CPT

## 2020-09-30 RX ORDER — FENTANYL 100 UG/H
1 PATCH TRANSDERMAL
Qty: 10 PATCH | Refills: 0 | Status: SHIPPED | OUTPATIENT
Start: 2020-09-30 | End: 2020-10-30

## 2020-09-30 RX ORDER — HYDROMORPHONE HYDROCHLORIDE 4 MG/1
4 TABLET ORAL
Qty: 90 TAB | Refills: 0 | Status: SHIPPED | OUTPATIENT
Start: 2020-09-30 | End: 2020-10-30

## 2020-09-30 NOTE — PROGRESS NOTES
[facility]  [id number] [place] [address], [address]  [facility]  Office Phone: [phone]  Fax: [phone]     HEMATOLOGY/ONCOLOGY PROGRESS NOTE        Reason for visit:  Pancreatic cancer  712  Subjective:     Tamiko Pineda is a 62 y.o. 1965 male with past medical history including pulmonary embolus, on Lovenox, type 2 diabetes, GERD, who I was asked to see in consultation at the request of Lindsey Berg for evaluation for newly diagnosed pancreatic adenocarcinoma.  Patient had fine-needle aspiration of pancreatic mass on 11/13/2019 and pathology showed pancreatic adenocarcinoma. I saw him in consultation for the first time on 11/19/2019) was to treat him with palliative FOLFIRINOX. He received C1D1 FOLFIRINOX on 12/16/2019 and tolerated well overall but cycle 5 was held on 2/10/2020 due to thrombocytopenia. Patient was started on avatrombopag but we still waiting for the insurance to have it shipped to his house. He completed C12 D1 on 7/7/20, here for follow-up.        Patient was seen and examined today. [facility] is awake alert oriented x3.    Denies any fevers, chills, or nausea and vomiting today.  Denies any melena or bright red blood per rectum. Denies any abdominal pain. Reports 10/10 pain in right hip. 6/10 epigastric pain. Ambulating with cane. He is clinically stable.  All also points of review of system have been reviewed and were negative.  ECOG performance status 1.  Independent with ADLs and IADLs.    DX: Pancreatic adenocarcinoma     STAGE: Stage IV     Treatment intent: Palliative     ONCOLOGY HISTORY: BRCA1/2 negative-Pan-NTRK negative-No available activating mutation by Varghese  11/03/2019: Went to ER due to 5 days complaints of epigastric pain.  CT abdomen and pelvis showed:  1. 5 cm mass within the pancreatic body highly suggestive of primary malignancy. Superimposed acute pancreatitis cannot entirely be excluded.  2. Multiple liver lesions highly suggestive of metastatic disease 3. Metastatic retroperitoneal and mesenteric lymphadenopathy. 4. Indeterminate hypodensity within the spleen. Diagnostic consideration include splenic infarct or metastatic lesion 5. Right lower lobe pulmonary emboli.       *MRI of the abdomen showed  1. Stable since same day CT abdomen pelvis. Elliptical 4 cm hypoenhancing mass,  body of pancreas, with upstream glandular atrophy and pancreatic ductal  dilatation, most likely adenocarcinoma. Associated local/regional likely metastatic lymphadenopathy including upper retroperitoneum, lesser omentum, portacaval/periportal space. Associated encasement/narrowing of the adjacent  splenic vein. 2. Mild peripancreatic edema and soft tissue reticulation; may reflect secondary low-grade or chronic pancreatitis or adjacent peripancreatic tumor infiltration.   3. Numerous hypoenhancing and target-like small nodules and masses throughout  the liver, typical of metastatic disease. 4. Mild pericholecystic fluid, nonspecific, but may represent low-grade cholecystitis.   No evident cholecystolithiasis/choledocholithiasis or biliary ductal dilatation. 5. Mild splenomegaly with focal small splenic infarction, age indeterminate perhaps recent. Small left lower pole, nonacute renal infarction. 6. Other minor and/or ancillary findings including lumbar disc herniations     11/15/2019: Duplex left  lower extremity showed Acute nonocclusive thrombus in the distal femoral vein, popliteal, peroneal and both posterior tibial veins. The thrombus in the distal femoral vein appears to be floating tail-like thrombus.     11/19/2019: First medical oncology visit with me     11/29/2019: PET/CT showed  Malignant metabolic activity corresponding with the known malignancy in the pancreatic body. Innumerable hypermetabolic liver metastases. Metastatic portal caval and aortocaval lymph nodes.  Enlargement and malignant metabolic activity in the anterior aspect of the left  psoas muscle suspicious for metastatic disease. Distant metastatic disease to include the left supraclavicular region, left  superior mediastinum and possibly the left inferior hilum. 12/16/19: [id number]/30/19:C2D1-1/13/20:C3D1- 1/14/2020: CA-19-9 = 79 1/27/20:C4D1-2/10/20:C5 held due to thrombocytopenia. Started Avatrombopag (waiting for approval)-2/17/2020: C5D1(delayed)-CA-19-9 = 87 3/09/20: C6 D1  2/12/20:CT CAP showed  1.  Redemonstration of multiple stigmata related to the patient's pancreatic  malignancy and accompanying ernesto/hepatic metastasis.   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     > Some interval improvement in the appearance of upper abdominal  retroperitoneal adenopathy with persistently abnormal/necrotic periportal and  peripancreatic lymph nodes.     2. No evidence of bowel obstruction. No free intraperitoneal gas.     3. Mild nonspecific urothelial enhancement. Correlation with urinalysis may be  helpful if symptoms of urinary tract infection are present.     4/24/20: Restaging CT CAP showed   1.  No CT findings of an acute intra-abdominal or intrapelvic obstructive or  inflammatory process. 2. Decreased size of the ill-defined infiltrative mass at the junction of the pancreatic body and tail measuring approximately 3.4 x 2.3 cm  (axial 48), previously 6.5 x 2.8 cm. Similar peripancreatic stranding with  downstream duct dilatation and atrophy of the tail. 3. Innumerable hepatic metastatic lesions, stable to mildly decreased size to the preceding CT scan of the abdomen/pelvis from 2/12/2020.  5/18/20: C10 D1 held due to thrombocytopenia  5/21/20: CT AP showed   1. Multiple hepatic metastases, all of which appears slightly smaller in size  compared to most recent CT of 4/24/2020. Peripancreatic lymphadenopathy also  appears slightly decreased in the interval.  2. Primary pancreatic mass appears essentially unchanged.  Indeterminant to what  extent peripancreatic inflammatory stranding represents a superimposed acute  pancreatitis versus inflammatory stigmata related to pancreatic cancer. 2020: C10 D1 delayed by 1 week  20: Celiac block  20: Completed 12 cycles of FOLFIRINOX  20: XR hip showed  1. Erosive lesion in the anterior right superior iliac crest suggestive of  metastatic disease. 2. Bilateral hip osteoarthritis, mild on the left and moderate on the right. No evidence for fracture    20: CT CAP showed  1. Pancreatic mass with vascular encasement appears increased size since prior Study. 2.  Increased inflammatory stranding around the distal pancreatic parenchyma may be reactive versus superimposed pancreatitis. 3.  Worsening metastatic disease evidenced by new and enlarging liver metastases, enlarging lymphadenopathy, enlarging left psoas muscle mass, and new erosive osseous metastasis in the right iliac.        Past Medical History:   Diagnosis Date    Cancer [facility])     Pancreatic Adenocarcinoma. Diagnosed 2019.  Diabetes ([facility] [address] 10/2019    Diagnosed 10/2019. Patient denies     Hepatitis C     Received Curative Treatment, but Hepatitis C was not cured and returned.  Left ulnar fracture     Thromboembolus ([facility] [address]     DVT of LLE 2019. Also, Pulmonary Embolism 2019. Treated with Rivaroxaban.      Past Surgical History:   Procedure Laterality Date    HX TONSILLECTOMY      IR BX PANCREAS NEEDLE PERC  2019    Endoscopic Pancreatic Biopsy (?)    IR INSERT TUNL CVC W PORT OVER 5 YEARS  2019     Social History     Socioeconomic History    Marital status:      Spouse name: Not on file    Number of children: Not on file    Years of education: Not on file    Highest education level: Not on file   Tobacco Use    Smoking status: Former Smoker     Packs/day: 0.50     Years: 40.00     Pack years: 20.00     Last attempt to quit: 2018     Years since quittin.9    Smokeless tobacco: Never Used    Tobacco comment: Quit 11/2018   Substance and Sexual Activity    Alcohol use: Not Currently    Drug use: Not Currently     Comment: Quit Cocaine, Heroin, and Marijuana since 11/2018    Sexual activity: Not Currently   Other Topics Concern     Family History   Problem Relation Age of Onset    Diabetes Mother     Kidney Disease Mother     Diabetes Father     Diabetes Brother     Cancer Maternal Grandmother     Cancer Maternal Grandfather     Cancer Maternal Aunt     Cancer Maternal Uncle        Current Outpatient Medications   Medication Sig Dispense Refill    HYDROmorphone (DILAUDID) 4 mg tablet Take 1 Tab by mouth every three (3) hours as needed for Pain for up to 30 days. Max Daily Amount: 32 mg. 90 Tab 0    rivaroxaban (Xarelto) 20 mg tab tablet Take 1 Tab by mouth daily (with breakfast). 30 Tab 3    fentaNYL (DURAGESIC) 50 mcg/hr PATCH 1 Patch by TransDERmal route every seventy-two (72) hours for 30 days. Max Daily Amount: 1 Patch. 10 Patch 0    Doptelet, 30 tab pack, 20 mg tab TAKE 2 TABLETS BY MOUTH DAILY FOR 15 DAYS 30 Tab 2    Omeprazole delayed release (PRILOSEC D/R) 20 mg tablet Take 1 Tab by mouth daily. 30 Tab 3    lidocaine-prilocaine (EMLA) topical cream APPLY TO AFFECTED AREA AS NEEDED FOR PAIN  (APPLY TO SKIN 30 - 60 MINUTES BEFORE MEDIPORT ACCESS) 30 g 0    dronabinoL (MARINOL) 5 mg capsule Take 1 Cap by mouth two (2) times a day. Max Daily Amount: 10 mg. 60 Cap 3    True Metrix Glucose Test Strip strip       L-Mfolate-B6 Phos-Methyl-B12 (METANX) 3-35-2 mg tab tab Take 1 Tab by mouth two (2) times a day. Indications: peripheral neuropathy 60 Tab 1    lactulose (CHRONULAC) 10 gram/15 mL solution Take 15 mL by mouth three (3) times daily. 480 mL 3    naloxone (NARCAN) 4 mg/actuation nasal spray Use 1 spray intranasally, then discard. Repeat with new spray every 2 min as needed for opioid overdose symptoms, alternating nostrils.   Indications: opioid overdose 1 Each 1    insulin glargine (LANTUS U-100 INSULIN) 100 unit/mL injection 28 Units by SubCUTAneous route nightly. 1 Vial 0    insulin lispro (HUMALOG) 100 unit/mL injection 3-15 Units by SubCUTAneous route Before breakfast, lunch, and dinner. 1 Vial 0    rivaroxaban (XARELTO) 15 mg (42)- 20 mg (9) DsPk Take 20 mg by mouth daily. Take one 15 mg tablet twice a day with food for the first 21 days. Then, take one 20 mg tablet once a day with food for 9 days.  naloxone (NARCAN) 4 mg/actuation nasal spray Use 1 spray intranasally, then discard. Repeat with new spray every 2 min as needed for opioid overdose symptoms, alternating nostrils. 1 Each 1    dexAMETHasone (DECADRON) 4 mg tablet Take 4mg by mouth twice a day the day before chemotherapy and the day after chemotherapy 30 Tab 0    nut.tx.gluc.intol,lac-free,soy (GLUCERNA ADVANCE) liqd Take 237 mL by mouth three (3) times daily. 90 Bottle 5    TRUE METRIX GLUCOSE METER misc USE AS DIRECTED  0    omeprazole (PRILOSEC) 20 mg capsule TAKE 1 CAPSULE BY MOUTH ONCE DAILY  3    QUEtiapine (SEROQUEL) 50 mg tablet Take 50 mg by mouth nightly.  lidocaine (LIDODERM) 5 % Apply patch to the affected area for 12 hours a day and remove for 12 hours a day.  5 Each 0       No Known Allergies    Review of Systems  As per HPI      Objective:  Visit Vitals  /80   Pulse 85   Temp 98.3 °F (36.8 °C) (Oral)   Resp 18   Wt 85.1 kg (187 lb 9.6 oz)   SpO2 97%   BMI 25.44 kg/m²         Physical Exam:   General appearance - alert, well appearing, and in no distress  Mental status - alert, oriented to person, place, and time  EYE-ONOFRE, EOMI  ENT-ENT exam normal, no neck nodes or sinus tenderness  Mouth - mucous membranes moist, pharynx normal without lesions  Neck - supple, no significant adenopathy   Chest - clear to auscultation, no wheezes, rales or rhonchi, symmetric air entry   Heart - normal rate and regular rhythm   Abdomen - soft, nontender, nondistended, no masses or organomegaly  Lymph- no adenopathy palpable  Ext-no pedal edema noted  Skin-Warm and dry. Neuro -alert, oriented, normal speech, no focal findings or movement disorder noted    Diagnostic Imaging     No results found for this or any previous visit. Results for orders placed during the hospital encounter of 09/25/20   XR FLUORO GUIDE ASP/BX/INJ/LOC    Narrative PROCEDURE: Bilateral hip injection with fluoroscopic guidance    CLINICAL HISTORY: History of hip pain    COMPARISON: None    GUIDANCE: Fluoroscopic guidance was used to position (and confirm the position  of) the needle  Image(s) saved in PACS: Fluoroscopic    TECHNIQUE:    Informed consent was performed. Procedural timeout was performed. Sterile prep and drape.  view of the bilateral hips. Using fluoroscopic guidance a 22-gauge needle was guided into the left hip joint  using a AP approach. Gentle contrast injection confirmed intracapsular  positioning. Combination of 3 cc lidocaine, 3 cc. Bupivacaine, 80 mg Kenalog,  were injected easily without resistance. Using fluoroscopic guidance a 22-gauge needle was guided into the right hip  joint using a AP approach. Gentle contrast injection confirmed intracapsular  positioning. Combination of 3 cc lidocaine, 3 bupivacaine, 80 mg Kenalog, were  injected easily without resistance. Patient tolerated the procedure well. Needles were withdrawn and a sterile dressing applied. FINDINGS:  Contrast injection showed intracapsular positioning bilaterally. Fluoroscopic time: 0.8 minutes  Fluoroscopic dose (reference air kerma): 16.58 mGy      Impression IMPRESSION:    1. Successful fluoroscopic guided bilateral hip steroid/anesthetic injection. Results for orders placed during the hospital encounter of 08/28/20   CT CHEST ABD PELV W CONT    Narrative EXAM: CT CHEST, ABDOMEN AND PELVIS     CLINICAL INDICATION/HISTORY: Pancreatic cancer with liver metastases, follow-up. COMPARISON: CT 5/21/2020.  PET/CT 11/29/2019    TECHNIQUE: Axial CT imaging of the chest, abdomen, and pelvis was performed with  intravenous contrast. Multiplanar reformats were generated. One or more dose  reduction techniques were used on this CT: automated exposure control,  adjustment of the mAs and/or kVp according to patient size, and iterative  reconstruction techniques. The specific techniques used on this CT exam have  been documented in the patient's electronic medical record. Digital Imaging and  Communications in Medicine (DICOM) format image data are available to  nonaffiliated external healthcare facilities or entities on a secure, media  free, reciprocally searchable basis with patient authorization for at least a  12-month period after this study. ________________    FINDINGS:    LINES/DEVICES:     >  Tunneled right internal jugular venous catheter tip terminates at the  cavoatrial junction. CHEST:    LUNGS: Mild predominantly upper lobe paraseptal emphysematous changes. Mild  dependent atelectasis. Reticular opacities along the ventral left upper lobe,  likely scarring. There are faint patchy ground glass opacities in the lingula. No suspicious mass. PLEURA: Normal.    AIRWAY: Normal.    MEDIASTINUM: Normal heart size. No pericardial effusion. Great vessels are  unremarkable. LYMPH NODES: Prominent right hilar and mediastinal lymph nodes, for reference:     > Precarinal 1.2 cm lymph node (axial image 44). > Right hilar 1 cm lymph node (axial image 51). CHEST WALL: Small amount of breast tissue present bilaterally. _______________    ABDOMEN/PELVIS:    LIVER: Numerous enhancing masses are seen throughout the liver, for reference:     > Left liver mass measures 1.9 x 1.9 cm (axial image 100), previously 1.2 x  0.9 cm.     > Inferior right liver mass measures 4.4 x 2.6 cm (axial image 110),  previously 3.3 x 1.8 cm. BILIARY: Small amount of pericholecystic fluid present. Gallbladder  unremarkable.  No biliary dilation. SPLEEN: Enlarged measuring 15.8 cm craniocaudal. Heterogeneous enhancement. PANCREAS: Ill-defined pancreatic body mass measures 3.6 x 1.6 cm (axial image  117), previously 3.2 x 1.6 cm. This mass abuts the celiac artery, splenic  artery, and common hepatic artery. No definite involvement of the superior  mesenteric vein, portal vein, or SMA. Hazy ill-defined stranding/soft tissue is  seen around and extending to the SMA (axial image 120-124). There is ductal  dilation distal to this mass measuring up to 1.0 cm with mild inflammatory  stranding along the distal pancreatic body and tail. No organized fluid  collection. ADRENALS: Normal.    KIDNEYS: Subcentimeter renal lesions are too small to accurately characterize by  CT but similar in appearance to prior study. Cortical scarring is seen in both  kidneys, otherwise symmetric renal enhancement. No perinephric stranding,  radiopaque stone or hydronephrosis. GI TRACT:  Mild wall thickening throughout the esophagus, which may due to  underdistention versus reflux esophagitis. Normal caliber small and large bowel  loops. No morphology of bowel obstruction. No bowel wall thickening. Normal  appendix. BLADDER: Normal.     PELVIC ORGANS: No acute abnormality. VASCULATURE: No arterial aneurysm. Chronic splenic vein thrombosis with varices  present. LYMPH NODES: A large centrally necrotic portacaval lymph nodes, for reference:     > Portal caval 1.3 cm lymph node (axial image 119), previously 1.1 cm.    OTHER: No free intraperitoneal air. No ascites. Asymmetrically enlarged left  psoas muscle with rim enhancing mass measuring 2.1 x 1.6 cm (axial image 142)    OSSEOUS STRUCTURES: New erosive lesion in the right iliac crest (axial image  187), likely metastatic. Diffuse degenerative changes notably in the lumbar  spine and both hips.    _______________      Impression IMPRESSION:    1.   Pancreatic mass with vascular encasement appears increased size since prior  study. 2.  Increased inflammatory stranding around the distal pancreatic parenchyma may  be reactive versus superimposed pancreatitis. 3.  Worsening metastatic disease evidenced by new and enlarging liver  metastases, enlarging lymphadenopathy, enlarging left psoas muscle mass, and new  erosive osseous metastasis in the right iliac. Lab Results  Lab Results   Component Value Date/Time    WBC 9.8 09/16/2020 10:44 AM    HGB 12.3 09/16/2020 10:44 AM    HCT 37.2 09/16/2020 10:44 AM    PLATELET 227 (L) 19/22/6028 10:44 AM    MCV 93.7 09/16/2020 10:44 AM       Lab Results   Component Value Date/Time    Sodium 136 09/16/2020 10:44 AM    Potassium 3.5 09/16/2020 10:44 AM    Chloride 100 09/16/2020 10:44 AM    CO2 29 09/16/2020 10:44 AM    Anion gap 7 09/16/2020 10:44 AM    Glucose 272 (H) 09/16/2020 10:44 AM    BUN 15 09/16/2020 10:44 AM    Creatinine 0.84 09/16/2020 10:44 AM    BUN/Creatinine ratio 18 09/16/2020 10:44 AM    GFR est AA >60 09/16/2020 10:44 AM    GFR est non-AA >60 09/16/2020 10:44 AM    Calcium 8.7 09/16/2020 10:44 AM    Alk. phosphatase 113 09/16/2020 10:44 AM    Protein, total 7.6 09/16/2020 10:44 AM    Albumin 3.4 09/16/2020 10:44 AM    Globulin 4.2 (H) 09/16/2020 10:44 AM    A-G Ratio 0.8 09/16/2020 10:44 AM    ALT (SGPT) 15 (L) 09/16/2020 10:44 AM     F/U with PCP for health maintenance  Assessment/Plan:  No diagnosis found. No orders of the defined types were placed in this encounter. 1. Pancreatic adenocarcinoma ([address]  I had a long discussion with Mr. Rj Perkins regarding his newly diagnosed metastatic pancreatic cancer.  I told him that in the first-line setting, palliative chemotherapy have been shown to improve 1 year survival by 73% compared to supportive care alone. Patient tolerated well C11, clinically much better. Gaining weight. *UG T1 A1  heterozygous  *Brain MRI patient could not do MRI due to claustrophobia.   *On 12/18/2019, CA-19-9 was 85 from 112 on 11/19/2019. *On 4/13/20, =95 (0-35). *On 5/18/20, =86  * On 7/20/20, CA 19-9= 15  *9/16/20: =24  Reviewed labs done on 9/16/20     Day 1: Oxaliplatin 85mg/m2 IV + irinotecan 180mg/m2 IV + leucovorin 400mg/m2 IV, followed by a 5-FU bolus of 400mg/m2 and a 46-hour continuous 5-FU infusion of 2,400mg/m2. Repeat cycle every 2 weeks until disease progression.     Reviewed CT CAP done on 5/21/20 which showed improved or stabledisease. Patient tolerated well cycle 12 FOLFIRINOX on 7/7/20. Clinically doing very well. *Going to CA from 7/6-7/17/20. * normalized  *Recheck CBC  and CA-19-9. *Check monthly CA-19-9  *F/u palliative care team-Re-contact palliative for pain management   *Celiac block done on 6/17/20-No more abdominal pain  *Resume FOLFIRINOX for 12 more cycles after RT to hip      2. Liver metastases (HCC)  Reviewed CT CAP     3. Epigastric pain/Bilateral hip pain (Right>>>Left)  Epigastric pain resolved after celiac block but now bilateral hip pain. Increased dilaudid to 4mg Q 3hours  He is on 100 mcg fentanyl patch  F/U to  to radiate painful right hip-To complete on 10/02/20  Refer to pain management. Has h/o opiates abuse.   Resume chemo after RT      4. Pulmonary embolism without acute cor pulmonale, unspecified chronicity, unspecified pulmonary embolism type (HCC)  Continue Xarelto      6. Insulin dependent diabetes:Blood sugars much better controlled now.      7. Constipation: Resolved. Has lactulose if needed     8. Chemotherapy induced nausea: Continue  Zydis as needed-Add dexamethasone.      RTC 4 weeks  I spent at least 15 minutes on this visit with this established patient.    have labs drawn prior to ROV, call if any problems  There are no Patient Instructions on file for this visit.        Nancy Falcon MD

## 2020-10-01 ENCOUNTER — HOSPITAL ENCOUNTER (OUTPATIENT)
Dept: RADIATION THERAPY | Age: 55
Discharge: HOME | End: 2020-10-01
Payer: MEDICAID

## 2020-10-01 DIAGNOSIS — R11.0 CHEMOTHERAPY-INDUCED NAUSEA: ICD-10-CM

## 2020-10-01 DIAGNOSIS — C25.9 PANCREATIC ADENOCARCINOMA (HCC): ICD-10-CM

## 2020-10-01 DIAGNOSIS — R63.0 APPETITE LOSS: ICD-10-CM

## 2020-10-01 DIAGNOSIS — T45.1X5A CHEMOTHERAPY-INDUCED NAUSEA: ICD-10-CM

## 2020-10-01 PROCEDURE — 77412 RADIATION TX DELIVERY LVL 3: CPT

## 2020-10-01 RX ORDER — DRONABINOL 5 MG/1
CAPSULE ORAL
Qty: 60 CAP | Refills: 1 | Status: SHIPPED | OUTPATIENT
Start: 2020-10-01 | End: 2020-10-31

## 2020-10-01 NOTE — NURSE NAVIGATOR
Saw pt in clinic with Dr. Susan Mesa for f/u c/o cancer related pain, pain meds revised. Referred to palliative care, appointment scheduled for 10/1/20. Pt will resume chemo treatment after completion of radiation on 10/2/20. RTC in 4 weeks, all questions answered.

## 2020-10-02 ENCOUNTER — HOSPITAL ENCOUNTER (OUTPATIENT)
Dept: RADIATION THERAPY | Age: 55
Discharge: HOME | End: 2020-10-02
Payer: MEDICAID

## 2020-10-02 PROCEDURE — 77412 RADIATION TX DELIVERY LVL 3: CPT

## 2020-10-02 PROCEDURE — 77336 RADIATION PHYSICS CONSULT: CPT

## 2020-10-03 DIAGNOSIS — T45.1X5A CHEMOTHERAPY-INDUCED NAUSEA: ICD-10-CM

## 2020-10-03 DIAGNOSIS — R11.0 CHEMOTHERAPY-INDUCED NAUSEA: ICD-10-CM

## 2020-10-05 ENCOUNTER — HOSPITAL ENCOUNTER (OUTPATIENT)
Dept: RADIATION THERAPY | Age: 55
Discharge: HOME | End: 2020-10-05
Payer: MEDICAID

## 2020-10-05 RX ORDER — ONDANSETRON 4 MG/1
TABLET, FILM COATED ORAL
Qty: 90 TAB | Refills: 1 | Status: SHIPPED | OUTPATIENT
Start: 2020-10-05

## 2020-10-06 ENCOUNTER — TRANSCRIBE ORDER (OUTPATIENT)
Dept: SCHEDULING | Age: 55
End: 2020-10-06

## 2020-10-06 DIAGNOSIS — C79.51 SECONDARY MALIGNANT NEOPLASM OF BONE AND BONE MARROW (HCC): Primary | ICD-10-CM

## 2020-10-06 DIAGNOSIS — C79.52 SECONDARY MALIGNANT NEOPLASM OF BONE AND BONE MARROW (HCC): Primary | ICD-10-CM

## 2020-10-07 RX ORDER — ACETAMINOPHEN 325 MG/1
650 TABLET ORAL AS NEEDED
Status: CANCELLED
Start: 2020-10-14

## 2020-10-07 RX ORDER — PALONOSETRON 0.05 MG/ML
0.25 INJECTION, SOLUTION INTRAVENOUS ONCE
Status: CANCELLED | OUTPATIENT
Start: 2020-10-14

## 2020-10-07 RX ORDER — SODIUM CHLORIDE 0.9 % (FLUSH) 0.9 %
10 SYRINGE (ML) INJECTION AS NEEDED
Status: CANCELLED
Start: 2020-10-14

## 2020-10-07 RX ORDER — HEPARIN 100 UNIT/ML
300-500 SYRINGE INTRAVENOUS AS NEEDED
Status: CANCELLED
Start: 2020-10-14

## 2020-10-07 RX ORDER — DIPHENHYDRAMINE HYDROCHLORIDE 50 MG/ML
50 INJECTION, SOLUTION INTRAMUSCULAR; INTRAVENOUS AS NEEDED
Status: CANCELLED
Start: 2020-10-14

## 2020-10-07 RX ORDER — ATROPINE SULFATE 1 MG/ML
0.4 INJECTION, SOLUTION INTRAVENOUS ONCE
Status: CANCELLED | OUTPATIENT
Start: 2020-10-14

## 2020-10-07 RX ORDER — SODIUM CHLORIDE 0.9 % (FLUSH) 0.9 %
10 SYRINGE (ML) INJECTION AS NEEDED
Status: CANCELLED
Start: 2020-10-16

## 2020-10-07 RX ORDER — SODIUM CHLORIDE 9 MG/ML
10 INJECTION INTRAMUSCULAR; INTRAVENOUS; SUBCUTANEOUS AS NEEDED
Status: CANCELLED | OUTPATIENT
Start: 2020-10-16

## 2020-10-07 RX ORDER — SODIUM CHLORIDE 9 MG/ML
10 INJECTION INTRAMUSCULAR; INTRAVENOUS; SUBCUTANEOUS AS NEEDED
Status: CANCELLED | OUTPATIENT
Start: 2020-10-14

## 2020-10-07 RX ORDER — ATROPINE SULFATE 0.4 MG/ML
0.4 INJECTION, SOLUTION ENDOTRACHEAL; INTRAMEDULLARY; INTRAMUSCULAR; INTRAVENOUS; SUBCUTANEOUS
Status: CANCELLED | OUTPATIENT
Start: 2020-10-14

## 2020-10-07 RX ORDER — HYDROCORTISONE SODIUM SUCCINATE 100 MG/2ML
100 INJECTION, POWDER, FOR SOLUTION INTRAMUSCULAR; INTRAVENOUS AS NEEDED
Status: CANCELLED | OUTPATIENT
Start: 2020-10-14

## 2020-10-07 RX ORDER — ONDANSETRON 2 MG/ML
8 INJECTION INTRAMUSCULAR; INTRAVENOUS AS NEEDED
Status: CANCELLED | OUTPATIENT
Start: 2020-10-14

## 2020-10-07 RX ORDER — DEXTROSE MONOHYDRATE 50 MG/ML
25 INJECTION, SOLUTION INTRAVENOUS CONTINUOUS
Status: CANCELLED | OUTPATIENT
Start: 2020-10-14

## 2020-10-07 RX ORDER — HEPARIN 100 UNIT/ML
300-500 SYRINGE INTRAVENOUS AS NEEDED
Status: CANCELLED | OUTPATIENT
Start: 2020-10-16

## 2020-10-07 RX ORDER — EPINEPHRINE 1 MG/ML
0.3 INJECTION, SOLUTION, CONCENTRATE INTRAVENOUS AS NEEDED
Status: CANCELLED | OUTPATIENT
Start: 2020-10-14

## 2020-10-07 RX ORDER — ALBUTEROL SULFATE 0.83 MG/ML
2.5 SOLUTION RESPIRATORY (INHALATION) AS NEEDED
Status: CANCELLED
Start: 2020-10-14

## 2020-10-07 RX ORDER — FLUOROURACIL 50 MG/ML
800 INJECTION, SOLUTION INTRAVENOUS ONCE
Status: CANCELLED
Start: 2020-10-14 | End: 2020-10-14

## 2020-10-13 ENCOUNTER — HOSPITAL ENCOUNTER (OUTPATIENT)
Dept: NUCLEAR MEDICINE | Age: 55
Discharge: HOME | End: 2020-10-13
Attending: RADIOLOGY
Payer: MEDICAID

## 2020-10-13 ENCOUNTER — HOSPITAL ENCOUNTER (OUTPATIENT)
Dept: INFUSION THERAPY | Age: 55
Discharge: HOME | End: 2020-10-13
Payer: MEDICAID

## 2020-10-13 VITALS
OXYGEN SATURATION: 96 % | TEMPERATURE: 99 F | BODY MASS INDEX: 26.68 KG/M2 | DIASTOLIC BLOOD PRESSURE: 68 MMHG | HEART RATE: 82 BPM | WEIGHT: 197 LBS | HEIGHT: 72 IN | SYSTOLIC BLOOD PRESSURE: 107 MMHG

## 2020-10-13 DIAGNOSIS — C79.51 SECONDARY MALIGNANT NEOPLASM OF BONE AND BONE MARROW (HCC): ICD-10-CM

## 2020-10-13 DIAGNOSIS — C79.52 SECONDARY MALIGNANT NEOPLASM OF BONE AND BONE MARROW (HCC): ICD-10-CM

## 2020-10-13 LAB
ALBUMIN SERPL-MCNC: 3.1 G/DL (ref 3.4–5)
ALBUMIN/GLOB SERPL: 0.8 {RATIO} (ref 0.8–1.7)
ALP SERPL-CCNC: 122 U/L (ref 45–117)
ALT SERPL-CCNC: 17 U/L (ref 16–61)
ANION GAP SERPL CALC-SCNC: 5 MMOL/L (ref 3–18)
AST SERPL-CCNC: 22 U/L (ref 10–38)
BASO+EOS+MONOS # BLD AUTO: 0.5 K/UL (ref 0–2.3)
BASO+EOS+MONOS NFR BLD AUTO: 8 % (ref 0.1–17)
BILIRUB SERPL-MCNC: 0.5 MG/DL (ref 0.2–1)
BUN SERPL-MCNC: 11 MG/DL (ref 7–18)
BUN/CREAT SERPL: 12 (ref 12–20)
CALCIUM SERPL-MCNC: 8.6 MG/DL (ref 8.5–10.1)
CHLORIDE SERPL-SCNC: 101 MMOL/L (ref 100–111)
CO2 SERPL-SCNC: 29 MMOL/L (ref 21–32)
CREAT SERPL-MCNC: 0.9 MG/DL (ref 0.6–1.3)
DIFFERENTIAL METHOD BLD: ABNORMAL
ERYTHROCYTE [DISTWIDTH] IN BLOOD BY AUTOMATED COUNT: 14.1 % (ref 11.5–14.5)
GLOBULIN SER CALC-MCNC: 4 G/DL (ref 2–4)
GLUCOSE SERPL-MCNC: 196 MG/DL (ref 74–99)
HCT VFR BLD AUTO: 35.3 % (ref 36–48)
HGB BLD-MCNC: 11.6 G/DL (ref 12–16)
LYMPHOCYTES # BLD: 0.9 K/UL (ref 1.1–5.9)
LYMPHOCYTES NFR BLD: 15 % (ref 14–44)
MCH RBC QN AUTO: 30.6 PG (ref 25–35)
MCHC RBC AUTO-ENTMCNC: 32.9 G/DL (ref 31–37)
MCV RBC AUTO: 93.1 FL (ref 78–102)
NEUTS SEG # BLD: 4.6 K/UL (ref 1.8–9.5)
NEUTS SEG NFR BLD: 77 % (ref 40–70)
PLATELET # BLD AUTO: 122 K/UL (ref 140–440)
POTASSIUM SERPL-SCNC: 4 MMOL/L (ref 3.5–5.5)
PROT SERPL-MCNC: 7.1 G/DL (ref 6.4–8.2)
RBC # BLD AUTO: 3.79 M/UL (ref 4.1–5.1)
SODIUM SERPL-SCNC: 135 MMOL/L (ref 136–145)
WBC # BLD AUTO: 6 K/UL (ref 4.5–13)

## 2020-10-13 PROCEDURE — 85025 COMPLETE CBC W/AUTO DIFF WBC: CPT

## 2020-10-13 PROCEDURE — 36415 COLL VENOUS BLD VENIPUNCTURE: CPT

## 2020-10-13 PROCEDURE — 78306 BONE IMAGING WHOLE BODY: CPT

## 2020-10-13 PROCEDURE — 80053 COMPREHEN METABOLIC PANEL: CPT

## 2020-10-13 PROCEDURE — 86301 IMMUNOASSAY TUMOR CA 19-9: CPT

## 2020-10-13 NOTE — PROGRESS NOTES
[facility] Progress Note    Date: 2020    Name: Pavel Garcia    MRN: [id number]         : 1965    Peripheral Lab Draw      Mr. Paige Hankins to [facility], ambulatory at 9452 accompanied by self. Pt was assessed and education was provided. Mr. Manolo Ying vitals were reviewed and patient was observed for 5 minutes prior to treatment. Visit Vitals  /68 (BP 1 Location: Right arm, BP Patient Position: Sitting)   Pulse 82   Temp 99 °F (37.2 °C)   Ht 6' (1.829 m)   Wt 89.4 kg (197 lb)   SpO2 96%   BMI 26.72 kg/m²     Recent Results (from the past 12 hour(s))   CBC WITH 3 PART DIFF    Collection Time: 10/13/20  8:42 AM   Result Value Ref Range    WBC 6.0 4.5 - 13.0 K/uL    RBC 3.79 (L) 4.10 - 5.10 M/uL    HGB 11.6 (L) 12.0 - 16.0 g/dL    HCT 35.3 (L) 36 - 48 %    MCV 93.1 78 - 102 FL    MCH 30.6 25.0 - 35.0 PG    MCHC 32.9 31 - 37 g/dL    RDW 14.1 11.5 - 14.5 %    PLATELET 961 (L) 120 - 440 K/uL    NEUTROPHILS 77 (H) 40 - 70 %    MIXED CELLS 8 0.1 - 17 %    LYMPHOCYTES 15 14 - 44 %    ABS. NEUTROPHILS 4.6 1.8 - 9.5 K/UL    ABS. MIXED CELLS 0.5 0.0 - 2.3 K/uL    ABS. LYMPHOCYTES 0.9 (L) 1.1 - 5.9 K/UL    DF AUTOMATED         Blood obtained peripherally from left arm x 1 attempt with butterfly needle and sent to lab for Cbc w/diff, Cmp and CA 19-9 per written orders. No bleeding or hematoma noted at site. Gauze and coban applied. Mr. Paige Hankins tolerated the sybil- puncture, and had no complaints. Patient armband removed and shredded. Mr. Paige Hankins was discharged from [place] [address] in stable condition at 7451.      Siddhartha Pond Phlebotomist PCT  2020  9:13 AM

## 2020-10-14 ENCOUNTER — APPOINTMENT (OUTPATIENT)
Dept: INFUSION THERAPY | Age: 55
End: 2020-10-14
Payer: MEDICAID

## 2020-10-14 ENCOUNTER — HOSPITAL ENCOUNTER (OUTPATIENT)
Dept: INFUSION THERAPY | Age: 55
Discharge: HOME | End: 2020-10-14
Payer: MEDICAID

## 2020-10-14 VITALS
HEART RATE: 67 BPM | BODY MASS INDEX: 26.68 KG/M2 | RESPIRATION RATE: 18 BRPM | TEMPERATURE: 97.8 F | HEIGHT: 72 IN | WEIGHT: 197 LBS | SYSTOLIC BLOOD PRESSURE: 113 MMHG | OXYGEN SATURATION: 97 % | DIASTOLIC BLOOD PRESSURE: 68 MMHG

## 2020-10-14 DIAGNOSIS — K86.89 PANCREATIC MASS: Primary | ICD-10-CM

## 2020-10-14 DIAGNOSIS — C78.7 LIVER METASTASIS (HCC): ICD-10-CM

## 2020-10-14 PROCEDURE — 96416 CHEMO PROLONG INFUSE W/PUMP: CPT

## 2020-10-14 PROCEDURE — 96413 CHEMO IV INFUSION 1 HR: CPT

## 2020-10-14 PROCEDURE — 96375 TX/PRO/DX INJ NEW DRUG ADDON: CPT

## 2020-10-14 PROCEDURE — 96415 CHEMO IV INFUSION ADDL HR: CPT

## 2020-10-14 PROCEDURE — 96367 TX/PROPH/DG ADDL SEQ IV INF: CPT

## 2020-10-14 PROCEDURE — 96417 CHEMO IV INFUS EACH ADDL SEQ: CPT

## 2020-10-14 PROCEDURE — 96411 CHEMO IV PUSH ADDL DRUG: CPT

## 2020-10-14 PROCEDURE — 74011250636 HC RX REV CODE- 250/636: Performed by: INTERNAL MEDICINE

## 2020-10-14 PROCEDURE — 77030012965 HC NDL HUBR BBMI -A

## 2020-10-14 PROCEDURE — 74011000258 HC RX REV CODE- 258: Performed by: INTERNAL MEDICINE

## 2020-10-14 RX ORDER — FLUOROURACIL 50 MG/ML
800 INJECTION, SOLUTION INTRAVENOUS ONCE
Status: COMPLETED | OUTPATIENT
Start: 2020-10-14 | End: 2020-10-14

## 2020-10-14 RX ORDER — DEXTROSE MONOHYDRATE 50 MG/ML
25 INJECTION, SOLUTION INTRAVENOUS CONTINUOUS
Status: DISPENSED | OUTPATIENT
Start: 2020-10-14 | End: 2020-10-14

## 2020-10-14 RX ORDER — ATROPINE SULFATE 1 MG/ML
0.4 INJECTION, SOLUTION INTRAVENOUS ONCE
Status: COMPLETED | OUTPATIENT
Start: 2020-10-14 | End: 2020-10-14

## 2020-10-14 RX ORDER — PALONOSETRON 0.05 MG/ML
0.25 INJECTION, SOLUTION INTRAVENOUS ONCE
Status: COMPLETED | OUTPATIENT
Start: 2020-10-14 | End: 2020-10-14

## 2020-10-14 RX ORDER — SODIUM CHLORIDE 0.9 % (FLUSH) 0.9 %
10 SYRINGE (ML) INJECTION AS NEEDED
Status: DISPENSED | OUTPATIENT
Start: 2020-10-14 | End: 2020-10-14

## 2020-10-14 RX ADMIN — OXALIPLATIN 175 MG: 5 INJECTION, SOLUTION, CONCENTRATE INTRAVENOUS at 10:33

## 2020-10-14 RX ADMIN — IRINOTECAN HYDROCHLORIDE 375 MG: 20 INJECTION, SOLUTION INTRAVENOUS at 12:57

## 2020-10-14 RX ADMIN — Medication 10 ML: at 08:42

## 2020-10-14 RX ADMIN — DEXTROSE MONOHYDRATE 25 ML/HR: 50 INJECTION, SOLUTION INTRAVENOUS at 09:00

## 2020-10-14 RX ADMIN — FOSAPREPITANT 150 MG: 150 INJECTION, POWDER, LYOPHILIZED, FOR SOLUTION INTRAVENOUS at 09:35

## 2020-10-14 RX ADMIN — ATROPINE SULFATE 0.4 MG: 1 INJECTION, SOLUTION INTRAMUSCULAR; INTRAVENOUS; SUBCUTANEOUS at 09:44

## 2020-10-14 RX ADMIN — PALONOSETRON 0.25 MG: 0.05 INJECTION, SOLUTION INTRAVENOUS at 09:41

## 2020-10-14 RX ADMIN — FLUOROURACIL 800 MG: 50 INJECTION, SOLUTION INTRAVENOUS at 15:25

## 2020-10-14 RX ADMIN — FLUOROURACIL 5000 MG: 50 INJECTION, SOLUTION INTRAVENOUS at 15:25

## 2020-10-14 RX ADMIN — DEXTROSE MONOHYDRATE 800 MG: 5 INJECTION, SOLUTION INTRAVENOUS at 12:57

## 2020-10-14 NOTE — PROGRESS NOTES
[facility] Progress Note    Date: 2020    Name: Martha Mcclure              MRN: [id number]              : 1965    Chemotherapy Cycle:13; Day 1    FOLFIRINOX Infusion     Pt to [facility], ambulatory with assist of cane, and in no acute distress, at [address] [place] [address]. Mr. Sulema Epperson was assessed and education was provided. Patient complained of bilateral hip pain which he described as 9/10 on numeric pain scale. Patient stated that he is taking pain medication which provides some relief. Mr. Judith Grace vitals were reviewed. Visit Vitals  /68 (BP 1 Location: Left arm, BP Patient Position: At rest;Supine)   Pulse 67   Temp 97.8 °F (36.6 °C)   Resp 18   Ht 6' (1.829 m)   Wt 89.4 kg (197 lb)   SpO2 97%   BMI 26.72 kg/m²     Right anterior chest mediport accessed with 20 g 1 inch non-coring access needle, using sterile technique and central line dressing kit. Port flushed easily and had brisk blood return. Site without evidence of complication or patient complaint. D5W initiated via port-a-cath IV line @ 25 mL/hr. Lab results obtained on 10/13/2020 were reviewed and within ordered parameters to give chemo today. ANC = 4.6, PLT = 122. Chemo dosages verified with today's BSA ( 2.13) and found to be within 10% of ordered dosages. Premedications (EMEND 150 mg IVPB; ALOXI 0.25 mg IVP; Atropine 0.4mg IVP) administered as ordered. Oxaliplatin (ELOXATIN) 175 mg IV was infused over 2 hours, as ordered, via port-a-cath IV line. VS stable at end of infusion and pt denied complaints. Blood return from port was re-verified and line flushed with D5W. Irinotecan (CAMPTOSAR) 375 mg  IV was infused over 90 minutes, as ordered. Leucovorin (WELLCOVORIN) 800 mg IV was infused over 90 minutes as ordered. VS stable at end of infusion and pt denied complaints. Line flushed with D5W and blood return from port re-verified. Mr. Sulema Epperson tolerated infusion, and had no complaints at this time.     CADD pump was loaded with (Fluorouracil 5000 mg) cassette and attached to port-a-cath. All connections and clamps were secured and wrapped with tape. Patient armband removed and shredded. Mr. Gomez Celestin was discharged from [place] [address] in stable condition at [phone]. He is to return on 10/16/2020 at 1500 for d/c of CADD pump.      Davian Cabrera RN  October 14, 2020  9:38 AM

## 2020-10-15 ENCOUNTER — TELEPHONE (OUTPATIENT)
Age: 55
End: 2020-10-15

## 2020-10-15 DIAGNOSIS — C25.9 PANCREATIC ADENOCARCINOMA (HCC): Primary | ICD-10-CM

## 2020-10-15 DIAGNOSIS — C78.7 LIVER METASTASIS (HCC): ICD-10-CM

## 2020-10-15 DIAGNOSIS — G89.3 CANCER ASSOCIATED PAIN: ICD-10-CM

## 2020-10-15 DIAGNOSIS — C25.9 PANCREATIC ADENOCARCINOMA (HCC): ICD-10-CM

## 2020-10-15 LAB — CANCER AG19-9 SERPL-ACNC: 18 U/ML (ref 0–35)

## 2020-10-15 NOTE — TELEPHONE ENCOUNTER
Left vm with IR regarding scheduling patient for Celiac Block, will need to be between his chemotherapy treatments. Provided [facility] office phone number and requested a return call for any further questions/concerns.

## 2020-10-16 ENCOUNTER — HOSPITAL ENCOUNTER (OUTPATIENT)
Dept: INFUSION THERAPY | Age: 55
Discharge: HOME | End: 2020-10-16
Payer: MEDICAID

## 2020-10-16 VITALS
TEMPERATURE: 98.5 F | HEART RATE: 77 BPM | DIASTOLIC BLOOD PRESSURE: 76 MMHG | RESPIRATION RATE: 20 BRPM | OXYGEN SATURATION: 99 % | SYSTOLIC BLOOD PRESSURE: 108 MMHG

## 2020-10-16 DIAGNOSIS — C78.7 LIVER METASTASIS (HCC): ICD-10-CM

## 2020-10-16 DIAGNOSIS — K86.89 PANCREATIC MASS: Primary | ICD-10-CM

## 2020-10-16 PROCEDURE — 74011250636 HC RX REV CODE- 250/636: Performed by: INTERNAL MEDICINE

## 2020-10-16 PROCEDURE — 96523 IRRIG DRUG DELIVERY DEVICE: CPT

## 2020-10-16 RX ORDER — SODIUM CHLORIDE 0.9 % (FLUSH) 0.9 %
10 SYRINGE (ML) INJECTION AS NEEDED
Status: DISCONTINUED | OUTPATIENT
Start: 2020-10-16 | End: 2020-10-17 | Stop reason: HOSPADM

## 2020-10-16 RX ORDER — HEPARIN 100 UNIT/ML
300-500 SYRINGE INTRAVENOUS AS NEEDED
Status: DISCONTINUED | OUTPATIENT
Start: 2020-10-16 | End: 2020-10-17 | Stop reason: HOSPADM

## 2020-10-16 RX ORDER — SODIUM CHLORIDE 9 MG/ML
10 INJECTION INTRAMUSCULAR; INTRAVENOUS; SUBCUTANEOUS AS NEEDED
Status: DISCONTINUED | OUTPATIENT
Start: 2020-10-16 | End: 2020-10-17 | Stop reason: HOSPADM

## 2020-10-16 RX ADMIN — HEPARIN 500 UNITS: 100 SYRINGE at 15:34

## 2020-10-16 RX ADMIN — Medication 10 ML: at 15:33

## 2020-10-16 RX ADMIN — Medication 10 ML: at 15:34

## 2020-10-16 NOTE — PROGRESS NOTES
[facility] Progress Note    Date: 2020    Name: Antoine Austin    MRN: [id number]         : 1965    CADD Pump D/C    Mr. Connie Looney arrived ambulatory, with assist of cane, to [facility] at 1525. Patient reported not feeling well after his treatment. Patient was assessed and education provided. Denied n/v, diarrhea or fever. Patient also complained of poor appetite and stated that he was able to tolerate some oatmeal today, however. Patient encouraged to maintain adequate hydration and p.o intake of water. Mr. Tabitha Eller vitals were reviewed. Patient Vitals for the past 12 hrs:   Temp Pulse Resp BP SpO2   10/16/20 1526 98.5 °F (36.9 °C) 77 20 108/76 99 %       Patient's right upper chest port was accessed from 10/14/2020 visit. Dressing clean dry and intact to site. Site without evidence of complication. CADD pump disconnected from patient's port. Blood return verified. Port flushed with 20 mL NS, followed by instillation of 500 units/5 mL of Heparin. Access needle was then removed intact and bandaid applied to site. Patient denied complaints. Mr. Connie Looney tolerated procedure without complaints. Mr. Connie Looney was discharged from [place] [address] in stable condition at 1540. He is to return to [facility] on 10/27/2020 at 0800 for his next pre-chemo lab appointment.       Bogdan Villafuerte RN  2020  1550

## 2020-10-20 ENCOUNTER — TRANSCRIBE ORDER (OUTPATIENT)
Dept: RADIATION THERAPY | Age: 55
End: 2020-10-20

## 2020-10-20 ENCOUNTER — HOSPITAL ENCOUNTER (OUTPATIENT)
Dept: RADIATION THERAPY | Age: 55
Discharge: HOME | End: 2020-10-20
Payer: MEDICAID

## 2020-10-20 DIAGNOSIS — C25.9 PANCREATIC ADENOCARCINOMA (HCC): ICD-10-CM

## 2020-10-20 DIAGNOSIS — D69.6 THROMBOCYTOPENIA (HCC): ICD-10-CM

## 2020-10-20 DIAGNOSIS — C78.7 LIVER METASTASES (HCC): ICD-10-CM

## 2020-10-20 DIAGNOSIS — C25.9 MALIGNANT NEOPLASM OF PANCREAS, UNSPECIFIED LOCATION OF MALIGNANCY (HCC): Primary | ICD-10-CM

## 2020-10-20 PROCEDURE — 77334 RADIATION TREATMENT AID(S): CPT

## 2020-10-20 PROCEDURE — 77290 THER RAD SIMULAJ FIELD CPLX: CPT

## 2020-10-20 RX ORDER — AVATROMBOPAG MALEATE 20 MG/1
TABLET, FILM COATED ORAL
Qty: 30 TAB | Refills: 2 | Status: SHIPPED | OUTPATIENT
Start: 2020-10-20

## 2020-10-20 RX ORDER — IOHEXOL 240 MG/ML
50 INJECTION, SOLUTION INTRATHECAL; INTRAVASCULAR; INTRAVENOUS; ORAL
Qty: 50 ML | Refills: 0 | Status: SHIPPED
Start: 2020-10-20 | End: 2020-10-20

## 2020-10-21 ENCOUNTER — HOSPITAL ENCOUNTER (OUTPATIENT)
Dept: RADIATION THERAPY | Age: 55
Discharge: HOME | End: 2020-10-21
Payer: MEDICAID

## 2020-10-21 PROCEDURE — 77295 3-D RADIOTHERAPY PLAN: CPT

## 2020-10-21 PROCEDURE — 77300 RADIATION THERAPY DOSE PLAN: CPT

## 2020-10-21 PROCEDURE — 77334 RADIATION TREATMENT AID(S): CPT

## 2020-10-21 RX ORDER — HEPARIN 100 UNIT/ML
300-500 SYRINGE INTRAVENOUS AS NEEDED
Status: CANCELLED | OUTPATIENT
Start: 2020-10-30

## 2020-10-21 RX ORDER — DIPHENHYDRAMINE HYDROCHLORIDE 50 MG/ML
50 INJECTION, SOLUTION INTRAMUSCULAR; INTRAVENOUS AS NEEDED
Status: CANCELLED
Start: 2020-10-28

## 2020-10-21 RX ORDER — ONDANSETRON 2 MG/ML
8 INJECTION INTRAMUSCULAR; INTRAVENOUS AS NEEDED
Status: CANCELLED | OUTPATIENT
Start: 2020-10-28

## 2020-10-21 RX ORDER — HEPARIN 100 UNIT/ML
300-500 SYRINGE INTRAVENOUS AS NEEDED
Status: CANCELLED
Start: 2020-10-28

## 2020-10-21 RX ORDER — ATROPINE SULFATE 1 MG/ML
0.4 INJECTION, SOLUTION INTRAVENOUS ONCE
Status: CANCELLED | OUTPATIENT
Start: 2020-10-28

## 2020-10-21 RX ORDER — ALBUTEROL SULFATE 0.83 MG/ML
2.5 SOLUTION RESPIRATORY (INHALATION) AS NEEDED
Status: CANCELLED
Start: 2020-10-28

## 2020-10-21 RX ORDER — PALONOSETRON 0.05 MG/ML
0.25 INJECTION, SOLUTION INTRAVENOUS ONCE
Status: CANCELLED | OUTPATIENT
Start: 2020-10-28

## 2020-10-21 RX ORDER — DEXTROSE MONOHYDRATE 50 MG/ML
25 INJECTION, SOLUTION INTRAVENOUS CONTINUOUS
Status: CANCELLED | OUTPATIENT
Start: 2020-10-28

## 2020-10-21 RX ORDER — EPINEPHRINE 1 MG/ML
0.3 INJECTION, SOLUTION, CONCENTRATE INTRAVENOUS AS NEEDED
Status: CANCELLED | OUTPATIENT
Start: 2020-10-28

## 2020-10-21 RX ORDER — SODIUM CHLORIDE 0.9 % (FLUSH) 0.9 %
10 SYRINGE (ML) INJECTION AS NEEDED
Status: CANCELLED
Start: 2020-10-30

## 2020-10-21 RX ORDER — ACETAMINOPHEN 325 MG/1
650 TABLET ORAL AS NEEDED
Status: CANCELLED
Start: 2020-10-28

## 2020-10-21 RX ORDER — SODIUM CHLORIDE 9 MG/ML
10 INJECTION INTRAMUSCULAR; INTRAVENOUS; SUBCUTANEOUS AS NEEDED
Status: CANCELLED | OUTPATIENT
Start: 2020-10-30

## 2020-10-21 RX ORDER — SODIUM CHLORIDE 9 MG/ML
10 INJECTION INTRAMUSCULAR; INTRAVENOUS; SUBCUTANEOUS AS NEEDED
Status: CANCELLED | OUTPATIENT
Start: 2020-10-28

## 2020-10-21 RX ORDER — SODIUM CHLORIDE 0.9 % (FLUSH) 0.9 %
10 SYRINGE (ML) INJECTION AS NEEDED
Status: CANCELLED
Start: 2020-10-28

## 2020-10-21 RX ORDER — HYDROCORTISONE SODIUM SUCCINATE 100 MG/2ML
100 INJECTION, POWDER, FOR SOLUTION INTRAMUSCULAR; INTRAVENOUS AS NEEDED
Status: CANCELLED | OUTPATIENT
Start: 2020-10-28

## 2020-10-21 RX ORDER — FLUOROURACIL 50 MG/ML
800 INJECTION, SOLUTION INTRAVENOUS ONCE
Status: CANCELLED
Start: 2020-10-28 | End: 2020-10-28

## 2020-10-21 RX ORDER — ATROPINE SULFATE 0.4 MG/ML
0.4 INJECTION, SOLUTION ENDOTRACHEAL; INTRAMEDULLARY; INTRAMUSCULAR; INTRAVENOUS; SUBCUTANEOUS
Status: CANCELLED | OUTPATIENT
Start: 2020-10-28

## 2020-10-22 ENCOUNTER — HOSPITAL ENCOUNTER (OUTPATIENT)
Dept: RADIATION THERAPY | Age: 55
Discharge: HOME | End: 2020-10-22
Payer: MEDICAID

## 2020-10-22 PROCEDURE — 77280 THER RAD SIMULAJ FIELD SMPL: CPT

## 2020-10-22 PROCEDURE — 77412 RADIATION TX DELIVERY LVL 3: CPT

## 2020-10-23 ENCOUNTER — HOSPITAL ENCOUNTER (OUTPATIENT)
Dept: RADIATION THERAPY | Age: 55
Discharge: HOME | End: 2020-10-23
Payer: MEDICAID

## 2020-10-23 PROCEDURE — 77412 RADIATION TX DELIVERY LVL 3: CPT

## 2020-10-26 ENCOUNTER — HOSPITAL ENCOUNTER (OUTPATIENT)
Dept: RADIATION THERAPY | Age: 55
Discharge: HOME | End: 2020-10-26
Payer: MEDICAID

## 2020-10-26 PROCEDURE — 77412 RADIATION TX DELIVERY LVL 3: CPT

## 2020-10-27 ENCOUNTER — HOSPITAL ENCOUNTER (OUTPATIENT)
Dept: RADIATION THERAPY | Age: 55
Discharge: HOME | End: 2020-10-27
Payer: MEDICAID

## 2020-10-27 ENCOUNTER — APPOINTMENT (OUTPATIENT)
Dept: INFUSION THERAPY | Age: 55
End: 2020-10-27

## 2020-10-27 PROCEDURE — 77412 RADIATION TX DELIVERY LVL 3: CPT

## 2020-10-28 ENCOUNTER — HOSPITAL ENCOUNTER (OUTPATIENT)
Dept: INFUSION THERAPY | Age: 55
End: 2020-10-28

## 2020-10-28 ENCOUNTER — HOSPITAL ENCOUNTER (OUTPATIENT)
Dept: RADIATION THERAPY | Age: 55
Discharge: HOME | End: 2020-10-28
Payer: MEDICAID

## 2020-10-28 ENCOUNTER — OFFICE VISIT (OUTPATIENT)
Age: 55
End: 2020-10-28
Payer: MEDICAID

## 2020-10-28 ENCOUNTER — NURSE NAVIGATOR (OUTPATIENT)
Dept: OTHER | Age: 55
End: 2020-10-28

## 2020-10-28 VITALS
DIASTOLIC BLOOD PRESSURE: 60 MMHG | BODY MASS INDEX: 25.58 KG/M2 | WEIGHT: 188.6 LBS | RESPIRATION RATE: 18 BRPM | OXYGEN SATURATION: 98 % | SYSTOLIC BLOOD PRESSURE: 90 MMHG | HEART RATE: 68 BPM

## 2020-10-28 DIAGNOSIS — I26.99 PULMONARY EMBOLISM WITHOUT ACUTE COR PULMONALE, UNSPECIFIED CHRONICITY, UNSPECIFIED PULMONARY EMBOLISM TYPE (HCC): ICD-10-CM

## 2020-10-28 DIAGNOSIS — C25.9 PANCREATIC ADENOCARCINOMA (HCC): Primary | ICD-10-CM

## 2020-10-28 DIAGNOSIS — R10.13 EPIGASTRIC PAIN: ICD-10-CM

## 2020-10-28 DIAGNOSIS — C78.7 LIVER METASTASES (HCC): ICD-10-CM

## 2020-10-28 PROCEDURE — 99213 OFFICE O/P EST LOW 20 MIN: CPT | Performed by: INTERNAL MEDICINE

## 2020-10-28 PROCEDURE — 77412 RADIATION TX DELIVERY LVL 3: CPT

## 2020-10-28 NOTE — PROGRESS NOTES
Sola Méndez is a 60 y.o. male presenting today for a follow-up appointment. Patient is ambulatory with a cane and reports 2/10 pain in abdomen. Provider was advised. Chief Complaint   Patient presents with    Pancreatic Cancer       Visit Vitals  BP 90/60   Pulse 68   Resp 18   Wt 188 lb 9.6 oz (85.5 kg)   SpO2 98%   BMI 25.58 kg/m²       Current Outpatient Medications   Medication Sig    Doptelet, 30 tab pack, 20 mg tab TAKE 2 TABLETS BY MOUTH DAILY FOR 15 DAYS    ondansetron hcl (ZOFRAN) 4 mg tablet take 2 tablets by mouth every 8 hours if needed for nausea and vomiting    dronabinoL (MARINOL) 5 mg capsule take 1 capsule by mouth twice a day    fentaNYL (DURAGESIC) 100 mcg/hr PATCH 1 Patch by TransDERmal route every seventy-two (72) hours for 30 days. Max Daily Amount: 1 Patch.  HYDROmorphone (DILAUDID) 4 mg tablet Take 1 Tab by mouth every three (3) hours as needed for Pain for up to 30 days. Max Daily Amount: 32 mg.    rivaroxaban (Xarelto) 20 mg tab tablet Take 1 Tab by mouth daily (with breakfast).  Omeprazole delayed release (PRILOSEC D/R) 20 mg tablet Take 1 Tab by mouth daily.  lidocaine-prilocaine (EMLA) topical cream APPLY TO AFFECTED AREA AS NEEDED FOR PAIN  (APPLY TO SKIN 30 - 60 MINUTES BEFORE MEDIPORT ACCESS)    True Metrix Glucose Test Strip strip     L-Mfolate-B6 Phos-Methyl-B12 (METANX) 3-35-2 mg tab tab Take 1 Tab by mouth two (2) times a day. Indications: peripheral neuropathy    lactulose (CHRONULAC) 10 gram/15 mL solution Take 15 mL by mouth three (3) times daily.  naloxone (NARCAN) 4 mg/actuation nasal spray Use 1 spray intranasally, then discard. Repeat with new spray every 2 min as needed for opioid overdose symptoms, alternating nostrils. Indications: opioid overdose    insulin glargine (LANTUS U-100 INSULIN) 100 unit/mL injection 28 Units by SubCUTAneous route nightly.     insulin lispro (HUMALOG) 100 unit/mL injection 3-15 Units by SubCUTAneous route Before breakfast, lunch, and dinner.  rivaroxaban (XARELTO) 15 mg (42)- 20 mg (9) DsPk Take 20 mg by mouth daily. Take one 15 mg tablet twice a day with food for the first 21 days. Then, take one 20 mg tablet once a day with food for 9 days.  naloxone (NARCAN) 4 mg/actuation nasal spray Use 1 spray intranasally, then discard. Repeat with new spray every 2 min as needed for opioid overdose symptoms, alternating nostrils.  dexAMETHasone (DECADRON) 4 mg tablet Take 4mg by mouth twice a day the day before chemotherapy and the day after chemotherapy    nut.tx.gluc.intol,lac-free,soy (GLUCERNA ADVANCE) liqd Take 237 mL by mouth three (3) times daily.  TRUE METRIX GLUCOSE METER misc USE AS DIRECTED    omeprazole (PRILOSEC) 20 mg capsule TAKE 1 CAPSULE BY MOUTH ONCE DAILY    QUEtiapine (SEROQUEL) 50 mg tablet Take 50 mg by mouth nightly.  lidocaine (LIDODERM) 5 % Apply patch to the affected area for 12 hours a day and remove for 12 hours a day. No current facility-administered medications for this visit. Fall Risk Assessment, last 12 mths 2/17/2020   Able to walk? Yes   Fall in past 12 months? No       3 most recent PHQ Screens 6/25/2020   Little interest or pleasure in doing things Not at all   Feeling down, depressed, irritable, or hopeless Not at all   Total Score PHQ 2 0       Abuse Screening Questionnaire 2/17/2020   Do you ever feel afraid of your partner? N   Are you in a relationship with someone who physically or mentally threatens you? N   Is it safe for you to go home? Y       1. Have you been to the ER, urgent care clinic since your last visit? Hospitalized since your last visit? No    2. Have you seen or consulted any other health care providers outside of the [address] [place] [address] since your last visit? Include any pap smears or colon screening.  No

## 2020-10-28 NOTE — PROGRESS NOTES
[facility]  [address] [address], [address]  [facility], [facility]  Office Phone: [phone]  Fax: [phone]     HEMATOLOGY/ONCOLOGY PROGRESS NOTE        Reason for visit:  Pancreatic cancer  712  Subjective:     Leslee Bishop is a 62 y.o. 1965 male with past medical history including pulmonary embolus, on Lovenox, type 2 diabetes, GERD, who I was asked to see in consultation at the request of Terri Berg for evaluation for newly diagnosed pancreatic adenocarcinoma.  Patient had fine-needle aspiration of pancreatic mass on 11/13/2019 and pathology showed pancreatic adenocarcinoma. I saw him in consultation for the first time on 11/19/2019) was to treat him with palliative FOLFIRINOX. He received C1D1 FOLFIRINOX on 12/16/2019 and tolerated well overall but cycle 5 was held on 2/10/2020 due to thrombocytopenia. Patient was started on avatrombopag but we still waiting for the insurance to have it shipped to his house. He completed C12 D1 on 7/7/20, here for follow-up. In the interim patient was referred to pain management with Dr. Francisca Krishnamurthy.        Patient was seen and examined today. Lyudmila Brown is awake alert oriented x3.    Denies any fevers, chills, or nausea and vomiting today.  Denies any melena or bright red blood per rectum. Denies any abdominal pain. Pain is now better and is down to 2/10 from 10/10. Nausea is also better. Ambulating with cane but hip pain has resolved. He is clinically stable.  All also points of review of system have been reviewed and were negative.  ECOG performance status 1.  Independent with ADLs and IADLs.      DX: Pancreatic adenocarcinoma     STAGE: Stage IV     Treatment intent: Palliative     ONCOLOGY HISTORY: BRCA1/2 negative-Pan-NTRK negative-No available activating mutation by Varghese  11/03/2019: Went to ER due to 5 days complaints of epigastric pain.  CT abdomen and pelvis showed:  1. 5 cm mass within the pancreatic body highly suggestive of primary malignancy. Superimposed acute pancreatitis cannot entirely be excluded. 2. Multiple liver lesions highly suggestive of metastatic disease 3. Metastatic retroperitoneal and mesenteric lymphadenopathy. 4. Indeterminate hypodensity within the spleen. Diagnostic consideration include splenic infarct or metastatic lesion 5. Right lower lobe pulmonary emboli.       *MRI of the abdomen showed  1. Stable since same day CT abdomen pelvis. Elliptical 4 cm hypoenhancing mass,  body of pancreas, with upstream glandular atrophy and pancreatic ductal  dilatation, most likely adenocarcinoma. Associated local/regional likely metastatic lymphadenopathy including upper retroperitoneum, lesser omentum, portacaval/periportal space. Associated encasement/narrowing of the adjacent  splenic vein. 2. Mild peripancreatic edema and soft tissue reticulation; may reflect secondary low-grade or chronic pancreatitis or adjacent peripancreatic tumor infiltration.   3. Numerous hypoenhancing and target-like small nodules and masses throughout  the liver, typical of metastatic disease. 4. Mild pericholecystic fluid, nonspecific, but may represent low-grade cholecystitis.   No evident cholecystolithiasis/choledocholithiasis or biliary ductal dilatation. 5. Mild splenomegaly with focal small splenic infarction, age indeterminate perhaps recent. Small left lower pole, nonacute renal infarction. 6. Other minor and/or ancillary findings including lumbar disc herniations     11/15/2019: Duplex left  lower extremity showed Acute nonocclusive thrombus in the distal femoral vein, popliteal, peroneal and both posterior tibial veins. The thrombus in the distal femoral vein appears to be floating tail-like thrombus.     11/19/2019: First medical oncology visit with me     11/29/2019: PET/CT showed  Malignant metabolic activity corresponding with the known malignancy in the pancreatic body. Innumerable hypermetabolic liver metastases.  Metastatic portal caval and aortocaval lymph nodes. Enlargement and malignant metabolic activity in the anterior aspect of the left  psoas muscle suspicious for metastatic disease. Distant metastatic disease to include the left supraclavicular region, left  superior mediastinum and possibly the left inferior hilum. 12/16/19: [id number]/30/19:C2D1-1/13/20:C3D1- 1/14/2020: CA-19-9 = 79 1/27/20:C4D1-2/10/20:C5 held due to thrombocytopenia. Started Avatrombopag (waiting for approval)-2/17/2020: C5D1(delayed)-CA-19-9 = 87 3/09/20: C6 D1  2/12/20:CT CAP showed  1.  Redemonstration of multiple stigmata related to the patient's pancreatic  malignancy and accompanying ernesto/hepatic metastasis.   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     > Some interval improvement in the appearance of upper abdominal  retroperitoneal adenopathy with persistently abnormal/necrotic periportal and  peripancreatic lymph nodes.     2. No evidence of bowel obstruction. No free intraperitoneal gas.     3. Mild nonspecific urothelial enhancement. Correlation with urinalysis may be  helpful if symptoms of urinary tract infection are present.     4/24/20: Restaging CT CAP showed   1.  No CT findings of an acute intra-abdominal or intrapelvic obstructive or  inflammatory process. 2. Decreased size of the ill-defined infiltrative mass at the junction of the pancreatic body and tail measuring approximately 3.4 x 2.3 cm  (axial 48), previously 6.5 x 2.8 cm. Similar peripancreatic stranding with  downstream duct dilatation and atrophy of the tail. 3. Innumerable hepatic metastatic lesions, stable to mildly decreased size to the preceding CT scan of the abdomen/pelvis from 2/12/2020.  5/18/20: C10 D1 held due to thrombocytopenia  5/21/20: CT AP showed   1. Multiple hepatic metastases, all of which appears slightly smaller in size  compared to most recent CT of 4/24/2020.  Peripancreatic lymphadenopathy also  appears slightly decreased in the interval.  2. Primary pancreatic mass appears essentially unchanged. Indeterminant to what  extent peripancreatic inflammatory stranding represents a superimposed acute  pancreatitis versus inflammatory stigmata related to pancreatic cancer. 5/26/2020: C10 D1 delayed by 1 week  6/17/20: Celiac block  7/7/20: Completed 12 cycles of FOLFIRINOX  8/30/20: XR hip showed  1. Erosive lesion in the anterior right superior iliac crest suggestive of  metastatic disease. 2. Bilateral hip osteoarthritis, mild on the left and moderate on the right. No evidence for fracture    8/28/20: CT CAP showed  1. Pancreatic mass with vascular encasement appears increased size since prior Study. 2.  Increased inflammatory stranding around the distal pancreatic parenchyma may be reactive versus superimposed pancreatitis. 3.  Worsening metastatic disease evidenced by new and enlarging liver metastases, enlarging lymphadenopathy, enlarging left psoas muscle mass, and new erosive osseous metastasis in the right iliac.  10/28/20: Referred patient to home hospice on patient and family request.         Past Medical History:   Diagnosis Date    Cancer [facility])     Pancreatic Adenocarcinoma. Diagnosed 11/2019.  Diabetes ([address] 10/2019    Diagnosed 10/2019. Patient denies     Hepatitis C     Received Curative Treatment, but Hepatitis C was not cured and returned.  Left ulnar fracture     Thromboembolus ([address]     DVT of LLE 11/2019. Also, Pulmonary Embolism 11/2019. Treated with Rivaroxaban.      Past Surgical History:   Procedure Laterality Date    HX TONSILLECTOMY      IR BX PANCREAS NEEDLE PERC  11/2019    Endoscopic Pancreatic Biopsy (?)    IR INSERT TUNL CVC W PORT OVER 5 YEARS  11/25/2019     Social History     Socioeconomic History    Marital status:      Spouse name: Not on file    Number of children: Not on file    Years of education: Not on file    Highest education level: Not on file   Tobacco Use    Smoking status: Former Smoker     Packs/day: 0.50     Years: 40.00     Pack years: 20.00     Last attempt to quit: 2018     Years since quittin.9    Smokeless tobacco: Never Used    Tobacco comment: Quit 2018   Substance and Sexual Activity    Alcohol use: Not Currently    Drug use: Not Currently     Comment: Quit Cocaine, Heroin, and Marijuana since 2018    Sexual activity: Not Currently   Other Topics Concern     Family History   Problem Relation Age of Onset    Diabetes Mother     Kidney Disease Mother     Diabetes Father     Diabetes Brother     Cancer Maternal Grandmother     Cancer Maternal Grandfather     Cancer Maternal Aunt     Cancer Maternal Uncle        Current Outpatient Medications   Medication Sig Dispense Refill    Doptelet, 30 tab pack, 20 mg tab TAKE 2 TABLETS BY MOUTH DAILY FOR 15 DAYS 30 Tab 2    ondansetron hcl (ZOFRAN) 4 mg tablet take 2 tablets by mouth every 8 hours if needed for nausea and vomiting 90 Tab 1    dronabinoL (MARINOL) 5 mg capsule take 1 capsule by mouth twice a day 60 Cap 1    fentaNYL (DURAGESIC) 100 mcg/hr PATCH 1 Patch by TransDERmal route every seventy-two (72) hours for 30 days. Max Daily Amount: 1 Patch. 10 Patch 0    HYDROmorphone (DILAUDID) 4 mg tablet Take 1 Tab by mouth every three (3) hours as needed for Pain for up to 30 days. Max Daily Amount: 32 mg. 90 Tab 0    rivaroxaban (Xarelto) 20 mg tab tablet Take 1 Tab by mouth daily (with breakfast). 30 Tab 3    Omeprazole delayed release (PRILOSEC D/R) 20 mg tablet Take 1 Tab by mouth daily. 30 Tab 3    lidocaine-prilocaine (EMLA) topical cream APPLY TO AFFECTED AREA AS NEEDED FOR PAIN  (APPLY TO SKIN 30 - 60 MINUTES BEFORE MEDIPORT ACCESS) 30 g 0    True Metrix Glucose Test Strip strip       L-Mfolate-B6 Phos-Methyl-B12 (METANX) 3-35-2 mg tab tab Take 1 Tab by mouth two (2) times a day.  Indications: peripheral neuropathy 60 Tab 1    lactulose (CHRONULAC) 10 gram/15 mL solution Take 15 mL by mouth three (3) times daily. 480 mL 3    naloxone (NARCAN) 4 mg/actuation nasal spray Use 1 spray intranasally, then discard. Repeat with new spray every 2 min as needed for opioid overdose symptoms, alternating nostrils. Indications: opioid overdose 1 Each 1    insulin glargine (LANTUS U-100 INSULIN) 100 unit/mL injection 28 Units by SubCUTAneous route nightly. 1 Vial 0    insulin lispro (HUMALOG) 100 unit/mL injection 3-15 Units by SubCUTAneous route Before breakfast, lunch, and dinner. 1 Vial 0    rivaroxaban (XARELTO) 15 mg (42)- 20 mg (9) DsPk Take 20 mg by mouth daily. Take one 15 mg tablet twice a day with food for the first 21 days. Then, take one 20 mg tablet once a day with food for 9 days.  naloxone (NARCAN) 4 mg/actuation nasal spray Use 1 spray intranasally, then discard. Repeat with new spray every 2 min as needed for opioid overdose symptoms, alternating nostrils. 1 Each 1    dexAMETHasone (DECADRON) 4 mg tablet Take 4mg by mouth twice a day the day before chemotherapy and the day after chemotherapy 30 Tab 0    nut.tx.gluc.intol,lac-free,soy (GLUCERNA ADVANCE) liqd Take 237 mL by mouth three (3) times daily. 90 Bottle 5    TRUE METRIX GLUCOSE METER misc USE AS DIRECTED  0    omeprazole (PRILOSEC) 20 mg capsule TAKE 1 CAPSULE BY MOUTH ONCE DAILY  3    QUEtiapine (SEROQUEL) 50 mg tablet Take 50 mg by mouth nightly.  lidocaine (LIDODERM) 5 % Apply patch to the affected area for 12 hours a day and remove for 12 hours a day.  5 Each 0       No Known Allergies    Review of Systems  As per HPI      Objective:  Visit Vitals  BP 90/60   Pulse 68   Resp 18   Wt 85.5 kg (188 lb 9.6 oz)   SpO2 98%   BMI 25.58 kg/m²         Physical Exam:   General appearance - alert, well appearing, and in no distress  Mental status - alert, oriented to person, place, and time  EYE-ONOFRE, EOMI  ENT-ENT exam normal, no neck nodes or sinus tenderness  Mouth - mucous membranes moist, pharynx normal without lesions  Neck - supple, no significant adenopathy   Chest - clear to auscultation, no wheezes, rales or rhonchi, symmetric air entry   Heart - normal rate and regular rhythm   Abdomen - soft, nontender, nondistended, no masses or organomegaly  Lymph- no adenopathy palpable  Ext-no pedal edema noted  Skin-Warm and dry. Neuro -alert, oriented, normal speech, no focal findings or movement disorder noted    Diagnostic Imaging     No results found for this or any previous visit. Results for orders placed during the hospital encounter of 09/25/20   XR FLUORO GUIDE ASP/BX/INJ/LOC    Narrative PROCEDURE: Bilateral hip injection with fluoroscopic guidance    CLINICAL HISTORY: History of hip pain    COMPARISON: None    GUIDANCE: Fluoroscopic guidance was used to position (and confirm the position  of) the needle  Image(s) saved in PACS: Fluoroscopic    TECHNIQUE:    Informed consent was performed. Procedural timeout was performed. Sterile prep and drape.  view of the bilateral hips. Using fluoroscopic guidance a 22-gauge needle was guided into the left hip joint  using a AP approach. Gentle contrast injection confirmed intracapsular  positioning. Combination of 3 cc lidocaine, 3 cc. Bupivacaine, 80 mg Kenalog,  were injected easily without resistance. Using fluoroscopic guidance a 22-gauge needle was guided into the right hip  joint using a AP approach. Gentle contrast injection confirmed intracapsular  positioning. Combination of 3 cc lidocaine, 3 bupivacaine, 80 mg Kenalog, were  injected easily without resistance. Patient tolerated the procedure well. Needles were withdrawn and a sterile dressing applied. FINDINGS:  Contrast injection showed intracapsular positioning bilaterally. Fluoroscopic time: 0.8 minutes  Fluoroscopic dose (reference air kerma): 16.58 mGy      Impression IMPRESSION:    1. Successful fluoroscopic guided bilateral hip steroid/anesthetic injection.        Results for orders placed during the hospital encounter of 08/28/20   CT CHEST ABD PELV W CONT    Narrative EXAM: CT CHEST, ABDOMEN AND PELVIS     CLINICAL INDICATION/HISTORY: Pancreatic cancer with liver metastases, follow-up. COMPARISON: CT 5/21/2020. PET/CT 11/29/2019    TECHNIQUE: Axial CT imaging of the chest, abdomen, and pelvis was performed with  intravenous contrast. Multiplanar reformats were generated. One or more dose  reduction techniques were used on this CT: automated exposure control,  adjustment of the mAs and/or kVp according to patient size, and iterative  reconstruction techniques. The specific techniques used on this CT exam have  been documented in the patient's electronic medical record. Digital Imaging and  Communications in Medicine (DICOM) format image data are available to  nonaffiliated external healthcare facilities or entities on a secure, media  free, reciprocally searchable basis with patient authorization for at least a  12-month period after this study. ________________    FINDINGS:    LINES/DEVICES:     >  Tunneled right internal jugular venous catheter tip terminates at the  cavoatrial junction. CHEST:    LUNGS: Mild predominantly upper lobe paraseptal emphysematous changes. Mild  dependent atelectasis. Reticular opacities along the ventral left upper lobe,  likely scarring. There are faint patchy ground glass opacities in the lingula. No suspicious mass. PLEURA: Normal.    AIRWAY: Normal.    MEDIASTINUM: Normal heart size. No pericardial effusion. Great vessels are  unremarkable. LYMPH NODES: Prominent right hilar and mediastinal lymph nodes, for reference:     > Precarinal 1.2 cm lymph node (axial image 44). > Right hilar 1 cm lymph node (axial image 51). CHEST WALL: Small amount of breast tissue present bilaterally.     _______________    ABDOMEN/PELVIS:    LIVER: Numerous enhancing masses are seen throughout the liver, for reference:     > Left liver mass measures 1.9 x 1.9 cm (axial image 100), previously 1.2 x  0.9 cm.     > Inferior right liver mass measures 4.4 x 2.6 cm (axial image 110),  previously 3.3 x 1.8 cm. BILIARY: Small amount of pericholecystic fluid present. Gallbladder  unremarkable. No biliary dilation. SPLEEN: Enlarged measuring 15.8 cm craniocaudal. Heterogeneous enhancement. PANCREAS: Ill-defined pancreatic body mass measures 3.6 x 1.6 cm (axial image  117), previously 3.2 x 1.6 cm. This mass abuts the celiac artery, splenic  artery, and common hepatic artery. No definite involvement of the superior  mesenteric vein, portal vein, or SMA. Hazy ill-defined stranding/soft tissue is  seen around and extending to the SMA (axial image 120-124). There is ductal  dilation distal to this mass measuring up to 1.0 cm with mild inflammatory  stranding along the distal pancreatic body and tail. No organized fluid  collection. ADRENALS: Normal.    KIDNEYS: Subcentimeter renal lesions are too small to accurately characterize by  CT but similar in appearance to prior study. Cortical scarring is seen in both  kidneys, otherwise symmetric renal enhancement. No perinephric stranding,  radiopaque stone or hydronephrosis. GI TRACT:  Mild wall thickening throughout the esophagus, which may due to  underdistention versus reflux esophagitis. Normal caliber small and large bowel  loops. No morphology of bowel obstruction. No bowel wall thickening. Normal  appendix. BLADDER: Normal.     PELVIC ORGANS: No acute abnormality. VASCULATURE: No arterial aneurysm. Chronic splenic vein thrombosis with varices  present. LYMPH NODES: A large centrally necrotic portacaval lymph nodes, for reference:     > Portal caval 1.3 cm lymph node (axial image 119), previously 1.1 cm.    OTHER: No free intraperitoneal air. No ascites.  Asymmetrically enlarged left  psoas muscle with rim enhancing mass measuring 2.1 x 1.6 cm (axial image 142)    OSSEOUS STRUCTURES: New erosive lesion in the right iliac crest (axial image  187), likely metastatic. Diffuse degenerative changes notably in the lumbar  spine and both hips.    _______________      Impression IMPRESSION:    1. Pancreatic mass with vascular encasement appears increased size since prior  study. 2.  Increased inflammatory stranding around the distal pancreatic parenchyma may  be reactive versus superimposed pancreatitis. 3.  Worsening metastatic disease evidenced by new and enlarging liver  metastases, enlarging lymphadenopathy, enlarging left psoas muscle mass, and new  erosive osseous metastasis in the right iliac. Lab Results  Lab Results   Component Value Date/Time    WBC 6.0 10/13/2020 08:42 AM    HGB 11.6 (L) 10/13/2020 08:42 AM    HCT 35.3 (L) 10/13/2020 08:42 AM    PLATELET 961 (L) 53/10/1790 08:42 AM    MCV 93.1 10/13/2020 08:42 AM       Lab Results   Component Value Date/Time    Sodium 135 (L) 10/13/2020 08:42 AM    Potassium 4.0 10/13/2020 08:42 AM    Chloride 101 10/13/2020 08:42 AM    CO2 29 10/13/2020 08:42 AM    Anion gap 5 10/13/2020 08:42 AM    Glucose 196 (H) 10/13/2020 08:42 AM    BUN 11 10/13/2020 08:42 AM    Creatinine 0.90 10/13/2020 08:42 AM    BUN/Creatinine ratio 12 10/13/2020 08:42 AM    GFR est AA >60 10/13/2020 08:42 AM    GFR est non-AA >60 10/13/2020 08:42 AM    Calcium 8.6 10/13/2020 08:42 AM    Alk. phosphatase 122 (H) 10/13/2020 08:42 AM    Protein, total 7.1 10/13/2020 08:42 AM    Albumin 3.1 (L) 10/13/2020 08:42 AM    Globulin 4.0 10/13/2020 08:42 AM    A-G Ratio 0.8 10/13/2020 08:42 AM    ALT (SGPT) 17 10/13/2020 08:42 AM     F/U with PCP for health maintenance  Assessment/Plan:    ICD-10-CM ICD-9-CM    1. Pancreatic adenocarcinoma (HCC)  C25.9 157.9    2. Liver metastases (HCC)  C78.7 197.7    3. Pulmonary embolism without acute cor pulmonale, unspecified chronicity, unspecified pulmonary embolism type (HCC)  I26.99 415.19    4.  Epigastric pain  R10.13 789.06      No orders of the defined types were placed in this encounter. 1. Pancreatic adenocarcinoma [facility])  Had a long discussion with patient regarding his metastatic pancreatic cancer. He was referred to pain management clinic. He comes here today with family because they decided to put patient to hospice. Plan is therefore to stop chemotherapy and refer patient for hospice care. *=05 on 10/13/20    *Check monthly CA-19-9  *F/u palliative care team for pain management   *Celiac block done on 6/17/20-No more abdominal pain-to be repeated on 11/3/2020. *Stop chemotherapy as per patient and family request.      2. Liver metastases [address] [address] [facility] [address]  Reviewed CT CAP     3. Epigastric pain/Bilateral hip pain (Right>>>Left)  Epigastric pain resolved after celiac block but now bilateral hip pain. Increased dilaudid to 4mg Q 3hours  He is on 100 mcg fentanyl patch  F/U to  to radiate painful right hip-To complete on 10/02/20  Refer to pain management. Has h/o opiates abuse.   Resume chemo after RT      4. Pulmonary embolism without acute cor pulmonale, unspecified chronicity, unspecified pulmonary embolism type (HCC)  Continue Xarelto     5. Constipation: Resolved. Has lactulose if needed     6. Chemotherapy induced nausea: Continue  Zydis as needed-Add dexamethasone.      RTC 3 months    There are no Patient Instructions on file for this visit. Follow-up and Dispositions    · Return in about 3 months (around 1/28/2021).          Renu Bautista MD

## 2020-10-28 NOTE — NURSE NAVIGATOR
Saw pt in clinic with Dr. William Cervantes for f/u requesting to stop treatment and be referred to hospice. Pt referred to hospice services, RTC in 3 months. All questions answered.

## 2020-10-29 ENCOUNTER — HOSPITAL ENCOUNTER (OUTPATIENT)
Dept: RADIATION THERAPY | Age: 55
Discharge: HOME | End: 2020-10-29
Payer: MEDICAID

## 2020-10-29 PROCEDURE — 77412 RADIATION TX DELIVERY LVL 3: CPT

## 2020-10-30 ENCOUNTER — TRANSCRIBE ORDER (OUTPATIENT)
Dept: RADIATION THERAPY | Age: 55
End: 2020-10-30

## 2020-10-30 ENCOUNTER — APPOINTMENT (OUTPATIENT)
Dept: INFUSION THERAPY | Age: 55
End: 2020-10-30

## 2020-10-30 ENCOUNTER — HOSPITAL ENCOUNTER (OUTPATIENT)
Dept: RADIATION THERAPY | Age: 55
Discharge: HOME | End: 2020-10-30
Payer: MEDICAID

## 2020-10-30 PROCEDURE — 77412 RADIATION TX DELIVERY LVL 3: CPT

## 2020-11-02 ENCOUNTER — HOSPITAL ENCOUNTER (OUTPATIENT)
Dept: RADIATION THERAPY | Age: 55
Discharge: HOME | End: 2020-11-02
Payer: MEDICAID

## 2020-11-02 PROCEDURE — 77412 RADIATION TX DELIVERY LVL 3: CPT

## 2020-11-03 ENCOUNTER — TELEPHONE (OUTPATIENT)
Age: 55
End: 2020-11-03

## 2020-11-03 ENCOUNTER — HOSPITAL ENCOUNTER (OUTPATIENT)
Dept: CT IMAGING | Age: 55
Discharge: HOME | End: 2020-11-03
Attending: RADIOLOGY | Admitting: RADIOLOGY
Payer: MEDICAID

## 2020-11-03 VITALS
RESPIRATION RATE: 18 BRPM | HEIGHT: 72 IN | BODY MASS INDEX: 25.27 KG/M2 | DIASTOLIC BLOOD PRESSURE: 58 MMHG | OXYGEN SATURATION: 100 % | WEIGHT: 186.6 LBS | SYSTOLIC BLOOD PRESSURE: 88 MMHG | HEART RATE: 86 BPM | TEMPERATURE: 97.9 F

## 2020-11-03 LAB
ANION GAP SERPL CALC-SCNC: 3 MMOL/L (ref 3–18)
APTT PPP: 29.9 SEC (ref 23–36.4)
BUN SERPL-MCNC: 9 MG/DL (ref 7–18)
BUN/CREAT SERPL: 12 (ref 12–20)
CALCIUM SERPL-MCNC: 9.1 MG/DL (ref 8.5–10.1)
CHLORIDE SERPL-SCNC: 104 MMOL/L (ref 100–111)
CO2 SERPL-SCNC: 28 MMOL/L (ref 21–32)
CREAT SERPL-MCNC: 0.73 MG/DL (ref 0.6–1.3)
ERYTHROCYTE [DISTWIDTH] IN BLOOD BY AUTOMATED COUNT: 14.4 % (ref 11.6–14.5)
GLUCOSE BLD STRIP.AUTO-MCNC: 131 MG/DL (ref 70–110)
GLUCOSE SERPL-MCNC: 137 MG/DL (ref 74–99)
HCT VFR BLD AUTO: 32.9 % (ref 36–48)
HGB BLD-MCNC: 11.1 G/DL (ref 13–16)
INR PPP: 1.2 (ref 0.8–1.2)
MCH RBC QN AUTO: 30.7 PG (ref 24–34)
MCHC RBC AUTO-ENTMCNC: 33.7 G/DL (ref 31–37)
MCV RBC AUTO: 90.9 FL (ref 74–97)
PLATELET # BLD AUTO: 82 K/UL (ref 135–420)
PMV BLD AUTO: 11 FL (ref 9.2–11.8)
POTASSIUM SERPL-SCNC: 3.9 MMOL/L (ref 3.5–5.5)
PROTHROMBIN TIME: 15.1 SEC (ref 11.5–15.2)
RBC # BLD AUTO: 3.62 M/UL (ref 4.7–5.5)
SODIUM SERPL-SCNC: 135 MMOL/L (ref 136–145)
WBC # BLD AUTO: 2.3 K/UL (ref 4.6–13.2)

## 2020-11-03 PROCEDURE — 85027 COMPLETE CBC AUTOMATED: CPT

## 2020-11-03 PROCEDURE — 74011250636 HC RX REV CODE- 250/636: Performed by: RADIOLOGY

## 2020-11-03 PROCEDURE — 82962 GLUCOSE BLOOD TEST: CPT

## 2020-11-03 PROCEDURE — 85610 PROTHROMBIN TIME: CPT

## 2020-11-03 PROCEDURE — 85730 THROMBOPLASTIN TIME PARTIAL: CPT

## 2020-11-03 PROCEDURE — 80048 BASIC METABOLIC PNL TOTAL CA: CPT

## 2020-11-03 RX ORDER — SODIUM CHLORIDE 9 MG/ML
20 INJECTION, SOLUTION INTRAVENOUS CONTINUOUS
Status: DISCONTINUED | OUTPATIENT
Start: 2020-11-03 | End: 2020-11-04 | Stop reason: HOSPADM

## 2020-11-03 RX ADMIN — SODIUM CHLORIDE 20 ML/HR: 900 INJECTION, SOLUTION INTRAVENOUS at 11:09

## 2020-11-03 NOTE — PROGRESS NOTES
Due to anesthesia being unavailable, pt will be rescheduled for Fri Nov 13 at 0900. Pt's sister in law Radha was made aware of change, pre op instructions provided.

## 2020-11-03 NOTE — TELEPHONE ENCOUNTER
Brandon Leroy w/ IR called and advised that patient's Celiac Block will have to be rescheduled to 11/13/20 at 9:00am d/t problem with scheduling anesthesia.

## 2020-11-03 NOTE — PERIOP NOTES
Pre-Op Summary    Pt arrived via car with family/friend and is oriented to time, place, person and situation. Patient with steady gait with none assistive devices. Visit Vitals  BP (!) 88/58 (BP 1 Location: Left arm, BP Patient Position: Sitting)   Pulse 86   Temp 97.9 °F (36.6 °C)   Resp 18   Ht 6' (1.829 m)   Wt 84.6 kg (186 lb 9.6 oz)   SpO2 100%   BMI 25.31 kg/m²       Peripheral IV located on Left forearm. Patients belongings are locked up on [facility]. Patient's point of contact is his sister in law Radha and their contact number is: [phone]. They will be leaving and coming back. They are able to receive medication information. They will be their ride home.

## 2020-11-03 NOTE — PERIOP NOTES
Patient's blood pressure was taken several times, on both arms, and the highest reading was 88/58. Asymptomatic, alert and oriented x 4. 1/2 a bag of NS fluids was given and readings still remained with systolic in the 01'G. Art Cunha RN was notified. Case was cancelled as anesthesia is not available to be involved in the case. His sister in law Radha was called and notified by Art Cunha. Patient's IV was dc'd,. No complaints.

## 2020-11-04 ENCOUNTER — HOSPITAL ENCOUNTER (OUTPATIENT)
Dept: RADIATION THERAPY | Age: 55
Discharge: HOME | End: 2020-11-04
Payer: MEDICAID

## 2020-11-05 ENCOUNTER — HOSPITAL ENCOUNTER (OUTPATIENT)
Dept: RADIATION THERAPY | Age: 55
Discharge: HOME | End: 2020-11-05
Payer: MEDICAID

## 2020-11-05 PROCEDURE — 77412 RADIATION TX DELIVERY LVL 3: CPT

## 2020-11-06 ENCOUNTER — HOSPITAL ENCOUNTER (OUTPATIENT)
Dept: RADIATION THERAPY | Age: 55
Discharge: HOME | End: 2020-11-06
Payer: MEDICAID

## 2020-11-06 ENCOUNTER — ANESTHESIA EVENT (OUTPATIENT)
Dept: CT IMAGING | Age: 55
End: 2020-11-06
Payer: MEDICAID

## 2020-11-06 PROCEDURE — 77336 RADIATION PHYSICS CONSULT: CPT

## 2020-11-06 PROCEDURE — 77412 RADIATION TX DELIVERY LVL 3: CPT

## 2020-11-11 ENCOUNTER — APPOINTMENT (OUTPATIENT)
Dept: INFUSION THERAPY | Age: 55
End: 2020-11-11

## 2020-11-13 ENCOUNTER — HOSPITAL ENCOUNTER (OUTPATIENT)
Dept: CT IMAGING | Age: 55
Discharge: HOME | End: 2020-11-13
Attending: RADIOLOGY | Admitting: RADIOLOGY
Payer: MEDICAID

## 2020-11-13 ENCOUNTER — ANESTHESIA (OUTPATIENT)
Dept: CT IMAGING | Age: 55
End: 2020-11-13
Payer: MEDICAID

## 2020-11-13 ENCOUNTER — APPOINTMENT (OUTPATIENT)
Dept: INFUSION THERAPY | Age: 55
End: 2020-11-13

## 2020-11-13 VITALS
RESPIRATION RATE: 18 BRPM | OXYGEN SATURATION: 94 % | WEIGHT: 181.4 LBS | HEART RATE: 88 BPM | SYSTOLIC BLOOD PRESSURE: 131 MMHG | TEMPERATURE: 97.8 F | BODY MASS INDEX: 24.57 KG/M2 | HEIGHT: 72 IN | DIASTOLIC BLOOD PRESSURE: 76 MMHG

## 2020-11-13 LAB
COVID-19 RAPID TEST, COVR: NOT DETECTED
GLUCOSE BLD STRIP.AUTO-MCNC: 125 MG/DL (ref 70–110)
GLUCOSE BLD STRIP.AUTO-MCNC: 174 MG/DL (ref 70–110)
INR PPP: 1.2 (ref 0.8–1.2)
PROTHROMBIN TIME: 14.9 SEC (ref 11.5–15.2)
SOURCE, COVRS: NORMAL
SPECIMEN TYPE, XMCV1T: NORMAL

## 2020-11-13 PROCEDURE — 01991 ANES DX/THER NRV BLK&INJ OTH: CPT | Performed by: ANESTHESIOLOGY

## 2020-11-13 PROCEDURE — 82962 GLUCOSE BLOOD TEST: CPT

## 2020-11-13 PROCEDURE — 74011000636 HC RX REV CODE- 636: Performed by: RADIOLOGY

## 2020-11-13 PROCEDURE — 76060000034 HC ANESTHESIA 1.5 TO 2 HR

## 2020-11-13 PROCEDURE — 87635 SARS-COV-2 COVID-19 AMP PRB: CPT

## 2020-11-13 PROCEDURE — 74011000250 HC RX REV CODE- 250: Performed by: NURSE ANESTHETIST, CERTIFIED REGISTERED

## 2020-11-13 PROCEDURE — 74011250636 HC RX REV CODE- 250/636: Performed by: RADIOLOGY

## 2020-11-13 PROCEDURE — 74011250636 HC RX REV CODE- 250/636: Performed by: NURSE ANESTHETIST, CERTIFIED REGISTERED

## 2020-11-13 PROCEDURE — 76210000025 HC REC RM PH II 3 TO 3.5 HR

## 2020-11-13 PROCEDURE — 85610 PROTHROMBIN TIME: CPT

## 2020-11-13 PROCEDURE — 64530 N BLOCK INJ CELIAC PELUS: CPT

## 2020-11-13 PROCEDURE — 74011000636 HC RX REV CODE- 636: Performed by: NURSE ANESTHETIST, CERTIFIED REGISTERED

## 2020-11-13 PROCEDURE — 74011636637 HC RX REV CODE- 636/637: Performed by: ANESTHESIOLOGY

## 2020-11-13 PROCEDURE — 01991 ANES DX/THER NRV BLK&INJ OTH: CPT | Performed by: NURSE ANESTHETIST, CERTIFIED REGISTERED

## 2020-11-13 PROCEDURE — 74011000250 HC RX REV CODE- 250: Performed by: RADIOLOGY

## 2020-11-13 RX ORDER — INSULIN LISPRO 100 [IU]/ML
INJECTION, SOLUTION INTRAVENOUS; SUBCUTANEOUS ONCE
Status: COMPLETED | OUTPATIENT
Start: 2020-11-13 | End: 2020-11-13

## 2020-11-13 RX ORDER — FENTANYL CITRATE 50 UG/ML
25-200 INJECTION, SOLUTION INTRAMUSCULAR; INTRAVENOUS
Status: DISCONTINUED | OUTPATIENT
Start: 2020-11-13 | End: 2020-11-13 | Stop reason: HOSPADM

## 2020-11-13 RX ORDER — SODIUM CHLORIDE 9 MG/ML
20 INJECTION, SOLUTION INTRAVENOUS CONTINUOUS
Status: DISCONTINUED | OUTPATIENT
Start: 2020-11-13 | End: 2020-11-13 | Stop reason: HOSPADM

## 2020-11-13 RX ORDER — PROPOFOL 10 MG/ML
INJECTION, EMULSION INTRAVENOUS AS NEEDED
Status: DISCONTINUED | OUTPATIENT
Start: 2020-11-13 | End: 2020-11-13 | Stop reason: HOSPADM

## 2020-11-13 RX ORDER — LIDOCAINE HYDROCHLORIDE 20 MG/ML
INJECTION, SOLUTION EPIDURAL; INFILTRATION; INTRACAUDAL; PERINEURAL AS NEEDED
Status: DISCONTINUED | OUTPATIENT
Start: 2020-11-13 | End: 2020-11-13 | Stop reason: HOSPADM

## 2020-11-13 RX ORDER — MIDAZOLAM HYDROCHLORIDE 1 MG/ML
.5-4 INJECTION, SOLUTION INTRAMUSCULAR; INTRAVENOUS
Status: DISCONTINUED | OUTPATIENT
Start: 2020-11-13 | End: 2020-11-13 | Stop reason: HOSPADM

## 2020-11-13 RX ORDER — DEXTROSE MONOHYDRATE 25 G/50ML
25-50 INJECTION, SOLUTION INTRAVENOUS AS NEEDED
Status: DISCONTINUED | OUTPATIENT
Start: 2020-11-13 | End: 2020-11-13 | Stop reason: HOSPADM

## 2020-11-13 RX ORDER — ONDANSETRON 2 MG/ML
4-12 INJECTION INTRAMUSCULAR; INTRAVENOUS
Status: DISCONTINUED | OUTPATIENT
Start: 2020-11-13 | End: 2020-11-13 | Stop reason: HOSPADM

## 2020-11-13 RX ORDER — PROPOFOL 10 MG/ML
VIAL (ML) INTRAVENOUS
Status: DISCONTINUED | OUTPATIENT
Start: 2020-11-13 | End: 2020-11-13 | Stop reason: HOSPADM

## 2020-11-13 RX ORDER — LIDOCAINE HYDROCHLORIDE 10 MG/ML
1-40 INJECTION INFILTRATION; PERINEURAL
Status: DISCONTINUED | OUTPATIENT
Start: 2020-11-13 | End: 2020-11-13 | Stop reason: HOSPADM

## 2020-11-13 RX ORDER — MIDAZOLAM HYDROCHLORIDE 1 MG/ML
INJECTION, SOLUTION INTRAMUSCULAR; INTRAVENOUS AS NEEDED
Status: DISCONTINUED | OUTPATIENT
Start: 2020-11-13 | End: 2020-11-13 | Stop reason: HOSPADM

## 2020-11-13 RX ADMIN — FENTANYL CITRATE 25 MCG: 50 INJECTION, SOLUTION INTRAMUSCULAR; INTRAVENOUS at 11:38

## 2020-11-13 RX ADMIN — ONDANSETRON 4 MG: 2 SOLUTION INTRAMUSCULAR; INTRAVENOUS at 11:35

## 2020-11-13 RX ADMIN — PROPOFOL 50 MG: 10 INJECTION, EMULSION INTRAVENOUS at 10:56

## 2020-11-13 RX ADMIN — MIDAZOLAM 2 MG: 1 INJECTION INTRAMUSCULAR; INTRAVENOUS at 10:48

## 2020-11-13 RX ADMIN — ONDANSETRON 4 MG: 2 SOLUTION INTRAMUSCULAR; INTRAVENOUS at 12:07

## 2020-11-13 RX ADMIN — LIDOCAINE HYDROCHLORIDE 60 MG: 20 INJECTION, SOLUTION EPIDURAL; INFILTRATION; INTRACAUDAL; PERINEURAL at 10:50

## 2020-11-13 RX ADMIN — INSULIN LISPRO 3 UNITS: 100 INJECTION, SOLUTION INTRAVENOUS; SUBCUTANEOUS at 08:34

## 2020-11-13 RX ADMIN — IOPAMIDOL 2 ML: 612 INJECTION, SOLUTION INTRAVENOUS at 12:07

## 2020-11-13 RX ADMIN — ALCOHOL 40 ML: 0.98 INJECTION INTRASPINAL at 12:17

## 2020-11-13 RX ADMIN — FENTANYL CITRATE 25 MCG: 50 INJECTION, SOLUTION INTRAMUSCULAR; INTRAVENOUS at 11:35

## 2020-11-13 RX ADMIN — ONDANSETRON 4 MG: 2 SOLUTION INTRAMUSCULAR; INTRAVENOUS at 10:51

## 2020-11-13 RX ADMIN — SODIUM CHLORIDE 20 ML/HR: 900 INJECTION, SOLUTION INTRAVENOUS at 08:26

## 2020-11-13 RX ADMIN — FENTANYL CITRATE 50 MCG: 50 INJECTION, SOLUTION INTRAMUSCULAR; INTRAVENOUS at 11:43

## 2020-11-13 RX ADMIN — LIDOCAINE HYDROCHLORIDE 20 ML: 10 INJECTION, SOLUTION INFILTRATION; PERINEURAL at 12:07

## 2020-11-13 RX ADMIN — PROPOFOL 75 MCG/KG/MIN: 10 INJECTION, EMULSION INTRAVENOUS at 10:56

## 2020-11-13 RX ADMIN — PROPOFOL 40 MG: 10 INJECTION, EMULSION INTRAVENOUS at 11:13

## 2020-11-13 RX ADMIN — IOPAMIDOL 20 ML: 612 INJECTION, SOLUTION INTRAVENOUS at 12:32

## 2020-11-13 NOTE — H&P
The patient is an appropriate candidate to undergo celiac neurolysis. Patient assessed immediately prior to induction. Anesthesia plan as follows:   MAC. History and Physical update:  H&P was reviewed and the patient was examined. No changes have occurred in the patient's condition since the H&P was completed.     Jerardo Matias MD  Vascular & Interventional Radiology  [facility]  11/13/2020

## 2020-11-13 NOTE — PROGRESS NOTES
Pt educated about procedure, verbalizes understanding. Pt states it has been \"3 to 4 days\" since his last dose of Xarelto. Labs reviewed. Will continue to monitor.

## 2020-11-13 NOTE — PERIOP NOTES
Pre-Op Summary    Pt arrived via car with family/friend and is oriented to time, place, person and situation. Patient with steady gait with none assistive devices. Visit Vitals  /78 (BP 1 Location: Left arm, BP Patient Position: Sitting)   Pulse 84   Temp 98.3 °F (36.8 °C)   Resp 18   Ht 6' (1.829 m)   Wt 82.3 kg (181 lb 6.4 oz)   SpO2 99%   BMI 24.60 kg/m²       Peripheral IV located on Right forearm. Patients belongings are located at [facility]. Patient's point of contact is Rosemarie Isidro, friend and their contact number is: [phone]. They will be called for ride home. They are able to receive medication information. They will be their ride home.

## 2020-11-13 NOTE — PROCEDURES
Vascular & Interventional Radiology Brief Procedure Note    Interventional Radiologist: Lion Yip MD    Pre-operative Diagnosis:  Celiac neurolysis    Post-operative Diagnosis: Same as pre-op dx    Procedure(s) Performed:  same    Anesthesia:  Local and Moderate Sedation    Findings:  Uncomplicated CT guided celiac neurolysis.      Complications: None    Estimated Blood Loss:  minimal    Tubes and Drains: None    Specimens: None    Condition: Good       Lion Yip MD  [facility]  Vascular & Interventional Radiology  11/13/2020

## 2020-11-13 NOTE — PERIOP NOTES
Phase 2 Recovery Summary    Report received from [address]:    11/13/20 1330 11/13/20 1400 11/13/20 1430 11/13/20 1500   BP: 111/70 126/78 130/82 131/76   Pulse:       Resp:       Temp:       SpO2: 93% 94%     Weight:       Height:           oriented to time, place, person and situation          Lines and Drains  Peripheral Intravenous Line: Removed prior to discharge   Peripheral IV 11/13/20 Right Arm (Active)   Site Assessment Clean, dry, & intact 11/13/20 0824   Phlebitis Assessment 0 11/13/20 0824   Infiltration Assessment 0 11/13/20 0824   Dressing Status Clean, dry, & intact 11/13/20 0824   Dressing Type Transparent;Tape 11/13/20 0824   Hub Color/Line Status Pink; Infusing 11/13/20 0824   Alcohol Cap Used Yes 11/13/20 0824       Wound  Wound Chest Right RIGHT SUBCLAVIAN (Active)   Number of days: 354        Patient assisted to chair with minimal assist. Pateint tolerating liquids well. Patient's family at bedside. Discharge discussed with patient and family. No questions or concerns at this time. Patient had time to ask any questions. Pain at 2/10. No pain meds given at this time. Two Dressings to mid back C/D/I. Discharge medications reviewed with patient and appropriate educational materials and side effects teaching were provided. Patient discharged to home with Herminia Mckinnon without incident.        Jono Rivas RN

## 2020-11-13 NOTE — ANESTHESIA PREPROCEDURE EVALUATION
Relevant Problems   No relevant active problems       Anesthetic History   No history of anesthetic complications            Review of Systems / Medical History  Patient summary reviewed and pertinent labs reviewed    Pulmonary          Smoker         Neuro/Psych   Within defined limits           Cardiovascular                       GI/Hepatic/Renal           Liver disease     Endo/Other    Diabetes: type 2    Anemia     Other Findings   Comments: Chronic pain           Physical Exam    Airway  Mallampati: II  TM Distance: 4 - 6 cm  Neck ROM: normal range of motion   Mouth opening: Diminished (comment)     Cardiovascular    Rhythm: regular  Rate: normal         Dental    Dentition: Edentulous     Pulmonary  Breath sounds clear to auscultation               Abdominal  GI exam deferred       Other Findings            Anesthetic Plan    ASA: 3  Anesthesia type: MAC            Anesthetic plan and risks discussed with: Patient

## 2020-11-13 NOTE — PROGRESS NOTES
TRANSFER - OUT REPORT:    Verbal report given to Summer SALAMANCA RN(name) on Jorge Luis Rivas  being transferred to [facility] Phase 2 recovery per CRNA's recommendations(unit) for routine progression of care       Report consisted of patients Situation, Background, Assessment and   Recommendations(SBAR). Information from the following report(s) SBAR, Procedure Summary and MAR was reviewed with the receiving nurse. Lines:   Venous Access Device Mediport 11/25/19 Upper chest (subclavicular area, right (Active)       Peripheral IV 11/13/20 Right Arm (Active)   Site Assessment Clean, dry, & intact 11/13/20 0824   Phlebitis Assessment 0 11/13/20 0824   Infiltration Assessment 0 11/13/20 0824   Dressing Status Clean, dry, & intact 11/13/20 0824   Dressing Type Transparent;Tape 11/13/20 0824   Hub Color/Line Status Pink; Infusing 11/13/20 0824   Alcohol Cap Used Yes 11/13/20 0824        4x4 and tegaderms applied to both sites    Opportunity for questions and clarification was provided.       Patient transported with:   Registered Nurse (CRNA and Radiology RN) negative

## 2020-11-13 NOTE — ANESTHESIA POSTPROCEDURE EVALUATION
* No procedures listed *. MAC    Anesthesia Post Evaluation      Multimodal analgesia: multimodal analgesia used between 6 hours prior to anesthesia start to PACU discharge  Patient location during evaluation: bedside  Patient participation: complete - patient participated  Level of consciousness: awake  Pain management: adequate  Airway patency: patent  Anesthetic complications: no  Cardiovascular status: stable  Respiratory status: acceptable  Hydration status: acceptable  Post anesthesia nausea and vomiting:  controlled      INITIAL Post-op Vital signs:   Vitals Value Taken Time   /76 11/13/2020  3:00 PM   Temp     Pulse     Resp     SpO2     Vitals shown include unvalidated device data.

## 2020-11-13 NOTE — DISCHARGE INSTRUCTIONS
Patient Education     DISCHARGE SUMMARY from Nurse    PATIENT INSTRUCTIONS:    After general anesthesia or intravenous sedation, for 24 hours or while taking prescription Narcotics:  · Limit your activities  · Do not drive and operate hazardous machinery  · Do not make important personal or business decisions  · Do  not drink alcoholic beverages  · If you have not urinated within 8 hours after discharge, please contact your surgeon on call. Report the following to your surgeon:  · Excessive pain, swelling, redness or odor of or around the surgical area  · Temperature over 100.5  · Nausea and vomiting lasting longer than 4 hours or if unable to take medications  · Any signs of decreased circulation or nerve impairment to extremity: change in color, persistent  numbness, tingling, coldness or increase pain  · Any questions    What to do at Home:  Recommended activity: Activity as tolerated and no driving for today,     IThese are general instructions for a healthy lifestyle:    No smoking/ No tobacco products/ Avoid exposure to second hand smoke  Surgeon General's Warning:  Quitting smoking now greatly reduces serious risk to your health. Obesity, smoking, and sedentary lifestyle greatly increases your risk for illness    A healthy diet, regular physical exercise & weight monitoring are important for maintaining a healthy lifestyle    You may be retaining fluid if you have a history of heart failure or if you experience any of the following symptoms:  Weight gain of 3 pounds or more overnight or 5 pounds in a week, increased swelling in our hands or feet or shortness of breath while lying flat in bed. Please call your doctor as soon as you notice any of these symptoms; do not wait until your next office visit. The discharge information has been reviewed with the patient. The patient verbalized understanding.   Discharge medications reviewed with the patient and appropriate educational materials and side effects teaching were provided. Patient armband removed and shredded  ___________________________________________________________________________________________________________________________________Learning About Coronavirus (COVID-19)  Coronavirus (COVID-19): Overview  What is coronavirus ([id number])? The coronavirus disease (COVID-19) is caused by a virus. It is an illness that was first found in [place], [place], in December 2019. It has since spread worldwide. The virus can cause fever, cough, and trouble breathing. In severe cases, it can cause pneumonia and make it hard to breathe without help. It can cause death. Coronaviruses are a large group of viruses. They cause the common cold. They also cause more serious illnesses like Middle East respiratory syndrome (MERS) and severe acute respiratory syndrome (SARS). COVID-19 is caused by a novel coronavirus. That means it's a new type that has not been seen in people before. This virus spreads person-to-person through droplets from coughing and sneezing. It can also spread when you are close to someone who is infected. And it can spread when you touch something that has the virus on it, such as a doorknob or a tabletop. What can you do to protect yourself from coronavirus (COVID-19)? The best way to protect yourself from getting sick is to:  · Avoid areas where there is an outbreak. · Avoid contact with people who may be infected. · Wash your hands often with soap or alcohol-based hand sanitizers. · Avoid crowds and try to stay at least 6 feet away from other people. · Wash your hands often, especially after you cough or sneeze. Use soap and water, and scrub for at least 20 seconds. If soap and water aren't available, use an alcohol-based hand . · Avoid touching your mouth, nose, and eyes. What can you do to avoid spreading the virus to others?   To help avoid spreading the virus to others:  · Cover your mouth with a tissue when you cough or sneeze. Then throw the tissue in the trash. · Use a disinfectant to clean things that you touch often. · Stay home if you are sick or have been exposed to the virus. Don't go to school, work, or public areas. And don't use public transportation. · If you are sick:  ? Leave your home only if you need to get medical care. But call the doctor's office first so they know you're coming. And wear a face mask, if you have one.  ? If you have a face mask, wear it whenever you're around other people. It can help stop the spread of the virus when you cough or sneeze. ? Clean and disinfect your home every day. Use household  and disinfectant wipes or sprays. Take special care to clean things that you grab with your hands. These include doorknobs, remote controls, phones, and handles on your refrigerator and microwave. And don't forget countertops, tabletops, bathrooms, and computer keyboards. When to call for help  Call 911 anytime you think you may need emergency care. For example, call if:  · You have severe trouble breathing. (You can't talk at all.)  · You have constant chest pain or pressure. · You are severely dizzy or lightheaded. · You are confused or can't think clearly. · Your face and lips have a blue color. · You pass out (lose consciousness) or are very hard to wake up. Call your doctor now if you develop symptoms such as:  · Shortness of breath. · Fever. · Cough. If you need to get care, call ahead to the doctor's office for instructions before you go. Make sure you wear a face mask, if you have one, to prevent exposing other people to the virus. Where can you get the latest information? The following health organizations are tracking and studying this virus. Their websites contain the most up-to-date information. [place] also learn what to do if you think you may have been exposed to the virus. · U.S. Centers for Disease Control and Prevention (CDC):  The CDC provides updated news about the disease and travel advice. The website also tells you how to prevent the spread of infection. www.cdc.gov  · World Health Organization [facility]): WHO offers information about the virus outbreaks. WHO also has travel advice. www.who.int  Current as of: April 1, 2020               Content Version: 12.4  © 7507-9721 [employer]. Care instructions adapted under license by your healthcare professional. If you have questions about a medical condition or this instruction, always ask your healthcare professional. [address] any warranty or liability for your use of this information.

## 2021-09-17 NOTE — NURSE NAVIGATOR
Saw pt in clinic with  for f/u c/o cancer related pain, N & V. Pain meds adjusted, chem held x 1 week due to thrombocytopenia, avatrombopag ordered. Referred to palliative care for symptom management. RTC in 2 weeks, Labs today. All questions answered. PAST MEDICAL HISTORY:  History of genital warts     Hypertension diagnosed 1 year ago    Kidney stone 5 years ago

## 2021-11-14 NOTE — PROGRESS NOTES
Patient is here today for his pre-chemo labs. He reports that his urine is orange-red in color yesterday. He said he might have blood in his urine. Urine was obtained today. It is dark yellow in color and clear. Urine was sent for testing - U/A with culture and sensitivity. Patient denies pain, burning and urinary urgency. He will be informed of abnormal U/A result. No

## 2023-05-01 NOTE — ANESTHESIA POSTPROCEDURE EVALUATION
Procedure(s):  right subclavian vein powerport placement. MAC    Anesthesia Post Evaluation      Multimodal analgesia: multimodal analgesia used between 6 hours prior to anesthesia start to PACU discharge  Patient location during evaluation: bedside  Patient participation: complete - patient participated  Level of consciousness: awake  Pain score: 1  Pain management: adequate  Airway patency: patent  Anesthetic complications: no  Cardiovascular status: stable  Respiratory status: acceptable  Hydration status: acceptable  Post anesthesia nausea and vomiting:  controlled      No vitals data found for the desired time range. Initial Size Of Lesion: 2.3

## 2023-08-06 NOTE — PROGRESS NOTES
conducted an initial consultation and Spiritual Assessment for Romi Brasher, who is a 62 y.o.,male. Patients Primary Language is: [place]. According to the patients EMR Muslim Affiliation is: Restorationist.     The reason the Patient came to the hospital is:   Patient Active Problem List    Diagnosis Date Noted    Pancreatic cancer ([facility] 11/25/2019    Diabetes mellitus, new onset ([address] 11/25/2019    Hepatitis C 11/25/2019    Anemia 11/25/2019    Pancreatic adenocarcinoma ([facility] 11/19/2019    Liver metastases ([address] 11/19/2019    Epigastric pain 11/19/2019    Pulmonary embolism without acute cor pulmonale ([facility] 11/19/2019    Thrombocytopenia ([facility] 11/19/2019    Pancreatic mass 11/08/2019    Liver metastasis ([facility] 11/08/2019    Abdominal pain 11/08/2019        The  provided the following Interventions:  Initiated a relationship of care and support. Explored issues of shanell, spirituality and/or Yarsanism needs while hospitalized. Listened empathically. Provided chaplaincy education. Provided information about Spiritual Care Services. Offered prayer and assurance of continued prayers on patient's behalf. Chart reviewed. The following outcomes were achieved:  Patient shared some information about their medical narrative and spiritual journey/beliefs. Patient processed feeling about current hospitalization. Patient expressed gratitude for the 's visit. Assessment:  Patient did not indicate any spiritual or Yarsanism issues which require Spiritual Care Services interventions at this time. Patient does not have any Yarsanism/cultural needs that will affect patients preferences in health care. Plan:  Chaplains will continue to follow and will provide pastoral care on an as needed or requested basis.  recommends bedside caregivers page  on duty if patient shows signs of acute spiritual or emotional distress.     [address] [facility]   Staff [address] [facility]   [phone] No

## (undated) DEVICE — SUT PROL 2-0 30IN CT2 BLU --

## (undated) DEVICE — TUBING, SUCTION, 3/16" X 6', STRAIGHT: Brand: MEDLINE

## (undated) DEVICE — 3M™ TEGADERM™ TRANSPARENT FILM DRESSING FRAME STYLE, 1626W, 4 IN X 4-3/4 IN (10 CM X 12 CM), 50/CT 4CT/CASE: Brand: 3M™ TEGADERM™

## (undated) DEVICE — SYR 10ML LUER LOK 1/5ML GRAD --

## (undated) DEVICE — NEEDLE HYPO 22GA L1 1/2IN PIVOTING SHLD FOR LUERLOCK SYR

## (undated) DEVICE — 3M™ BAIR PAWS FLEX™ WARMING GOWN, STANDARD, 20 PER CASE 81003: Brand: BAIR PAWS™

## (undated) DEVICE — BLOCK BITE BLOX W DENTAL RIM --

## (undated) DEVICE — BASIN EMESIS 500CC ROSE 250/CS 60/PLT: Brand: MEDEGEN MEDICAL PRODUCTS, LLC

## (undated) DEVICE — PACK PROCEDURE SURG MAJ W/ BASIN LF

## (undated) DEVICE — Device: Brand: BALLOON3

## (undated) DEVICE — 3M™ TEGADERM™ TRANSPARENT FILM DRESSING FRAME STYLE, 1626, 4 IN X 4-3/4 IN (10 CM X 12 CM), 50/CT 4CT/CASE: Brand: 3M™ TEGADERM™

## (undated) DEVICE — DRAPE XR C ARM MOB SURG 44X72 --

## (undated) DEVICE — REM POLYHESIVE ADULT PATIENT RETURN ELECTRODE: Brand: VALLEYLAB

## (undated) DEVICE — COVER US PRB W15XL120CM W/ GEL RUBBERBAND TAPE STRP FLD GEN

## (undated) DEVICE — SYR 50ML SLIP TIP NSAF LF STRL --

## (undated) DEVICE — SUTURE VCRL SZ 3-0 L27IN ABSRB UD L26MM SH 1/2 CIR J416H

## (undated) DEVICE — ENDOSCOPIC ULTRASOUND ASPIRATION NEEDLE: Brand: EXPECT SLIMLINE SL

## (undated) DEVICE — Device: Brand: DEFENDO VALVE AND CONNECTOR KIT

## (undated) DEVICE — TOWEL SURG W16XL26IN BLU NONFENESTRATED DLX ST 2 PER PK

## (undated) DEVICE — SYR 5ML 1/5 GRAD LL NSAF LF --

## (undated) DEVICE — KIT COLON W/ 1.1OZ LUB AND 2 END

## (undated) DEVICE — HEX-LOCKING BLADE ELECTRODE: Brand: EDGE

## (undated) DEVICE — SUTURE MCRYL SZ 4-0 L27IN ABSRB UD L24MM PS-1 3/8 CIR PRIM Y935H